# Patient Record
Sex: FEMALE | Race: ASIAN | Employment: FULL TIME | URBAN - METROPOLITAN AREA
[De-identification: names, ages, dates, MRNs, and addresses within clinical notes are randomized per-mention and may not be internally consistent; named-entity substitution may affect disease eponyms.]

---

## 2017-01-03 DIAGNOSIS — Q34.8 PCD (PRIMARY CILIARY DYSKINESIA): ICD-10-CM

## 2017-01-03 DIAGNOSIS — Q89.3 KARTAGENER'S SYNDROME: Primary | Chronic | ICD-10-CM

## 2017-01-05 DIAGNOSIS — Q89.3 KARTAGENER'S SYNDROME: Primary | Chronic | ICD-10-CM

## 2017-01-05 DIAGNOSIS — Q34.8 PCD (PRIMARY CILIARY DYSKINESIA): ICD-10-CM

## 2017-01-10 ENCOUNTER — TELEPHONE (OUTPATIENT)
Dept: PULMONOLOGY | Facility: CLINIC | Age: 42
End: 2017-01-10

## 2017-01-10 NOTE — TELEPHONE ENCOUNTER
Prior Authorization Retail Medication Request  Medication/Dose: Key: [id number]  Cayston 75 mg tid, 28 days on, 28 days off.  Diagnosis and ICD code: bronchiectasis  J47.9  New/Renewal/Insurance Change PA: renewal  Previously Tried and Failed Therapies: check chart    Insurance ID (if provided): not provided  Insurance Phone (if provided): not provided    Any additional info from fax request:     If you received a fax notification from an outside Pharmacy:  Pharmacy Name:[facility]  Pharmacy #:[phone]  Pharmacy Fax:[phone]

## 2017-01-18 NOTE — TELEPHONE ENCOUNTER
[facility] Prior Authorization Team   Phone: [phone]  Fax: [phone]    PA Initiation    Medication: Cayston 75 mg tid, 28 days on, 28 days off.  Insurance Company: [employer] - Phone [phone] Fax [phone]  Pharmacy Filling the Rx: [facility] - [place] [id number]  Filling Pharmacy Phone: [phone]  Filling Pharmacy Fax: [phone]  Start Date: 1/18/2017

## 2017-01-19 NOTE — TELEPHONE ENCOUNTER
Writer received the below [facility] question set via fax. Manually faxed completed [facility] to [facility] marked urgent. Included CF culture report from 04/19/16

## 2017-01-20 NOTE — TELEPHONE ENCOUNTER
PRIOR AUTHORIZATION DENIED    Medication: Cayston 75 mg tid, 28 days on, 28 days off - Denied    Denial Date: 1/19/2017    Denial Rational: Patient does not have diagnosis of Cystic Fibrosis.    Appeal Information: Appeals can be faxed to [employer] at [phone]. Please let us know if you would like to appeal this denial. If appealing, please provide a letter of medical necessity.

## 2017-02-16 ENCOUNTER — OFFICE VISIT (OUTPATIENT)
Dept: PULMONOLOGY | Facility: CLINIC | Age: 42
End: 2017-02-16
Attending: INTERNAL MEDICINE
Payer: COMMERCIAL

## 2017-02-16 VITALS
WEIGHT: 140 LBS | OXYGEN SATURATION: 96 % | HEART RATE: 88 BPM | RESPIRATION RATE: 16 BRPM | BODY MASS INDEX: 23.9 KG/M2 | TEMPERATURE: 98.6 F | DIASTOLIC BLOOD PRESSURE: 79 MMHG | SYSTOLIC BLOOD PRESSURE: 117 MMHG | HEIGHT: 64 IN

## 2017-02-16 DIAGNOSIS — Q34.8 PCD (PRIMARY CILIARY DYSKINESIA): Primary | ICD-10-CM

## 2017-02-16 DIAGNOSIS — Q89.3 KARTAGENER'S SYNDROME: ICD-10-CM

## 2017-02-16 DIAGNOSIS — Q89.3 KARTAGENER SYNDROME: ICD-10-CM

## 2017-02-16 LAB
EXPTIME-PRE: 10.27 SEC
FEF2575-%PRED-PRE: 21 %
FEF2575-PRE: 0.62 L/SEC
FEF2575-PRED: 2.96 L/SEC
FEFMAX-%PRED-PRE: 69 %
FEFMAX-PRE: 4.78 L/SEC
FEFMAX-PRED: 6.9 L/SEC
FEV1-%PRED-PRE: 54 %
FEV1-PRE: 1.49 L
FEV1FEV6-PRE: 62 %
FEV1FEV6-PRED: 84 %
FEV1FVC-PRE: 59 %
FEV1FVC-PRED: 83 %
FIFMAX-PRE: 4.45 L/SEC
FVC-%PRED-PRE: 76 %
FVC-PRE: 2.54 L
FVC-PRED: 3.33 L
GRAM STN SPEC: ABNORMAL
Lab: ABNORMAL
MICRO REPORT STATUS: ABNORMAL
SPECIMEN SOURCE: ABNORMAL

## 2017-02-16 PROCEDURE — 87077 CULTURE AEROBIC IDENTIFY: CPT | Performed by: INTERNAL MEDICINE

## 2017-02-16 PROCEDURE — 87186 SC STD MICRODIL/AGAR DIL: CPT | Performed by: INTERNAL MEDICINE

## 2017-02-16 PROCEDURE — 87070 CULTURE OTHR SPECIMN AEROBIC: CPT | Performed by: INTERNAL MEDICINE

## 2017-02-16 PROCEDURE — 99212 OFFICE O/P EST SF 10 MIN: CPT | Mod: 25,ZF

## 2017-02-16 PROCEDURE — 87205 SMEAR GRAM STAIN: CPT | Performed by: INTERNAL MEDICINE

## 2017-02-16 PROCEDURE — 94375 RESPIRATORY FLOW VOLUME LOOP: CPT | Mod: ZF | Performed by: INTERNAL MEDICINE

## 2017-02-16 RX ORDER — ALBUTEROL SULFATE 90 UG/1
2 AEROSOL, METERED RESPIRATORY (INHALATION) EVERY 6 HOURS PRN
Qty: 1 INHALER | Refills: 12 | Status: SHIPPED | OUTPATIENT
Start: 2017-02-16 | End: 2019-09-11

## 2017-02-16 RX ORDER — BUDESONIDE AND FORMOTEROL FUMARATE DIHYDRATE 160; 4.5 UG/1; UG/1
2 AEROSOL RESPIRATORY (INHALATION) 2 TIMES DAILY
Qty: 1 INHALER | Refills: 12 | Status: SHIPPED | OUTPATIENT
Start: 2017-02-16 | End: 2018-08-07

## 2017-02-16 ASSESSMENT — ENCOUNTER SYMPTOMS
DOUBLE VISION: 0
BRUISES/BLEEDS EASILY: 0
INSOMNIA: 0
HOARSE VOICE: 0
SWOLLEN GLANDS: 0
PALPITATIONS: 0
TASTE DISTURBANCE: 0
BOWEL INCONTINENCE: 0
TACHYCARDIA: 0
CLAUDICATION: 0
HEARTBURN: 0
NECK PAIN: 0
TROUBLE SWALLOWING: 0
POOR WOUND HEALING: 0
ARTHRALGIAS: 0
DIFFICULTY URINATING: 0
LOSS OF CONSCIOUSNESS: 0
HALLUCINATIONS: 0
FATIGUE: 0
FLANK PAIN: 0
NERVOUS/ANXIOUS: 0
SPUTUM PRODUCTION: 1
NUMBNESS: 0
RECTAL PAIN: 0
HOT FLASHES: 0
HEADACHES: 0
EXERCISE INTOLERANCE: 0
EYE IRRITATION: 0
DYSURIA: 0
DIZZINESS: 0
WEIGHT GAIN: 0
DIARRHEA: 0
CONSTIPATION: 0
SYNCOPE: 0
BREAST MASS: 0
HYPOTENSION: 0
RESPIRATORY PAIN: 0
COUGH DISTURBING SLEEP: 0
HYPERTENSION: 0
SLEEP DISTURBANCES DUE TO BREATHING: 0
COUGH: 1
FEVER: 0
JOINT SWELLING: 0
MEMORY LOSS: 0
ORTHOPNEA: 0
NAIL CHANGES: 0
JAUNDICE: 0
ABDOMINAL PAIN: 0
MYALGIAS: 0
MUSCLE WEAKNESS: 0
NIGHT SWEATS: 0
SHORTNESS OF BREATH: 0
POSTURAL DYSPNEA: 0
CHILLS: 0
TINGLING: 0
STIFFNESS: 0
PANIC: 0
WEIGHT LOSS: 0
MUSCLE CRAMPS: 0
SINUS PAIN: 0
VOMITING: 0
BLOATING: 0
WHEEZING: 1
LEG SWELLING: 0
DYSPNEA ON EXERTION: 1
INCREASED ENERGY: 0
NAUSEA: 0
SEIZURES: 0
SMELL DISTURBANCE: 0
HEMOPTYSIS: 0
DECREASED LIBIDO: 0
ALTERED TEMPERATURE REGULATION: 0
LIGHT-HEADEDNESS: 0
EYE PAIN: 0
DECREASED APPETITE: 0
POLYPHAGIA: 0
SPEECH CHANGE: 0
WEAKNESS: 0
DEPRESSION: 0
SNORES LOUDLY: 0
EYE WATERING: 0
DECREASED CONCENTRATION: 0
TREMORS: 0
BLOOD IN STOOL: 0
SKIN CHANGES: 0
SORE THROAT: 0
LEG PAIN: 0
HEMATURIA: 0
PARALYSIS: 0
EYE REDNESS: 0
POLYDIPSIA: 0
SINUS CONGESTION: 0
NECK MASS: 0
BREAST PAIN: 0
RECTAL BLEEDING: 0
BACK PAIN: 0
DISTURBANCES IN COORDINATION: 0
EXTREMITY NUMBNESS: 0

## 2017-02-16 ASSESSMENT — PAIN SCALES - GENERAL: PAINLEVEL: NO PAIN (0)

## 2017-02-16 NOTE — MR AVS SNAPSHOT
After Visit Summary   2/16/2017    Armando Dc    MRN: [id number]           Patient Information     Date Of Birth          1975        Visit Information        Provider Department      2/16/2017 2:20 PM Lyn Hernández MD [facility]        Today's Diagnoses     PCD (primary ciliary dyskinesia)    -  1    Kartagener syndrome        Kartagener's syndrome          Care Instructions    Self-Care Plan       MRN: [id number]   Clinic Date: February 16, 2017   Patient: Armando Dc       Pulmonary Function Tests  FEV1: amount of air you can blow out in 1 second  FVC: total amount of air you can take in and blow out    Your Goals:         PFT Latest Ref Rng & Units 2/16/2017   FVC L 2.54   FEV1 L 1.49   FVC% % 76   FEV1% % 54          Airway Clearance: The Most Important Way to Keep Your Lungs Healthy  Vest Settings:    Hill-Rom Frequencies: 8, 9, 10 Pressure 10 Then, Frequencies 18, 19, 20 Pressure 6      RespirTech: Quick Start with Pressure of     Do each frequency for 5 minutes; Deflate vest after each frequency & cough 3 times before beginning the next setting.    Vest and Neb Therapy should be done 2 times/day.        Wt Readings from Last 3 Encounters:   02/16/17 63.5 kg (140 lb)   09/20/16 63.1 kg (139 lb 1.8 oz)   08/08/16 61.7 kg (136 lb)       Body mass index is 24.33 kg/(m^2).       Last A1C Results:   Hemoglobin A1C   Date Value Ref Range Status   09/20/2016 5.8 4.3 - 6.0 % Final         If diabetic, measure A1C every 6 months. Goal: Under 7%         RECOMMENDATIONS: Things look good.  Keep doing what you are doing.  Fasting lipid panel before next visit.    YOUR GOAL:  Stay well.      CF Nurse Line:  Tami Lao: [phone]     Sybil Peng RT: [phone]                    Follow-ups after your visit        Follow-up notes from your care team     Return in about 3 months (around 5/16/2017) for fasting lipid panel before next  visit..      Your next 10 appointments already scheduled     May 16, 2017 10:40 AM CDT   (Arrive by 10:25 AM)   RETURN CYSTIC FIBROSIS VISIT with Lyn Hernández MD   [facility] ([facility])    [address] [id number] [phone]              Future tests that were ordered for you today     Open Future Orders        Priority Expected Expires Ordered    Sputum Culture Aerobic Bacterial Routine  3/18/2017 2/16/2017    Gram stain Routine  4/17/2017 2/16/2017    Lipid panel reflex to direct LDL Routine 5/16/2017 2/16/2018 2/16/2017            Who to contact     If you have questions or need follow up information about today's clinic visit or your schedule please contact [facility] directly at [phone].  Normal or non-critical lab and imaging results will be communicated to you by MyChart, letter or phone within 4 business days after the clinic has received the results. If you do not hear from us within 7 days, please contact the clinic through WebLinchart or phone. If you have a critical or abnormal lab result, we will notify you by phone as soon as possible.  Submit refill requests through Mixbook or call your pharmacy and they will forward the refill request to us. Please allow 3 business days for your refill to be completed.          Additional Information About Your Visit        MyChart Information     Mixbook gives you secure access to your electronic health record. If you see a primary care provider, you can also send messages to your care team and make appointments. If you have questions, please call your primary care clinic.  If you do not have a primary care provider, please call [phone] and they will assist you.        Care EveryWhere ID     This is your Care EveryWhere ID. This could be used by other organizations to access your [facility]  "records  [id number]        Your Vitals Were     Pulse Temperature Respirations Height Pulse Oximetry BMI (Body Mass Index)    88 98.6  F (37  C) (Oral) 16 1.615 m (5' 3.6\") 96% 24.33 kg/m2       Blood Pressure from Last 3 Encounters:   02/16/17 117/79   09/20/16 113/76   08/08/16 128/90    Weight from Last 3 Encounters:   02/16/17 63.5 kg (140 lb)   09/20/16 63.1 kg (139 lb 1.8 oz)   08/08/16 61.7 kg (136 lb)              We Performed the Following     RESPIRATORY FLOW VOLUME LOOP          Today's Medication Changes          These changes are accurate as of: 2/16/17  3:07 PM.  If you have any questions, ask your nurse or doctor.               Stop taking these medicines if you haven't already. Please contact your care team if you have questions.     aztreonam 200 MG/ML   Commonly known as:  AZACTAM   Stopped by:  Lyn Hernández MD           aztreonam lysine 75 MG Solr   Commonly known as:  CAYSTON   Stopped by:  Lyn Hernández MD           tobramycin (PF) 300 MG/5ML neb solution   Commonly known as:  ALEK   Stopped by:  Lyn Hernández MD                Where to get your medicines      These medications were sent to [facility] - [address]  [address] [place] [id number]    Hours:  Mon-Fri 8:30am-5:00pm Toll Free [phone] Phone:  [phone]     albuterol 108 (90 BASE) MCG/ACT Inhaler    budesonide-formoterol 160-4.5 MCG/ACT Inhaler                Primary Care Provider    None Specified       No primary provider on file.        Thank you!     Thank you for choosing [facility]  for your care. Our goal is always to provide you with excellent care. Hearing back from our patients is one way we can continue to improve our services. Please take a few minutes to complete the written survey that you may receive in the mail after your visit with us. Thank you!             Your Updated " "Medication List - Protect others around you: Learn how to safely use, store and throw away your medicines at www.disposemymeds.org.          This list is accurate as of: 2/16/17  3:07 PM.  Always use your most recent med list.                   Brand Name Dispense Instructions for use    ACAPELLA Misc      use twice daily       acetylcysteine 20 % injection    MUCOMYST    120 mL    Inhale 2 mLs into the lungs 2 times daily       * albuterol (2.5 MG/3ML) 0.083% neb solution     180 mL    USE ONE VIAL IN NEBULIZER TWICE DAILY       * albuterol 108 (90 BASE) MCG/ACT Inhaler    PROAIR HFA/PROVENTIL HFA/VENTOLIN HFA    1 Inhaler    Inhale 2 puffs into the lungs every 6 hours as needed for shortness of breath / dyspnea or wheezing       budesonide-formoterol 160-4.5 MCG/ACT Inhaler    SYMBICORT    1 Inhaler    Inhale 2 puffs into the lungs 2 times daily       nebulizer Sindy      Nebulizer compressor - use with ALEK as directed       Syringe Luer Lock 20G X 1-1/2\" 5 ML Misc     60 each    1 each 2 times daily       Water For Injection Sterile Soln     250 mL    Inject 4 mLs into the vein 2 times daily       * Notice:  This list has 2 medication(s) that are the same as other medications prescribed for you. Read the directions carefully, and ask your doctor or other care provider to review them with you.      "

## 2017-02-16 NOTE — LETTER
Date:February 21, 2017      Patient was self referred, no letter generated. Do not send.        [facility] Information

## 2017-02-16 NOTE — PATIENT INSTRUCTIONS
Self-Care Plan       MRN: [id number]   Clinic Date: February 16, 2017   Patient: Armando Dc       Pulmonary Function Tests  FEV1: amount of air you can blow out in 1 second  FVC: total amount of air you can take in and blow out    Your Goals:         PFT Latest Ref Rng & Units 2/16/2017   FVC L 2.54   FEV1 L 1.49   FVC% % 76   FEV1% % 54          Airway Clearance: The Most Important Way to Keep Your Lungs Healthy  Vest Settings:    Hill-Rom Frequencies: 8, 9, 10 Pressure 10 Then, Frequencies 18, 19, 20 Pressure 6      RespirTech: Quick Start with Pressure of     Do each frequency for 5 minutes; Deflate vest after each frequency & cough 3 times before beginning the next setting.    Vest and Neb Therapy should be done 2 times/day.        Wt Readings from Last 3 Encounters:   02/16/17 63.5 kg (140 lb)   09/20/16 63.1 kg (139 lb 1.8 oz)   08/08/16 61.7 kg (136 lb)       Body mass index is 24.33 kg/(m^2).       Last A1C Results:   Hemoglobin A1C   Date Value Ref Range Status   09/20/2016 5.8 4.3 - 6.0 % Final         If diabetic, measure A1C every 6 months. Goal: Under 7%         RECOMMENDATIONS: Things look good.  Keep doing what you are doing.  Fasting lipid panel before next visit.    YOUR GOAL:  Stay well.      CF Nurse Line:  Tami Lao: [phone]     Sybil Peng RT: [phone]

## 2017-02-16 NOTE — LETTER
2/16/2017       RE: Armando Dc  [address]  [place] [address]     Dear Colleague,    Thank you for referring your patient, Armando Dc, to the [facility] at [facility]. Please see a copy of my visit note below.    [facility]  February 16, 2017         Assessment and Plan:   Armando Dc is a 41 year old female with PCD.    1. PCD lung disease with moderately-severe obstruction:  Armando has had a nice improvement in her pulmonary function as well as her pulmonary symptomatology.  She reports that she is no longer having episodes of shortness of breath, and her  also reports that she appears more comfortable.  Her last culture was last year and she did show evidence of Pseudomonas.  She has not been doing her inhalational ALEK and aztreonam, as she has found no significant change with them.  I have removed these from her list.  I have recommended that she continue to do her vest and nebulized therapy as prescribed.   2.  Chronic otitis and sinusitis.  Armando describes stable symptoms.  She has no active concerns at this time.    3.  Gynecology.  Armando and her  are continuing to pursue conception.  They will be going to [place] in May or June for another attempt at vitro fertilization.   4.  Psychosocial.  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  She reports that it is going well, but it is very tiring.  She is holding her own despite the rigorous lifestyle of a resident.     Lyn Hernández MD MPH   of Medicine  Pulmonary, Allergy, Critical Care and Sleep Medicine      Interval History:     Armando does report that she continues to produce sputum that is yellow in color.  Her cough frequency and sputum volume are slightly improved.  She is doing her vest therapy at least one time per day.          Review of Systems:     All questionnaires were  reviewed by me today with the patient.  Review of Systems     Constitutional:  Negative for fever, chills, weight loss, weight gain, fatigue, decreased appetite, night sweats, recent stressors, height gain, height loss, post-operative complications, incisional pain, hallucinations, increased energy, hyperactivity and confused.   HENT:  Negative for ear pain, hearing loss, tinnitus, nosebleeds, trouble swallowing, hoarse voice, mouth sores, sore throat, ear discharge, tooth pain, gum tenderness, taste disturbance, smell disturbance, hearing aid, bleeding gums, dry mouth, sinus pain, sinus congestion and neck mass.    Eyes:  Negative for double vision, pain, redness, eye pain, decreased vision, eye watering, eye bulging, eye dryness, flashing lights, spots, floaters, strabismus, tunnel vision, jaundice and eye irritation.   Respiratory:   Positive for cough, sputum production, wheezing and dyspnea on exertion. Negative for hemoptysis, shortness of breath, sleep disturbances due to breathing, snores loudly, respiratory pain, cough disturbing sleep and postural dyspnea.    Cardiovascular:  Positive for dyspnea on exertion. Negative for chest pain, palpitations, orthopnea, claudication, leg swelling, fingers/toes turn blue, hypertension, hypotension, syncope, history of heart murmur, chest pain on exertion, chest pain at rest, pacemaker, few scattered varicosities, leg pain, sleep disturbances due to breathing, tachycardia, light-headedness, exercise intolerance and edema.   Gastrointestinal:  Negative for heartburn, nausea, vomiting, abdominal pain, diarrhea, constipation, blood in stool, melena, rectal pain, bloating, hemorrhoids, bowel incontinence, jaundice, rectal bleeding, coffee ground emesis and change in stool.   Genitourinary:  Negative for bladder incontinence, dysuria, urgency, hematuria, flank pain, vaginal discharge, difficulty urinating, genital sores, dyspareunia, decreased libido, nocturia, voiding less  frequently, arousal difficulty, abnormal vaginal bleeding, excessive menstruation, menstrual changes, hot flashes, vaginal dryness and postmenopausal bleeding.   Musculoskeletal:  Negative for myalgias, back pain, joint swelling, arthralgias, stiffness, muscle cramps, neck pain, bone pain, muscle weakness and fracture.   Skin:  Negative for nail changes, itching, poor wound healing, rash, hair changes, skin changes, acne, warts, poor wound healing, scarring, flaky skin, Raynaud's phenomenon, sensitivity to sunlight and skin thickening.   Neurological:  Negative for dizziness, tingling, tremors, speech change, seizures, loss of consciousness, weakness, light-headedness, numbness, headaches, disturbances in coordination, extremity numbness, memory loss, difficulty walking and paralysis.   Endo/Heme:  Negative for anemia, swollen glands and bruises/bleeds easily.   Psychiatric/Behavioral:  Negative for depression, hallucinations, memory loss, decreased concentration, mood swings and panic attacks.    Breast:  Negative for breast discharge, breast mass, breast pain and nipple retraction.   Endocrine:  Negative for altered temperature regulation, polyphagia, polydipsia, unwanted hair growth and change in facial hair.            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,      Pseudomonas aeruginosa colonization 2008     Reactive airway disease      Uncomplicated asthma      Reactive airway disease     Past Surgical History   Procedure Laterality Date     Tonsillectomy & adenoidectomy       8 years old     Transvaginal retrieval ovum Bilateral 3/3/2016     Procedure: TRANSVAGINAL RETRIEVAL OVUM;  Surgeon: Sunshine Gilliam MD;  [facility]           Family History:     Family History   Problem Relation Age of Onset     Hyperlipidemia Mother      DIABETES Mother      Hypertension Mother       "Hyperlipidemia Father      Hypertension Father             Social History:     Social History     Social History     Marital status:      Spouse name: N/A     Number of children: N/A     Years of education: N/A     Occupational History     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 Devices (ACAPELLA) MIS     Respiratory Therapy Supplies (NEBULIZER) SUZANNA     acetylcysteine (MUCOMYST) 20 % nebulizer solution     Syringe/Needle, Disp, (SYRINGE LUER LOCK) 20G X 1-1/2\" 5 ML MISC     Water For Injection Sterile SOLN     No current facility-administered medications for this visit.             Physical Exam:   /79  Pulse 88  Temp 98.6  F (37  C) (Oral)  Resp 16  Ht 1.615 m (5' 3.6\")  Wt 63.5 kg (140 lb)  SpO2 96%  BMI 24.33 kg/m2    Constitutional:   Awake, alert and in no apparent distress     Eyes:   nonicteric     ENT:   oral mucosa moist without lesions, normal tm bilaterally, bilateral mucosal edema      Neck:   Supple without supraclavicular or cervical lymphadenopathy     Lungs:   fair air flow.  No crackles. scattered  rhonchi.  rare wheezes.     Cardiovascular:   Normal S1 and S2.  RRR.  No murmur, gallop or rub.     Abdomen:   NABS, soft, nontender, nondistended.      Musculoskeletal:   No edema, no digital clubbing present     Neurologic:   Alert and conversant.     Skin:   Warm, dry.  No rash on limited exam.             Data:   All laboratory " and imaging data reviewed.    Cystic Fibrosis Culture  Specimen Description   Date Value Ref Range Status   02/16/2017 Sputum  Final   02/16/2017 Sputum  Final   04/19/2016 Swab  Final    Culture Micro   Date Value Ref Range Status   02/16/2017 Heavy growth Normal otto to date  Final   04/19/2016 (A)  Final    Normal otto  Light growth Pseudomonas aeruginosa, mucoid strain     10/28/2015 (A)  Final    Normal otto  Light growth Pseudomonas aeruginosa Organism failed to thrive for susceptibility   testing  Light growth Aspergillus niger          Recent Results (from the past 168 hour(s))   General PFT Lab (Please always keep checked)    Collection Time: 02/16/17  1:47 PM   Result Value Ref Range    FVC-Pred 3.33 L    FVC-Pre 2.54 L    FVC-%Pred-Pre 76 %    FEV1-Pre 1.49 L    FEV1-%Pred-Pre 54 %    FEV1FVC-Pred 83 %    FEV1FVC-Pre 59 %    FEFMax-Pred 6.90 L/sec    FEFMax-Pre 4.78 L/sec    FEFMax-%Pred-Pre 69 %    FEF2575-Pred 2.96 L/sec    FEF2575-Pre 0.62 L/sec    DYL1140-%Pred-Pre 21 %    ExpTime-Pre 10.27 sec    FIFMax-Pre 4.45 L/sec    FEV1FEV6-Pred 84 %    FEV1FEV6-Pre 62 %   Sputum Culture Aerobic Bacterial    Collection Time: 02/16/17  3:00 PM   Result Value Ref Range    Specimen Description Sputum     Culture Micro Heavy growth Normal otto to date     Micro Report Status Pending    Gram stain    Collection Time: 02/16/17  3:00 PM   Result Value Ref Range    Specimen Description Sputum     Special Requests Screen     Gram Stain (A)      <10 Squamous epithelial cells/low power field  >25 PMNs/low power field  Moderate Gram negative rods  Rare Gram positive cocci      Micro Report Status FINAL 02/16/2017        PFT: Moderately-severe obstructive lung disease.  When compared to 9/20/2016, the FEV1 has incresaed.  The decrease in FVC may represent air trapping v. restrictive physiology.  Lung volumes would be necessary to determine.      Again, thank you for allowing me to participate in the care of your  patient.      Sincerely,    Lyn Hernández MD

## 2017-02-16 NOTE — PROGRESS NOTES
[facility]  February 16, 2017         Assessment and Plan:   Armando Dc is a 41 year old female with PCD.    1. PCD lung disease with moderately-severe obstruction:  Armando has had a nice improvement in her pulmonary function as well as her pulmonary symptomatology.  She reports that she is no longer having episodes of shortness of breath, and her  also reports that she appears more comfortable.  Her last culture was last year and she did show evidence of Pseudomonas.  She has not been doing her inhalational ALEK and aztreonam, as she has found no significant change with them.  I have removed these from her list.  I have recommended that she continue to do her vest and nebulized therapy as prescribed.   2.  Chronic otitis and sinusitis.  Armando describes stable symptoms.  She has no active concerns at this time.    3.  Gynecology.  Armando and her  are continuing to pursue conception.  They will be going to [place] in May or June for another attempt at vitro fertilization.   4.  Psychosocial.  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  She reports that it is going well, but it is very tiring.  She is holding her own despite the rigorous lifestyle of a resident.     Lyn Hernández MD MPH   of Medicine  Pulmonary, Allergy, Critical Care and Sleep Medicine      Interval History:     Armando does report that she continues to produce sputum that is yellow in color.  Her cough frequency and sputum volume are slightly improved.  She is doing her vest therapy at least one time per day.          Review of Systems:     All questionnaires were reviewed by me today with the patient.  Review of Systems     Constitutional:  Negative for fever, chills, weight loss, weight gain, fatigue, decreased appetite, night sweats, recent stressors, height gain, height loss, post-operative complications, incisional pain, hallucinations, increased energy,  hyperactivity and confused.   HENT:  Negative for ear pain, hearing loss, tinnitus, nosebleeds, trouble swallowing, hoarse voice, mouth sores, sore throat, ear discharge, tooth pain, gum tenderness, taste disturbance, smell disturbance, hearing aid, bleeding gums, dry mouth, sinus pain, sinus congestion and neck mass.    Eyes:  Negative for double vision, pain, redness, eye pain, decreased vision, eye watering, eye bulging, eye dryness, flashing lights, spots, floaters, strabismus, tunnel vision, jaundice and eye irritation.   Respiratory:   Positive for cough, sputum production, wheezing and dyspnea on exertion. Negative for hemoptysis, shortness of breath, sleep disturbances due to breathing, snores loudly, respiratory pain, cough disturbing sleep and postural dyspnea.    Cardiovascular:  Positive for dyspnea on exertion. Negative for chest pain, palpitations, orthopnea, claudication, leg swelling, fingers/toes turn blue, hypertension, hypotension, syncope, history of heart murmur, chest pain on exertion, chest pain at rest, pacemaker, few scattered varicosities, leg pain, sleep disturbances due to breathing, tachycardia, light-headedness, exercise intolerance and edema.   Gastrointestinal:  Negative for heartburn, nausea, vomiting, abdominal pain, diarrhea, constipation, blood in stool, melena, rectal pain, bloating, hemorrhoids, bowel incontinence, jaundice, rectal bleeding, coffee ground emesis and change in stool.   Genitourinary:  Negative for bladder incontinence, dysuria, urgency, hematuria, flank pain, vaginal discharge, difficulty urinating, genital sores, dyspareunia, decreased libido, nocturia, voiding less frequently, arousal difficulty, abnormal vaginal bleeding, excessive menstruation, menstrual changes, hot flashes, vaginal dryness and postmenopausal bleeding.   Musculoskeletal:  Negative for myalgias, back pain, joint swelling, arthralgias, stiffness, muscle cramps, neck pain, bone pain, muscle  weakness and fracture.   Skin:  Negative for nail changes, itching, poor wound healing, rash, hair changes, skin changes, acne, warts, poor wound healing, scarring, flaky skin, Raynaud's phenomenon, sensitivity to sunlight and skin thickening.   Neurological:  Negative for dizziness, tingling, tremors, speech change, seizures, loss of consciousness, weakness, light-headedness, numbness, headaches, disturbances in coordination, extremity numbness, memory loss, difficulty walking and paralysis.   Endo/Heme:  Negative for anemia, swollen glands and bruises/bleeds easily.   Psychiatric/Behavioral:  Negative for depression, hallucinations, memory loss, decreased concentration, mood swings and panic attacks.    Breast:  Negative for breast discharge, breast mass, breast pain and nipple retraction.   Endocrine:  Negative for altered temperature regulation, polyphagia, polydipsia, unwanted hair growth and change in facial hair.            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,      Pseudomonas aeruginosa colonization 2008     Reactive airway disease      Uncomplicated asthma      Reactive airway disease     Past Surgical History   Procedure Laterality Date     Tonsillectomy & adenoidectomy       8 years old     Transvaginal retrieval ovum Bilateral 3/3/2016     Procedure: TRANSVAGINAL RETRIEVAL OVUM;  Surgeon: Sunshine Gilliam MD;  Location: [facility]           Family History:     Family History   Problem Relation Age of Onset     Hyperlipidemia Mother      DIABETES Mother      Hypertension Mother      Hyperlipidemia Father      Hypertension Father             Social History:     Social History     Social History     Marital status:      Spouse name: N/A     Number of children: N/A     Years of education: N/A     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 Devices (ACAPELLA) MISC     Respiratory Therapy Supplies (NEBULIZER) SUZANNA     acetylcysteine (MUCOMYST) 20 % nebulizer solution     Syringe/Needle, Disp, (SYRINGE LUER LOCK) 20G X 1-1/2\" 5 ML MISC     Water For Injection Sterile SOLN     No current facility-administered medications for this visit.             Physical Exam:   /79  Pulse 88  Temp 98.6  F (37  C) (Oral)  Resp 16  Ht 1.615 m (5' 3.6\")  Wt 63.5 kg (140 lb)  SpO2 96%  BMI 24.33 kg/m2    Constitutional:   Awake, alert and in no apparent distress     Eyes:   nonicteric     ENT:   oral mucosa moist without lesions, normal tm bilaterally, bilateral mucosal edema      Neck:   Supple without supraclavicular or cervical lymphadenopathy     Lungs:   fair air flow.  No crackles. scattered  rhonchi.  rare wheezes.     Cardiovascular:   Normal S1 and S2.  RRR.  No murmur, gallop or rub.     Abdomen:   NABS, soft, nontender, nondistended.      Musculoskeletal:   No edema, no digital clubbing present     Neurologic:   Alert and conversant.     Skin:   Warm, dry.  No rash on limited exam.             Data:   All laboratory and imaging data reviewed.    Cystic Fibrosis Culture  Specimen Description   Date Value Ref Range Status   02/16/2017 Sputum  Final   02/16/2017 Sputum  Final   04/19/2016 Swab  Final    Culture Micro   Date Value Ref Range Status   02/16/2017 Heavy growth Normal otto to date  Final   04/19/2016 (A)  " Final    Normal otto  Light growth Pseudomonas aeruginosa, mucoid strain     10/28/2015 (A)  Final    Normal otto  Light growth Pseudomonas aeruginosa Organism failed to thrive for susceptibility   testing  Light growth Aspergillus niger          Recent Results (from the past 168 hour(s))   General PFT Lab (Please always keep checked)    Collection Time: 02/16/17  1:47 PM   Result Value Ref Range    FVC-Pred 3.33 L    FVC-Pre 2.54 L    FVC-%Pred-Pre 76 %    FEV1-Pre 1.49 L    FEV1-%Pred-Pre 54 %    FEV1FVC-Pred 83 %    FEV1FVC-Pre 59 %    FEFMax-Pred 6.90 L/sec    FEFMax-Pre 4.78 L/sec    FEFMax-%Pred-Pre 69 %    FEF2575-Pred 2.96 L/sec    FEF2575-Pre 0.62 L/sec    DUT5729-%Pred-Pre 21 %    ExpTime-Pre 10.27 sec    FIFMax-Pre 4.45 L/sec    FEV1FEV6-Pred 84 %    FEV1FEV6-Pre 62 %   Sputum Culture Aerobic Bacterial    Collection Time: 02/16/17  3:00 PM   Result Value Ref Range    Specimen Description Sputum     Culture Micro Heavy growth Normal otto to date     Micro Report Status Pending    Gram stain    Collection Time: 02/16/17  3:00 PM   Result Value Ref Range    Specimen Description Sputum     Special Requests Screen     Gram Stain (A)      <10 Squamous epithelial cells/low power field  >25 PMNs/low power field  Moderate Gram negative rods  Rare Gram positive cocci      Micro Report Status FINAL 02/16/2017        PFT: Moderately-severe obstructive lung disease.  When compared to 9/20/2016, the FEV1 has incresaed.  The decrease in FVC may represent air trapping v. restrictive physiology.  Lung volumes would be necessary to determine.

## 2017-02-16 NOTE — NURSING NOTE
Chief Complaint   Patient presents with     Breathing Problem     Patient is being seen for follow up of PCD     Giorgio Ramsey CMA at 2:20 PM on 2/16/2017

## 2017-02-20 LAB
BACTERIA SPEC CULT: ABNORMAL
MICRO REPORT STATUS: ABNORMAL
MICROORGANISM SPEC CULT: ABNORMAL
MICROORGANISM SPEC CULT: ABNORMAL
SPECIMEN SOURCE: ABNORMAL

## 2017-05-16 ENCOUNTER — OFFICE VISIT (OUTPATIENT)
Dept: PULMONOLOGY | Facility: CLINIC | Age: 42
End: 2017-05-16
Attending: INTERNAL MEDICINE
Payer: COMMERCIAL

## 2017-05-16 VITALS
BODY MASS INDEX: 23.9 KG/M2 | HEART RATE: 95 BPM | HEIGHT: 64 IN | WEIGHT: 140 LBS | OXYGEN SATURATION: 95 % | SYSTOLIC BLOOD PRESSURE: 118 MMHG | RESPIRATION RATE: 18 BRPM | DIASTOLIC BLOOD PRESSURE: 87 MMHG | TEMPERATURE: 98.7 F

## 2017-05-16 DIAGNOSIS — J47.9 BRONCHIECTASIS WITHOUT COMPLICATION (H): Primary | ICD-10-CM

## 2017-05-16 DIAGNOSIS — Q89.3 KARTAGENER'S SYNDROME: Primary | Chronic | ICD-10-CM

## 2017-05-16 DIAGNOSIS — Q89.3 KARTAGENER'S SYNDROME: ICD-10-CM

## 2017-05-16 DIAGNOSIS — Q34.8 PCD (PRIMARY CILIARY DYSKINESIA): ICD-10-CM

## 2017-05-16 LAB
CHOLEST SERPL-MCNC: 176 MG/DL
HDLC SERPL-MCNC: 62 MG/DL
LDLC SERPL CALC-MCNC: 97 MG/DL
NONHDLC SERPL-MCNC: 114 MG/DL
TRIGL SERPL-MCNC: 85 MG/DL

## 2017-05-16 PROCEDURE — 99212 OFFICE O/P EST SF 10 MIN: CPT | Mod: ZF

## 2017-05-16 PROCEDURE — 94375 RESPIRATORY FLOW VOLUME LOOP: CPT | Mod: ZF | Performed by: INTERNAL MEDICINE

## 2017-05-16 PROCEDURE — 36415 COLL VENOUS BLD VENIPUNCTURE: CPT | Performed by: INTERNAL MEDICINE

## 2017-05-16 PROCEDURE — 80061 LIPID PANEL: CPT | Performed by: INTERNAL MEDICINE

## 2017-05-16 ASSESSMENT — PAIN SCALES - GENERAL: PAINLEVEL: NO PAIN (0)

## 2017-05-16 NOTE — NURSING NOTE
Chief Complaint   Patient presents with     RECHECK     Follow up on Asma and her lung issues     Giorgio Ramsey CMA at 10:45 AM on 5/16/2017

## 2017-05-16 NOTE — MR AVS SNAPSHOT
After Visit Summary   5/16/2017    Armando Dc    MRN: [id number]           Patient Information     Date Of Birth          1975        Visit Information        Provider Department      5/16/2017 10:40 AM Lyn Hernández MD [facility]        Today's Diagnoses     Bronchiectasis without acute exacerbation (H)    -  1    Kartagener's syndrome          Care Instructions    Self-Care Plan       MRN: [id number]   Clinic Date: May 16, 2017   Patient: Armando Dc     Annual Studies:   No results found for: IGG  No results found for: INS  There are no preventive care reminders to display for this patient.      Pulmonary Function Tests  FEV1: amount of air you can blow out in 1 second  FVC: total amount of air you can take in and blow out    Your Goals:         PFT Latest Ref Rng & Units 5/16/2017   FVC L 2.55   FEV1 L 1.51   FVC% % 76   FEV1% % 55          Airway Clearance: The Most Important Way to Keep Your Lungs Healthy  Vest Settings:    Hill-Rom Frequencies: 8, 9, 10 Pressure 10 Then, Frequencies 18, 19, 20 Pressure 6      RespirTech: Quick Start with Pressure of     Do each frequency for 5 minutes; Deflate vest after each frequency & cough 3 times before beginning the next setting.    Vest and Neb Therapy should be done 1 times/day.    Good Nutrition Can Improve Lung Function and Overall Health     Take ALL of your vitamins with food     Take 1/2 of your enzymes before EVERY meal/snack and the other 1/2 mid-meal/snack    Wt Readings from Last 3 Encounters:   05/16/17 63.5 kg (140 lb)   02/16/17 63.5 kg (140 lb)   09/20/16 63.1 kg (139 lb 1.8 oz)       Body mass index is 24.41 kg/(m^2).         National CF Foundation Recommendations for BMI in CF Adults: Women: at least 22 Men: at least 23        Controlling Blood Sugars Helps Prevent Lung Infections & Improves Nutrition  Test blood sugar:     In the morning before eating (goal is )     2 hours after  a meal (goal is less than 150)     When pre-meal glucose is greater than 150 add correction     At bedtime (if less than 100 eat a snack with 15 grams of carbohydrates  Last A1C Results:   Hemoglobin A1C   Date Value Ref Range Status   09/20/2016 5.8 4.3 - 6.0 % Final         If diabetic, measure A1C every 6 months. Goal: Under 7%    Staying Healthy    Research:  If you are interested in learning about research opportunities or have questions, please contact Adriana Vásquez at [phone] or [email or web address].      CF Foundation:  Compass is a personalized resource service to help you with the insurance, financial, legal and other issues you are facing.  It's free, confidential and available to anyone with CF.  Ask your  for more information or contact Compass directly at [facility] ([phone]) or compass@cff.org, or learn more at cff.org/compass.          RECOMMENDATIONS: Great job!  [facility].  Keep up the good work.  Think about yoga.    YOUR GOAL: Enjoy the summer and the beginning of your 3rd year.  I like the idea of the child fellowship.      CF Nurse Line:  Tami Lao: [phone]   Sybil Peng, RT: [phone]     Delphine Angulo: [phone] or Milena Ferreiraicians: [phone]   Sharon Stacy, Diabetes Nurse: [phone]      Akilah Guzman: [phone] or Olga Greer [phone], Social Workers   [email or web address]                 Follow-ups after your visit        Follow-up notes from your care team     Return in about 3 months (around 8/16/2017).      Your next 10 appointments already scheduled     Aug 16, 2017 10:00 AM CDT   CF LOOP with  [facility] Pulmonary Function Testing ([facility])    [address] [place] [address] [phone]            Aug 16, 2017 10:30 AM CDT   (Arrive by 10:15 AM)   RETURN CYSTIC FIBROSIS VISIT with Lyn Hernández MD  "  [facility] ([facility])    [address] [facility] [facility]  [address] [facility] [address] [phone]              Future tests that were ordered for you today     Open Future Orders        Priority Expected Expires Ordered    Sputum Culture Aerobic Bacterial Routine  6/15/2017 5/16/2017    Gram stain Routine  7/15/2017 5/16/2017    RESPIRATORY FLOW VOLUME LOOP Routine  6/15/2017 5/16/2017            Who to contact     If you have questions or need follow up information about today's clinic visit or your schedule please contact [facility] directly at [phone].  Normal or non-critical lab and imaging results will be communicated to you by OFERTALDIAhart, letter or phone within 4 business days after the clinic has received the results. If you do not hear from us within 7 days, please contact the clinic through [employer] or phone. If you have a critical or abnormal lab result, we will notify you by phone as soon as possible.  Submit refill requests through [employer] or call your pharmacy and they will forward the refill request to us. Please allow 3 business days for your refill to be completed.          Additional Information About Your Visit        OFERTALDIAharMemorado Information     [employer] gives you secure access to your electronic health record. If you see a primary care provider, you can also send messages to your care team and make appointments. If you have questions, please call your primary care clinic.  If you do not have a primary care provider, please call [phone] and they will assist you.        Care EveryWhere ID     This is your Care EveryWhere ID. This could be used by other organizations to access your [place] medical records  [id number]        Your Vitals Were     Pulse Temperature Respirations Height Pulse Oximetry BMI (Body Mass Index)    95 98.7  F (37.1  C) (Oral) 18 1.613 m (5' 3.5\") 95% 24.41 kg/m2       Blood " "Pressure from Last 3 Encounters:   05/16/17 118/87   02/16/17 117/79   09/20/16 113/76    Weight from Last 3 Encounters:   05/16/17 63.5 kg (140 lb)   02/16/17 63.5 kg (140 lb)   09/20/16 63.1 kg (139 lb 1.8 oz)              We Performed the Following     RESPIRATORY FLOW VOLUME LOOP        Primary Care Provider    None Specified       No primary provider on file.        Thank you!     Thank you for choosing [facility]  for your care. Our goal is always to provide you with excellent care. Hearing back from our patients is one way we can continue to improve our services. Please take a few minutes to complete the written survey that you may receive in the mail after your visit with us. Thank you!             Your Updated Medication List - Protect others around you: Learn how to safely use, store and throw away your medicines at www.disposemymeds.org.          This list is accurate as of: 5/16/17 11:57 AM.  Always use your most recent med list.                   Brand Name Dispense Instructions for use    ACAPELLA Misc      use twice daily       acetylcysteine 20 % nebulizer solution    MUCOMYST    120 mL    Inhale 2 mLs into the lungs 2 times daily       * albuterol (2.5 MG/3ML) 0.083% neb solution     180 mL    USE ONE VIAL IN NEBULIZER TWICE DAILY       * albuterol 108 (90 BASE) MCG/ACT Inhaler    PROAIR HFA/PROVENTIL HFA/VENTOLIN HFA    1 Inhaler    Inhale 2 puffs into the lungs every 6 hours as needed for shortness of breath / dyspnea or wheezing       budesonide-formoterol 160-4.5 MCG/ACT Inhaler    SYMBICORT    1 Inhaler    Inhale 2 puffs into the lungs 2 times daily       nebulizer Sindy      Nebulizer compressor - use with ALEK as directed       Syringe Luer Lock 20G X 1-1/2\" 5 ML Misc     60 each    1 each 2 times daily       Water For Injection Sterile Soln     250 mL    Inject 4 mLs into the vein 2 times daily       * Notice:  This list has 2 medication(s) that are the same " as other medications prescribed for you. Read the directions carefully, and ask your doctor or other care provider to review them with you.

## 2017-05-16 NOTE — PROGRESS NOTES
[facility]  [facility]  May 16, 2017         Assessment and Plan:   Armando Dc is a 42 year old female with PCD.    1. PCD lung disease with moderately-severe obstruction:  Armando has shown evidence of stability in her pulmonary function as well as her pulmonary symptomatology.  She continues to show evidence of Pseudomonas in her sputum.  We did discuss doing alternating antibiotics, but given her wellness she does not feel that she needs to.  At this time, I would recommend that she continue on her bronchial drainage and nebulized therapies.    2.  Sinusitis.  Currently Armando has no concerning symptoms.  She has a little bit of sneezing and rhinorrhea in the morning, and that resolves without intervention.   3.  Psychosocial.  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  She is interested in pursuing a Child Psychiatry fellowship.  She will be applying for those programs.  She reports overall that things are going well.  She is  and in a stable relationship.   4.  Conception.  Armando and her  have been working with a specialty clinic and pursuing IVF.  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     Lyn Hernández MD MPH   of Medicine  Pulmonary, Allergy, Critical Care and Sleep Medicine      Interval History:     Armando denies that she has any cough outside of about 3 times per day.  Her cough frequency and sputum volume are stable.  Her activity tolerance remains stable.  She is doing one vest per day.          Review of Systems:     CONSTITUTIONAL: no fever, no chills, no change in weight, no change in energy, no change in appetite    INTEGUMENTARY/SKIN: no rash, no obvious new lesions    ENT/MOUTH: no sore throat, no new sinus pain, increased nasal drainage, no hearing loss, no ear ringing     RESPIRATORY: see interval history    CV: no chest pain,  no palpitations    GI: no nausea, no vomiting, no change in stools, no fatty stools, no GERD, no abdominal pain    : no dysuria, no urinary frequency, no incontinence    MUSCULOSKELETAL: no myalgias, no arthralgias    ENDOCRINE: no excessive thirst    NEURO:  No headache, no numbness, no tingling    SLEEP: no issues    PSYCHIATRIC: mood stable          Past Medical and Surgical History:     Past Medical History:   Diagnosis Date     Infertility      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,      Pseudomonas aeruginosa colonization 2008     Reactive airway disease      Uncomplicated asthma     Reactive airway disease     Past Surgical History:   Procedure Laterality Date     TONSILLECTOMY & ADENOIDECTOMY      8 years old     TRANSVAGINAL RETRIEVAL OVUM Bilateral 3/3/2016    Procedure: TRANSVAGINAL RETRIEVAL OVUM;  Surgeon: Sunshine Gilliam MD;  Location: 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 Devices (ACAPELLA) MISC     Respiratory  "Therapy Supplies (NEBULIZER) SUZANNA     acetylcysteine (MUCOMYST) 20 % nebulizer solution     Syringe/Needle, Disp, (SYRINGE LUER LOCK) 20G X 1-1/2\" 5 ML MISC     Water For Injection Sterile SOLN     No current facility-administered medications for this visit.             Physical Exam:   /87  Pulse 95  Temp 98.7  F (37.1  C) (Oral)  Resp 18  Ht 1.613 m (5' 3.5\")  Wt 63.5 kg (140 lb)  SpO2 95%  BMI 24.41 kg/m2    Constitutional:   Awake, alert and in no apparent distress     Eyes:   nonicteric     ENT:   oral mucosa moist without lesions, normal tm bilaterally, bilateral mucosal edema      Neck:   Supple without supraclavicular or cervical lymphadenopathy     Lungs:   fair air flow.  No crackles. No rhonchi. diffsue wheezes.     Cardiovascular:   Normal S1 and S2.  RRR.  No murmur, gallop or rub.     Abdomen:   NABS, soft, nontender, nondistended.      Musculoskeletal:   No edema, no digital clubbing present     Neurologic:   Alert and conversant.     Skin:   Warm, dry.  No rash on limited exam.             Data:   All laboratory and imaging data reviewed.    Cystic Fibrosis Culture  Specimen Description   Date Value Ref Range Status   02/16/2017 Sputum  Final   02/16/2017 Sputum  Final   04/19/2016 Swab  Final    Culture Micro   Date Value Ref Range Status   02/16/2017 (A)  Final    Heavy growth Normal otto  Heavy growth Pseudomonas aeruginosa, mucoid strain  Heavy growth Strain 2 Pseudomonas aeruginosa, mucoid strain     04/19/2016 (A)  Final    Normal otto  Light growth Pseudomonas aeruginosa, mucoid strain     10/28/2015 (A)  Final    Normal otto  Light growth Pseudomonas aeruginosa Organism failed to thrive for susceptibility   testing  Light growth Aspergillus niger          Recent Results (from the past 168 hour(s))   General PFT Lab (Please always keep checked)    Collection Time: 05/16/17 10:26 AM   Result Value Ref Range    FVC-Pred 3.32 L    FVC-Pre 2.55 L    FVC-%Pred-Pre 76 %    FEV1-Pre " 1.51 L    FEV1-%Pred-Pre 55 %    FEV1FVC-Pred 83 %    FEV1FVC-Pre 59 %    FEFMax-Pred 6.90 L/sec    FEFMax-Pre 4.65 L/sec    FEFMax-%Pred-Pre 67 %    FEF2575-Pred 2.95 L/sec    FEF2575-Pre 0.64 L/sec    JYK2241-%Pred-Pre 21 %    ExpTime-Pre 10.00 sec    FIFMax-Pre 3.42 L/sec    FEV1FEV6-Pred 84 %    FEV1FEV6-Pre 60 %   Lipid panel reflex to direct LDL    Collection Time: 05/16/17 11:56 AM   Result Value Ref Range    Cholesterol 176 <200 mg/dL    Triglycerides 85 <150 mg/dL    HDL Cholesterol 62 >49 mg/dL    LDL Cholesterol Calculated 97 <100 mg/dL    Non HDL Cholesterol 114 <130 mg/dL       PFT: Moderately-severe obstructive lung disease.  When compared to 2/16/17, the FEV1 and FVC have little change.  The decrease in FVC may represent air trapping v. restrictive physiology.  Lung volumes would be necessary to determine.

## 2017-05-16 NOTE — LETTER
Date:May 17, 2017      Patient was self referred, no letter generated. Do not send.        [facility]

## 2017-05-16 NOTE — LETTER
5/16/2017       RE: Armando Dc  [address]  [place] [address]     Dear Colleague,    Thank you for referring your patient, Armando cD, to the [facility] at [facility]. Please see a copy of my visit note below.    [facility] for [facility]  May 16, 2017         Assessment and Plan:   Armando Dc is a 42 year old female with PCD.    1. PCD lung disease with moderately-severe obstruction:  Armando has shown evidence of stability in her pulmonary function as well as her pulmonary symptomatology.  She continues to show evidence of Pseudomonas in her sputum.  We did discuss doing alternating antibiotics, but given her wellness she does not feel that she needs to.  At this time, I would recommend that she continue on her bronchial drainage and nebulized therapies.    2.  Sinusitis.  Currently Armando has no concerning symptoms.  She has a little bit of sneezing and rhinorrhea in the morning, and that resolves without intervention.   3.  Psychosocial.  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  She is interested in pursuing a Child Psychiatry fellowship.  She will be applying for those programs.  She reports overall that things are going well.  She is  and in a stable relationship.   4.  Conception.  Armando and her  have been working with a specialty clinic and pursuing IVF.  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     Lyn Hernández MD MPH   of Medicine  Pulmonary, Allergy, Critical Care and Sleep Medicine      Interval History:     Armando denies that she has any cough outside of about 3 times per day.  Her cough frequency and sputum volume are stable.  Her activity tolerance remains stable.  She is doing one vest per day.          Review of Systems:     CONSTITUTIONAL: no  fever, no chills, no change in weight, no change in energy, no change in appetite    INTEGUMENTARY/SKIN: no rash, no obvious new lesions    ENT/MOUTH: no sore throat, no new sinus pain, increased nasal drainage, no hearing loss, no ear ringing     RESPIRATORY: see interval history    CV: no chest pain, no palpitations    GI: no nausea, no vomiting, no change in stools, no fatty stools, no GERD, no abdominal pain    : no dysuria, no urinary frequency, no incontinence    MUSCULOSKELETAL: no myalgias, no arthralgias    ENDOCRINE: no excessive thirst    NEURO:  No headache, no numbness, no tingling    SLEEP: no issues    PSYCHIATRIC: mood stable          Past Medical and Surgical History:     Past Medical History:   Diagnosis Date     Infertility      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,      Pseudomonas aeruginosa colonization 2008     Reactive airway disease      Uncomplicated asthma     Reactive airway disease     Past Surgical History:   Procedure Laterality Date     TONSILLECTOMY & ADENOIDECTOMY      8 years old     TRANSVAGINAL RETRIEVAL OVUM Bilateral 3/3/2016    Procedure: TRANSVAGINAL RETRIEVAL OVUM;  Surgeon: Sunshine Gilliam MD;  Location: [facility]           Family History:     Family History   Problem Relation Age of Onset     Hyperlipidemia Mother      DIABETES Mother      Hypertension Mother      Hyperlipidemia Father      Hypertension Father             Social History:     Social History     Social History     Marital status:      Spouse name: N/A     Number of children: N/A     Years of education: N/A     Occupational History     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 Devices (ACAPELLA) MISC     Respiratory Therapy Supplies (NEBULIZER) SUZANNA     acetylcysteine (MUCOMYST) 20 % nebulizer solution     Syringe/Needle, Disp, (SYRINGE LUER LOCK) 20G X 1-1/2\" 5 ML MISC     Water For Injection Sterile SOLN     No current facility-administered medications for this visit.             Physical Exam:   /87  Pulse 95  Temp 98.7  F (37.1  C) (Oral)  Resp 18  Ht 1.613 m (5' 3.5\")  Wt 63.5 kg (140 lb)  SpO2 95%  BMI 24.41 kg/m2    Constitutional:   Awake, alert and in no apparent distress     Eyes:   nonicteric     ENT:   oral mucosa moist without lesions, normal tm bilaterally, bilateral mucosal edema      Neck:   Supple without supraclavicular or cervical lymphadenopathy     Lungs:   fair air flow.  No crackles. No rhonchi. diffsue wheezes.     Cardiovascular:   Normal S1 and S2.  RRR.  No murmur, gallop or rub.     Abdomen:   NABS, soft, nontender, nondistended.      Musculoskeletal:   No edema, no digital clubbing present     Neurologic:   Alert and conversant.     Skin:   Warm, dry.  No rash on limited exam.             Data:   All laboratory and imaging data reviewed.    Cystic Fibrosis Culture  Specimen Description   Date Value Ref Range Status   02/16/2017 Sputum  Final   02/16/2017 Sputum  Final   04/19/2016 Swab  Final    Culture Micro   Date Value Ref Range Status   02/16/2017 (A)  Final    Heavy growth Normal otto  Heavy growth Pseudomonas aeruginosa, mucoid strain  Heavy growth Strain 2 Pseudomonas aeruginosa, mucoid strain     04/19/2016 (A)  Final    Normal otto  Light growth Pseudomonas aeruginosa, mucoid strain     10/28/2015 (A)  Final    Normal otto  Light growth Pseudomonas aeruginosa Organism failed to " thrive for susceptibility   testing  Light growth Aspergillus niger          Recent Results (from the past 168 hour(s))   General PFT Lab (Please always keep checked)    Collection Time: 05/16/17 10:26 AM   Result Value Ref Range    FVC-Pred 3.32 L    FVC-Pre 2.55 L    FVC-%Pred-Pre 76 %    FEV1-Pre 1.51 L    FEV1-%Pred-Pre 55 %    FEV1FVC-Pred 83 %    FEV1FVC-Pre 59 %    FEFMax-Pred 6.90 L/sec    FEFMax-Pre 4.65 L/sec    FEFMax-%Pred-Pre 67 %    FEF2575-Pred 2.95 L/sec    FEF2575-Pre 0.64 L/sec    [id number]-%Pred-Pre 21 %    ExpTime-Pre 10.00 sec    FIFMax-Pre 3.42 L/sec    FEV1FEV6-Pred 84 %    FEV1FEV6-Pre 60 %   Lipid panel reflex to direct LDL    Collection Time: 05/16/17 11:56 AM   Result Value Ref Range    Cholesterol 176 <200 mg/dL    Triglycerides 85 <150 mg/dL    HDL Cholesterol 62 >49 mg/dL    LDL Cholesterol Calculated 97 <100 mg/dL    Non HDL Cholesterol 114 <130 mg/dL       PFT: Moderately-severe obstructive lung disease.  When compared to 2/16/17, the FEV1 and FVC have little change.  The decrease in FVC may represent air trapping v. restrictive physiology.  Lung volumes would be necessary to determine.      Again, thank you for allowing me to participate in the care of your patient.      Sincerely,    Lyn Hernández MD

## 2017-05-16 NOTE — PATIENT INSTRUCTIONS
Self-Care Plan       MRN: [id number]   Clinic Date: May 16, 2017   Patient: Armando Dc     Annual Studies:   No results found for: IGG  No results found for: INS  There are no preventive care reminders to display for this patient.      Pulmonary Function Tests  FEV1: amount of air you can blow out in 1 second  FVC: total amount of air you can take in and blow out    Your Goals:         PFT Latest Ref Rng & Units 5/16/2017   FVC L 2.55   FEV1 L 1.51   FVC% % 76   FEV1% % 55          Airway Clearance: The Most Important Way to Keep Your Lungs Healthy  Vest Settings:    Hill-Rom Frequencies: 8, 9, 10 Pressure 10 Then, Frequencies 18, 19, 20 Pressure 6      RespirTech: Quick Start with Pressure of     Do each frequency for 5 minutes; Deflate vest after each frequency & cough 3 times before beginning the next setting.    Vest and Neb Therapy should be done 1 times/day.    Good Nutrition Can Improve Lung Function and Overall Health     Take ALL of your vitamins with food     Take 1/2 of your enzymes before EVERY meal/snack and the other 1/2 mid-meal/snack    Wt Readings from Last 3 Encounters:   05/16/17 63.5 kg (140 lb)   02/16/17 63.5 kg (140 lb)   09/20/16 63.1 kg (139 lb 1.8 oz)       Body mass index is 24.41 kg/(m^2).         National CF Foundation Recommendations for BMI in CF Adults: Women: at least 22 Men: at least 23        Controlling Blood Sugars Helps Prevent Lung Infections & Improves Nutrition  Test blood sugar:     In the morning before eating (goal is )     2 hours after a meal (goal is less than 150)     When pre-meal glucose is greater than 150 add correction     At bedtime (if less than 100 eat a snack with 15 grams of carbohydrates  Last A1C Results:   Hemoglobin A1C   Date Value Ref Range Status   09/20/2016 5.8 4.3 - 6.0 % Final         If diabetic, measure A1C every 6 months. Goal: Under 7%    Staying Healthy    Research:  If you are interested in learning about research opportunities or  have questions, please contact Adriana Vásquez at [phone] or [email or web address].      CF Foundation:  Compass is a personalized resource service to help you with the insurance, financial, legal and other issues you are facing.  It's free, confidential and available to anyone with CF.  Ask your  for more information or contact Compass directly at 002-COMPASS (820-7510) or compass@cff.org, or learn more at cff.org/compass.          RECOMMENDATIONS: Great job!  [facility].  Keep up the good work.  Think about yoga.    YOUR GOAL: Enjoy the summer and the beginning of your 3rd year.  I like the idea of the child fellowship.      CF Nurse Line:  Tami Lao: [phone]   Sybil Peng, RT: [phone]     Delphine Angulo: [phone] or Faye Maciel, Dieticians: [phone]   Sharon Stacy, Diabetes Nurse: [phone]      Akilah Guzman: [phone] or Olga Greer [phone], Social Workers   [email or web address]

## 2017-05-24 DIAGNOSIS — Z22.39 PSEUDOMONAS AERUGINOSA COLONIZATION: Chronic | ICD-10-CM

## 2017-05-24 DIAGNOSIS — Q89.3 SITUS INVERSUS: Chronic | ICD-10-CM

## 2017-05-24 DIAGNOSIS — Q89.3 KARTAGENER'S SYNDROME: Chronic | ICD-10-CM

## 2017-05-24 RX ORDER — ACETYLCYSTEINE 200 MG/ML
2 SOLUTION ORAL; RESPIRATORY (INHALATION) 2 TIMES DAILY
Qty: 120 ML | Refills: 5 | Status: SHIPPED | OUTPATIENT
Start: 2017-05-24 | End: 2018-06-28

## 2017-05-24 NOTE — TELEPHONE ENCOUNTER
Medication Appeal Initiation    We have initiated an appeal for the requested medication:  Medication: Cayston 75 mg - DENIED, APPEALING   Appeal Start Date:  5/24/2017  Insurance Company: [employer] - Phone [phone] Fax [phone]  Comments:   VENUS drafted and appeal submitted via fax to [employer] [phone]

## 2017-05-24 NOTE — TELEPHONE ENCOUNTER
Drug Name: acetylcysteine 20% soln  Last Fill Date: 2/6/17  Quantity: 120    Diana Pascual   [facility]  [phone]

## 2017-05-25 NOTE — TELEPHONE ENCOUNTER
MEDICATION APPEAL APPROVED    Medication: aztreonam (Cayston) 75mg   Authorization Effective Date: 5/24/2017  Authorization Expiration Date: 5/24/2019  Approved Dose/Quantity:  Reference #: [id number]   Insurance Company: [employer] - Phone [phone] Fax [phone]  Expected CoPay:       CoPay Card Available:      Foundation Assistance Needed:    Which Pharmacy is filling the prescription (Not needed for infusion/clinic administered): [facility] [id number]    ** Appeal approved and pharmacy notified, thank you!

## 2017-06-14 LAB
EXPTIME-PRE: 10 SEC
FEF2575-%PRED-PRE: 21 %
FEF2575-PRE: 0.64 L/SEC
FEF2575-PRED: 2.95 L/SEC
FEFMAX-%PRED-PRE: 67 %
FEFMAX-PRE: 4.65 L/SEC
FEFMAX-PRED: 6.9 L/SEC
FEV1-%PRED-PRE: 55 %
FEV1-PRE: 1.51 L
FEV1FEV6-PRE: 60 %
FEV1FEV6-PRED: 84 %
FEV1FVC-PRE: 59 %
FEV1FVC-PRED: 83 %
FIFMAX-PRE: 3.42 L/SEC
FVC-%PRED-PRE: 76 %
FVC-PRE: 2.55 L
FVC-PRED: 3.32 L

## 2017-08-29 ENCOUNTER — OFFICE VISIT (OUTPATIENT)
Dept: PULMONOLOGY | Facility: CLINIC | Age: 42
End: 2017-08-29
Attending: INTERNAL MEDICINE
Payer: COMMERCIAL

## 2017-08-29 VITALS
DIASTOLIC BLOOD PRESSURE: 81 MMHG | OXYGEN SATURATION: 96 % | WEIGHT: 138.67 LBS | TEMPERATURE: 98.1 F | RESPIRATION RATE: 16 BRPM | HEART RATE: 79 BPM | BODY MASS INDEX: 23.67 KG/M2 | SYSTOLIC BLOOD PRESSURE: 131 MMHG | HEIGHT: 64 IN

## 2017-08-29 DIAGNOSIS — Q89.3 KARTAGENER'S SYNDROME: ICD-10-CM

## 2017-08-29 DIAGNOSIS — Q89.3 KARTAGENER'S SYNDROME: Primary | Chronic | ICD-10-CM

## 2017-08-29 DIAGNOSIS — Q34.8 PCD (PRIMARY CILIARY DYSKINESIA): ICD-10-CM

## 2017-08-29 DIAGNOSIS — Q34.8 PCD (PRIMARY CILIARY DYSKINESIA): Primary | ICD-10-CM

## 2017-08-29 LAB
EXPTIME-PRE: 9.86 SEC
FEF2575-%PRED-PRE: 19 %
FEF2575-PRE: 0.58 L/SEC
FEF2575-PRED: 2.93 L/SEC
FEFMAX-%PRED-PRE: 71 %
FEFMAX-PRE: 4.9 L/SEC
FEFMAX-PRED: 6.89 L/SEC
FEV1-%PRED-PRE: 55 %
FEV1-PRE: 1.53 L
FEV1FEV6-PRE: 59 %
FEV1FEV6-PRED: 84 %
FEV1FVC-PRE: 55 %
FEV1FVC-PRED: 83 %
FIFMAX-PRE: 4.28 L/SEC
FVC-%PRED-PRE: 84 %
FVC-PRE: 2.79 L
FVC-PRED: 3.32 L

## 2017-08-29 PROCEDURE — 87070 CULTURE OTHR SPECIMN AEROBIC: CPT | Performed by: INTERNAL MEDICINE

## 2017-08-29 PROCEDURE — 99212 OFFICE O/P EST SF 10 MIN: CPT | Mod: ZF

## 2017-08-29 ASSESSMENT — PAIN SCALES - GENERAL: PAINLEVEL: NO PAIN (0)

## 2017-08-29 NOTE — PATIENT INSTRUCTIONS
Self-Care Plan       MRN: [id number]   Clinic Date: August 29, 2017   Patient: Armando Dc     Annual Studies:   No results found for: IGG  No results found for: INS  There are no preventive care reminders to display for this patient.      Pulmonary Function Tests  FEV1: amount of air you can blow out in 1 second  FVC: total amount of air you can take in and blow out    Your Goals:         PFT Latest Ref Rng & Units 8/29/2017   FVC L 2.79   FEV1 L 1.53   FVC% % 84   FEV1% % 55          Airway Clearance: The Most Important Way to Keep Your Lungs Healthy  Vest Settings:    Hill-Rom Frequencies: 8, 9, 10 Pressure 10 Then, Frequencies 18, 19, 20 Pressure 6      RespirTech: Quick Start with Pressure of     Do each frequency for 5 minutes; Deflate vest after each frequency & cough 3 times before beginning the next setting.    Vest and Neb Therapy should be done 1-2 times/day.    Good Nutrition Can Improve Lung Function and Overall Health     Take ALL of your vitamins with food     Take 1/2 of your enzymes before EVERY meal/snack and the other 1/2 mid-meal/snack    Wt Readings from Last 3 Encounters:   08/29/17 62.9 kg (138 lb 10.7 oz)   05/16/17 63.5 kg (140 lb)   02/16/17 63.5 kg (140 lb)       Body mass index is 24.18 kg/(m^2).         National CF Foundation Recommendations for BMI in CF Adults: Women: at least 22 Men: at least 23        Controlling Blood Sugars Helps Prevent Lung Infections & Improves Nutrition  Test blood sugar:     In the morning before eating (goal is )     2 hours after a meal (goal is less than 150)     When pre-meal glucose is greater than 150 add correction     At bedtime (if less than 100 eat a snack with 15 grams of carbohydrates  Last A1C Results:   Hemoglobin A1C   Date Value Ref Range Status   09/20/2016 5.8 4.3 - 6.0 % Final         If diabetic, measure A1C every 6 months. Goal: Under 7%    Staying Healthy    Research:  If you are interested in learning about research  opportunities or have questions, please contact Adriana Vásquez at [phone] or [email or web address].      CF Foundation:  Compass is a personalized resource service to help you with the insurance, financial, legal and other issues you are facing.  It's free, confidential and available to anyone with CF.  Ask your  for more information or contact Compass directly at 975-COMPASS ([phone]) or compass@cff.org, or learn more at cff.org/compass.          RECOMMENDATIONS: Will provide letter.  Change to pantoprazole from prilosec.  It is a category B.  Great job!  Good luck with the interview.    YOUR GOAL:  To get a fellowship and enjoy Melvina.      CF Nurse Line:  Tami Lao: [phone]   Sybil Peng, RT: [phone]     Deplhine Angulo: [phone] or Faye Maciel, Dieticians: [phone]   Sharon Stacy, Diabetes Nurse: [phone]      Akilah Guzman: [phone] or Olga Greer [phone], Social Workers   [email or web address]

## 2017-08-29 NOTE — NURSING NOTE
Chief Complaint   Patient presents with     Cystic Fibrosis     Follow up on Asma and her PCD     Giorgio Ramsey CMA at 10:24 AM on 8/29/2017

## 2017-08-29 NOTE — PROGRESS NOTES
[facility]  [facility]  August 29, 2017         Assessment and Plan:   Armando Dc is a 42 year old female with PCD.    1. PCD lung disease with moderately-severe obstruction:  Armando has shown evidence of excellent stability in her pulmonary function.  Symptomatically she does feel quite good.  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  She has not had a culture performed in a long time, so we will obtain a throat culture today.  Armando should continue on her present bronchial drainage and nebulized therapy.    2.  Pre-conception counseling.  Armando is now going to [facility] to pursue IVF.  She and her  tried in the [place] without success.  I do feel that Armando has tolerated previous medications in order to pursue IVF well in the past.  She has also shown excellent stability in her pulmonary function, and I anticipate that will continue as long as she remains diligent with her self-cares.    3.  Sinusitis.  Armando has chronic symptoms that are not worrisome to her.  She does not feel that she needs specific management for this.    4.  Prediabetes.  Armando reports that she has now been told that she is prediabetic and has an A1c of 5.8.  She reports removing sugars from her diet and only eating brown rice instead of white rice.    5.  Gastroesophageal reflux.  Armando is taking p.r.n. Prilosec.  I have recommended that she change this to pantoprazole, as it is a category B drug.    6.  Psychosocial:  Armando continues in her residency.  She will be interviewing for a fellowship in child psychology.  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     Lyn Hernández MD MPH   of Medicine  Pulmonary, Allergy, Critical Care and Sleep Medicine      Interval History:     Armando does continue to produce sputum that  is yellow-green in color.  Her cough frequency and sputum volume are stable to improved.  She denies any difficulty with activity tolerance.  She is vesting every other day.  She continues to have chronic wheezing.          Review of Systems:     CONSTITUTIONAL: no fever, no chills, no change in weight, no change in energy, no change in appetite    INTEGUMENTARY/SKIN: no rash, no obvious new lesions    ENT/MOUTH: no sore throat, no new sinus pain, no new nasal drainage, no hearing loss, no ear ringing     RESPIRATORY: see interval history    CV: no chest pain, no palpitations, no peripheral edema    GI: no nausea, no vomiting, no change in stools, no GERD, no abdominal pain    : no dysuria, no urinary frequency    MUSCULOSKELETAL: no myalgias, no arthralgias    ENDOCRINE: no excessive thirst    NEURO:  No headache, no numbness, no tingling    SLEEP: interrupted by call schedule    PSYCHIATRIC: mood stable          Past Medical and Surgical History:     Past Medical History:   Diagnosis Date     Infertility      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,      Pseudomonas aeruginosa colonization 2008     Reactive airway disease      Uncomplicated asthma     Reactive airway disease     Past Surgical History:   Procedure Laterality Date     TONSILLECTOMY & ADENOIDECTOMY      8 years old     TRANSVAGINAL RETRIEVAL OVUM Bilateral 3/3/2016    Procedure: TRANSVAGINAL RETRIEVAL OVUM;  Surgeon: Sunshine Gilliam MD;  Location: [facility]           Family History:     Family History   Problem Relation Age of Onset     Hyperlipidemia Mother      DIABETES Mother      Hypertension Mother      Hyperlipidemia Father      Hypertension Father             Social History:     Social History     Social History     Marital status:      Spouse name: N/A     Number of children: N/A     Years of education: N/A     Occupational History     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     Social History Main Topics      "Smoking status: Never Smoker     Smokeless tobacco: Never Used     Alcohol use No     Drug use: No     Sexual activity: Yes     Partners: Male     Birth control/ protection: None     Other Topics Concern     Not on file     Social History Narrative            Medications:     Current Outpatient Prescriptions   Medication     acetylcysteine (MUCOMYST) 20 % nebulizer solution     albuterol (PROAIR HFA/PROVENTIL HFA/VENTOLIN HFA) 108 (90 BASE) MCG/ACT Inhaler     budesonide-formoterol (SYMBICORT) 160-4.5 MCG/ACT Inhaler     albuterol (2.5 MG/3ML) 0.083% nebulizer solution     Misc. Devices (ACAPELLA) MISC     Respiratory Therapy Supplies (NEBULIZER) SUZANNA     Syringe/Needle, Disp, (SYRINGE LUER LOCK) 20G X 1-1/2\" 5 ML MISC     Water For Injection Sterile SOLN     No current facility-administered medications for this visit.             Physical Exam:   /81  Pulse 79  Temp 98.1  F (36.7  C) (Oral)  Resp 16  Ht 1.613 m (5' 3.5\")  Wt 62.9 kg (138 lb 10.7 oz)  SpO2 96%  BMI 24.18 kg/m2    Constitutional:   Awake, alert and in no apparent distress     Eyes:   nonicteric     ENT:   oral mucosa moist without lesions, normal tm bilaterally, bilateral mucosal edema      Neck:   Supple without supraclavicular or cervical lymphadenopathy     Lungs:   fair air flow.  No crackles. biapical rhonchi.  diffuse wheezes.     Cardiovascular:   Normal S1 and S2.  RRR.  No murmur, gallop or rub.     Abdomen:   NABS, soft, nontender, nondistended.       Musculoskeletal:   No edema, digital clubbing present     Neurologic:   Alert and conversant.     Skin:   Warm, dry.  No rash on limited exam.             Data:   All laboratory and imaging data reviewed.    Cystic Fibrosis Culture  Specimen Description   Date Value Ref Range Status   08/29/2017 Throat  Final   02/16/2017 Sputum  Final   02/16/2017 Sputum  Final    Culture Micro   Date Value Ref Range Status   08/29/2017 PENDING  Preliminary   02/16/2017 (A)  Final    Heavy growth " Normal otto  Heavy growth Pseudomonas aeruginosa, mucoid strain  Heavy growth Strain 2 Pseudomonas aeruginosa, mucoid strain     04/19/2016 (A)  Final    Normal otto  Light growth Pseudomonas aeruginosa, mucoid strain          Recent Results (from the past 168 hour(s))   General PFT Lab (Please always keep checked)    Collection Time: 08/29/17 10:01 AM   Result Value Ref Range    FVC-Pred 3.32 L    FVC-Pre 2.79 L    FVC-%Pred-Pre 84 %    FEV1-Pre 1.53 L    FEV1-%Pred-Pre 55 %    FEV1FVC-Pred 83 %    FEV1FVC-Pre 55 %    FEFMax-Pred 6.89 L/sec    FEFMax-Pre 4.90 L/sec    FEFMax-%Pred-Pre 71 %    FEF2575-Pred 2.93 L/sec    FEF2575-Pre 0.58 L/sec    [id number]-%Pred-Pre 19 %    ExpTime-Pre 9.86 sec    FIFMax-Pre 4.28 L/sec    FEV1FEV6-Pred 84 %    FEV1FEV6-Pre 59 %   Throat Culture Aerobic Bacterial    Collection Time: 08/29/17 11:35 AM   Result Value Ref Range    Specimen Description Throat     Special Requests Specimen collected in eSwab transport (white cap)     Culture Micro PENDING        PFT: Moderately-severe obstructive lung disease.  When compared to 5/16/2017, the FEV1 and FVC have increased.

## 2017-08-29 NOTE — LETTER
Date:August 31, 2017      Patient was self referred, no letter generated. Do not send.        [facility] Health Information

## 2017-08-29 NOTE — LETTER
8/29/2017       RE: Armando Dc  [address]  [place] [address]     Dear Colleague,    Thank you for referring your patient, Armando cD, to the [facility] at [facility]. Please see a copy of my visit note below.    [facility]  August 29, 2017         Assessment and Plan:   Armando Dc is a 42 year old female with PCD.    1. PCD lung disease with moderately-severe obstruction:  Armando has shown evidence of excellent stability in her pulmonary function.  Symptomatically she does feel quite good.  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  She has not had a culture performed in a long time, so we will obtain a throat culture today.  Armando should continue on her present bronchial drainage and nebulized therapy.    2.  Pre-conception counseling.  Armando is now going to [facility] to pursue IVF.  She and her  t[place] in the [place].  I do feel that Armando has tolerated previous medications in order to pursue IVF well in the past.  She has also shown excellent stability in her pulmonary function, and I anticipate that will continue as long as she remains diligent with her self-cares.    3.  Sinusitis.  Armando has chronic symptoms that are not worrisome to her.  She does not feel that she needs specific management for this.    4.  Prediabetes.  Armando reports that she has now been told that she is prediabetic and has an A1c of 5.8.  She reports removing sugars from her diet and only eating brown rice instead of white rice.    5.  Gastroesophageal reflux.  Armando is taking p.r.n. Prilosec.  I have recommended that she change this to pantoprazole, as it is a category B drug.    6.  Psychosocial:  Armando continues in her residency.  She will be  interviewing for a fellowship in child psychology.  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     Lyn Hernández MD MPH   of Medicine  Pulmonary, Allergy, Critical Care and Sleep Medicine      Interval History:     Armando does continue to produce sputum that is yellow-green in color.  Her cough frequency and sputum volume are stable to improved.  She denies any difficulty with activity tolerance.  She is vesting every other day.  She continues to have chronic wheezing.          Review of Systems:     CONSTITUTIONAL: no fever, no chills, no change in weight, no change in energy, no change in appetite    INTEGUMENTARY/SKIN: no rash, no obvious new lesions    ENT/MOUTH: no sore throat, no new sinus pain, no new nasal drainage, no hearing loss, no ear ringing     RESPIRATORY: see interval history    CV: no chest pain, no palpitations, no peripheral edema    GI: no nausea, no vomiting, no change in stools, no GERD, no abdominal pain    : no dysuria, no urinary frequency    MUSCULOSKELETAL: no myalgias, no arthralgias    ENDOCRINE: no excessive thirst    NEURO:  No headache, no numbness, no tingling    SLEEP: interrupted by call schedule    PSYCHIATRIC: mood stable          Past Medical and Surgical History:     Past Medical History:   Diagnosis Date     Infertility      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,      Pseudomonas aeruginosa colonization 2008     Reactive airway disease      Uncomplicated asthma     Reactive airway disease     Past Surgical History:   Procedure Laterality Date     TONSILLECTOMY & ADENOIDECTOMY      8 years old     TRANSVAGINAL RETRIEVAL OVUM Bilateral 3/3/2016    Procedure: TRANSVAGINAL RETRIEVAL OVUM;  Surgeon: Sunshine Gilliam MD;  Location: [facility]           Family History:     Family History   Problem Relation Age of Onset     Hyperlipidemia Mother      DIABETES Mother      Hypertension Mother       "Hyperlipidemia Father      Hypertension Father             Social History:     Social History     Social History     Marital status:      Spouse name: N/A     Number of children: N/A     Years of education: N/A     Occupational History     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     Social History Main Topics     Smoking status: Never Smoker     Smokeless tobacco: Never Used     Alcohol use No     Drug use: No     Sexual activity: Yes     Partners: Male     Birth control/ protection: None     Other Topics Concern     Not on file     Social History Narrative            Medications:     Current Outpatient Prescriptions   Medication     acetylcysteine (MUCOMYST) 20 % nebulizer solution     albuterol (PROAIR HFA/PROVENTIL HFA/VENTOLIN HFA) 108 (90 BASE) MCG/ACT Inhaler     budesonide-formoterol (SYMBICORT) 160-4.5 MCG/ACT Inhaler     albuterol (2.5 MG/3ML) 0.083% nebulizer solution     Misc. Devices (ACAPELLA) MISC     Respiratory Therapy Supplies (NEBULIZER) SUZANNA     Syringe/Needle, Disp, (SYRINGE LUER LOCK) 20G X 1-1/2\" 5 ML MISC     Water For Injection Sterile SOLN     No current facility-administered medications for this visit.             Physical Exam:   /81  Pulse 79  Temp 98.1  F (36.7  C) (Oral)  Resp 16  Ht 1.613 m (5' 3.5\")  Wt 62.9 kg (138 lb 10.7 oz)  SpO2 96%  BMI 24.18 kg/m2    Constitutional:   Awake, alert and in no apparent distress     Eyes:   nonicteric     ENT:   oral mucosa moist without lesions, normal tm bilaterally, bilateral mucosal edema      Neck:   Supple without supraclavicular or cervical lymphadenopathy     Lungs:   fair air flow.  No crackles. biapical rhonchi.  diffuse wheezes.     Cardiovascular:   Normal S1 and S2.  RRR.  No murmur, gallop or rub.     Abdomen:   NABS, soft, nontender, nondistended.       Musculoskeletal:   No edema, digital clubbing present     Neurologic:   Alert and conversant.     Skin:   Warm, dry.  No rash on limited exam.             Data: "   All laboratory and imaging data reviewed.    Cystic Fibrosis Culture  Specimen Description   Date Value Ref Range Status   08/29/2017 Throat  Final   02/16/2017 Sputum  Final   02/16/2017 Sputum  Final    Culture Micro   Date Value Ref Range Status   08/29/2017 PENDING  Preliminary   02/16/2017 (A)  Final    Heavy growth Normal otto  Heavy growth Pseudomonas aeruginosa, mucoid strain  Heavy growth Strain 2 Pseudomonas aeruginosa, mucoid strain     04/19/2016 (A)  Final    Normal otto  Light growth Pseudomonas aeruginosa, mucoid strain          Recent Results (from the past 168 hour(s))   General PFT Lab (Please always keep checked)    Collection Time: 08/29/17 10:01 AM   Result Value Ref Range    FVC-Pred 3.32 L    FVC-Pre 2.79 L    FVC-%Pred-Pre 84 %    FEV1-Pre 1.53 L    FEV1-%Pred-Pre 55 %    FEV1FVC-Pred 83 %    FEV1FVC-Pre 55 %    FEFMax-Pred 6.89 L/sec    FEFMax-Pre 4.90 L/sec    FEFMax-%Pred-Pre 71 %    FEF2575-Pred 2.93 L/sec    FEF2575-Pre 0.58 L/sec    [id number]-%Pred-Pre 19 %    ExpTime-Pre 9.86 sec    FIFMax-Pre 4.28 L/sec    FEV1FEV6-Pred 84 %    FEV1FEV6-Pre 59 %   Throat Culture Aerobic Bacterial    Collection Time: 08/29/17 11:35 AM   Result Value Ref Range    Specimen Description Throat     Special Requests Specimen collected in eSwab transport (white cap)     Culture Micro PENDING        PFT: Moderately-severe obstructive lung disease.  When compared to 5/16/2017, the FEV1 and FVC have increased.        Again, thank you for allowing me to participate in the care of your patient.      Sincerely,    Lyn Hernández MD

## 2017-08-31 LAB
BACTERIA SPEC CULT: NORMAL
Lab: NORMAL
SPECIMEN SOURCE: NORMAL

## 2017-09-01 ENCOUNTER — OFFICE VISIT (OUTPATIENT)
Dept: INTERNAL MEDICINE | Facility: CLINIC | Age: 42
End: 2017-09-01

## 2017-09-01 VITALS
DIASTOLIC BLOOD PRESSURE: 76 MMHG | SYSTOLIC BLOOD PRESSURE: 114 MMHG | HEART RATE: 77 BPM | OXYGEN SATURATION: 93 % | WEIGHT: 137.9 LBS | BODY MASS INDEX: 24.04 KG/M2 | RESPIRATION RATE: 20 BRPM

## 2017-09-01 DIAGNOSIS — E55.9 VITAMIN D DEFICIENCY: ICD-10-CM

## 2017-09-01 DIAGNOSIS — Z00.01 ENCOUNTER FOR ROUTINE ADULT MEDICAL EXAM WITH ABNORMAL FINDINGS: Primary | ICD-10-CM

## 2017-09-01 DIAGNOSIS — Q89.3 KARTAGENER'S SYNDROME: Chronic | ICD-10-CM

## 2017-09-01 DIAGNOSIS — S46.811A STRAIN OF TRAPEZIUS MUSCLE, RIGHT, INITIAL ENCOUNTER: ICD-10-CM

## 2017-09-01 LAB
ALBUMIN SERPL-MCNC: 3.7 G/DL (ref 3.4–5)
ALP SERPL-CCNC: 83 U/L (ref 40–150)
ALT SERPL W P-5'-P-CCNC: 20 U/L (ref 0–50)
ANION GAP SERPL CALCULATED.3IONS-SCNC: 6 MMOL/L (ref 3–14)
AST SERPL W P-5'-P-CCNC: 11 U/L (ref 0–45)
BILIRUB SERPL-MCNC: 0.6 MG/DL (ref 0.2–1.3)
BUN SERPL-MCNC: 14 MG/DL (ref 7–30)
CALCIUM SERPL-MCNC: 8.7 MG/DL (ref 8.5–10.1)
CHLORIDE SERPL-SCNC: 105 MMOL/L (ref 94–109)
CO2 SERPL-SCNC: 26 MMOL/L (ref 20–32)
CREAT SERPL-MCNC: 0.81 MG/DL (ref 0.52–1.04)
GFR SERPL CREATININE-BSD FRML MDRD: 78 ML/MIN/1.7M2
GLUCOSE SERPL-MCNC: 122 MG/DL (ref 70–99)
HBA1C MFR BLD: 5.8 % (ref 4.3–6)
POTASSIUM SERPL-SCNC: 3.3 MMOL/L (ref 3.4–5.3)
PROT SERPL-MCNC: 8 G/DL (ref 6.8–8.8)
SODIUM SERPL-SCNC: 138 MMOL/L (ref 133–144)
TSH SERPL DL<=0.005 MIU/L-ACNC: 1.61 MU/L (ref 0.4–4)

## 2017-09-01 ASSESSMENT — PAIN SCALES - GENERAL: PAINLEVEL: MODERATE PAIN (4)

## 2017-09-01 NOTE — PROGRESS NOTES
SUBJECTIVE:  Armando Dc is a 42 year old female who presents for routine health maintenance exam.    LMP: No LMP recorded.    HPI: Hx of ciliary dyskinesia, followed by Dr. Hernández.  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  Her Grandmother recently passed away, most of her family lives either in [place] or [place].  She is here for a few concerns.    1. Establish primary care  2. Neck pain- occurred initially several years ago, was seen in  few weeks ago, given Flexeril, helped a little.  On right side, initially shooting pain; initially had numbness and tingling in right hand but now resolved; no prior issues with neck pain or hx of trauma.  Had similar episode two years ago.   Pain with lifting heavy object.   3. Pre- diabetic- HgA1C of 5.8, with strong family history.    Hx of low Vitamin D, currently taking 2000 IU daily, had taken 50,000 weekly for treatment.      The patient's medical,family and social histories have been reviewed and updated as needed today.    Contraception: none    REVIEW OF SYSTEMS:  CONSTITUTIONAL:NEGATIVE for fever, chills  and has gained about 6-7 lbs pver the past 3 years  INTEGUMENTARY/SKIN: hx of milia, treated with retinoic acid, saw Derm at [facility]  EYES: NEGATIVE for vision changes or irritation  ENT/MOUTH: hx of recurrent sinus disease, winter and spring. No hx of allergies. She does sinus irrigation as needed. Not treated with antibiotics  RESP:cilliary dyskinesia; does vest treatment once daily to twice daily; albuterol, acetyl cysteine; has symbicort to use prn.  Has been hospitalized once in [place] 2014.  Treated with antibiotics.  PFT's every 3 months.  Recent PFT's FEV1/FVC = 1.53/ 3.32  BREAST: NEGATIVE for masses, tenderness or discharge; aunt dx'ed with breast ca recently; due for mammogram   CV: hx of situs inversus  GI: occasional heartburn, was taking Prilosec, may be switching to Protonix   :hx of endometriosis, planning on doing IVF at [facility]     Any history of  irregular bleeding? No  MUSCULOSKELETAL:right neck pain as above  NEURO: NEGATIVE for weakness, dizziness or paresthesias  ENDOCRINE: family hx of diabetes mellitus ; feels cold all the time  HEME/ALLERGY/IMMUNE: NEGATIVE for bleeding problems  PSYCHIATRIC: NEGATIVE for changes in mood or affect    EXAM: /76 (BP Location: Right arm, Patient Position: Chair, Cuff Size: Adult Regular)  Pulse 77  Resp 20  Wt 62.6 kg (137 lb 14.4 oz)  SpO2 93%  BMI 24.04 kg/m2  BMI = Body mass index is 24.04 kg/(m^2).   GENERAL APPEARANCE: healthy, alert and no distress   EYES: Eyes grossly normal to inspection, PERRL and conjunctivae and sclerae normal  HENT: ear canals and TM's normal and nose and mouth without ulcers or lesions  NECK: no adenopathy, no asymmetry, masses, or scars and thyroid normal to palpation; right mid trapezius region with tender trigger point to palpation  RESP: lungs clear to auscultation - no rales, rhonchi or wheezes  BREAST: deferred  CV: regular rates and rhythm, normal S1 S2, no S3 or S4 and no murmur, click or rub audible right chest  LYMPHATICS: normal ant/post cervical and supraclavicular nodes  ABDOMEN: soft, nontender, without hepatosplenomegaly or masses  : deferred  MS: FROM on exam of UE, LE, neck, and back, No arthritis, or synovitis on exam.  EXT: peripheral pulses are brisk and symmetric in upper and lower extremities and clubbing present  SKIN: no suspicious lesions or rashes  NEURO: Normal strength and tone, mentation intact and speech normal  PSYCH: mentation appears normal and affect normal/bright         ASSESSMENT/IMPRESSION:  42 year old health maintenance exam    Cardiac Risks: none  Patient's Goal  LDL = <130    Patient is screened for GC and Chlamydia:  No      (Z00.01) Encounter for routine adult medical exam with abnormal findings  (primary encounter diagnosis)  Comment: pt with impaired glucose tolerance in the past x 1 but normal since.  Will check today and yearly.   Given family hx, pt would benefit from metformin if evidence of impaired glucose tolerance.   Plan: Hemoglobin A1c, Comprehensive metabolic panel,         Mammogram, routine screening, TSH with free T4         reflex        Will order mammogram    (Q89.3) Kartagener's syndrome  Comment: followed by Pulmonary, discussed pneumococcal vaccine which they have discussed and are deferring  Plan: follow    (E55.9) Vitamin D deficiency  Comment: will recheck today  Plan: Vitamin D deficiency screening        Continue Vit D 1000 IU daily or 2000 IU qOD    (S46.207J) Strain of trapezius muscle, right, initial encounter  Comment: pt getting PT at [facility], continue  Plan: recommended massages to trigger point and daily ROM of neck     Immunizations are up to date.  Will discuss pneumococcal vaccine with Dr. Hernández.     I have discussed with the patient the risks, benefits, and treatment options of prescribed medications or other treatment modalities.  I have instructed the patient to call or schedule a follow-up appointment for any problems or failure to improve. I have also discussed the importance of a balanced diet and regular exercise, osteoporosis prevention and accident prevention.

## 2017-09-01 NOTE — PATIENT INSTRUCTIONS
[facility] Medication Refill Request Information:  * Please contact your pharmacy regarding ANY request for medication refills.  ** [facility] Prescription Fax = [phone]  * Please allow 3 business days for routine medication refills.  * Please allow 5 business days for controlled substance medication refills.     [facility] Test Result notification information:  *You will be notified with in 7-10 days of your appointment day regarding the results of your test.  If you are on MyChart you will be notified as soon as the provider has reviewed the results and signed off on them.    [facility] [phone]

## 2017-09-01 NOTE — NURSING NOTE
Chief Complaint   Patient presents with     [facility] care/provider-physical   Brandi Ronquillo LPN 6:57 AM on 9/1/2017

## 2017-09-01 NOTE — MR AVS SNAPSHOT
After Visit Summary   9/1/2017    Armando Dc    MRN: [id number]           Patient Information     Date Of Birth          1975        Visit Information        Provider Department      9/1/2017 7:00 AM Blake Sifuentes MD [facility]        Today's Diagnoses     Encounter for routine adult medical exam with abnormal findings    -  1    Kartagener's syndrome        Vitamin D deficiency          Care Instructions    Primary Care Center Medication Refill Request Information:  * Please contact your pharmacy regarding ANY request for medication refills.  ** [facility] Prescription Fax = [phone]  * Please allow 3 business days for routine medication refills.  * Please allow 5 business days for controlled substance medication refills.     Primary Care Center Test Result notification information:  *You will be notified with in 7-10 days of your appointment day regarding the results of your test.  If you are on MyChart you will be notified as soon as the provider has reviewed the results and signed off on them.    Primary Care Center [phone]           Follow-ups after your visit        Your next 10 appointments already scheduled     Sep 01, 2017  8:00 AM CDT   LAB with [facility] ([facility])    [address] [place] [id number] [phone]           Patient must bring picture ID. Patient should be prepared to give a urine specimen  Please do not eat 10-12 hours before your appointment if you are coming in fasting for labs on lipids, cholesterol, or glucose (sugar). Pregnant women should follow their Care Team instructions. Water with medications is okay. Do not drink coffee or other fluids. If you have concerns about taking  your medications, please ask at office or if scheduling via Mayvenn, send a message by clicking on Secure Messaging, Message Your Care Team.            Sep 01, 2017  3:30 PM CDT   (Arrive by 3:15 PM)   MA  "SCREENING DIGITAL BILATERAL with [id number]   [facility] ([facility] [place])    [address]  2nd Floor  [facility] [address] [phone]           Do not use any powder, lotion or deodorant under your arms or on your breast. If you do, we will ask you to remove it before your exam.  Wear comfortable, two-piece clothing.  If you have any allergies, tell your care team.  Bring any previous mammograms from other facilities or have them mailed to the breast center. Three-dimensional (3D) mammograms are available at [place] locations in [facility], and [place]. [facility] locations include [place] and [facility] in [place]. Benefits of 3D mammograms include: - Improved rate of cancer detection - Decreases your chance of having to go back for more tests, which means fewer: - \"False-positive\" results (This means that there is an abnormal area but it isn't cancer.) - Invasive testing procedures, such as a biopsy or surgery - Can provide clearer images of the breast if you have dense breast tissue. 3D mammography is an optional exam that anyone can have with a 2D mammogram. It doesn't replace or take the place of a 2D mammogram. 2D mammograms remain an effective screening test for all women.  Not all insurance companies cover the cost of a 3D mammogram. Check with your insurance.            Sep 07, 2017 11:45 AM CDT   Office Visit with Zayra Roberts MD   [facility] ([facility])    [address] [place] [address] [phone]           Bring a current list of meds and any records pertaining to this visit. For Physicals, please bring immunization records and any forms needing to be filled out. Please arrive 10 minutes early to complete paperwork.            Dec 05, 2017 10:00 AM CST   CF LOOP with AN PFL [facility] " ([facility] [place])    [address]  3rd [facility] [address] [phone]            Dec 05, 2017 10:40 AM CST   (Arrive by 10:25 AM)   RETURN CYSTIC FIBROSIS VISIT with Lyn Hernández MD   [facility] ([facility])    [address]  3rd [facility] [id number] [phone]              Future tests that were ordered for you today     Open Future Orders        Priority Expected Expires Ordered    Mammogram, routine screening Routine  9/1/2018 9/1/2017            Who to contact     Please call your clinic at [phone] to:    Ask questions about your health    Make or cancel appointments    Discuss your medicines    Learn about your test results    Speak to your doctor   If you have compliments or concerns about an experience at your clinic, or if you wish to file a complaint, please contact [facility] Patient Relations at [phone] or email us at [email or web address]         Additional Information About Your Visit        [employer] Information     3Jamt gives you secure access to your electronic health record. If you see a primary care provider, you can also send messages to your care team and make appointments. If you have questions, please call your primary care clinic.  If you do not have a primary care provider, please call [phone] and they will assist you.      Glassdoor is an electronic gateway that provides easy, online access to your medical records. With Glassdoor, you can request a clinic appointment, read your test results, renew a prescription or communicate with your care team.     To access your existing account, please contact your [facility] or call [phone] for assistance.        Care EveryWhere ID     This is your Care EveryWhere ID. This could be used by other organizations to access your [place]  medical records  [id number]        Your Vitals Were     Pulse Respirations Pulse Oximetry BMI (Body Mass Index)          77 20 93% 24.04 kg/m2         Blood Pressure from Last 3 Encounters:   09/01/17 114/76   08/29/17 131/81   05/16/17 118/87    Weight from Last 3 Encounters:   09/01/17 62.6 kg (137 lb 14.4 oz)   08/29/17 62.9 kg (138 lb 10.7 oz)   05/16/17 63.5 kg (140 lb)              We Performed the Following     Comprehensive metabolic panel     Hemoglobin A1c     TSH with free T4 reflex     Vitamin D deficiency screening        Primary Care Provider    None Specified       No primary provider on file.        Equal Access to Services     MILLY LY : Ana Godinez, sujatha angel, evan weiss, tee mead . So [facility] [phone].    ATENCIÓN: Si habla español, tiene a guevara disposición servicios gratuitos de asistencia lingüística. Llame al [phone].    We comply with applicable federal civil rights laws and [place] laws. We do not discriminate on the basis of race, color, national origin, age, disability sex, sexual orientation or gender identity.            Thank you!     Thank you for choosing [facility]  for your care. Our goal is always to provide you with excellent care. Hearing back from our patients is one way we can continue to improve our services. Please take a few minutes to complete the written survey that you may receive in the mail after your visit with us. Thank you!             Your Updated Medication List - Protect others around you: Learn how to safely use, store and throw away your medicines at www.disposemymeds.org.          This list is accurate as of: 9/1/17  7:59 AM.  Always use your most recent med list.                   Brand Name Dispense Instructions for use Diagnosis    ACAPELLA Misc      use twice daily        acetylcysteine 20 % nebulizer solution    MUCOMYST    120 mL    Inhale 2 mLs into the  "lungs 2 times daily    Kartagener's syndrome, Situs inversus, Pseudomonas aeruginosa colonization       * albuterol (2.5 MG/3ML) 0.083% neb solution     180 mL    USE ONE VIAL IN NEBULIZER TWICE DAILY    Bronchiectasis without acute exacerbation (H)       * albuterol 108 (90 BASE) MCG/ACT Inhaler    PROAIR HFA/PROVENTIL HFA/VENTOLIN HFA    1 Inhaler    Inhale 2 puffs into the lungs every 6 hours as needed for shortness of breath / dyspnea or wheezing    Kartagener syndrome       budesonide-formoterol 160-4.5 MCG/ACT Inhaler    SYMBICORT    1 Inhaler    Inhale 2 puffs into the lungs 2 times daily    Kartagener syndrome, Kartagener's syndrome       nebulizer Sindy      Nebulizer compressor - use with ALEK as directed        Syringe Luer Lock 20G X 1-1/2\" 5 ML Misc     60 each    1 each 2 times daily    Kartagener syndrome, Reactive airway disease       Water For Injection Sterile Soln     250 mL    Inject 4 mLs into the vein 2 times daily    Kartagener syndrome, Reactive airway disease       * Notice:  This list has 2 medication(s) that are the same as other medications prescribed for you. Read the directions carefully, and ask your doctor or other care provider to review them with you.      "

## 2017-09-05 LAB — DEPRECATED CALCIDIOL+CALCIFEROL SERPL-MC: 29 UG/L (ref 20–75)

## 2017-09-10 ENCOUNTER — MYC MEDICAL ADVICE (OUTPATIENT)
Dept: OBGYN | Facility: CLINIC | Age: 42
End: 2017-09-10

## 2017-09-10 ENCOUNTER — MYC MEDICAL ADVICE (OUTPATIENT)
Dept: INTERNAL MEDICINE | Facility: CLINIC | Age: 42
End: 2017-09-10

## 2017-09-11 NOTE — TELEPHONE ENCOUNTER
I was not sure what she was even on my schedule for?   She is seeing GONZÁLEZ for pregnancy and is AM pt.    If she is not seeing GONZÁLEZ she really needs to be at her age and with history of endometriosis.  I will not be able to help her much with letrozole alone.  :(    So if it's for something else, then sure I can find a spot.  But if it's about her getting pregnant, I am not helpful     Sorry about the death in her family though.  :(    Zayra Roberts MD

## 2017-09-18 NOTE — TELEPHONE ENCOUNTER
Could probably see her on call Wednesday if no spots in AM schedule.  (she normally sees AM so would be fine to see her for this)    Thanks  Zayra Roberts MD

## 2017-09-18 NOTE — TELEPHONE ENCOUNTER
Dr Brantley - can you see her on your call day Thurs? If not, I'll double book her on Dr MCKAY. Thanks.   Amrita Webb, RN-BSN

## 2017-09-20 ENCOUNTER — OFFICE VISIT (OUTPATIENT)
Dept: OBGYN | Facility: CLINIC | Age: 42
End: 2017-09-20
Payer: COMMERCIAL

## 2017-09-20 VITALS
WEIGHT: 137 LBS | OXYGEN SATURATION: 96 % | SYSTOLIC BLOOD PRESSURE: 124 MMHG | BODY MASS INDEX: 23.89 KG/M2 | DIASTOLIC BLOOD PRESSURE: 79 MMHG | TEMPERATURE: 98.8 F | HEART RATE: 85 BPM

## 2017-09-20 DIAGNOSIS — N92.0 EXCESSIVE OR FREQUENT MENSTRUATION: Primary | ICD-10-CM

## 2017-09-20 PROCEDURE — 99214 OFFICE O/P EST MOD 30 MIN: CPT | Performed by: OBSTETRICS & GYNECOLOGY

## 2017-09-20 NOTE — PROGRESS NOTES
CC:  Discuss bleeding  HPI:  Armando Dc is a 42 year old female No LMP recorded.  Has been bleeding since 9/5/17 but very light.  Sometimes pink or bloody, but also comes and goes.  Using only a panty liner but last time this happened had a polyp so wants to make sure that gets checked out.    Has done total of 4 cycles of IVF and plans to do one more in [place] probably.  They have discussed donor egg but she is not ready to go there yet.  Wants to know my opinion.    Last u/s she had done was 11/30/16 showing left ovarian cyst measuring 6.5 x 4.3x 5.3 cm thought to be endometrioma.  Has endometriosis by history and prior ?endometromias seen on ultrasound, but has not had laparoscopy.  She is not a great surgery candidate with her history.    Saw +OPK yesterday and day prior, but not sure if she really ovulated.  States [facility] only 0.8 per [facility] where she has been going.    Allergies: Review of patient's allergies indicates no known allergies.    The patient's past medical history, social history and family history is reviewed at our visit and updated in EPIC.    EXAM:  Blood pressure 124/79, pulse 85, temperature 98.8  F (37.1  C), temperature source Oral, weight 137 lb (62.1 kg), SpO2 96 %, not currently breastfeeding.  BMI= Body mass index is 23.89 kg/(m^2).  No LMP recorded.  General - pleasant female in no acute distress.  Neurological - alert and oriented X 3  Psychiatric - normal mood and affect    No other physical examination was performed today as we spent over 50% of today's 25 minute visit in face-to-face discussion and counseling about endometriosis and affect on fertility, left ovarian cyst .    ASSESSMENT/PLAN:  (N92.0) Excessive or frequent menstruation  (primary encounter diagnosis)  Comment: large left ovarian cyst on u/s 11/2016  Plan: Progesterone, US Pelvic Complete with         Transvaginal        Will check progesterone level about 7 days after +OPK to confirm that she ovulated.  Also check u/s to  check endometrium.  Discussed if looks like polyp we would need to take care of that.  If no polyp seen and >4 mm, should have endometrial biopsy done then.  Answered questions.  Discussed ovarian cyst and endometriomas.  Reviewed if it's still large she is at risk of torsion and not sure how her response would be to IVF meds with that ovary.  May need laparoscopy with ovarian cystectomy.  Will discuss more after we know ultrasound results.  Also discussed donor egg at her age and the advantages.       Zayra Roberts MD

## 2017-09-20 NOTE — MR AVS SNAPSHOT
After Visit Summary   9/20/2017    Armando Dc    MRN: [id number]           Patient Information     Date Of Birth          1975        Visit Information        Provider Department      9/20/2017 1:00 PM Zayra Roberts MD [facility]        Today's Diagnoses     Excessive or frequent menstruation    -  1      Care Instructions    Check progesterone level next week--Monday or Tuesday lab only appoinment    Ultrasound to check endometrium for ?polyp AND check left ovary for size of endometrioma    Call [phone] to schedule ultrasound for uterus.  Listen to prompts for the surgery and ultrasound .  [place] [phone]          Follow-ups after your visit        Your next 10 appointments already scheduled     Dec 05, 2017 10:00 AM CST   CF LOOP with  PFL [facility] ([facility])    [address] [place] [id number] [phone]            Dec 05, 2017 10:40 AM CST   (Arrive by 10:25 AM)   RETURN CYSTIC FIBROSIS VISIT with Lyn Hernández MD   [facility] ([facility])    [address] [place] [id number] [phone]              Future tests that were ordered for you today     Open Future Orders        Priority Expected Expires Ordered    Progesterone Routine 10/20/2017 12/19/2017 9/20/2017    US Pelvic Complete with Transvaginal Routine 12/19/2017 3/19/2018 9/20/2017            Who to contact     If you have questions or need follow up information about today's clinic visit or your schedule please contact [facility] directly at [phone].  Normal or non-critical lab and imaging results will be communicated to you by MyChart, letter or phone within 4 business days after the clinic has received the results. If you do not hear from us within 7 days, please contact the clinic  through [employer] or phone. If you have a critical or abnormal lab result, we will notify you by phone as soon as possible.  Submit refill requests through [employer] or call your pharmacy and they will forward the refill request to us. Please allow 3 business days for your refill to be completed.          Additional Information About Your Visit        [employer] Information     [employer] gives you secure access to your electronic health record. If you see a primary care provider, you can also send messages to your care team and make appointments. If you have questions, please call your primary care clinic.  If you do not have a primary care provider, please call [phone] and they will assist you.        Care EveryWhere ID     This is your Care EveryWhere ID. This could be used by other organizations to access your [place] medical records  [id number]        Your Vitals Were     Pulse Temperature Pulse Oximetry BMI (Body Mass Index)          85 98.8  F (37.1  C) (Oral) 96% 23.89 kg/m2         Blood Pressure from Last 3 Encounters:   09/20/17 124/79   09/01/17 114/76   08/29/17 131/81    Weight from Last 3 Encounters:   09/20/17 137 lb (62.1 kg)   09/01/17 137 lb 14.4 oz (62.6 kg)   08/29/17 138 lb 10.7 oz (62.9 kg)               Primary Care Provider    None Specified       No primary provider on file.        Equal Access to Services     MILLY LY : Hadii bernardo ku hadasho Soomaali, waaxda luqadaha, qaybta kaalmada adegildada, tee magana. So [facility] [phone].    ATENCIÓN: Si habla español, tiene a guevara disposición servicios gratuitos de asistencia lingüística. Llame al [phone].    We comply with applicable federal civil rights laws and [place] laws. We do not discriminate on the basis of race, color, national origin, age, disability sex, sexual orientation or gender identity.            Thank you!     Thank you for choosing [facility]  for your care. Our goal is always  "to provide you with excellent care. Hearing back from our patients is one way we can continue to improve our services. Please take a few minutes to complete the written survey that you may receive in the mail after your visit with us. Thank you!             Your Updated Medication List - Protect others around you: Learn how to safely use, store and throw away your medicines at www.disposemymeds.org.          This list is accurate as of: 9/20/17  1:14 PM.  Always use your most recent med list.                   Brand Name Dispense Instructions for use Diagnosis    ACAPELLA Misc      use twice daily        acetylcysteine 20 % nebulizer solution    MUCOMYST    120 mL    Inhale 2 mLs into the lungs 2 times daily    Kartagener's syndrome, Situs inversus, Pseudomonas aeruginosa colonization       * albuterol (2.5 MG/3ML) 0.083% neb solution     180 mL    USE ONE VIAL IN NEBULIZER TWICE DAILY    Bronchiectasis without acute exacerbation (H)       * albuterol 108 (90 BASE) MCG/ACT Inhaler    PROAIR HFA/PROVENTIL HFA/VENTOLIN HFA    1 Inhaler    Inhale 2 puffs into the lungs every 6 hours as needed for shortness of breath / dyspnea or wheezing    Kartagener syndrome       budesonide-formoterol 160-4.5 MCG/ACT Inhaler    SYMBICORT    1 Inhaler    Inhale 2 puffs into the lungs 2 times daily    Kartagener syndrome, Kartagener's syndrome       nebulizer Sindy      Nebulizer compressor - use with ALEK as directed        Syringe Luer Lock 20G X 1-1/2\" 5 ML Misc     60 each    1 each 2 times daily    Kartagener syndrome, Reactive airway disease       Water For Injection Sterile Soln     250 mL    Inject 4 mLs into the vein 2 times daily    Kartagener syndrome, Reactive airway disease       * Notice:  This list has 2 medication(s) that are the same as other medications prescribed for you. Read the directions carefully, and ask your doctor or other care provider to review them with you.      "

## 2017-09-20 NOTE — PATIENT INSTRUCTIONS
Check progesterone level next week--Monday or Tuesday lab only appoinment    Ultrasound to check endometrium for ?polyp AND check left ovary for size of endometrioma    Call [phone] to schedule ultrasound for uterus.  Listen to prompts for the surgery and ultrasound .  [place] [phone]

## 2017-09-25 ENCOUNTER — RADIANT APPOINTMENT (OUTPATIENT)
Dept: ULTRASOUND IMAGING | Facility: CLINIC | Age: 42
End: 2017-09-25
Attending: OBSTETRICS & GYNECOLOGY
Payer: COMMERCIAL

## 2017-09-25 DIAGNOSIS — N92.0 EXCESSIVE OR FREQUENT MENSTRUATION: ICD-10-CM

## 2017-09-25 LAB — PROGEST SERPL-MCNC: 9.9 NG/ML

## 2017-09-25 PROCEDURE — 76830 TRANSVAGINAL US NON-OB: CPT | Performed by: OBSTETRICS & GYNECOLOGY

## 2017-09-25 PROCEDURE — 36415 COLL VENOUS BLD VENIPUNCTURE: CPT | Performed by: OBSTETRICS & GYNECOLOGY

## 2017-09-25 PROCEDURE — 84144 ASSAY OF PROGESTERONE: CPT | Performed by: OBSTETRICS & GYNECOLOGY

## 2017-10-03 ENCOUNTER — ALLIED HEALTH/NURSE VISIT (OUTPATIENT)
Dept: SURGERY | Facility: CLINIC | Age: 42
End: 2017-10-03

## 2017-10-03 ENCOUNTER — OFFICE VISIT (OUTPATIENT)
Dept: SURGERY | Facility: CLINIC | Age: 42
End: 2017-10-03

## 2017-10-03 ENCOUNTER — ANESTHESIA EVENT (OUTPATIENT)
Dept: SURGERY | Facility: CLINIC | Age: 42
End: 2017-10-03

## 2017-10-03 VITALS
TEMPERATURE: 98.1 F | SYSTOLIC BLOOD PRESSURE: 142 MMHG | HEIGHT: 64 IN | BODY MASS INDEX: 24.12 KG/M2 | DIASTOLIC BLOOD PRESSURE: 84 MMHG | WEIGHT: 141.3 LBS | HEART RATE: 71 BPM | RESPIRATION RATE: 18 BRPM | OXYGEN SATURATION: 91 %

## 2017-10-03 DIAGNOSIS — Z01.818 PREOPERATIVE GENERAL PHYSICAL EXAMINATION: Primary | ICD-10-CM

## 2017-10-03 DIAGNOSIS — Z22.39 PSEUDOMONAS AERUGINOSA COLONIZATION: Chronic | ICD-10-CM

## 2017-10-03 DIAGNOSIS — Q89.3 KARTAGENER'S SYNDROME: Chronic | ICD-10-CM

## 2017-10-03 NOTE — PATIENT INSTRUCTIONS
Preparing for Your Surgery      Name:  Armando Dc   MRN:  [id number]   :  1975   Today's Date:  10/3/2017     Arriving for surgery:  Surgery date:                  Your surgery date is not scheduled.  Once it is scheduled we will call you with the Date, time. Location and eating and drinking instructions.  If you do not hear from us please call [phone]  Surgery time:    Arrival time:    Please come to:         What can I eat or drink?  -  You may have solid food or milk products until 8 hours prior to your surgery.  -  You may have water, apple juice or 7up/Sprite until 2 hours prior to your surgery.    Which medicines can I take?    -  Stop the prenatal vitamin one week before surgery    -  Please take these medications the day of surgery:  Nebulizer treatments and inhalers normally taken in the morning      How do I prepare myself?  -  Take two showers: one the night before surgery; and one the morning of surgery.         Use Scrubcare or Hibiclens to wash from neck down.  You may use your own shampoo and conditioner. No other hair products.   -  Do NOT use lotion, powder, deodorant, or antiperspirant the day of your surgery.  -  Do NOT wear any makeup, fingernail polish or jewelry.  -  Begin using Incentive Spirometer 1 week prior to surgery.  Use 4 times per day, up to 5-10 breaths each time.  Bring Incentive Spirometer to hospital.  -Do not bring your own medications to the hospital, except for inhalers and eye drops.  -  Bring your ID and insurance card.    Questions or Concerns:  If you have questions or concerns, please call the  Preoperative Assessment Center, Monday-Friday 7AM-7PM:  [phone]    AFTER YOUR SURGERY  Breathing exercises   Breathing exercises help you recover faster. Take deep breaths and let the air out slowly. This will:     Help you wake up after surgery.    Help prevent complications like pneumonia.  Preventing complications will help you go home sooner.   We may give you a  breathing device (incentive spirometer) to encourage you to breathe deeply.   Nausea and vomiting   You may feel sick to your stomach after surgery; if so, let your nurse know.    Pain control:  After surgery, you may have pain. Our goal is to help you manage your pain. Pain medicine will help you feel comfortable enough to do activities that will help you heal.  These activities may include breathing exercises, walking and physical therapy.   To help your health care team treat your pain we will ask: 1) If you have pain  2) where it is located 3) describe your pain in your words  Methods of pain control include medications given by mouth, vein or by nerve block for some surgeries.  We may give you a pain control pump that will:  1) Deliver the medicine through a tube placed in your vein  2) Control the amount of medicine you receive  3) Allow you to push a button to deliver a dose of pain medicine  Sequential Compression Device (SCD) or Pneumo Boots:  You may need to wear SCD S on your legs or feet. These are wraps connected to a machine that pumps in air and releases it. The repeated pumping helps prevent blood clots from forming.

## 2017-10-03 NOTE — ADDENDUM NOTE
Addendum  created 10/03/17 0931 by Cecilia Maya MD    Actions taken from a BestPractice Advisory, Sign clinical note

## 2017-10-03 NOTE — H&P
Pre-Operative H & P     CC:  Preoperative exam to assess for increased cardiopulmonary risk while undergoing surgery and anesthesia.    Date of Encounter: 10/3/2017  Primary Care Physician:  No primary care provider on file.    LLOYD Dc is a 42 year old female who presents for pre-operative H & P in preparation for possible laparoscopic ovarian cystectomy, with Dr. Huang, location, date and time to be determined. 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  She has had 4 cycles of intravenous fertilization. This process involved pelvic US, 11/30/16, which shows left ovarian cyst 6.5 x 4.3x 5.3 cm thought to be endometrioma.  She also has vaginal bleeding since 9/5/17, light with no documented anemia (HGB 13.9 may be elevated due to hypoxia) and no blood transfusion.    History is obtained from the patient.     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    Steroid use in the last year: no    Personal or FH with difficulty with Anesthesia:  no    Prior to Admission Medications  Current Outpatient Prescriptions   Medication Sig Dispense Refill     Prenatal MV-Min-Fe Fum-FA-DHA (PRENATAL 1 PO) Take 1 tablet by mouth every evening       acetylcysteine (MUCOMYST)  "20 % nebulizer solution Inhale 2 mLs into the lungs 2 times daily 120 mL 5     albuterol (PROAIR HFA/PROVENTIL HFA/VENTOLIN HFA) 108 (90 BASE) MCG/ACT Inhaler Inhale 2 puffs into the lungs every 6 hours as needed for shortness of breath / dyspnea or wheezing 1 Inhaler 12     budesonide-formoterol (SYMBICORT) 160-4.5 MCG/ACT Inhaler Inhale 2 puffs into the lungs 2 times daily 1 Inhaler 12     albuterol (2.5 MG/3ML) 0.083% nebulizer solution USE ONE VIAL IN NEBULIZER TWICE DAILY (Patient taking differently: USE ONE VIAL IN NEBULIZER DAILY) 180 mL 11     Misc. 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  Immigrated in 2006 from [place] ([facility]). No children.  Parents in [place], 2 younger sisters and a brother, she is eldest.        Family History  Family History   Problem Relation Age of Onset     Hyperlipidemia Mother      DIABETES Mother      Hypertension Mother      Hyperlipidemia Father      Hypertension Father      DIABETES Maternal Grandmother      DIABETES Maternal Grandfather                ROS/MED HX    ENT/Pulmonary:     (+)allergic rhinitis, asthma Treatment: Nebulizer daily and Inhaler prn,  , . Other " "pulmonary disease primary ciliary dyskinesia (vest treatments).    Neurologic:  - neg neurologic ROS     Cardiovascular:  - neg cardiovascular ROS   (+) ----. : . . . :. . Previous cardiac testing Echodate:results:date: results: date: results: date: results:          METS/Exercise Tolerance:  >4 METS   Hematologic:  - neg hematologic  ROS       Musculoskeletal:  - neg musculoskeletal ROS       GI/Hepatic:  - neg GI/hepatic ROS       Renal/Genitourinary:  - ROS Renal section negative       Endo:  - neg endo ROS       Psychiatric:  - neg psychiatric ROS       Infectious Disease:   (+) Other Infectious Disease pseudomonas aeriginos colonozation      Malignancy:      - no malignancy   Other:    (+) no H/O Chronic Pain,no other significant disability        The complete review of systems is negative other than noted in the HPI or here.   Temp: 98.1  F (36.7  C) Temp src: Oral BP: 142/84 Pulse: 71   Resp: 18 SpO2: 91 %         141 lbs 4.8 oz  5' 3.5\"   Body mass index is 24.64 kg/(m^2).       Physical Exam  Constitutional: Awake, alert, cooperative, no apparent distress, and appears stated age.  Eyes: Pupils equal, round and reactive to light, extra ocular muscles intact, sclera clear, conjunctiva normal.  HENT: Normocephalic, oral pharynx with dry moist mucus membranes, small areas lips have dry crusted white areas. No goiter appreciated.   Respiratory: late inspiratory crackles especially at bases and left lateral chest. Intermittent wheezes. Moving air. Pulsox 91%  Cardiovascular: Regular rate and rhythm, normal S1 and S2, and no murmur noted.  Carotids +2, no bruits. No LE edema. Palpable pulses to radial  arteries.   GI: Normal bowel sounds, soft, non-distended, non-tender, no masses palpated, no hepatosplenomegaly.    Lymph/Hematologic: No cervical lymphadenopathy and no supraclavicular lymphadenopathy.  Genitourinary:  deferred  Skin: Warm and dry.  No rashes at anticipated surgical site.   Musculoskeletal: Full " ROM of neck. There is no redness, warmth, or swelling of the joints.   Neurologic: Awake, alert, oriented to name, place and time. Cranial nerves II-XII are grossly intact. Gait is normal.   Neuropsychiatric: Calm, cooperative. Normal affect.     Labs: (personally reviewed)  CBC RESULTS:   Recent Labs   Lab Test  11/14/16   1153   WBC  5.6   RBC  4.74   HGB  13.9   HCT  41.3   MCV  87   MCH  29.3   MCHC  33.7   RDW  13.9   PLT  262     Last Basic Metabolic Panel:  Lab Results   Component Value Date     09/01/2017      Lab Results   Component Value Date    POTASSIUM 3.3 09/01/2017     Lab Results   Component Value Date    CHLORIDE 105 09/01/2017     Lab Results   Component Value Date    JOHN 8.7 09/01/2017     Lab Results   Component Value Date    CO2 26 09/01/2017     Lab Results   Component Value Date    BUN 14 09/01/2017     Lab Results   Component Value Date    CR 0.81 09/01/2017     Lab Results   Component Value Date     09/01/2017       EKG: Not indicated.   Cardiac echo: Done in [place] - will request    ASSESSMENT and PLAN  Armando Dc is a 42 year old female scheduled to undergo possible laparoscopic ovarian cystectomy, with Dr. Huang, location, date and time to be determined.     Pre-operative considerations include:  1.) Pulmonary: Reactive airway ds. Kartagener's syndrome. 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 Last exacerbation requiring hospitalization with associated hypoxia was 2014 in [place]. She has been stable since on vest therapy, Mucomyst nebs, symbicort and albuterol inhalers. Exam today reveals normal RR and effort with scattered wheezes and inspiratory crackles, especially at bases and lt lateral chest. Pulsox 91%. FEV1 55%; FVC 84%.   -See PAC note for evaluation re: discussion anesthesia risks  -MILAGRO risk = 0/8 = low risk  -Use nebulizer as well as inhalers DOS and continue prescribed vest therapy  2.) Cardiac:  Exercise tolerance is somewhat limited due to respiratory sx. Likely has ~ 4 METS -ca walk 4 blocks, SOB with > 1 flight stars. Never smoker.   -RCRI = 0.9%    -No further cardiac evaluation indicated  3.) Heme/Endo: Endometriosis and infertility, has had 4 cycles of intravenous fertilization. US11/30/16 shows left ovarian cyst 6.5 x 4.3x 5.3 cm thought to be endometrioma.    Vaginal bleeding since 9/5/17, light with no documented anemia (HGB 13.9 may be elevated due to hypoxia) and no blood transfusion.   -VTE risk is not elevated (0.26%)   4.) GI: PONV 2 (2 or > antiemetic prophylaxis recommended).    Patient was discussed with Dr Maya.    JAYSHREE Mckenzie  Preoperative Assessment Center  [facility]  [facility]  Phone: [phone]  Fax: [phone]

## 2017-10-03 NOTE — ANESTHESIA PREPROCEDURE EVALUATION
Anesthesia Evaluation     . Pt has had prior anesthetic. Type: General, MAC and Regional           ROS/MED HX    ENT/Pulmonary:     (+)allergic rhinitis, asthma Treatment: Nebulizer daily and Inhaler prn,  , . Other pulmonary disease primary ciliary dyskinesia (vest treatments).    Neurologic:  - neg neurologic ROS     Cardiovascular:  - neg cardiovascular ROS   (+) ----. : . . . :. . Previous cardiac testing Echodate:results:date: results: date: results: date: results:          METS/Exercise Tolerance:  >4 METS   Hematologic:  - neg hematologic  ROS       Musculoskeletal:  - neg musculoskeletal ROS       GI/Hepatic:  - neg GI/hepatic ROS       Renal/Genitourinary:  - ROS Renal section negative       Endo:  - neg endo ROS       Psychiatric:  - neg psychiatric ROS       Infectious Disease:   (+) Other Infectious Disease pseudomonas aeriginos colonozation      Malignancy:      - no malignancy   Other:    (+) no H/O Chronic Pain,no other significant disability                    Physical Exam  Normal systems: dental    Airway   Mallampati: III  TM distance: <3 FB  Neck ROM: full    Dental     Cardiovascular   Rhythm and rate: regular and normal      Pulmonary (+) wheezes and rales       Other findings: LABS:  CBC RESULTS: Lab Test        11/14/16                       1153          WBC          5.6           RBC          4.74          HGB          13.9          HCT          41.3          MCV          87            MCH          29.3          MCHC         33.7          RDW          13.9          PLT          262           Last Basic Metabolic Panel:  Lab Results      Component                Value               Date                      NA                       138                 09/01/2017             Lab Results      Component                Value               Date                      POTASSIUM                3.3                 09/01/2017            Lab Results      Component                Value                Date                      CHLORIDE                 105                 09/01/2017            Lab Results      Component                Value               Date                      JOHN                      8.7                 09/01/2017            Lab Results      Component                Value               Date                      CO2                      26                  09/01/2017            Lab Results      Component                Value               Date                      BUN                      14                  09/01/2017            Lab Results      Component                Value               Date                      CR                       0.81                09/01/2017            Lab Results      Component                Value               Date                      GLC                      122                 09/01/2017                     PAC Discussion and Assessment    ASA Classification:   Case is suitable for:   Anesthetic techniques and relevant risks discussed:   Invasive monitoring and risk discussed:   Types:   Possibility and Risk of blood transfusion discussed:   NPO instructions given:   Additional anesthetic preparation and risks discussed:   Needs early admission to pre-op area:   Other:     PAC Resident/NP Anesthesia Assessment:  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 Huang, location, date and time to be determined. PAC referral for risk assessment and optimization for anesthesia with comorbid conditions of primary ciliary dyskinesia (Kartagener's syndrome), reactive airway ds and she is referred to discuss anesthesia options given her respiratory status.  Pre-operative considerations include:  1.) Pulmonary: Reactive airway ds. Kartagener's syndrome. 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 Last exacerbation requiring hospitalization with  associated hypoxia was 2014 in [place]. She has been stable since on vest therapy, Mucomyst nebs, symbicort and albuterol inhalers. Exam today reveals normal RR and effort with scattered wheezes and inspiratory crackles, especially at bases and lt lateral chest. Pulsox 91%. FEV1 55%; FVC 84%. Also with h/o endometriosis and infertility, has had 4 cycles of intravenous fertilization. US11/30/16 shows left ovarian cyst 6.5 x 4.3x 5.3 cm thought to be endometrioma.    -MILAGRO risk = 0/8 = low risk  2.) Cardiac: Exercise tolerance is somewhat limited due to respiratory sx. Likely has ~ 4 METS -ca walk 4 blocks, SOB with > 1 flight stars. Never smoker.   -RCRI = 0.9%    -No further cardiac evaluation indicated  3.) Heme: Vaginal bleeding since 9/5/17, light with no documented anemia (HGB 13.9 may be elevated due to hypoxia) and no blood transfusion.   -VTE risk is not elevated (0.26%)   4.) GI: PONV 2 (2 or > antiemetic prophylaxis recommended).  Pt evaluated by Dr. Maya. See her recommendations below.    Reviewed and Signed by PAC Mid-Level Provider/Resident  Mid-Level Provider/Resident: Azeb Thacker PA-C  Date: 10/3/17  Time: 8:32 AM    Attending Anesthesiologist Anesthesia Assessment:  42 year old for possible lap cystectomy of ovary in the setting of Kartagener's syndrome. Chart reviewed, patient seen and evaluated; agree with above assessment. Her primary ciliary dyskinesis behaves a bit like CF, and is manageed much the same way with vest treatment, mucomyst, symbicort and albuterol. She is followed closely here by pulmonary who will set the preop meds (if any). No cardiac disease known.     Patient is appropriate for the planned procedure without further workup or medical management change. The final anesthesia plan will be determined by the physician anesthesiologist caring for the patient on the day of surgery.      Reviewed and Signed by PAC Anesthesiologist  Anesthesiologist: heather  Date:  10/3/2017  Time:   Pass/Fail: Pass  Disposition:     PAC Pharmacist Assessment:        Pharmacist:   Date:   Time:                           .

## 2017-10-03 NOTE — MR AVS SNAPSHOT
After Visit Summary   10/3/2017    Armando Dc    MRN: [id number]           Patient Information     Date Of Birth          1975        Visit Information        Provider Department      10/3/2017 8:30 AM Rn, [facility] Preoperative Assessment Center        Care Instructions    Preparing for Your Surgery      Name:  Armando Dc   MRN:  [id number]   :  1975   Today's Date:  10/3/2017     Arriving for surgery:  Surgery date:                  Your surgery date is not scheduled.  Once it is scheduled we will call you with the Date, time. Location and eating and drinking instructions.  If you do not hear from us please call [phone]  Surgery time:    Arrival time:    Please come to:         What can I eat or drink?  -  You may have solid food or milk products until 8 hours prior to your surgery.  -  You may have water, apple juice or 7up/Sprite until 2 hours prior to your surgery.    Which medicines can I take?    -  Stop the prenatal vitamin one week before surgery    -  Please take these medications the day of surgery:  Nebulizer treatments and inhalers normally taken in the morning      How do I prepare myself?  -  Take two showers: one the night before surgery; and one the morning of surgery.         Use Scrubcare or Hibiclens to wash from neck down.  You may use your own shampoo and conditioner. No other hair products.   -  Do NOT use lotion, powder, deodorant, or antiperspirant the day of your surgery.  -  Do NOT wear any makeup, fingernail polish or jewelry.  -  Begin using Incentive Spirometer 1 week prior to surgery.  Use 4 times per day, up to 5-10 breaths each time.  Bring Incentive Spirometer to hospital.  -Do not bring your own medications to the hospital, except for inhalers and eye drops.  -  Bring your ID and insurance card.    Questions or Concerns:  If you have questions or concerns, please call the  Preoperative Assessment Center, Monday-Friday 7AM-7PM:   [phone]    AFTER YOUR SURGERY  Breathing exercises   Breathing exercises help you recover faster. Take deep breaths and let the air out slowly. This will:     Help you wake up after surgery.    Help prevent complications like pneumonia.  Preventing complications will help you go home sooner.   We may give you a breathing device (incentive spirometer) to encourage you to breathe deeply.   Nausea and vomiting   You may feel sick to your stomach after surgery; if so, let your nurse know.    Pain control:  After surgery, you may have pain. Our goal is to help you manage your pain. Pain medicine will help you feel comfortable enough to do activities that will help you heal.  These activities may include breathing exercises, walking and physical therapy.   To help your health care team treat your pain we will ask: 1) If you have pain  2) where it is located 3) describe your pain in your words  Methods of pain control include medications given by mouth, vein or by nerve block for some surgeries.  We may give you a pain control pump that will:  1) Deliver the medicine through a tube placed in your vein  2) Control the amount of medicine you receive  3) Allow you to push a button to deliver a dose of pain medicine  Sequential Compression Device (SCD) or Pneumo Boots:  You may need to wear SCD S on your legs or feet. These are wraps connected to a machine that pumps in air and releases it. The repeated pumping helps prevent blood clots from forming.                 Follow-ups after your visit        Your next 10 appointments already scheduled     Oct 03, 2017  8:30 AM CDT   (Arrive by 8:15 AM)   PAC RN ASSESSMENT with Dov Pac Rn   [facility] Preoperative Assessment Center ([facility])    [address] [id number] [phone]            Oct 03, 2017  9:00 AM CDT   (Arrive by 8:45 AM)   PAC Anesthesia Consult with Dov Pac Anesthesiologist   [facility] Preoperative  Assessment Center ([facility])    [address] [place] [address]  4th [facility] [address] [phone]            Oct 03, 2017  9:15 AM CDT   LAB with  LAB   [facility] ([facility])    [address] [place] [address]  1st [facility] [id number] [phone]           Patient must bring picture ID. Patient should be prepared to give a urine specimen  Please do not eat 10-12 hours before your appointment if you are coming in fasting for labs on lipids, cholesterol, or glucose (sugar). Pregnant women should follow their Care Team instructions. Water with medications is okay. Do not drink coffee or other fluids. If you have concerns about taking  your medications, please ask at office or if scheduling via [employer], send a message by clicking on Secure Messaging, Message Your Care Team.            Dec 05, 2017 10:00 AM CST   CF LOOP with  PFL CF   [facility] Pulmonary Function Testing ([facility])    [address] [place] [id number] [phone]            Dec 05, 2017 10:40 AM CST   (Arrive by 10:25 AM)   RETURN CYSTIC FIBROSIS VISIT with Lyn Hernández MD   [facility] ([facility])    [address] [place] [id number] [phone]              Who to contact     Please call your clinic at [phone] to:    Ask questions about your health    Make or cancel appointments    Discuss your medicines    Learn about your test results    Speak to your doctor   If you have compliments or concerns about an experience at your clinic, or if you wish to file a complaint, please contact [facility] Patient Relations at [phone] or email us at [email or web address]         Additional Information About Your Visit        [employer] Information     [employer] gives you secure access to your  electronic health record. If you see a primary care provider, you can also send messages to your care team and make appointments. If you have questions, please call your primary care clinic.  If you do not have a primary care provider, please call [phone] and they will assist you.      [employer] is an electronic gateway that provides easy, online access to your medical records. With [employer], you can request a clinic appointment, read your test results, renew a prescription or communicate with your care team.     To access your existing account, please contact your [facility] or call [phone] for assistance.        Care EveryWhere ID     This is your Care EveryWhere ID. This could be used by other organizations to access your [place] medical records  [id number]        Your Vitals Were     Last Period                   10/01/2017            Blood Pressure from Last 3 Encounters:   10/03/17 142/84   09/20/17 124/79   09/01/17 114/76    Weight from Last 3 Encounters:   10/03/17 64.1 kg (141 lb 4.8 oz)   09/20/17 62.1 kg (137 lb)   09/01/17 62.6 kg (137 lb 14.4 oz)              Today, you had the following     No orders found for display         Today's Medication Changes          These changes are accurate as of: 10/3/17  8:17 AM.  If you have any questions, ask your nurse or doctor.               These medicines have changed or have updated prescriptions.        Dose/Directions    * albuterol (2.5 MG/3ML) 0.083% neb solution   This may have changed:  See the new instructions.   Used for:  Bronchiectasis without acute exacerbation (H)   Changed by:  Lyn Hernández MD        USE ONE VIAL IN NEBULIZER TWICE DAILY   Quantity:  180 mL   Refills:  11       * albuterol 108 (90 BASE) MCG/ACT Inhaler   Commonly known as:  PROAIR HFA/PROVENTIL HFA/VENTOLIN HFA   This may have changed:  Another medication with the same name was changed. Make sure you understand how and when to  take each.   Used for:  Kartagener syndrome   Changed by:  Lyn Hernández MD        Dose:  2 puff   Inhale 2 puffs into the lungs every 6 hours as needed for shortness of breath / dyspnea or wheezing   Quantity:  1 Inhaler   Refills:  12       * Notice:  This list has 2 medication(s) that are the same as other medications prescribed for you. Read the directions carefully, and ask your doctor or other care provider to review them with you.             Primary Care Provider    None Specified       No primary provider on file.        Equal Access to Services     [facility]: Hadii aad ku hadasho Soomaali, waaxda luqadaha, qaybta kaalmada adeegyada, tee mead . So [facility] [phone].    ATENCIÓN: Si habla español, tiene a guevara disposición servicios gratuitos de asistencia lingüística. Llame al [phone].    We comply with applicable federal civil rights laws and [place] laws. We do not discriminate on the basis of race, color, national origin, age, disability, sex, sexual orientation, or gender identity.            Thank you!     Thank you for choosing [facility] PREOPERATIVE ASSESSMENT CENTER  for your care. Our goal is always to provide you with excellent care. Hearing back from our patients is one way we can continue to improve our services. Please take a few minutes to complete the written survey that you may receive in the mail after your visit with us. Thank you!             Your Updated Medication List - Protect others around you: Learn how to safely use, store and throw away your medicines at www.disposemymeds.org.          This list is accurate as of: 10/3/17  8:17 AM.  Always use your most recent med list.                   Brand Name Dispense Instructions for use Diagnosis    ACAPELLA Misc      use twice daily        acetylcysteine 20 % nebulizer solution    MUCOMYST    120 mL    Inhale 2 mLs into the lungs 2 times daily    Kartagener's syndrome, Situs inversus,  "Pseudomonas aeruginosa colonization       * albuterol (2.5 MG/3ML) 0.083% neb solution     180 mL    USE ONE VIAL IN NEBULIZER TWICE DAILY    Bronchiectasis without acute exacerbation (H)       * albuterol 108 (90 BASE) MCG/ACT Inhaler    PROAIR HFA/PROVENTIL HFA/VENTOLIN HFA    1 Inhaler    Inhale 2 puffs into the lungs every 6 hours as needed for shortness of breath / dyspnea or wheezing    Kartagener syndrome       budesonide-formoterol 160-4.5 MCG/ACT Inhaler    SYMBICORT    1 Inhaler    Inhale 2 puffs into the lungs 2 times daily    Kartagener syndrome, Kartagener's syndrome       nebulizer Sindy      Nebulizer compressor - use with ALEK as directed        PRENATAL 1 PO      Take 1 tablet by mouth every evening        Syringe Luer Lock 20G X 1-1/2\" 5 ML Misc     60 each    1 each 2 times daily    Kartagener syndrome, Reactive airway disease       Water For Injection Sterile Soln     250 mL    Inject 4 mLs into the vein 2 times daily    Kartagener syndrome, Reactive airway disease       * Notice:  This list has 2 medication(s) that are the same as other medications prescribed for you. Read the directions carefully, and ask your doctor or other care provider to review them with you.      "

## 2017-10-06 ENCOUNTER — TELEPHONE (OUTPATIENT)
Dept: PULMONOLOGY | Facility: CLINIC | Age: 42
End: 2017-10-06

## 2017-10-06 NOTE — TELEPHONE ENCOUNTER
"Respiratory Therapy Telephone Note:    Writer was asked by RNCC to contact patient regarding upcoming laparoscopic abdominal surgery.    Patient encouraged to:  1. To prepare for surgery, perform 3-4 treatments daily for 2-3 days prior to surgery.  2. Utilize approved pain medication after surgery to help with discomfort - preferably dosed 30-60 minutes before Vest treatments.  3. Use a thin towel, thin blanket, or sweatshirt to \"splint\" areas of discomfort immediately after surgery.  4. You are at increased risk of pulmonary complications after an abdominal surgery. It is ok to use lower Vest settings immediately post op, but imperative to return to [place] Protocol settings as soon after surgery as able.   5. For any additional needs regarding this, don't hesitate to call or in basket. We are happy to help troubleshoot as needed.   "

## 2017-10-13 RX ORDER — CEFAZOLIN SODIUM 2 G/100ML
2 INJECTION, SOLUTION INTRAVENOUS
Status: CANCELLED | OUTPATIENT
Start: 2017-11-13

## 2017-10-13 RX ORDER — CEFAZOLIN SODIUM 1 G/3ML
1 INJECTION, POWDER, FOR SOLUTION INTRAMUSCULAR; INTRAVENOUS SEE ADMIN INSTRUCTIONS
Status: CANCELLED | OUTPATIENT
Start: 2017-11-13

## 2017-11-01 ENCOUNTER — TELEPHONE (OUTPATIENT)
Dept: PULMONOLOGY | Facility: CLINIC | Age: 42
End: 2017-11-01

## 2017-11-01 DIAGNOSIS — J47.9 BRONCHIECTASIS WITHOUT ACUTE EXACERBATION (H): Primary | ICD-10-CM

## 2017-11-03 ENCOUNTER — OFFICE VISIT (OUTPATIENT)
Dept: INTERNAL MEDICINE | Facility: CLINIC | Age: 42
End: 2017-11-03

## 2017-11-03 VITALS
OXYGEN SATURATION: 94 % | BODY MASS INDEX: 24.39 KG/M2 | WEIGHT: 139.9 LBS | HEART RATE: 77 BPM | RESPIRATION RATE: 20 BRPM | SYSTOLIC BLOOD PRESSURE: 132 MMHG | DIASTOLIC BLOOD PRESSURE: 83 MMHG

## 2017-11-03 DIAGNOSIS — R06.02 SOB (SHORTNESS OF BREATH): Primary | ICD-10-CM

## 2017-11-03 DIAGNOSIS — R06.02 SOB (SHORTNESS OF BREATH): ICD-10-CM

## 2017-11-03 LAB
ALBUMIN SERPL-MCNC: 3.7 G/DL (ref 3.4–5)
ALP SERPL-CCNC: 99 U/L (ref 40–150)
ALT SERPL W P-5'-P-CCNC: 18 U/L (ref 0–50)
ANION GAP SERPL CALCULATED.3IONS-SCNC: 7 MMOL/L (ref 3–14)
AST SERPL W P-5'-P-CCNC: 10 U/L (ref 0–45)
BASOPHILS # BLD AUTO: 0 10E9/L (ref 0–0.2)
BASOPHILS NFR BLD AUTO: 0.3 %
BILIRUB SERPL-MCNC: 0.4 MG/DL (ref 0.2–1.3)
BUN SERPL-MCNC: 18 MG/DL (ref 7–30)
CALCIUM SERPL-MCNC: 8.5 MG/DL (ref 8.5–10.1)
CHLORIDE SERPL-SCNC: 106 MMOL/L (ref 94–109)
CO2 SERPL-SCNC: 26 MMOL/L (ref 20–32)
CREAT SERPL-MCNC: 0.68 MG/DL (ref 0.52–1.04)
DIFFERENTIAL METHOD BLD: NORMAL
EOSINOPHIL # BLD AUTO: 0.2 10E9/L (ref 0–0.7)
EOSINOPHIL NFR BLD AUTO: 3.5 %
ERYTHROCYTE [DISTWIDTH] IN BLOOD BY AUTOMATED COUNT: 12.7 % (ref 10–15)
GFR SERPL CREATININE-BSD FRML MDRD: >90 ML/MIN/1.7M2
GLUCOSE SERPL-MCNC: 113 MG/DL (ref 70–99)
HCG SERPL QL: NEGATIVE
HCT VFR BLD AUTO: 40.1 % (ref 35–47)
HGB BLD-MCNC: 13.1 G/DL (ref 11.7–15.7)
IMM GRANULOCYTES # BLD: 0 10E9/L (ref 0–0.4)
IMM GRANULOCYTES NFR BLD: 0.3 %
LYMPHOCYTES # BLD AUTO: 2 10E9/L (ref 0.8–5.3)
LYMPHOCYTES NFR BLD AUTO: 31.4 %
MCH RBC QN AUTO: 29.1 PG (ref 26.5–33)
MCHC RBC AUTO-ENTMCNC: 32.7 G/DL (ref 31.5–36.5)
MCV RBC AUTO: 89 FL (ref 78–100)
MONOCYTES # BLD AUTO: 0.4 10E9/L (ref 0–1.3)
MONOCYTES NFR BLD AUTO: 7 %
NEUTROPHILS # BLD AUTO: 3.6 10E9/L (ref 1.6–8.3)
NEUTROPHILS NFR BLD AUTO: 57.5 %
NRBC # BLD AUTO: 0 10*3/UL
NRBC BLD AUTO-RTO: 0 /100
PLATELET # BLD AUTO: 271 10E9/L (ref 150–450)
POTASSIUM SERPL-SCNC: 4.1 MMOL/L (ref 3.4–5.3)
PROT SERPL-MCNC: 8 G/DL (ref 6.8–8.8)
RBC # BLD AUTO: 4.5 10E12/L (ref 3.8–5.2)
SODIUM SERPL-SCNC: 138 MMOL/L (ref 133–144)
WBC # BLD AUTO: 6.3 10E9/L (ref 4–11)

## 2017-11-03 ASSESSMENT — PAIN SCALES - GENERAL: PAINLEVEL: NO PAIN (0)

## 2017-11-03 NOTE — PROGRESS NOTES
HPI:    Pt. Comes in for preop for laparoscopic ovarian cystectomy with Dr. Roberts. She was seen by Dr. Roberts 9/20/17. She was also seen in Preop clinic 10/3/17. She has h/o  Primary ciliary dyskinesia lung disease and is followed by Dr. Hernández pulmonary and seen 8/29/17. She remains on nebs and inhalers. She denies any rest/exertional CP. Her breathing is currently stable. No new SOB or cough. No URI sxs. No F/C/NS. She does not smoke, no abuse EtOH. No bleeding or anesthesia complaints. She got influenza vaccination this year. Td within the last 10 years. No other HEENT, cardiopulmonary, abdominal, , GYN, neurological complaints.    Past Medical History:   Diagnosis Date     Endometriosis     left ovary     Infertility      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,      Pseudomonas aeruginosa colonization 2008     Reactive airway disease      Uncomplicated asthma     Reactive airway disease     Past Surgical History:   Procedure Laterality Date     TONSILLECTOMY & ADENOIDECTOMY      8 years old     TRANSVAGINAL RETRIEVAL OVUM Bilateral 3/3/2016    IVF Procedure: TRANSVAGINAL RETRIEVAL OVUM;  Surgeon: Sunshine Gilliam MD;  [facility]     uterine polyp  2013     PE:    Vitals noted gen nad cooperative alert, HEENT, oropharynx clear no exudate, neck supple nl rom, lungs with coarse BS's, good air movement, RRR, S1, S2, No MRG, abdomen, nd, nt, grossly normal neurological exam    A/P:    Probably low/moderate risk for planned procedure. She is currently clinically stable from pulmonary standpoint. She will continue to use Nebulizers/inhalers. She is aware of viral illness triggers. Labs ordered today    Total time spent 25 minutes.  More than 50% of the time spent with Ms. Dc on counseling / coordinating her care

## 2017-11-03 NOTE — MR AVS SNAPSHOT
After Visit Summary   11/3/2017    Armando Dc    MRN: [id number]           Patient Information     Date Of Birth          1975        Visit Information        Provider Department      11/3/2017 7:05 AM Rigoberto Smith MD [facility] Care Clinic        Today's Diagnoses     SOB (shortness of breath)    -  1      Care Instructions    Primary Care Center: [phone]     Primary Care Center Medication Refill Request Information:  * Please contact your pharmacy regarding ANY request for medication refills.  ** [facility] Prescription Fax = [phone]  * Please allow 3 business days for routine medication refills.  * Please allow 5 business days for controlled substance medication refills.     Primary Care Center Test Result notification information:  *You will be notified with in 7-10 days of your appointment day regarding the results of your test.  If you are on MyChart you will be notified as soon as the provider has reviewed the results and signed off on them.          Follow-ups after your visit        Your next 10 appointments already scheduled     Nov 03, 2017  7:45 AM CDT   LAB with  LAB   [facility] Lab ([facility])    [address] [place] [id number] [phone]           Please do not eat 10-12 hours before your appointment if you are coming in fasting for labs on lipids, cholesterol, or glucose (sugar). This does not apply to pregnant women. Water, hot tea and black coffee (with nothing added) are okay. Do not drink other fluids, diet soda or chew gum.            Nov 13, 2017   Procedure with Zayra Roberts MD   [facility] [place], Same Day Surgery (--)    [facility] [id number] [phone]            Dec 05, 2017 10:00 AM CST   CF LOOP with  PFL CF   [facility] Pulmonary Function Testing ([facility])    [address] [place] [address] [phone]             Dec 05, 2017 10:40 AM CST   (Arrive by 10:25 AM)   RETURN CYSTIC FIBROSIS VISIT with Lyn Hernández MD   [facility] ([facility])    [address] [address] [phone]              Future tests that were ordered for you today     Open Future Orders        Priority Expected Expires Ordered    CBC with platelets differential Routine 11/3/2017 11/17/2017 11/3/2017    Comprehensive metabolic panel Routine 11/3/2017 11/3/2018 11/3/2017            Who to contact     Please call your clinic at [phone] to:    Ask questions about your health    Make or cancel appointments    Discuss your medicines    Learn about your test results    Speak to your doctor   If you have compliments or concerns about an experience at your clinic, or if you wish to file a complaint, please contact [facility] Patient Relations at [phone] or email us at [email or web address]         Additional Information About Your Visit        around[employer]wayhar[employer] Information     [employer] gives you secure access to your electronic health record. If you see a primary care provider, you can also send messages to your care team and make appointments. If you have questions, please call your primary care clinic.  If you do not have a primary care provider, please call [phone] and they will assist you.      [employer] is an electronic gateway that provides easy, online access to your medical records. With [employer], you can request a clinic appointment, read your test results, renew a prescription or communicate with your care team.     To access your existing account, please contact your [facility] or call [phone] for assistance.        Care EveryWhere ID     This is your Care EveryWhere ID. This could be used by other organizations to access your [place] medical records  [id number]         Your Vitals Were     Pulse Respirations Pulse Oximetry BMI (Body Mass Index)          77 20 94% 24.39 kg/m2         Blood Pressure from Last 3 Encounters:   11/03/17 132/83   10/03/17 142/84   09/20/17 124/79    Weight from Last 3 Encounters:   11/03/17 63.5 kg (139 lb 14.4 oz)   10/03/17 64.1 kg (141 lb 4.8 oz)   09/20/17 62.1 kg (137 lb)              We Performed the Following     HCG qualitative          Today's Medication Changes          These changes are accurate as of: 11/3/17  7:26 AM.  If you have any questions, ask your nurse or doctor.               These medicines have changed or have updated prescriptions.        Dose/Directions    * albuterol (2.5 MG/3ML) 0.083% neb solution   This may have changed:  See the new instructions.   Used for:  Bronchiectasis without acute exacerbation (H)   Changed by:  Lyn Hernández MD        USE ONE VIAL IN NEBULIZER TWICE DAILY   Quantity:  180 mL   Refills:  11       * albuterol 108 (90 BASE) MCG/ACT Inhaler   Commonly known as:  PROAIR HFA/PROVENTIL HFA/VENTOLIN HFA   This may have changed:  Another medication with the same name was changed. Make sure you understand how and when to take each.   Used for:  Kartagener syndrome   Changed by:  Lyn Hernández MD        Dose:  2 puff   Inhale 2 puffs into the lungs every 6 hours as needed for shortness of breath / dyspnea or wheezing   Quantity:  1 Inhaler   Refills:  12       * Notice:  This list has 2 medication(s) that are the same as other medications prescribed for you. Read the directions carefully, and ask your doctor or other care provider to review them with you.             Primary Care Provider Office Phone # Fax #    Blake Sifuentes [phone] [address] [address]        Equal Access to Services     [facility]: Ana byrnes Soelissa, waaxda luqadaha, qaybta kaalmaananya weiss, tee magana. So  [facility] [place] [phone].    ATENCIÓN: Si edith ocntreras, tiene a guevara disposición servicios gratuitos de asistencia lingüística. Srikanth keys [phone].    We comply with applicable federal civil rights laws and [place] laws. We do not discriminate on the basis of race, color, national origin, age, disability, sex, sexual orientation, or gender identity.            Thank you!     Thank you for choosing [facility] PRIMARY CARE CLINIC  for your care. Our goal is always to provide you with excellent care. Hearing back from our patients is one way we can continue to improve our services. Please take a few minutes to complete the written survey that you may receive in the mail after your visit with us. Thank you!             Your Updated Medication List - Protect others around you: Learn how to safely use, store and throw away your medicines at www.disposemymeds.org.          This list is accurate as of: 11/3/17  7:26 AM.  Always use your most recent med list.                   Brand Name Dispense Instructions for use Diagnosis    ACAPELLA Misc      use twice daily        acetylcysteine 20 % nebulizer solution    MUCOMYST    120 mL    Inhale 2 mLs into the lungs 2 times daily    Kartagener's syndrome, Situs inversus, Pseudomonas aeruginosa colonization       * albuterol (2.5 MG/3ML) 0.083% neb solution     180 mL    USE ONE VIAL IN NEBULIZER TWICE DAILY    Bronchiectasis without acute exacerbation (H)       * albuterol 108 (90 BASE) MCG/ACT Inhaler    PROAIR HFA/PROVENTIL HFA/VENTOLIN HFA    1 Inhaler    Inhale 2 puffs into the lungs every 6 hours as needed for shortness of breath / dyspnea or wheezing    Kartagener syndrome       budesonide-formoterol 160-4.5 MCG/ACT Inhaler    SYMBICORT    1 Inhaler    Inhale 2 puffs into the lungs 2 times daily    Kartagener syndrome, Kartagener's syndrome       fluticasone-salmeterol 100-50 MCG/DOSE diskus inhaler    ADVAIR    3 Inhaler    Inhale 1 puff into the lungs 2 times daily     "Bronchiectasis without acute exacerbation (H)       nebulizer Sindy      Nebulizer compressor - use with ALEK as directed        PRENATAL 1 PO      Take 1 tablet by mouth every evening        Syringe Luer Lock 20G X 1-1/2\" 5 ML Misc     60 each    1 each 2 times daily    Kartagener syndrome, Reactive airway disease       Water For Injection Sterile Soln     250 mL    Inject 4 mLs into the vein 2 times daily    Kartagener syndrome, Reactive airway disease       * Notice:  This list has 2 medication(s) that are the same as other medications prescribed for you. Read the directions carefully, and ask your doctor or other care provider to review them with you.      "

## 2017-11-03 NOTE — NURSING NOTE
Chief Complaint   Patient presents with     Pre-Op Exam     pre op laparoscopic cystectomy miguel Ronquillo LPN 7:14 AM on 11/3/2017

## 2017-11-03 NOTE — PATIENT INSTRUCTIONS
[facility]: [phone]     [facility] Medication Refill Request Information:  * Please contact your pharmacy regarding ANY request for medication refills.  ** [facility] Prescription Fax = [phone]  * Please allow 3 business days for routine medication refills.  * Please allow 5 business days for controlled substance medication refills.     [facility] Test Result notification information:  *You will be notified with in 7-10 days of your appointment day regarding the results of your test.  If you are on MyChart you will be notified as soon as the provider has reviewed the results and signed off on them.

## 2017-11-09 ENCOUNTER — NURSE TRIAGE (OUTPATIENT)
Dept: NURSING | Facility: CLINIC | Age: 42
End: 2017-11-09

## 2017-11-09 ENCOUNTER — TELEPHONE (OUTPATIENT)
Dept: FAMILY MEDICINE | Facility: CLINIC | Age: 42
End: 2017-11-09

## 2017-11-09 NOTE — TELEPHONE ENCOUNTER
Cycle day 11 started yesterday. Acute pain in pelvis, lead to vomiting yesterday. Used Ibuprofen and rested. After 2-3 hours the pain was better. Scheduled for surgery next week. This is unusual pain. Wonders if she should take a day off from work. She's also on call tonight. May leave detailed message if she doesn't answer. She starts her shift at 8 a.m. So I told her if she doesn't think she has the stamina to make it through the day, she should call in sick. Please call her to discuss further..    Siobhan Pierce RN-[facility] Nurse Advisors

## 2017-11-09 NOTE — TELEPHONE ENCOUNTER
Cycle day 11 started yesterday. Acute pain in pelvis, lead to vomiting. Used Ibuprofen and rested. After 2-3 hours the pain was better. Scheduled for surgery next week. This is unusual pain. Wonders if she should take a day off. May leave detailed message.  Epic encounter sent high priority.  Siobhan Pierce RN-[facility]

## 2017-11-09 NOTE — TELEPHONE ENCOUNTER
Patient aware - will rest today/tonight and call back if she decides she wants to proceed with US.  Catherine Santos

## 2017-11-09 NOTE — TELEPHONE ENCOUNTER
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  Let me know if wants to get u/s done.    Thanks  Zayra Roberts MD

## 2017-11-09 NOTE — TELEPHONE ENCOUNTER
Patient stated that pain started yesterday - severe pelvic pain. Rating 9-10/10. Initially started on the left pelvic side and then moved more to the middle. Vomited. Still feeling nauseated. Is having discomfort, but not as painful today as it was yesterday. No vaginal bleeding, abnormal discharge, UTI symptoms, fever, aches or chills. Normal BMs - does not seem to be GI related per patient. Is taking Tylenol/Ibuprofen and heat therapy. Discussed that based on where she is at in cycle, may have been an ovarian cyst that ruptured. Reassuring that the pain is getting better. Worked last night - off today, but needs to work again tonight. Is wondering if she should be off of work tonight to rest? Other suggestions or recommendations?  Catherine Santos

## 2017-11-10 ENCOUNTER — MYC MEDICAL ADVICE (OUTPATIENT)
Dept: OBGYN | Facility: CLINIC | Age: 42
End: 2017-11-10

## 2017-11-12 ENCOUNTER — ANESTHESIA EVENT (OUTPATIENT)
Dept: SURGERY | Facility: CLINIC | Age: 42
End: 2017-11-12
Payer: COMMERCIAL

## 2017-11-13 ENCOUNTER — HOSPITAL ENCOUNTER (OUTPATIENT)
Facility: CLINIC | Age: 42
Discharge: HOME | End: 2017-11-13
Attending: OBSTETRICS & GYNECOLOGY | Admitting: OBSTETRICS & GYNECOLOGY
Payer: COMMERCIAL

## 2017-11-13 ENCOUNTER — ANESTHESIA (OUTPATIENT)
Dept: SURGERY | Facility: CLINIC | Age: 42
End: 2017-11-13
Payer: COMMERCIAL

## 2017-11-13 VITALS
BODY MASS INDEX: 23.52 KG/M2 | RESPIRATION RATE: 18 BRPM | DIASTOLIC BLOOD PRESSURE: 62 MMHG | TEMPERATURE: 98.2 F | HEIGHT: 64 IN | SYSTOLIC BLOOD PRESSURE: 104 MMHG | WEIGHT: 137.79 LBS | OXYGEN SATURATION: 94 %

## 2017-11-13 DIAGNOSIS — Z98.890 S/P LAPAROSCOPY: Primary | ICD-10-CM

## 2017-11-13 LAB
ABO + RH BLD: NORMAL
ABO + RH BLD: NORMAL
BLD GP AB SCN SERPL QL: NORMAL
BLOOD BANK CMNT PATIENT-IMP: NORMAL
GLUCOSE SERPL-MCNC: 115 MG/DL (ref 70–99)
HCG UR QL: NEGATIVE
SPECIMEN EXP DATE BLD: NORMAL

## 2017-11-13 PROCEDURE — 25000128 H RX IP 250 OP 636: Performed by: NURSE ANESTHETIST, CERTIFIED REGISTERED

## 2017-11-13 PROCEDURE — S0020 INJECTION, BUPIVICAINE HYDRO: HCPCS | Performed by: STUDENT IN AN ORGANIZED HEALTH CARE EDUCATION/TRAINING PROGRAM

## 2017-11-13 PROCEDURE — 86850 RBC ANTIBODY SCREEN: CPT | Performed by: OBSTETRICS & GYNECOLOGY

## 2017-11-13 PROCEDURE — 25000132 ZZH RX MED GY IP 250 OP 250 PS 637: Performed by: OBSTETRICS & GYNECOLOGY

## 2017-11-13 PROCEDURE — 94640 AIRWAY INHALATION TREATMENT: CPT

## 2017-11-13 PROCEDURE — 37000009 ZZH ANESTHESIA TECHNICAL FEE, EACH ADDTL 15 MIN: Performed by: OBSTETRICS & GYNECOLOGY

## 2017-11-13 PROCEDURE — 86900 BLOOD TYPING SEROLOGIC ABO: CPT | Performed by: OBSTETRICS & GYNECOLOGY

## 2017-11-13 PROCEDURE — 25000132 ZZH RX MED GY IP 250 OP 250 PS 637: Performed by: ANESTHESIOLOGY

## 2017-11-13 PROCEDURE — 40000275 ZZH STATISTIC RCP TIME EA 10 MIN

## 2017-11-13 PROCEDURE — 82947 ASSAY GLUCOSE BLOOD QUANT: CPT | Performed by: ANESTHESIOLOGY

## 2017-11-13 PROCEDURE — 94669 MECHANICAL CHEST WALL OSCILL: CPT

## 2017-11-13 PROCEDURE — 88305 TISSUE EXAM BY PATHOLOGIST: CPT | Mod: 26 | Performed by: OBSTETRICS & GYNECOLOGY

## 2017-11-13 PROCEDURE — 37000008 ZZH ANESTHESIA TECHNICAL FEE, 1ST 30 MIN: Performed by: OBSTETRICS & GYNECOLOGY

## 2017-11-13 PROCEDURE — 25000132 ZZH RX MED GY IP 250 OP 250 PS 637: Performed by: NURSE ANESTHETIST, CERTIFIED REGISTERED

## 2017-11-13 PROCEDURE — 25000128 H RX IP 250 OP 636: Performed by: ANESTHESIOLOGY

## 2017-11-13 PROCEDURE — C9290 INJ, BUPIVACAINE LIPOSOME: HCPCS | Performed by: STUDENT IN AN ORGANIZED HEALTH CARE EDUCATION/TRAINING PROGRAM

## 2017-11-13 PROCEDURE — 25000125 ZZHC RX 250: Performed by: STUDENT IN AN ORGANIZED HEALTH CARE EDUCATION/TRAINING PROGRAM

## 2017-11-13 PROCEDURE — 25000125 ZZHC RX 250: Performed by: ANESTHESIOLOGY

## 2017-11-13 PROCEDURE — C9399 UNCLASSIFIED DRUGS OR BIOLOG: HCPCS | Performed by: NURSE ANESTHETIST, CERTIFIED REGISTERED

## 2017-11-13 PROCEDURE — 71000014 ZZH RECOVERY PHASE 1 LEVEL 2 FIRST HR: Performed by: OBSTETRICS & GYNECOLOGY

## 2017-11-13 PROCEDURE — 25000125 ZZHC RX 250: Performed by: NURSE ANESTHETIST, CERTIFIED REGISTERED

## 2017-11-13 PROCEDURE — 71000027 ZZH RECOVERY PHASE 2 EACH 15 MINS: Performed by: OBSTETRICS & GYNECOLOGY

## 2017-11-13 PROCEDURE — 36000057 ZZH SURGERY LEVEL 3 1ST 30 MIN - UMMC: Performed by: OBSTETRICS & GYNECOLOGY

## 2017-11-13 PROCEDURE — 25000566 ZZH SEVOFLURANE, EA 15 MIN: Performed by: OBSTETRICS & GYNECOLOGY

## 2017-11-13 PROCEDURE — 27210794 ZZH OR GENERAL SUPPLY STERILE: Performed by: OBSTETRICS & GYNECOLOGY

## 2017-11-13 PROCEDURE — 71000015 ZZH RECOVERY PHASE 1 LEVEL 2 EA ADDTL HR: Performed by: OBSTETRICS & GYNECOLOGY

## 2017-11-13 PROCEDURE — 58662 LAPAROSCOPY EXCISE LESIONS: CPT | Mod: GC | Performed by: OBSTETRICS & GYNECOLOGY

## 2017-11-13 PROCEDURE — 40000170 ZZH STATISTIC PRE-PROCEDURE ASSESSMENT II: Performed by: OBSTETRICS & GYNECOLOGY

## 2017-11-13 PROCEDURE — 36000059 ZZH SURGERY LEVEL 3 EA 15 ADDTL MIN UMMC: Performed by: OBSTETRICS & GYNECOLOGY

## 2017-11-13 PROCEDURE — 86901 BLOOD TYPING SEROLOGIC RH(D): CPT | Performed by: OBSTETRICS & GYNECOLOGY

## 2017-11-13 PROCEDURE — 88305 TISSUE EXAM BY PATHOLOGIST: CPT | Performed by: OBSTETRICS & GYNECOLOGY

## 2017-11-13 PROCEDURE — 81025 URINE PREGNANCY TEST: CPT | Performed by: OBSTETRICS & GYNECOLOGY

## 2017-11-13 PROCEDURE — 36415 COLL VENOUS BLD VENIPUNCTURE: CPT | Performed by: OBSTETRICS & GYNECOLOGY

## 2017-11-13 PROCEDURE — 25000128 H RX IP 250 OP 636: Performed by: STUDENT IN AN ORGANIZED HEALTH CARE EDUCATION/TRAINING PROGRAM

## 2017-11-13 PROCEDURE — 58558 HYSTEROSCOPY BIOPSY: CPT | Mod: GC | Performed by: OBSTETRICS & GYNECOLOGY

## 2017-11-13 RX ORDER — SODIUM CHLORIDE, SODIUM LACTATE, POTASSIUM CHLORIDE, CALCIUM CHLORIDE 600; 310; 30; 20 MG/100ML; MG/100ML; MG/100ML; MG/100ML
INJECTION, SOLUTION INTRAVENOUS CONTINUOUS PRN
Status: DISCONTINUED | OUTPATIENT
Start: 2017-11-13 | End: 2017-11-13

## 2017-11-13 RX ORDER — MEPERIDINE HYDROCHLORIDE 25 MG/ML
12.5 INJECTION INTRAMUSCULAR; INTRAVENOUS; SUBCUTANEOUS
Status: DISCONTINUED | OUTPATIENT
Start: 2017-11-13 | End: 2017-11-13 | Stop reason: HOSPADM

## 2017-11-13 RX ORDER — PROPOFOL 10 MG/ML
INJECTION, EMULSION INTRAVENOUS CONTINUOUS PRN
Status: DISCONTINUED | OUTPATIENT
Start: 2017-11-13 | End: 2017-11-13

## 2017-11-13 RX ORDER — ONDANSETRON 2 MG/ML
INJECTION INTRAMUSCULAR; INTRAVENOUS PRN
Status: DISCONTINUED | OUTPATIENT
Start: 2017-11-13 | End: 2017-11-13

## 2017-11-13 RX ORDER — NALOXONE HYDROCHLORIDE 0.4 MG/ML
.1-.4 INJECTION, SOLUTION INTRAMUSCULAR; INTRAVENOUS; SUBCUTANEOUS
Status: DISCONTINUED | OUTPATIENT
Start: 2017-11-13 | End: 2017-11-13 | Stop reason: HOSPADM

## 2017-11-13 RX ORDER — HYDROCODONE BITARTRATE AND ACETAMINOPHEN 5; 325 MG/1; MG/1
1-2 TABLET ORAL
Status: DISCONTINUED | OUTPATIENT
Start: 2017-11-13 | End: 2017-11-13 | Stop reason: HOSPADM

## 2017-11-13 RX ORDER — LIDOCAINE HYDROCHLORIDE 20 MG/ML
INJECTION, SOLUTION INFILTRATION; PERINEURAL PRN
Status: DISCONTINUED | OUTPATIENT
Start: 2017-11-13 | End: 2017-11-13

## 2017-11-13 RX ORDER — FENTANYL CITRATE 50 UG/ML
25-50 INJECTION, SOLUTION INTRAMUSCULAR; INTRAVENOUS
Status: DISCONTINUED | OUTPATIENT
Start: 2017-11-13 | End: 2017-11-13 | Stop reason: HOSPADM

## 2017-11-13 RX ORDER — SENNOSIDES A AND B 8.6 MG/1
1 TABLET, FILM COATED ORAL DAILY
Qty: 50 TABLET | Refills: 0 | Status: SHIPPED | OUTPATIENT
Start: 2017-11-13 | End: 2017-12-05

## 2017-11-13 RX ORDER — ONDANSETRON 4 MG/1
4 TABLET, ORALLY DISINTEGRATING ORAL
Status: DISCONTINUED | OUTPATIENT
Start: 2017-11-13 | End: 2017-11-13 | Stop reason: HOSPADM

## 2017-11-13 RX ORDER — KETOROLAC TROMETHAMINE 30 MG/ML
INJECTION, SOLUTION INTRAMUSCULAR; INTRAVENOUS PRN
Status: DISCONTINUED | OUTPATIENT
Start: 2017-11-13 | End: 2017-11-13

## 2017-11-13 RX ORDER — ONDANSETRON 2 MG/ML
4 INJECTION INTRAMUSCULAR; INTRAVENOUS EVERY 30 MIN PRN
Status: DISCONTINUED | OUTPATIENT
Start: 2017-11-13 | End: 2017-11-13 | Stop reason: HOSPADM

## 2017-11-13 RX ORDER — SODIUM CHLORIDE, SODIUM LACTATE, POTASSIUM CHLORIDE, CALCIUM CHLORIDE 600; 310; 30; 20 MG/100ML; MG/100ML; MG/100ML; MG/100ML
INJECTION, SOLUTION INTRAVENOUS CONTINUOUS
Status: DISCONTINUED | OUTPATIENT
Start: 2017-11-13 | End: 2017-11-13 | Stop reason: HOSPADM

## 2017-11-13 RX ORDER — FLUMAZENIL 0.1 MG/ML
0.2 INJECTION, SOLUTION INTRAVENOUS
Status: DISCONTINUED | OUTPATIENT
Start: 2017-11-13 | End: 2017-11-13 | Stop reason: HOSPADM

## 2017-11-13 RX ORDER — PROPOFOL 10 MG/ML
INJECTION, EMULSION INTRAVENOUS PRN
Status: DISCONTINUED | OUTPATIENT
Start: 2017-11-13 | End: 2017-11-13

## 2017-11-13 RX ORDER — ACETYLCYSTEINE 200 MG/ML
2 SOLUTION ORAL; RESPIRATORY (INHALATION) ONCE
Status: COMPLETED | OUTPATIENT
Start: 2017-11-13 | End: 2017-11-13

## 2017-11-13 RX ORDER — LIDOCAINE 40 MG/G
CREAM TOPICAL
Status: DISCONTINUED | OUTPATIENT
Start: 2017-11-13 | End: 2017-11-13 | Stop reason: HOSPADM

## 2017-11-13 RX ORDER — ACETAMINOPHEN 325 MG/1
975 TABLET ORAL ONCE
Status: COMPLETED | OUTPATIENT
Start: 2017-11-13 | End: 2017-11-13

## 2017-11-13 RX ORDER — ONDANSETRON 4 MG/1
4 TABLET, ORALLY DISINTEGRATING ORAL EVERY 30 MIN PRN
Status: DISCONTINUED | OUTPATIENT
Start: 2017-11-13 | End: 2017-11-13 | Stop reason: HOSPADM

## 2017-11-13 RX ORDER — DEXAMETHASONE SODIUM PHOSPHATE 4 MG/ML
INJECTION, SOLUTION INTRA-ARTICULAR; INTRALESIONAL; INTRAMUSCULAR; INTRAVENOUS; SOFT TISSUE PRN
Status: DISCONTINUED | OUTPATIENT
Start: 2017-11-13 | End: 2017-11-13

## 2017-11-13 RX ORDER — GABAPENTIN 100 MG/1
300 CAPSULE ORAL ONCE
Status: COMPLETED | OUTPATIENT
Start: 2017-11-13 | End: 2017-11-13

## 2017-11-13 RX ORDER — HYDROCODONE BITARTRATE AND ACETAMINOPHEN 5; 325 MG/1; MG/1
1-2 TABLET ORAL EVERY 4 HOURS PRN
Qty: 30 TABLET | Refills: 0 | Status: SHIPPED | OUTPATIENT
Start: 2017-11-13 | End: 2017-12-05

## 2017-11-13 RX ORDER — FENTANYL CITRATE 50 UG/ML
INJECTION, SOLUTION INTRAMUSCULAR; INTRAVENOUS PRN
Status: DISCONTINUED | OUTPATIENT
Start: 2017-11-13 | End: 2017-11-13

## 2017-11-13 RX ORDER — ALBUTEROL SULFATE 0.83 MG/ML
2.5 SOLUTION RESPIRATORY (INHALATION) EVERY 4 HOURS PRN
Status: DISCONTINUED | OUTPATIENT
Start: 2017-11-13 | End: 2017-11-13 | Stop reason: HOSPADM

## 2017-11-13 RX ORDER — HYDROMORPHONE HYDROCHLORIDE 1 MG/ML
.3-.5 INJECTION, SOLUTION INTRAMUSCULAR; INTRAVENOUS; SUBCUTANEOUS EVERY 10 MIN PRN
Status: DISCONTINUED | OUTPATIENT
Start: 2017-11-13 | End: 2017-11-13 | Stop reason: HOSPADM

## 2017-11-13 RX ORDER — PHENAZOPYRIDINE HYDROCHLORIDE 200 MG/1
200 TABLET, FILM COATED ORAL ONCE
Status: COMPLETED | OUTPATIENT
Start: 2017-11-13 | End: 2017-11-13

## 2017-11-13 RX ORDER — ALBUTEROL SULFATE 90 UG/1
AEROSOL, METERED RESPIRATORY (INHALATION) PRN
Status: DISCONTINUED | OUTPATIENT
Start: 2017-11-13 | End: 2017-11-13

## 2017-11-13 RX ORDER — BUPIVACAINE HYDROCHLORIDE 2.5 MG/ML
INJECTION, SOLUTION EPIDURAL; INFILTRATION; INTRACAUDAL PRN
Status: DISCONTINUED | OUTPATIENT
Start: 2017-11-13 | End: 2017-11-13

## 2017-11-13 RX ADMIN — ALBUTEROL SULFATE 2.5 MG: 2.5 SOLUTION RESPIRATORY (INHALATION) at 09:19

## 2017-11-13 RX ADMIN — GABAPENTIN 300 MG: 300 CAPSULE ORAL at 06:56

## 2017-11-13 RX ADMIN — DEXAMETHASONE SODIUM PHOSPHATE 4 MG: 4 INJECTION, SOLUTION INTRAMUSCULAR; INTRAVENOUS at 08:07

## 2017-11-13 RX ADMIN — PHENAZOPYRIDINE HYDROCHLORIDE 200 MG: 200 TABLET, COATED ORAL at 06:30

## 2017-11-13 RX ADMIN — LIDOCAINE HYDROCHLORIDE 80 MG: 20 INJECTION, SOLUTION INFILTRATION; PERINEURAL at 07:17

## 2017-11-13 RX ADMIN — BUPIVACAINE HYDROCHLORIDE 20 ML: 2.5 INJECTION, SOLUTION EPIDURAL; INFILTRATION; INTRACAUDAL at 10:05

## 2017-11-13 RX ADMIN — KETOROLAC TROMETHAMINE 30 MG: 30 INJECTION, SOLUTION INTRAMUSCULAR at 08:30

## 2017-11-13 RX ADMIN — HYDROMORPHONE HYDROCHLORIDE 0.5 MG: 1 INJECTION, SOLUTION INTRAMUSCULAR; INTRAVENOUS; SUBCUTANEOUS at 08:13

## 2017-11-13 RX ADMIN — FENTANYL CITRATE 25 MCG: 50 INJECTION INTRAMUSCULAR; INTRAVENOUS at 09:41

## 2017-11-13 RX ADMIN — SUGAMMADEX 200 MG: 100 INJECTION, SOLUTION INTRAVENOUS at 08:33

## 2017-11-13 RX ADMIN — ACETYLCYSTEINE 2 ML: 200 SOLUTION ORAL; RESPIRATORY (INHALATION) at 10:09

## 2017-11-13 RX ADMIN — ONDANSETRON 4 MG: 2 INJECTION INTRAMUSCULAR; INTRAVENOUS at 07:11

## 2017-11-13 RX ADMIN — SODIUM CHLORIDE, POTASSIUM CHLORIDE, SODIUM LACTATE AND CALCIUM CHLORIDE: 600; 310; 30; 20 INJECTION, SOLUTION INTRAVENOUS at 07:00

## 2017-11-13 RX ADMIN — Medication 15 MG: at 08:07

## 2017-11-13 RX ADMIN — FENTANYL CITRATE 25 MCG: 50 INJECTION INTRAMUSCULAR; INTRAVENOUS at 09:46

## 2017-11-13 RX ADMIN — Medication 30 MG: at 07:18

## 2017-11-13 RX ADMIN — PROPOFOL 20 MG: 10 INJECTION, EMULSION INTRAVENOUS at 08:07

## 2017-11-13 RX ADMIN — FENTANYL CITRATE 50 MCG: 50 INJECTION, SOLUTION INTRAMUSCULAR; INTRAVENOUS at 07:17

## 2017-11-13 RX ADMIN — PROPOFOL 20 MCG/KG/MIN: 10 INJECTION, EMULSION INTRAVENOUS at 07:25

## 2017-11-13 RX ADMIN — MIDAZOLAM HYDROCHLORIDE 2 MG: 1 INJECTION, SOLUTION INTRAMUSCULAR; INTRAVENOUS at 07:11

## 2017-11-13 RX ADMIN — ALBUTEROL SULFATE 5 PUFF: 90 AEROSOL, METERED RESPIRATORY (INHALATION) at 08:44

## 2017-11-13 RX ADMIN — FENTANYL CITRATE 50 MCG: 50 INJECTION, SOLUTION INTRAMUSCULAR; INTRAVENOUS at 07:44

## 2017-11-13 RX ADMIN — ALBUTEROL SULFATE 5 PUFF: 90 AEROSOL, METERED RESPIRATORY (INHALATION) at 08:40

## 2017-11-13 RX ADMIN — ALBUTEROL SULFATE 5 PUFF: 90 AEROSOL, METERED RESPIRATORY (INHALATION) at 08:29

## 2017-11-13 RX ADMIN — PROPOFOL 150 MG: 10 INJECTION, EMULSION INTRAVENOUS at 07:18

## 2017-11-13 RX ADMIN — BUPIVACAINE 20 ML: 13.3 INJECTION, SUSPENSION, LIPOSOMAL INFILTRATION at 10:05

## 2017-11-13 RX ADMIN — ACETAMINOPHEN 975 MG: 325 TABLET, FILM COATED ORAL at 06:56

## 2017-11-13 RX ADMIN — ALBUTEROL SULFATE 5 PUFF: 90 AEROSOL, METERED RESPIRATORY (INHALATION) at 08:35

## 2017-11-13 RX ADMIN — HYDROMORPHONE HYDROCHLORIDE 0.5 MG: 1 INJECTION, SOLUTION INTRAMUSCULAR; INTRAVENOUS; SUBCUTANEOUS at 09:51

## 2017-11-13 NOTE — DISCHARGE INSTRUCTIONS
Same-Day Surgery   Adult Discharge Orders & Instructions     For 24 hours after surgery:  1. Get plenty of rest.  A responsible adult must stay with you for at least 24 hours after you leave the hospital.   2. Pain medication can slow your reflexes. Do not drive or use heavy equipment.  If you have weakness or tingling, don't drive or use heavy equipment until this feeling goes away.  3. Mixing alcohol and pain medication can cause dizziness and slow your breathing. It can even be fatal. Do not drink alcohol while taking pain medication.  4. Avoid strenuous or risky activities.  Ask for help when climbing stairs.   5. You may feel lightheaded.  If so, sit for a few minutes before standing.  Have someone help you get up.   6. If you have nausea (feel sick to your stomach), drink only clear liquids such as apple juice, ginger ale, broth or 7-Up.  Rest may also help.  Be sure to drink enough fluids.  Move to a regular diet as you feel able. Take pain medications with a small amount of solid food, such as toast or crackers, to avoid nausea.   7. A slight fever is normal. Call the doctor if your fever is over 100 F (37.7 C) (taken under the tongue) or lasts longer than 24 hours.  8. You may have a dry mouth, muscle aches, trouble sleeping or a sore throat.  These symptoms should go away after 24 hours.  9. Do not make important or legal decisions.   Pain Management:      1. Take pain medication (if prescribed) for pain as directed by your physician.        2. WARNING: If the pain medication you have been prescribed contains Tylenol  (acetaminophen), DO NOT take additional doses of Tylenol (acetaminophen).     Call your doctor for any of the followin.  Signs of infection (fever, growing tenderness at the surgery site, severe pain, a large amount of drainage or bleeding, foul-smelling drainage, redness, swelling).    2.  It has been over 8 to 10 hours since surgery and you are still not able to urinate (pee).    3.   "Headache for over 24 hours.    4.  Numbness, tingling or weakness the day after surgery (if you had spinal anesthesia).  To contact a doctor, call _____________________________________ or:      [phone] and ask for the Resident On Call for:          __________________________________________ (answered 24 hours a day)      Emergency Department:  [facility]: [phone]  [place]: [phone]               Rev. 10/2014   Discharge Instructions:   Following a Laparoscopy    Comfort:    The amount of discomfort you can expect is very unpredictable.     If you have pain that cannot be controlled with non aspirin medication or with the prescription medication you may have received, you should notify your physician.     You May Experience:    Abdominal tenderness; abdominal cramps (like menstrual cramps).    Low back ache or discomfort radiating to your shoulders, chest, back or neck. This is a result of the gas used to inflate your abdomen during surgery. This gas is absorbed in 24 to 36 hours. The \"knee chest\" position will help relieve this discomfort.    Sore throat for a day or two resulting from the anesthesia tube used during surgery. You may use throat lozenges to help relieve this discomfort.    Black and blue marks on your abdomen.    Drainage:    You may expect a small amount of drainage from the incision on your abdomen and you may change the bandage when necessary.    You may also have a small amount of vaginal drainage for 3 to 4 days; this is normal and no cause for concern. If excessive bleeding occurs, notify your physician.    Do not douche, and use a pad rather than tampons. Do not resume intercourse for at least one week or until bleeding has ceased.    Home Activity:    The day of surgery spend a quiet day at home.    Increase activity as tolerated.    You may bathe or shower, do not soak in bath tub or scrub incisions.    You have no restrictions on " your diet. Following surgery, drink plenty of fluids and eat a light meal.    The anesthesia may produce some nausea. If you feel nauseated, stay in bed, keep your head down and try drinking fluids such as Seven-Up, tea or soup.    Notify Physician at Once IF:    You have a fever over 100 degrees. A low grade fever (under 100 degrees) is usual after surgery.    You have severe pain.    You have a large amount of bleeding or drainage.    Rev. 4/2014

## 2017-11-13 NOTE — IP AVS SNAPSHOT
[facility] [address] [address]    Phone:  [phone]                                       After Visit Summary   11/13/2017    Armando Dc    MRN: [id number]           After Visit Summary Signature Page     I have received my discharge instructions, and my questions have been answered. I have discussed any challenges I see with this plan with the nurse or doctor.    ..........................................................................................................................................  Patient/Patient Representative Signature      ..........................................................................................................................................  Patient Representative Print Name and Relationship to Patient    ..................................................               ................................................  Date                                            Time    ..........................................................................................................................................  Reviewed by Signature/Title    ...................................................              ..............................................  Date                                                            Time

## 2017-11-13 NOTE — OR NURSING
Pt voided to the restroom, after brief walking able to keep O2 sats above 92%. D/c instructions given along with home meds, pt has no further questions.

## 2017-11-13 NOTE — OR NURSING
Pt's O2 sats drop to low 90s on room air, still requires some blow by O2 to keep sats above 92%. Pre-op sats were 93% on room air. Both nebulizer treatments are done, pt feels like she has secretions but unable to cough them up at this time..Incentive spirometry was offered. Awake and alert, LS clear, pt states that pain is minimal. Family at bedside.

## 2017-11-13 NOTE — ANESTHESIA PREPROCEDURE EVALUATION
Anesthesia Evaluation     .             ROS/MED HX    ENT/Pulmonary:     (+), . Other pulmonary disease Kartagener syndrome, usese vest therapy and nebs daily .    Neurologic:  - neg neurologic ROS     Cardiovascular: Comment: Situs inversus, dextrocardia.     (+) ----. : . . . :. . Previous cardiac testing       METS/Exercise Tolerance:  4 - Raking leaves, gardening   Hematologic:         Musculoskeletal:         GI/Hepatic: Comment: Nausea.     (+) GERD Asymptomatic on medication,       Renal/Genitourinary:  - ROS Renal section negative       Endo: Comment: Decreased tolerance to glucose.         Psychiatric:  - neg psychiatric ROS       Infectious Disease:         Malignancy:         Other:                     Physical Exam  Normal systems: cardiovascular and dental    Airway   Mallampati: II  TM distance: >3 FB  Neck ROM: full    Dental     Cardiovascular   Rhythm and rate: regular and normal      Pulmonary (+) rhonchi                       Anesthesia Plan      History & Physical Review  History and physical reviewed and following examination; no interval change.    ASA Status:  3 .        Plan for General and ETT with Intravenous induction. Maintenance will be Balanced.    PONV prophylaxis:  Dexamethasone or Solumedrol and Ondansetron (or other 5HT-3)  Patient did her neb and vest therapy this AM. Plan to use nebs and vest post-op.  Zofran pre-op. Low dose of Propoful intra-op for PONV prevention.       Postoperative Care  Postoperative pain management:  Multi-modal analgesia.      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     uterine polyp  2013       FAMILY HISTORY:   Family History   Problem Relation Age of Onset     Hyperlipidemia Mother      DIABETES Mother      Hypertension Mother      Hyperlipidemia Father      Hypertension Father      DIABETES Maternal Grandmother      DIABETES Maternal Grandfather            Allergies: No Known Allergies    Meds:   Prescriptions Prior to Admission   Medication Sig Dispense Refill Last Dose     IBUPROFEN PO Take 600 mg by mouth   11/9/2017     acetylcysteine (MUCOMYST) 20 % nebulizer solution Inhale 2 mLs into the lungs 2 times daily 120 mL 5 11/13/2017 at 0415     albuterol (2.5 MG/3ML) 0.083% nebulizer solution USE ONE VIAL IN NEBULIZER TWICE DAILY (Patient taking differently: USE ONE VIAL IN NEBULIZER DAILY) 180 mL 11 11/13/2017 at 0415     Prenatal MV-Min-Fe Fum-FA-DHA (PRENATAL 1 PO) Take 1 tablet by mouth every evening   11/3/2017     albuterol (PROAIR HFA/PROVENTIL HFA/VENTOLIN HFA) 108 (90 BASE) MCG/ACT Inhaler Inhale 2 puffs into the lungs every 6 hours as needed for shortness of breath / dyspnea or wheezing 1 Inhaler 12 More than a month at Unknown time     budesonide-formoterol (SYMBICORT) 160-4.5 MCG/ACT Inhaler Inhale 2 puffs into the lungs 2 times daily 1 Inhaler 12 More than a month at Unknown time     Misc. Devices (ACAPELLA) MISC use twice daily   Taking     Respiratory Therapy Supplies (NEBULIZER) SUZANNA Nebulizer compressor - use with ALEK as directed   Taking     Syringe/Needle, Disp, (SYRINGE LUER LOCK) 20G X 1-1/2\" 5 ML MISC 1 each 2 times daily 60 each 5 Taking     Water For Injection Sterile SOLN Inject 4 mLs into the vein 2 times daily 250 mL 5 Taking       No " current outpatient prescriptions on file.       Physical Exam:  VS: T 97.6, P Data Unavailable, /89, R 16, SpO2 93%       BMP:  Lab Results   Component Value Date     11/03/2017      Lab Results   Component Value Date    POTASSIUM 4.1 11/03/2017     Lab Results   Component Value Date    CHLORIDE 106 11/03/2017     Lab Results   Component Value Date    JOHN 8.5 11/03/2017     Lab Results   Component Value Date    CO2 26 11/03/2017     Lab Results   Component Value Date    BUN 18 11/03/2017     Lab Results   Component Value Date    CR 0.68 11/03/2017     Lab Results   Component Value Date     11/03/2017        CBC:  Lab Results   Component Value Date    WBC 6.3 11/03/2017     Lab Results   Component Value Date    HGB 13.1 11/03/2017     Lab Results   Component Value Date    HCT 40.1 11/03/2017     Lab Results   Component Value Date     11/03/2017        Coags/Type and Screen  Lab Results   Component Value Date    INR 0.98 03/03/2016     Ara Askew M.D.    11/13/2017  6:46 AM

## 2017-11-13 NOTE — ADDENDUM NOTE
Addendum  created 11/13/17 1233 by Liliam Crenshaw APRN CRNA    Anesthesia Event edited, Anesthesia Staff edited, Procedure Event Log accessed

## 2017-11-13 NOTE — IP AVS SNAPSHOT
MRN:[id number]                      After Visit Summary   11/13/2017    Armando Dc    MRN: [id number]           Thank you!     Thank you for choosing [facility] for your care. Our goal is always to provide you with excellent care. Hearing back from our patients is one way we can continue to improve our services. Please take a few minutes to complete the written survey that you may receive in the mail after you visit with us. Thank you!        Patient Information     Date Of Birth          1975        About your hospital stay     You were admitted on:  November 13, 2017 You last received care in the:   PACU    You were discharged on:  November 13, 2017       Who to Call     For medical emergencies, please call 911.  For non-urgent questions about your medical care, please call your primary care provider or clinic, [phone]  For questions related to your surgery, please call your surgery clinic        Attending Provider     Provider Specialty    Zayra Roberts MD OB/Gyn       Primary Care Provider Office Phone # Fax #    Blake ABELARDO Sifuentes [phone]      After Care Instructions     Discharge Instructions       Resume pre procedure diet            Discharge Instructions       Pelvic Rest. No tampons, douching or intercourse for  2  weeks.            Discharge Instructions       You may take norco and ibuprofen for pain.   Call your OB clinic or return to the ED if you have any of the following:    - Temperature greater than 100.4F  - Pain not controlled by pain medications  - any symptoms or preeclampsia, including: Severe headache, visual changes, chest pain, shortness of breath, pain in the upper right abdomen, or sudden increase in swelling  - Uncontrolled nausea/vomiting  - Foul-smelling vaginal discharge  - Vaginal bleeding soaking 1 pad per hour for 2 hours in a row            No alcohol       NO ALCOHOL for 24 hours post procedure            No driving or  operating machinery       No driving or operating machinery until day after procedure                  Your next 10 appointments already scheduled     Dec 05, 2017 10:00 AM CST   CF LOOP with UC PFL CF   [facility] Pulmonary Function Testing ([facility])    [address] [place] [id number] [phone]            Dec 05, 2017 10:40 AM CST   (Arrive by 10:25 AM)   Return Bronchiectas Non CF with Lyn Hernández MD   [facility] ([facility])    [address] [place] [id number] [phone]              Further instructions from your care team       Same-Day Surgery   Adult Discharge Orders & Instructions     For 24 hours after surgery:  1. Get plenty of rest.  A responsible adult must stay with you for at least 24 hours after you leave the hospital.   2. Pain medication can slow your reflexes. Do not drive or use heavy equipment.  If you have weakness or tingling, don't drive or use heavy equipment until this feeling goes away.  3. Mixing alcohol and pain medication can cause dizziness and slow your breathing. It can even be fatal. Do not drink alcohol while taking pain medication.  4. Avoid strenuous or risky activities.  Ask for help when climbing stairs.   5. You may feel lightheaded.  If so, sit for a few minutes before standing.  Have someone help you get up.   6. If you have nausea (feel sick to your stomach), drink only clear liquids such as apple juice, ginger ale, broth or 7-Up.  Rest may also help.  Be sure to drink enough fluids.  Move to a regular diet as you feel able. Take pain medications with a small amount of solid food, such as toast or crackers, to avoid nausea.   7. A slight fever is normal. Call the doctor if your fever is over 100 F (37.7 C) (taken under the tongue) or lasts longer than 24 hours.  8. You may have a dry mouth, muscle aches, trouble  "sleeping or a sore throat.  These symptoms should go away after 24 hours.  9. Do not make important or legal decisions.   Pain Management:      1. Take pain medication (if prescribed) for pain as directed by your physician.        2. WARNING: If the pain medication you have been prescribed contains Tylenol  (acetaminophen), DO NOT take additional doses of Tylenol (acetaminophen).     Call your doctor for any of the followin.  Signs of infection (fever, growing tenderness at the surgery site, severe pain, a large amount of drainage or bleeding, foul-smelling drainage, redness, swelling).    2.  It has been over 8 to 10 hours since surgery and you are still not able to urinate (pee).    3.  Headache for over 24 hours.    4.  Numbness, tingling or weakness the day after surgery (if you had spinal anesthesia).  To contact a doctor, call _____________________________________ or:      [phone] and ask for the Resident On Call for:          __________________________________________ (answered 24 hours a day)      Emergency Department:  [facility]: [phone]  [facility]: [phone]               Rev. 10/2014   Discharge Instructions:   Following a Laparoscopy    Comfort:    The amount of discomfort you can expect is very unpredictable.     If you have pain that cannot be controlled with non aspirin medication or with the prescription medication you may have received, you should notify your physician.     You May Experience:    Abdominal tenderness; abdominal cramps (like menstrual cramps).    Low back ache or discomfort radiating to your shoulders, chest, back or neck. This is a result of the gas used to inflate your abdomen during surgery. This gas is absorbed in 24 to 36 hours. The \"knee chest\" position will help relieve this discomfort.    Sore throat for a day or two resulting from the anesthesia tube used during surgery. You may use throat lozenges to help relieve " "this discomfort.    Black and blue marks on your abdomen.    Drainage:    You may expect a small amount of drainage from the incision on your abdomen and you may change the bandage when necessary.    You may also have a small amount of vaginal drainage for 3 to 4 days; this is normal and no cause for concern. If excessive bleeding occurs, notify your physician.    Do not douche, and use a pad rather than tampons. Do not resume intercourse for at least one week or until bleeding has ceased.    Home Activity:    The day of surgery spend a quiet day at home.    Increase activity as tolerated.    You may bathe or shower, do not soak in bath tub or scrub incisions.    You have no restrictions on your diet. Following surgery, drink plenty of fluids and eat a light meal.    The anesthesia may produce some nausea. If you feel nauseated, stay in bed, keep your head down and try drinking fluids such as Seven-Up, tea or soup.    Notify Physician at Once IF:    You have a fever over 100 degrees. A low grade fever (under 100 degrees) is usual after surgery.    You have severe pain.    You have a large amount of bleeding or drainage.    Rev. 4/2014    Additional Information     If you use hormonal birth control (such as the pill, patch, ring or implants): You'll need a second form of birth control for 7 days (condoms, a diaphragm or contraceptive foam). While in the hospital, you received a medicine called Bridion. Your normal birth control will not work as well for a week after taking this medicine.          Pending Results     Date and Time Order Name Status Description    11/13/2017 0828 Surgical pathology exam In process             Admission Information     Date & Time Provider Department Dept. Phone    11/13/2017 Zayra Roberts MD  PACU [phone]      Your Vitals Were     Blood Pressure Temperature Respirations Height Weight Pulse Oximetry    119/78 98.2  F (36.8  C) (Axillary) 18 1.626 m (5' 4\") 62.5 kg (137 " lb 12.6 oz) 94%    BMI (Body Mass Index)                   23.65 kg/m2           [facility] Information     [facility] gives you secure access to your electronic health record. If you see a primary care provider, you can also send messages to your care team and make appointments. If you have questions, please call your primary care clinic.  If you do not have a primary care provider, please call [phone] and they will assist you.        Care EveryWhere ID     This is your Care EveryWhere ID. This could be used by other organizations to access your [facility] medical records  [id number]        Equal Access to Services     [facility] : Ana Godinez, sujatha angel, evan weiss, tee mead . [facility] [phone].    ATENCIÓN: Si habla español, tiene a guevara disposición servicios gratuitos de asistencia lingüística. [facility] [phone].    We comply with applicable federal civil rights laws and [place] laws. We do not discriminate on the basis of race, color, national origin, age, disability, sex, sexual orientation, or gender identity.               Review of your medicines      START taking        Dose / Directions    HYDROcodone-acetaminophen 5-325 MG per tablet   Commonly known as:  NORCO   Used for:  S/P laparoscopy        Dose:  1-2 tablet   Take 1-2 tablets by mouth every 4 hours as needed for other (Moderate to Severe Pain)   Quantity:  30 tablet   Refills:  0       senna 8.6 MG tablet   Commonly known as:  SENOKOT   Used for:  S/P laparoscopy        Dose:  1 tablet   Take 1 tablet by mouth daily   Quantity:  50 tablet   Refills:  0         CONTINUE these medicines which may have CHANGED, or have new prescriptions. If we are uncertain of the size of tablets/capsules you have at home, strength may be listed as something that might have changed.        Dose / Directions    * albuterol (2.5 MG/3ML) 0.083% neb solution   This may have changed:  See the  "new instructions.   Used for:  Bronchiectasis without acute exacerbation (H)        USE ONE VIAL IN NEBULIZER TWICE DAILY   Quantity:  180 mL   Refills:  11       * albuterol 108 (90 BASE) MCG/ACT Inhaler   Commonly known as:  PROAIR HFA/PROVENTIL HFA/VENTOLIN HFA   This may have changed:  Another medication with the same name was changed. Make sure you understand how and when to take each.   Used for:  Kartagener syndrome        Dose:  2 puff   Inhale 2 puffs into the lungs every 6 hours as needed for shortness of breath / dyspnea or wheezing   Quantity:  1 Inhaler   Refills:  12       * Notice:  This list has 2 medication(s) that are the same as other medications prescribed for you. Read the directions carefully, and ask your doctor or other care provider to review them with you.      CONTINUE these medicines which have NOT CHANGED        Dose / Directions    ACAPELLA Misc        use twice daily   Refills:  0       acetylcysteine 20 % nebulizer solution   Commonly known as:  MUCOMYST   Used for:  Kartagener's syndrome, Situs inversus, Pseudomonas aeruginosa colonization        Dose:  2 mL   Inhale 2 mLs into the lungs 2 times daily   Quantity:  120 mL   Refills:  5       budesonide-formoterol 160-4.5 MCG/ACT Inhaler   Commonly known as:  SYMBICORT   Used for:  Kartagener syndrome, Kartagener's syndrome        Dose:  2 puff   Inhale 2 puffs into the lungs 2 times daily   Quantity:  1 Inhaler   Refills:  12       IBUPROFEN PO        Dose:  600 mg   Take 600 mg by mouth   Refills:  0       nebulizer Sindy        Nebulizer compressor - use with ALEK as directed   Refills:  0       PRENATAL 1 PO        Dose:  1 tablet   Take 1 tablet by mouth every evening   Refills:  0       Syringe Luer Lock 20G X 1-1/2\" 5 ML Misc   Used for:  Kartagener syndrome, Reactive airway disease        Dose:  1 each   1 each 2 times daily   Quantity:  60 each   Refills:  5       Water For Injection Sterile Soln   Used for:  Kartagener " syndrome, Reactive airway disease        Dose:  4 mL   Inject 4 mLs into the vein 2 times daily   Quantity:  250 mL   Refills:  5            Where to get your medicines      These medications were sent to [facility] [place], [place] - [address]  [address] [facility] [address], [facility] [address]     Phone:  [phone]     senna 8.6 MG tablet         Some of these will need a paper prescription and others can be bought over the counter. Ask your nurse if you have questions.     Bring a paper prescription for each of these medications     HYDROcodone-acetaminophen 5-325 MG per tablet                Protect others around you: Learn how to safely use, store and throw away your medicines at www.disposemymeds.org.             Medication List: This is a list of all your medications and when to take them. Check marks below indicate your daily home schedule. Keep this list as a reference.      Medications           Morning Afternoon Evening Bedtime As Needed    ACAPELLA Misc   use twice daily                                acetylcysteine 20 % nebulizer solution   Commonly known as:  MUCOMYST   Inhale 2 mLs into the lungs 2 times daily   Last time this was given:  2 mLs on 11/13/2017 10:09 AM                                * albuterol (2.5 MG/3ML) 0.083% neb solution   USE ONE VIAL IN NEBULIZER TWICE DAILY   Last time this was given:  2.5 mg on 11/13/2017  9:19 AM                                * albuterol 108 (90 BASE) MCG/ACT Inhaler   Commonly known as:  PROAIR HFA/PROVENTIL HFA/VENTOLIN HFA   Inhale 2 puffs into the lungs every 6 hours as needed for shortness of breath / dyspnea or wheezing   Last time this was given:  5 puffs on 11/13/2017  8:44 AM                                budesonide-formoterol 160-4.5 MCG/ACT Inhaler   Commonly known as:  SYMBICORT   Inhale 2 puffs into the lungs 2 times daily                                HYDROcodone-acetaminophen 5-325 MG per tablet   Commonly known as:   "NORCO   Take 1-2 tablets by mouth every 4 hours as needed for other (Moderate to Severe Pain)                                IBUPROFEN PO   Take 600 mg by mouth                                nebulizer Sindy   Nebulizer compressor - use with ALEK as directed                                PRENATAL 1 PO   Take 1 tablet by mouth every evening                                senna 8.6 MG tablet   Commonly known as:  SENOKOT   Take 1 tablet by mouth daily                                Syringe Luer Lock 20G X 1-1/2\" 5 ML Misc   1 each 2 times daily                                Water For Injection Sterile Soln   Inject 4 mLs into the vein 2 times daily                                * Notice:  This list has 2 medication(s) that are the same as other medications prescribed for you. Read the directions carefully, and ask your doctor or other care provider to review them with you.      "

## 2017-11-13 NOTE — BRIEF OP NOTE
Brief Operative Note   Name: Armando Dc  MRN: [id number]  : 1975  Date of Surgery: 2017    Pre-operative Diagnosis:   -left ovarian cysts  -desire for fertility with plan for IVF cycles  -ultrasound findings consistent with endometrial polyp  -endometriosis  -situs inversus totalis  -primary ciliary dyskinesia  Post-operative Diagnosis:   -same as above s/p below listed procedure  -diffuse endometriosis  -left endometriomas  Procedure(s):   Left ovarian cystostomy, drainage of endometrioma.  Hysteroscopy, polypectomy with myosure     Surgeon: Zayra Roberts MD   Assistants: Selena Collins MD    Anesthesia: general  EBL: 10 mL   Urine Output: 200 mL clear urine   Fluids: 1200 mL crystalloid    Specimens: endometrial curetting  Complications: None apparent.  Findings: EUA: 6 week sized midline mobile uterus, no right adnexal masses palpated, left adnexal fullness palpated.  Normal appearing external genitalia, normal appearing vaginal mucosa, normal appearing cervix.  On laparoscopy: entire abdominal cavity with diffuse endometriosis implants speckling the bowels and peritoneal lining.  Normal appearing right fallopian tube with dense adhesions between the right ovary and right tube and right pelvic sidewall.  Normal appearing right ovary.  Thin filmy adhesions between left ovarian endometrioma and cul de sac as well as posterior uterus.  Left ovary with two large endometriomas drained of thick dark brown fluid.  Normal appearing left fallopian tube.  Normal appearing liver, gallbladder, and stomach with position consistent with situs inversus totalis. Appendix in left lower quadrant located retrocecally.  On hysteroscopy: intrauterine cavity with diffuse polypoid tissues.  Following hysteroscopy empty appearing uterine cavity.     Selena Collins MD  2017, 8:35 AM

## 2017-11-13 NOTE — ANESTHESIA CARE TRANSFER NOTE
Patient: Armando Dc    Procedure(s):  Left  Laparoscopy Ovarian Cystotomy, Hysteroscopy And Polpectomy With Myosure  - Wound Class: II-Clean Contaminated   - Wound Class: II-Clean Contaminated    Diagnosis: Left Endometriomas and Endometrial Polyp   Diagnosis Additional Information: No value filed.    Anesthesia Type:   General, ETT     Note:  Airway :Face Mask  Patient transferred to:PACU  Comments: Report to RN, Dr Mccain present, will do TAP block.  RT present for Neb/vest Rx   /71  &@ SR  RR + 14, clear, SaO2 100 %  Humidified O2 by FM  Temp 36.6 C HOB upHandoff Report: Identifed the Patient, Identified the Reponsible Provider, Reviewed the pertinent medical history, Discussed the surgical course, Reviewed Intra-OP anesthesia mangement and issues during anesthesia, Set expectations for post-procedure period and Allowed opportunity for questions and acknowledgement of understanding      Vitals: (Last set prior to Anesthesia Care Transfer)    CRNA VITALS  11/13/2017 0816 - 11/13/2017 0904      11/13/2017             NIBP: 120/71    Ht Rate: 70                Electronically Signed By: DESHAWN Smith CRNA  November 13, 2017  9:04 AM

## 2017-11-13 NOTE — ANESTHESIA POSTPROCEDURE EVALUATION
Patient: Armando Dc    Procedure(s):  Left  Laparoscopy Ovarian Cystotomy, Hysteroscopy And Polpectomy With Myosure  - Wound Class: II-Clean Contaminated   - Wound Class: II-Clean Contaminated    Diagnosis:Left Endometriomas and Endometrial Polyp   Diagnosis Additional Information: No value filed.    Anesthesia Type:  General, ETT    Note:  Anesthesia Post Evaluation    Patient location during evaluation: PACU  Patient participation: Able to fully participate in evaluation  Level of consciousness: awake and alert  Pain management: adequate  Airway patency: patent  Cardiovascular status: acceptable  Respiratory status: acceptable  Hydration status: acceptable  PONV: none     Anesthetic complications: None          Last vitals:  Vitals:    11/13/17 1000 11/13/17 1015 11/13/17 1030   BP: 118/81 101/71 119/78   Resp: 14 9 18   Temp: 36.7  C (98.1  F)  36.8  C (98.2  F)   SpO2: 100% 100% 94%         Electronically Signed By: Ara Askew MD  November 13, 2017  10:41 AM

## 2017-11-13 NOTE — OR NURSING
RT at bedside to do vest treatment, Albuterol inhaler given at the same time, waiting for Acetylcysteine inhaler from pharmacy.

## 2017-11-13 NOTE — ANESTHESIA PROCEDURE NOTES
Peripheral Nerve Block Procedure Note    Staff:     Anesthesiologist:  JUWAN MANDEL  Location: PACU  Procedure Start/Stop TImes:      11/13/2017 10:00 AM     11/13/2017 10:08 AM    patient identified, IV checked, site marked, risks and benefits discussed, informed consent, monitors and equipment checked, pre-op evaluation, at physician/surgeon's request and post-op pain management      Correct Patient: Yes      Correct Position: Yes      Correct Site: Yes      Correct Procedure: Yes      Correct Laterality:  Yes    Site Marked:  Yes  Procedure details:     Procedure:  TAP    ASA:  2    Diagnosis:  Lap gyn surgery    Laterality:  Bilateral    Position:  Supine    Sterile Prep: chloraprep, mask and sterile gloves      Local skin infiltration:  None    Needle:  Short bevel    Needle gauge:  21    Needle length (mm):  110    Ultrasound: Yes      Ultrasound used to identify targeted nerve, plexus, or vascular structure and placed a needle adjacent to it      Permanent Image entered into patiient's record      Abnormal pain on injection: No      Blood Aspirated: No      Paresthesias:  No    Bleeding at site: No      Bolus via:  Needle    Infusion Method:  Single Shot    Complications:  None  Assessment/Narrative:     Injection made incrementally with aspirations every (mL):  5     Bilateral TAP blocks (10ml exparel + 10ml 0.25% bupivacaine into each TAP site)

## 2017-11-13 NOTE — OP NOTE
Operative Report  Name: Armando Dc  MRN: [id number]  : 1975  Date of Surgery: 2017    Pre-operative Diagnosis:   -left ovarian cysts  -desire for fertility with plan for IVF cycles  -ultrasound findings consistent with endometrial polyp  -endometriosis  -situs inversus totalis  -primary ciliary dyskinesia  Post-operative Diagnosis:   -same as above s/p below listed procedure  -diffuse endometriosis  -left endometriomas  Procedure(s):   Left ovarian cystostomy, drainage of endometrioma.  Hysteroscopy, polypectomy with myosure      Surgeon: Zayra Roberts MD   Assistants: Selena Collins MD     Anesthesia: general  EBL: 10 mL   Urine Output: 200 mL clear urine   Fluids: 1200 mL crystalloid     Specimens: endometrial curettings  Complications: None apparent.  Findings: EUA: 6 week sized midline mobile uterus, no right adnexal masses palpated, left adnexal fullness palpated.  Normal appearing external genitalia, normal appearing vaginal mucosa, normal appearing cervix.  On laparoscopy: entire abdominal cavity with diffuse endometriosis implants speckling the bowels and peritoneal lining and abdominal wall.  Normal appearing right fallopian tube with dense adhesions between the right ovary and right tube and right pelvic sidewall.  Normal appearing right ovary.  Thin filmy adhesions between left ovarian endometrioma and cul de sac as well as posterior uterus.  Left ovary with two large endometriomas drained of thick dark brown fluid.  Normal appearing left fallopian tube with endometriosis implants.  Normal appearing liver, gallbladder, and stomach with position consistent with situs inversus totalis. Appendix in left lower quadrant located retrocecally.  On hysteroscopy: intrauterine cavity with diffuse polypoid tissues and one polyp.  Following hysteroscopy empty appearing uterine cavity.     Indications: Armando Dc is a 42 year old  who presents with low abdominal pain and infertility.  The  patient has undergone four cycles of IVF unsuccessfully.  She plans another cycle with egg retrieval and desired removal of left ovarian cysts prior to stimulation cycle.  Ultrasound was performed and showed two complex left ovarian cysts that are consistent with endometriomas as well as a thickened endometrial stripe and likely endometrial polyp.  Laparoscopic left ovarian cystectomy and hysteroscopy with polypectomy were recommended.  All risks, benefits and alternatives were discussed and written informed consent was obtained.     Procedure: The patient was taken to the operating room where she was placed in the dorsal lithotomy position with feet in yellow fin stirrups. General endotracheal anesthesia was administered. An exam under anesthesia was performed. The patient was then prepped and draped in the usual sterile fashion. A speculum was inserted into the vagina, a single toothed tenaculum placed on the cervix at 12 o'clock, and a uterine manipulator inserted into the cervical os. The speculum was removed. Attention was then turned to the abdomen and an 11-blade scalpel was used to make a 5 mm vertical incision at the inferior aspect of the umbilicus and a [place] used to expand the incision and bluntly dissect through the subcutaneous tissue to the fascia.  A 5 mm port was placed as CO2 gas was infusing under direct visualization.  Pneumoperitoneum was achieved with good tympany of the abdomen.  Attention was turned to the RLQ of the abdomen where a site 4 cm medial to the anterior superior iliac crest was noted to be free of major blood vessels and a 5 mm horizontal skin incision made. A 5 mm port was placed under visualization within the abdomen. A 5 mm port was placed in the LLQ of the abdomen with similar technique under visualization. Inspection of the pelvis with blunt probe showed above listed findings  A scissors was used to incise the first endometrioma and was drained with the suction .   This was repeated with the second endometrioma resulting in completely decompressed ovarian cysts.  During compression, the filmy left ovarian to posterior uterine adhesions were taken down bluntly. Surgical sites were observed to be hemostatic.   The pneumoperitoneum was expelled. The ports were removed. The skin incisions were closed using dermabond.     Attention was then turned to the hysteroscopy.  The speculum was reinserted into the vagina, the uterine manipulator removed. The endocervical canal was serially dilated to 5.5 mm using Hegar dilators.  The hysteroscope was inserted without difficulty with the above findings noted.  Polypoid tissue was removed with the myosure lite device.  The tissue was sent to pathology. All instruments were then removed.  The tenaculum was removed from the cervix and the puncture sites were hemostatic.  The patient was repositioned to the supine position.  The patient tolerated the procedure well and was taken to the recovery room in stable condition.  Dr. Roberts was scrubbed and present for the entire procedure.    The patient was extubated in the operating room and transferred to the PACU in stable condition.    Selena Collins MD  OBGYN PGY3    Zayra Roberts MD

## 2017-11-14 ENCOUNTER — TELEPHONE (OUTPATIENT)
Dept: OBGYN | Facility: CLINIC | Age: 42
End: 2017-11-14

## 2017-11-14 NOTE — TELEPHONE ENCOUNTER
Patient called requesting a call back from you regarding her surgery tomorrow. She is aware you are seeing patients today and might not call until tomorrow when you are on-call. Pt stated something about speaking with her . She can be reached at [phone]. Thanks.   Amrita Webb, RN-BSN

## 2017-11-14 NOTE — TELEPHONE ENCOUNTER
surgical findings discussed at length over the phone and questions answered. Planning on going to [facility] end of January and considering donor egg for IVF.    No pain noted after surgery.  No postop appt needed.    Zayra Roberts MD

## 2017-11-20 LAB — COPATH REPORT: NORMAL

## 2017-12-05 ENCOUNTER — OFFICE VISIT (OUTPATIENT)
Dept: PULMONOLOGY | Facility: CLINIC | Age: 42
End: 2017-12-05
Attending: INTERNAL MEDICINE
Payer: COMMERCIAL

## 2017-12-05 VITALS
OXYGEN SATURATION: 94 % | RESPIRATION RATE: 16 BRPM | HEART RATE: 78 BPM | DIASTOLIC BLOOD PRESSURE: 85 MMHG | WEIGHT: 138 LBS | BODY MASS INDEX: 23.69 KG/M2 | SYSTOLIC BLOOD PRESSURE: 123 MMHG

## 2017-12-05 DIAGNOSIS — Q34.8 PCD (PRIMARY CILIARY DYSKINESIA): ICD-10-CM

## 2017-12-05 DIAGNOSIS — Q89.3 KARTAGENER'S SYNDROME: Primary | Chronic | ICD-10-CM

## 2017-12-05 DIAGNOSIS — Q34.8 PCD (PRIMARY CILIARY DYSKINESIA): Primary | ICD-10-CM

## 2017-12-05 LAB
BACTERIA SPEC CULT: NORMAL
EXPTIME-PRE: 10.19 SEC
FEF2575-%PRED-PRE: 22 %
FEF2575-PRE: 0.65 L/SEC
FEF2575-PRED: 2.92 L/SEC
FEFMAX-%PRED-PRE: 62 %
FEFMAX-PRE: 4.3 L/SEC
FEFMAX-PRED: 6.89 L/SEC
FEV1-%PRED-PRE: 54 %
FEV1-PRE: 1.5 L
FEV1FEV6-PRE: 63 %
FEV1FEV6-PRED: 84 %
FEV1FVC-PRE: 60 %
FEV1FVC-PRED: 83 %
FIFMAX-PRE: 3.16 L/SEC
FVC-%PRED-PRE: 76 %
FVC-PRE: 2.52 L
FVC-PRED: 3.31 L
Lab: NORMAL
SPECIMEN SOURCE: NORMAL

## 2017-12-05 PROCEDURE — 99212 OFFICE O/P EST SF 10 MIN: CPT | Mod: ZF

## 2017-12-05 PROCEDURE — 87070 CULTURE OTHR SPECIMN AEROBIC: CPT | Performed by: INTERNAL MEDICINE

## 2017-12-05 ASSESSMENT — PAIN SCALES - GENERAL: PAINLEVEL: NO PAIN (0)

## 2017-12-05 NOTE — MR AVS SNAPSHOT
After Visit Summary   12/5/2017    Armando Dc    MRN: [id number]           Patient Information     Date Of Birth          1975        Visit Information        Provider Department      12/5/2017 10:40 AM Lyn Hernández MD [facility]        Today's Diagnoses     PCD (primary ciliary dyskinesia)    -  1      Care Instructions    Self-Care Plan       MRN: [id number]   Clinic Date: December 5, 2017   Patient: Armando Dc     Annual Studies:   No results found for: IGG  No results found for: INS  There are no preventive care reminders to display for this patient.      Pulmonary Function Tests  FEV1: amount of air you can blow out in 1 second  FVC: total amount of air you can take in and blow out    Your Goals:         PFT Latest Ref Rng & Units 12/5/2017   FVC L 2.52   FEV1 L 1.50   FVC% % 76   FEV1% % 54          Airway Clearance: The Most Important Way to Keep Your Lungs Healthy  Vest Settings:    Hill-[place] Frequencies: 8, 9, 10 Pressure 10 Then, Frequencies 18, 19, 20 Pressure 6      RespirTech: Quick Start with Pressure of     Do each frequency for 5 minutes; Deflate vest after each frequency & cough 3 times before beginning the next setting.    Vest and Neb Therapy should be done 2 times/day.    Good Nutrition Can Improve Lung Function and Overall Health     Take ALL of your vitamins with food     Take 1/2 of your enzymes before EVERY meal/snack and the other 1/2 mid-meal/snack    Wt Readings from Last 3 Encounters:   12/05/17 62.6 kg (138 lb)   11/13/17 62.5 kg (137 lb 12.6 oz)   11/03/17 63.5 kg (139 lb 14.4 oz)       Body mass index is 23.69 kg/(m^2).         National CF Foundation Recommendations for BMI in CF Adults: Women: at least 22 Men: at least 23        Controlling Blood Sugars Helps Prevent Lung Infections & Improves Nutrition  Test blood sugar:     In the morning before eating (goal is )     2 hours after a meal (goal is less than  150)     When pre-meal glucose is greater than 150 add correction     At bedtime (if less than 100 eat a snack with 15 grams of carbohydrates  Last A1C Results:   Hemoglobin A1C   Date Value Ref Range Status   09/01/2017 5.8 4.3 - 6.0 % Final         If diabetic, measure A1C every 6 months. Goal: Under 7%    Staying Healthy    Research:  If you are interested in learning about research opportunities or have questions, please contact Adriana Vásquez at [phone] or [email or web address].      CF Foundation:  Compass is a personalized resource service to help you with the insurance, financial, legal and other issues you are facing.  It's free, confidential and available to anyone with CF.  Ask your  for more information or contact Compass directly at 651-COMPASS ([phone]) or compass@cff.org, or learn more at cff.org/compass.          RECOMMENDATIONS: ENT referral.  Lifestyle changes--diet and exercise.  Do not stress about this.  Give yourself a chance to figure it out over time.  Great job with vest.  Try to get back two.  Congratulations on your fellowship.    YOUR GOAL:  Take care of yourself.      CF Nurse Line:  Tami Lao: [phone]   Sybil Peng, RT: [phone]     Delphine Angulo: [phone] or Milena Ferreiraicians: [phone]   Sharon Stacy, Diabetes Nurse: [phone]      Akilah Guzman: [phone] or Olga Greer [phone], Social Workers   [email or web address]                   Follow-ups after your visit        Additional Services     ENT referral       Specify Dr. Burnett.  PCD with otitis and sinusitis.                  Follow-up notes from your care team     Return in about 2 months (around 2/5/2018), or ENT referral.      Your next 10 appointments already scheduled     Jan 24, 2018  3:00 PM CST   (Arrive by 2:45 PM)   New Patient Visit with Arpita Burnett MD   [facility] ([facility])    [address] [place] [address]  [address] [facility] [address]   [phone]            Feb 20, 2018  9:30 AM CST   CF LOOP with [facility] PFL CF   [facility] Pulmonary Function Testing ([facility])    [address] [id number] [phone]            Feb 20, 2018 10:00 AM CST   (Arrive by 9:45 AM)   Return Bronchiectas Non CF with Lyn Hernández MD   [facility] ([facility])    [address] [place] [id number] [phone]              Who to contact     If you have questions or need follow up information about today's clinic visit or your schedule please contact [facility] directly at [phone].  Normal or non-critical lab and imaging results will be communicated to you by MyChart, letter or phone within 4 business days after the clinic has received the results. If you do not hear from us within 7 days, please contact the clinic through [employer] or phone. If you have a critical or abnormal lab result, we will notify you by phone as soon as possible.  Submit refill requests through [employer] or call your pharmacy and they will forward the refill request to us. Please allow 3 business days for your refill to be completed.          Additional Information About Your Visit        MyChart Information     [employer] gives you secure access to your electronic health record. If you see a primary care provider, you can also send messages to your care team and make appointments. If you have questions, please call your primary care clinic.  If you do not have a primary care provider, please call [phone] and they will assist you.        Care EveryWhere ID     This is your Care EveryWhere ID. This could be used by other organizations to access your [facility] medical records  [id number]        Your Vitals Were     Pulse Respirations Pulse Oximetry BMI (Body Mass Index)           78 16 94% 23.69 kg/m2         Blood Pressure from Last 3 Encounters:   12/05/17 123/85   11/13/17 104/62   11/03/17 132/83    Weight from Last 3 Encounters:   12/05/17 62.6 kg (138 lb)   11/13/17 62.5 kg (137 lb 12.6 oz)   11/03/17 63.5 kg (139 lb 14.4 oz)              We Performed the Following     ENT referral     Sputum Culture Aerobic Bacterial     Throat Culture Aerobic Bacterial          Today's Medication Changes          These changes are accurate as of: 12/5/17 11:59 PM.  If you have any questions, ask your nurse or doctor.               These medicines have changed or have updated prescriptions.        Dose/Directions    * albuterol (2.5 MG/3ML) 0.083% neb solution   This may have changed:  See the new instructions.   Used for:  Bronchiectasis without acute exacerbation (H)   Changed by:  Lyn Hernández MD        USE ONE VIAL IN NEBULIZER TWICE DAILY   Quantity:  180 mL   Refills:  11       * albuterol 108 (90 BASE) MCG/ACT Inhaler   Commonly known as:  PROAIR HFA/PROVENTIL HFA/VENTOLIN HFA   This may have changed:  Another medication with the same name was changed. Make sure you understand how and when to take each.   Used for:  Kartagener syndrome   Changed by:  Lyn Hernández MD        Dose:  2 puff   Inhale 2 puffs into the lungs every 6 hours as needed for shortness of breath / dyspnea or wheezing   Quantity:  1 Inhaler   Refills:  12       * Notice:  This list has 2 medication(s) that are the same as other medications prescribed for you. Read the directions carefully, and ask your doctor or other care provider to review them with you.      Stop taking these medicines if you haven't already. Please contact your care team if you have questions.     HYDROcodone-acetaminophen 5-325 MG per tablet   Commonly known as:  NORCO   Stopped by:  Lyn Hernández MD           senna 8.6 MG tablet   Commonly known as:  SENOKOT   Stopped by:  Lyn Hernández MD                     Primary Care Provider Office Phone # Fax #    Blakenilam Sfiuentes -[phone] [address] [address]        Equal Access to Services     MILLY LY : Hadrozina bernardo gabriel khriso Sowaleskaali, waaxda luqadaha, qaybta kaalmada adeparis, tee anaya laToniapepper magana. So [facility] [phone].    ATENCIÓN: Si habla español, tiene a guevara disposición servicios gratuitos de asistencia lingüística. [facility] [phone].    We comply with applicable federal civil rights laws and [place] laws. We do not discriminate on the basis of race, color, national origin, age, disability, sex, sexual orientation, or gender identity.            Thank you!     Thank you for choosing [facility]  for your care. Our goal is always to provide you with excellent care. Hearing back from our patients is one way we can continue to improve our services. Please take a few minutes to complete the written survey that you may receive in the mail after your visit with us. Thank you!             Your Updated Medication List - Protect others around you: Learn how to safely use, store and throw away your medicines at www.disposemymeds.org.          This list is accurate as of: 12/5/17 11:59 PM.  Always use your most recent med list.                   Brand Name Dispense Instructions for use Diagnosis    ACAPELLA Misc      use twice daily        acetylcysteine 20 % nebulizer solution    MUCOMYST    120 mL    Inhale 2 mLs into the lungs 2 times daily    Kartagener's syndrome, Situs inversus, Pseudomonas aeruginosa colonization       * albuterol (2.5 MG/3ML) 0.083% neb solution     180 mL    USE ONE VIAL IN NEBULIZER TWICE DAILY    Bronchiectasis without acute exacerbation (H)       * albuterol 108 (90 BASE) MCG/ACT Inhaler    PROAIR HFA/PROVENTIL HFA/VENTOLIN HFA    1 Inhaler    Inhale 2 puffs into the lungs every 6 hours as needed for shortness of breath /  "dyspnea or wheezing    Kartagener syndrome       budesonide-formoterol 160-4.5 MCG/ACT Inhaler    SYMBICORT    1 Inhaler    Inhale 2 puffs into the lungs 2 times daily    Kartagener syndrome, Kartagener's syndrome       IBUPROFEN PO      Take 600 mg by mouth        nebulizer Sindy      Nebulizer compressor - use with ALEK as directed        PRENATAL 1 PO      Take 1 tablet by mouth every evening        Syringe Luer Lock 20G X 1-1/2\" 5 ML Misc     60 each    1 each 2 times daily    Kartagener syndrome, Reactive airway disease       Water For Injection Sterile Soln     250 mL    Inject 4 mLs into the vein 2 times daily    Kartagener syndrome, Reactive airway disease       * Notice:  This list has 2 medication(s) that are the same as other medications prescribed for you. Read the directions carefully, and ask your doctor or other care provider to review them with you.      "

## 2017-12-05 NOTE — PROGRESS NOTES
[facility]  [facility]  December 5, 2017         Assessment and Plan:   Armando Dc is a 42 year old female with PCD.    1. PCD lung disease with moderately-severe obstruction:  Armando does feel that she is doing relatively well from a pulmonary point of view.  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  She is not able to exercise, because of how busy she is.  Armando has not had a culture in several months.  We will try to obtain a throat culture today.  She should continue to do her vest therapy at least 1 if or 2 times per day.   2.  Sinusitis and otitis:  Armando does complain of some symptomatology that we have not actively addressed.  I have recommended at this time that she be seen by ENT for further evaluation.  It does seem that she has a fair amount of sinus symptoms that she underplays.   3.  Preconception counseling:  Armando and her  are traveling to [facility] in January and she is going to try another round of IVF.  She recently underwent a laparoscopic surgery and was found to have rather extensive endometriosis.  She is working with both a fertility specialist and again physicians in [facility].   4.  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  She is very excited about this opportunity.  She will be starting this in July.  Armando does report that she has some difficulty with sleep because of anxiety related to her busy life.  She does not feel that she needs an actual intervention related to this.     Lyn Hernández MD MPH   of Medicine  Pulmonary, Allergy, Critical Care and Sleep Medicine      Interval History:     Armando does report that her cough frequency and sputum volume are variable from day to day.  She is not short of breath.  She is able to get through her day without any difficulty.  She is performing 1 vest  therapy per day.  She does use Advair as her long-acting bronchodilator and steroid inhaler.          Review of Systems:     CONSTITUTIONAL: no fever, no chills, no change in weight, no change in energy, no change in appetite    INTEGUMENTARY/SKIN: no rash, no obvious new lesions    ENT/MOUTH: no sore throat, no new sinus pain, no new nasal drainage, no hearing loss, no ear ringing     RESPIRATORY: see interval history    CV: no chest pain, no palpitations, no peripheral edema    GI: no nausea, no vomiting, no change in stools, no fatty stools, no GERD, no abdominal pain    : no dysuria, no urinary frequency    MUSCULOSKELETAL: no myalgias, no arthralgias    ENDOCRINE: no excessive thirst    SLEEP: no issues    PSYCHIATRIC: mood stable          Past Medical and Surgical History:     Past Medical History:   Diagnosis Date     Endometriosis     left ovary     Infertility      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,      Pseudomonas aeruginosa colonization 2008     Reactive airway disease      Uncomplicated asthma     Reactive airway disease     Past Surgical History:   Procedure Laterality Date     LAPAROSCOPIC CYSTECTOMY OVARIAN (BENIGN) Left 11/13/2017    Procedure: LAPAROSCOPIC CYSTECTOMY OVARIAN (BENIGN);  Left  Laparoscopy Ovarian Cystotomy, Hysteroscopy And Polpectomy With Myosure ;  Surgeon: Zayra Roberts MD;  [facility]     OPERATIVE HYSTEROSCOPY WITH MORCELLATOR N/A 11/13/2017    Procedure: OPERATIVE HYSTEROSCOPY WITH MORCELLATOR;;  Surgeon: Zayra Roberts MD;  [facility]     TONSILLECTOMY & ADENOIDECTOMY      8 years old     TRANSVAGINAL RETRIEVAL OVUM Bilateral 3/3/2016    IVF Procedure: TRANSVAGINAL RETRIEVAL OVUM;  Surgeon: Sunshine Gilliam MD;  Location: [facility]     uterine polyp  2013           Family History:     Family History   Problem Relation Age of Onset     Hyperlipidemia Mother      DIABETES Mother      Hypertension Mother      Hyperlipidemia  "Father      Hypertension Father      DIABETES Maternal Grandmother      DIABETES Maternal Grandfather             Social History:     Social History     Social History     Marital status:      Spouse name: N/A     Number of children: N/A     Years of education: N/A     Occupational History     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  Immigrated in 2006 from [place] ([facility]). No children.  Parents in [place], 2 younger sisters and a brother, she is eldest.             Medications:     Current Outpatient Prescriptions   Medication     IBUPROFEN PO     Prenatal MV-Min-Fe Fum-FA-DHA (PRENATAL 1 PO)     acetylcysteine (MUCOMYST) 20 % nebulizer solution     albuterol (PROAIR HFA/PROVENTIL HFA/VENTOLIN HFA) 108 (90 BASE) MCG/ACT Inhaler     budesonide-formoterol (SYMBICORT) 160-4.5 MCG/ACT Inhaler     albuterol (2.5 MG/3ML) 0.083% nebulizer solution     Misc. Devices (ACAPELLA) [facility]     Respiratory Therapy Supplies (NEBULIZER) SUZANNA     Syringe/Needle, Disp, (SYRINGE LUER LOCK) 20G X 1-1/2\" 5 ML MISC     Water For Injection Sterile SOLN     No current facility-administered medications for this visit.             Physical Exam:   /85 (BP Location: Right arm, Patient Position: Chair, Cuff Size: Adult Regular)  Pulse 78  Resp 16  Wt 62.6 kg (138 lb)  SpO2 94%  BMI 23.69 kg/m2    Constitutional:   Awake, alert and in no apparent distress     Eyes:   nonicteric     ENT:   oral mucosa moist without lesions, normal tm bilaterally, bilateral mucosal edema and erythema     Neck:   Supple without supraclavicular or cervical lymphadenopathy     Lungs:   Good air flow.  No crackles. No rhonchi.  No " wheezes.     Cardiovascular:   Normal S1 and S2.  RRR.  No murmur, gallop or rub.     Abdomen:   NABS, soft, nontender, nondistended.  No HSM.     Musculoskeletal:   No edema, digital clubbing present     Neurologic:   Alert and conversant.     Skin:   Warm, dry.  No rash on limited exam.             Data:   All laboratory and imaging data reviewed.    Cystic Fibrosis Culture  Specimen Description   Date Value Ref Range Status   12/05/2017 Throat  Final   10/03/2017 Unknown  Final   10/03/2017 Sputum  Final   10/03/2017 Unknown  Final    Culture Micro   Date Value Ref Range Status   12/05/2017 Canceled, Test credited  Final   12/05/2017 Incorrectly ordered by PCU/Clinic  Final   12/05/2017   Final    Notification of test cancellation was given to  Giorgio TILLMAN from [facility].12/5/17 at 1350.TV.     12/05/2017 Reordered test as throat culture.  Final        Recent Results (from the past 168 hour(s))   General PFT Lab (Please always keep checked)    Collection Time: 12/05/17 10:25 AM   Result Value Ref Range    FVC-Pred 3.31 L    FVC-Pre 2.52 L    FVC-%Pred-Pre 76 %    FEV1-Pre 1.50 L    FEV1-%Pred-Pre 54 %    FEV1FVC-Pred 83 %    FEV1FVC-Pre 60 %    FEFMax-Pred 6.89 L/sec    FEFMax-Pre 4.30 L/sec    FEFMax-%Pred-Pre 62 %    FEF2575-Pred 2.92 L/sec    FEF2575-Pre 0.65 L/sec    CVJ6066-%Pred-Pre 22 %    ExpTime-Pre 10.19 sec    FIFMax-Pre 3.16 L/sec    FEV1FEV6-Pred 84 %    FEV1FEV6-Pre 63 %   Sputum Culture Aerobic Bacterial    Collection Time: 12/05/17 11:10 AM   Result Value Ref Range    Specimen Description Throat     Special Requests Specimen collected in eSwab transport (white cap)     Culture Micro Canceled, Test credited     Culture Micro Incorrectly ordered by PCU/Clinic     Culture Micro       Notification of test cancellation was given to  Giorgio TILLMAN from [facility].12/5/17 at 1350.TV.      Culture Micro Reordered test as throat culture.        PFT: Moderately-severe obstructive lung  disease.  When compared to 8/29/2017, the FEV1 and FVC have decresaed.  The decrease in FVC may represent air trapping v. restrictive physiology.  Lung volumes would be necessary to determine.

## 2017-12-05 NOTE — LETTER
12/5/2017       RE: Armando Dc  [address]  [place] [address]     Dear Colleague,    Thank you for referring your patient, Armando Dc, to the [facility] FOR [facility] at [facility]. Please see a copy of my visit note below.    [facility]  December 5, 2017         Assessment and Plan:   Armando Dc is a 42 year old female with PCD.    1. PCD lung disease with moderately-severe obstruction:  Armando does feel that she is doing relatively well from a pulmonary point of view.  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  She is not able to exercise, because of how busy she is.  Armando has not had a culture in several months.  We will try to obtain a throat culture today.  She should continue to do her vest therapy at least 1 if or 2 times per day.   2.  Sinusitis and otitis:  Armando does complain of some symptomatology that we have not actively addressed.  I have recommended at this time that she be seen by ENT for further evaluation.  It does seem that she has a fair amount of sinus symptoms that she underplays.   3.  Preconception counseling:  Armando and her  are traveling to [place] in January and she is going to try another round of IVF.  She recently underwent a laparoscopic surgery and was found to have rather extensive endometriosis.  She is working with both a fertility specialist and again physicians in [facility].   4.  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  She is very excited about this opportunity.  She will be starting this in July.  Armando does report that she has some difficulty with sleep because of anxiety related to her busy life.  She does not feel that she needs an actual intervention related to this.     Lyn Heránndez MD MPH  Assistant  Professor of Medicine  Pulmonary, Allergy, Critical Care and Sleep Medicine      Interval History:     Armando does report that her cough frequency and sputum volume are variable from day to day.  She is not short of breath.  She is able to get through her day without any difficulty.  She is performing 1 vest therapy per day.  She does use Advair as her long-acting bronchodilator and steroid inhaler.          Review of Systems:     CONSTITUTIONAL: no fever, no chills, no change in weight, no change in energy, no change in appetite    INTEGUMENTARY/SKIN: no rash, no obvious new lesions    ENT/MOUTH: no sore throat, no new sinus pain, no new nasal drainage, no hearing loss, no ear ringing     RESPIRATORY: see interval history    CV: no chest pain, no palpitations, no peripheral edema    GI: no nausea, no vomiting, no change in stools, no fatty stools, no GERD, no abdominal pain    : no dysuria, no urinary frequency    MUSCULOSKELETAL: no myalgias, no arthralgias    ENDOCRINE: no excessive thirst    SLEEP: no issues    PSYCHIATRIC: mood stable          Past Medical and Surgical History:     Past Medical History:   Diagnosis Date     Endometriosis     left ovary     Infertility      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,      Pseudomonas aeruginosa colonization 2008     Reactive airway disease      Uncomplicated asthma     Reactive airway disease     Past Surgical History:   Procedure Laterality Date     LAPAROSCOPIC CYSTECTOMY OVARIAN (BENIGN) Left 11/13/2017    Procedure: LAPAROSCOPIC CYSTECTOMY OVARIAN (BENIGN);  Left  Laparoscopy Ovarian Cystotomy, Hysteroscopy And Polpectomy With Myosure ;  Surgeon: Zayra Roberts MD;  [facility]     OPERATIVE HYSTEROSCOPY WITH MORCELLATOR N/A 11/13/2017    Procedure: OPERATIVE HYSTEROSCOPY WITH MORCELLATOR;;  Surgeon: Zayra Roberts MD;  [facility]     TONSILLECTOMY & ADENOIDECTOMY      8 years old     TRANSVAGINAL RETRIEVAL OVUM Bilateral  "3/3/2016    IVF Procedure: TRANSVAGINAL RETRIEVAL OVUM;  Surgeon: Sunshine Gilliam MD;  Location: [facility]     uterine polyp  2013           Family History:     Family History   Problem Relation Age of Onset     Hyperlipidemia Mother      DIABETES Mother      Hypertension Mother      Hyperlipidemia Father      Hypertension Father      DIABETES Maternal Grandmother      DIABETES Maternal Grandfather             Social History:     Social History     Social History     Marital status:      Spouse name: N/A     Number of children: N/A     Years of education: N/A     Occupational History     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  Immigrated in 2006 from [place] ([facility]). No children.  Parents in [place], 2 younger sisters and a brother, she is eldest.             Medications:     Current Outpatient Prescriptions   Medication     IBUPROFEN PO     Prenatal MV-Min-Fe Fum-FA-DHA (PRENATAL 1 PO)     acetylcysteine (MUCOMYST) 20 % nebulizer solution     albuterol (PROAIR HFA/PROVENTIL HFA/VENTOLIN HFA) 108 (90 BASE) MCG/ACT Inhaler     budesonide-formoterol (SYMBICORT) 160-4.5 MCG/ACT Inhaler     albuterol (2.5 MG/3ML) 0.083% nebulizer solution     Misc. Devices (ACAPELLA) [facility] [place]     Respiratory Therapy Supplies (NEBULIZER) SU[employer]ANNA     Syringe/Needle, Disp, (SYRINGE LUER LOCK) 20G X 1-1/2\" 5 ML MISC     Water For Injection Sterile SOLN     No current facility-administered medications for this visit.             Physical Exam:   /85 (BP Location: Right arm, Patient Position: Chair, Cuff Size: Adult Regular)  Pulse 78  Resp 16  Wt 62.6 kg (138 lb)  SpO2 94%  BMI 23.69 " kg/m2    Constitutional:   Awake, alert and in no apparent distress     Eyes:   nonicteric     ENT:   oral mucosa moist without lesions, normal tm bilaterally, bilateral mucosal edema and erythema     Neck:   Supple without supraclavicular or cervical lymphadenopathy     Lungs:   Good air flow.  No crackles. No rhonchi.  No wheezes.     Cardiovascular:   Normal S1 and S2.  RRR.  No murmur, gallop or rub.     Abdomen:   NABS, soft, nontender, nondistended.  No HSM.     Musculoskeletal:   No edema, digital clubbing present     Neurologic:   Alert and conversant.     Skin:   Warm, dry.  No rash on limited exam.             Data:   All laboratory and imaging data reviewed.    Cystic Fibrosis Culture  Specimen Description   Date Value Ref Range Status   12/05/2017 Throat  Final   10/03/2017 Unknown  Final   10/03/2017 Sputum  Final   10/03/2017 Unknown  Final    Culture Micro   Date Value Ref Range Status   12/05/2017 Canceled, Test credited  Final   12/05/2017 Incorrectly ordered by PCU/Clinic  Final   12/05/2017   Final    Notification of test cancellation was given to  Giorgio TILLMAN from [facility].12/5/17 at 1350.TV.     12/05/2017 Reordered test as throat culture.  Final        Recent Results (from the past 168 hour(s))   General PFT Lab (Please always keep checked)    Collection Time: 12/05/17 10:25 AM   Result Value Ref Range    FVC-Pred 3.31 L    FVC-Pre 2.52 L    FVC-%Pred-Pre 76 %    FEV1-Pre 1.50 L    FEV1-%Pred-Pre 54 %    FEV1FVC-Pred 83 %    FEV1FVC-Pre 60 %    FEFMax-Pred 6.89 L/sec    FEFMax-Pre 4.30 L/sec    FEFMax-%Pred-Pre 62 %    FEF2575-Pred 2.92 L/sec    FEF2575-Pre 0.65 L/sec    LSJ1854-%Pred-Pre 22 %    ExpTime-Pre 10.19 sec    FIFMax-Pre 3.16 L/sec    FEV1FEV6-Pred 84 %    FEV1FEV6-Pre 63 %   Sputum Culture Aerobic Bacterial    Collection Time: 12/05/17 11:10 AM   Result Value Ref Range    Specimen Description Throat     Special Requests Specimen collected in eSwab transport (white cap)      Culture Micro Canceled, Test credited     Culture Micro Incorrectly ordered by PCU/Clinic     Culture Micro       Notification of test cancellation was given to  Giorgio TILLMAN from [facility].12/5/17 at 1350.TV.      Culture Micro Reordered test as throat culture.        PFT: Moderately-severe obstructive lung disease.  When compared to 8/29/2017, the FEV1 and FVC have decresaed.  The decrease in FVC may represent air trapping v. restrictive physiology.  Lung volumes would be necessary to determine.      Again, thank you for allowing me to participate in the care of your patient.      Sincerely,    Lyn Hernández MD

## 2017-12-05 NOTE — PATIENT INSTRUCTIONS
Self-Care Plan       MRN: [id number]   Clinic Date: December 5, 2017   Patient: Armando Dc     Annual Studies:   No results found for: IGG  No results found for: INS  There are no preventive care reminders to display for this patient.      Pulmonary Function Tests  FEV1: amount of air you can blow out in 1 second  FVC: total amount of air you can take in and blow out    Your Goals:         PFT Latest Ref Rng & Units 12/5/2017   FVC L 2.52   FEV1 L 1.50   FVC% % 76   FEV1% % 54          Airway Clearance: The Most Important Way to Keep Your Lungs Healthy  Vest Settings:    Hill-Rom Frequencies: 8, 9, 10 Pressure 10 Then, Frequencies 18, 19, 20 Pressure 6      RespirTech: Quick Start with Pressure of     Do each frequency for 5 minutes; Deflate vest after each frequency & cough 3 times before beginning the next setting.    Vest and Neb Therapy should be done 2 times/day.    Good Nutrition Can Improve Lung Function and Overall Health     Take ALL of your vitamins with food     Take 1/2 of your enzymes before EVERY meal/snack and the other 1/2 mid-meal/snack    Wt Readings from Last 3 Encounters:   12/05/17 62.6 kg (138 lb)   11/13/17 62.5 kg (137 lb 12.6 oz)   11/03/17 63.5 kg (139 lb 14.4 oz)       Body mass index is 23.69 kg/(m^2).         National CF Foundation Recommendations for BMI in CF Adults: Women: at least 22 Men: at least 23        Controlling Blood Sugars Helps Prevent Lung Infections & Improves Nutrition  Test blood sugar:     In the morning before eating (goal is )     2 hours after a meal (goal is less than 150)     When pre-meal glucose is greater than 150 add correction     At bedtime (if less than 100 eat a snack with 15 grams of carbohydrates  Last A1C Results:   Hemoglobin A1C   Date Value Ref Range Status   09/01/2017 5.8 4.3 - 6.0 % Final         If diabetic, measure A1C every 6 months. Goal: Under 7%    Staying Healthy    Research:  If you are interested in learning about research  opportunities or have questions, please contact Adriana Vásquez at [phone] or [email or web address].      CF Foundation:  Compass is a personalized resource service to help you with the insurance, financial, legal and other issues you are facing.  It's free, confidential and available to anyone with CF.  Ask your  for more information or contact Compass directly at 131-COMPASS (028-1855) or compass@cff.org, or learn more at cff.org/compass.          RECOMMENDATIONS: ENT referral.  Lifestyle changes--diet and exercise.  Do not stress about this.  Give yourself a chance to figure it out over time.  Great job with vest.  Try to get back two.  Congratulations on your fellowship.    YOUR GOAL:  Take care of yourself.      CF Nurse Line:  Tami Lao: [phone]   Sybil Peng, RT: [phone]     Delphine Angulo: [phone] or Faye Maciel, Dieticians: [phone]   Sharon Stacy, Diabetes Nurse: [phone]      Akilah Guzman: [phone] or Olga Greer [phone], Social Workers   [email or web address]

## 2017-12-05 NOTE — NURSING NOTE
Chief Complaint   Patient presents with     Bronchiectasis     Patient is being seen for PCD follow up      Lola Espinoza CMA at 11:01 AM on 12/5/2017

## 2017-12-07 LAB
BACTERIA SPEC CULT: NORMAL
SPECIMEN SOURCE: NORMAL

## 2017-12-29 DIAGNOSIS — J47.9 BRONCHIECTASIS WITHOUT ACUTE EXACERBATION (H): ICD-10-CM

## 2017-12-29 RX ORDER — ALBUTEROL SULFATE 0.83 MG/ML
SOLUTION RESPIRATORY (INHALATION)
Qty: 180 ML | Refills: 11 | Status: CANCELLED | OUTPATIENT
Start: 2017-12-29

## 2017-12-30 RX ORDER — ALBUTEROL SULFATE 0.83 MG/ML
1 SOLUTION RESPIRATORY (INHALATION) 2 TIMES DAILY
Qty: 180 ML | Refills: 11 | OUTPATIENT
Start: 2017-12-30

## 2018-01-15 NOTE — TELEPHONE ENCOUNTER
APPT INFO    Date /Time: 1/24/18 at 3PM   Reason for Appt: PCD with otitis and sinusitis   Ref Provider/Clinic: Lyn Hernández   Are there internal records? Yes/No?  IF YES, list clinic names: [facility]   Are there outside records? Yes/No? No   Patient Contact (Y/N) & Call Details: No   Action: Chart reviewed

## 2018-01-22 ENCOUNTER — RADIANT APPOINTMENT (OUTPATIENT)
Dept: ULTRASOUND IMAGING | Facility: CLINIC | Age: 43
End: 2018-01-22
Attending: OBSTETRICS & GYNECOLOGY
Payer: COMMERCIAL

## 2018-01-22 DIAGNOSIS — Z31.41 FERTILITY TESTING: ICD-10-CM

## 2018-01-22 LAB
ESTRADIOL SERPL-MCNC: 12 PG/ML
LH SERPL-ACNC: 1.5 IU/L
PROGEST SERPL-MCNC: <0.2 NG/ML

## 2018-01-22 PROCEDURE — 83002 ASSAY OF GONADOTROPIN (LH): CPT | Performed by: OBSTETRICS & GYNECOLOGY

## 2018-01-22 PROCEDURE — 82670 ASSAY OF TOTAL ESTRADIOL: CPT | Performed by: OBSTETRICS & GYNECOLOGY

## 2018-01-22 PROCEDURE — 36415 COLL VENOUS BLD VENIPUNCTURE: CPT | Performed by: OBSTETRICS & GYNECOLOGY

## 2018-01-22 PROCEDURE — 84144 ASSAY OF PROGESTERONE: CPT | Performed by: OBSTETRICS & GYNECOLOGY

## 2018-01-22 PROCEDURE — 76830 TRANSVAGINAL US NON-OB: CPT | Performed by: OBSTETRICS & GYNECOLOGY

## 2018-01-24 ENCOUNTER — PRE VISIT (OUTPATIENT)
Dept: OTOLARYNGOLOGY | Facility: CLINIC | Age: 43
End: 2018-01-24

## 2018-01-24 ENCOUNTER — MYC MEDICAL ADVICE (OUTPATIENT)
Dept: OBGYN | Facility: CLINIC | Age: 43
End: 2018-01-24

## 2018-01-24 ENCOUNTER — OFFICE VISIT (OUTPATIENT)
Dept: OTOLARYNGOLOGY | Facility: CLINIC | Age: 43
End: 2018-01-24
Payer: COMMERCIAL

## 2018-01-24 VITALS — BODY MASS INDEX: 24.1 KG/M2 | WEIGHT: 136 LBS | HEIGHT: 63 IN

## 2018-01-24 DIAGNOSIS — J31.0 OTHER CHRONIC RHINITIS: Primary | ICD-10-CM

## 2018-01-24 DIAGNOSIS — Q89.3 SITUS INVERSUS: Chronic | ICD-10-CM

## 2018-01-24 ASSESSMENT — PAIN SCALES - GENERAL: PAINLEVEL: NO PAIN (0)

## 2018-01-24 NOTE — MR AVS SNAPSHOT
After Visit Summary   1/24/2018    Armando Dc    MRN: [id number]           Patient Information     Date Of Birth          1975        Visit Information        Provider Department      1/24/2018 3:00 PM Arpita Burnett MD [facility] Nose [facility] Thro[facility]        Today's Diagnoses     Other chronic rhinitis    -  1    Situs inversus           Follow-ups after your visit        Your next 10 appointments already scheduled     Feb 20, 2018  9:30 AM CST   CF LOOP with UC PFL CF   [facility] Pulmonary Function Testing ([facility])    [address]  3rd Floor  [facility] [address] [phone]            Feb 20, 2018 10:00 AM CST   (Arrive by 9:45 AM)   Return Bronchiectas Non CF with Lyn Hernández MD   [facility] ([facility])    [address] [place] [address]  [address] [place] [address] [phone]              Who to contact     Please call your clinic at [phone] to:    Ask questions about your health    Make or cancel appointments    Discuss your medicines    Learn about your test results    Speak to your doctor   If you have compliments or concerns about an experience at your clinic, or if you wish to file a complaint, please contact [facility] Patient Relations at [phone] or email us at [email or web address]         Additional Information About Your Visit        MyChart Information     [employer] gives you secure access to your electronic health record. If you see a primary care provider, you can also send messages to your care team and make appointments. If you have questions, please call your primary care clinic.  If you do not have a primary care provider, please call [phone] and they will assist you.      [employer] is an electronic gateway that provides easy, online access to your medical records. With [employer], you can request a clinic appointment,  "read your test results, renew a prescription or communicate with your care team.     To access your existing account, please contact your [facility] or call [phone] for assistance.        Care EveryWhere ID     This is your Care EveryWhere ID. This could be used by other organizations to access your [place] medical records  [id number]        Your Vitals Were     Height BMI (Body Mass Index)                1.61 m (5' 3.39\") 23.8 kg/m2           Blood Pressure from Last 3 Encounters:   12/05/17 123/85   11/13/17 104/62   11/03/17 132/83    Weight from Last 3 Encounters:   01/24/18 61.7 kg (136 lb)   12/05/17 62.6 kg (138 lb)   11/13/17 62.5 kg (137 lb 12.6 oz)              Today, you had the following     No orders found for display         Today's Medication Changes          These changes are accurate as of 1/24/18 11:59 PM.  If you have any questions, ask your nurse or doctor.               These medicines have changed or have updated prescriptions.        Dose/Directions    * albuterol (2.5 MG/3ML) 0.083% neb solution   This may have changed:  See the new instructions.   Used for:  Bronchiectasis without acute exacerbation (H)        USE ONE VIAL IN NEBULIZER TWICE DAILY   Quantity:  180 mL   Refills:  11       * albuterol 108 (90 BASE) MCG/ACT Inhaler   Commonly known as:  PROAIR HFA/PROVENTIL HFA/VENTOLIN HFA   This may have changed:  Another medication with the same name was changed. Make sure you understand how and when to take each.   Used for:  Kartagener syndrome        Dose:  2 puff   Inhale 2 puffs into the lungs every 6 hours as needed for shortness of breath / dyspnea or wheezing   Quantity:  1 Inhaler   Refills:  12       * Notice:  This list has 2 medication(s) that are the same as other medications prescribed for you. Read the directions carefully, and ask your doctor or other care provider to review them with you.             Primary Care Provider Office Phone # " Fax #    Blake ZHOU MD Bear [phone]       [facility] [id number]  [facility] [address]        Equal Access to Services     MILLY LY : Ana bernardo gabriel fitz Godinez, wagrisda luqadaha, qaybta kaalmada isela, tee anaya laToniapepper magana. So [facility] [phone].    ATENCIÓN: Si habla español, tiene a guevara disposición servicios gratuitos de asistencia lingüística. Llame al [phone].    We comply with applicable federal civil rights laws and [place] laws. We do not discriminate on the basis of race, color, national origin, age, disability, sex, sexual orientation, or gender identity.            Thank you!     Thank you for choosing [facility] EAR NOSE AND THROAT  for your care. Our goal is always to provide you with excellent care. Hearing back from our patients is one way we can continue to improve our services. Please take a few minutes to complete the written survey that you may receive in the mail after your visit with us. Thank you!             Your Updated Medication List - Protect others around you: Learn how to safely use, store and throw away your medicines at www.disposemymeds.org.          This list is accurate as of 1/24/18 11:59 PM.  Always use your most recent med list.                   Brand Name Dispense Instructions for use Diagnosis    ACAPELLA Misc      use twice daily        acetylcysteine 20 % nebulizer solution    MUCOMYST    120 mL    Inhale 2 mLs into the lungs 2 times daily    Kartagener's syndrome, Situs inversus, Pseudomonas aeruginosa colonization       * albuterol (2.5 MG/3ML) 0.083% neb solution     180 mL    USE ONE VIAL IN NEBULIZER TWICE DAILY    Bronchiectasis without acute exacerbation (H)       * albuterol 108 (90 BASE) MCG/ACT Inhaler    PROAIR HFA/PROVENTIL HFA/VENTOLIN HFA    1 Inhaler    Inhale 2 puffs into the lungs every 6 hours as needed for shortness of breath / dyspnea or wheezing    Kartagener syndrome       budesonide-formoterol  "160-4.5 MCG/ACT Inhaler    SYMBICORT    1 Inhaler    Inhale 2 puffs into the lungs 2 times daily    Kartagener syndrome, Kartagener's syndrome       IBUPROFEN PO      Take 600 mg by mouth        nebulizer Sindy      Nebulizer compressor - use with ALEK as directed        PRENATAL 1 PO      Take 1 tablet by mouth every evening        Syringe Luer Lock 20G X 1-1/2\" 5 ML Misc     60 each    1 each 2 times daily    Kartagener syndrome, Reactive airway disease       Water For Injection Sterile Soln     250 mL    Inject 4 mLs into the vein 2 times daily    Kartagener syndrome, Reactive airway disease       * Notice:  This list has 2 medication(s) that are the same as other medications prescribed for you. Read the directions carefully, and ask your doctor or other care provider to review them with you.      "

## 2018-01-24 NOTE — LETTER
2018       RE: Armando Dc  [address]  [place] [address]     Dear Colleague,    Thank you for referring your patient, Armando Dc, to the [facility] EAR NOSE AND THROAT at [facility]. Please see a copy of my visit note below.    Otolaryngology Adult Consultation    Patient: Armando Dc   : 1975     Patient presents with:  Consult:   Chief Complaint   Patient presents with     Consult     PCD with otitis and sinusits         Referring Provider: Lyn Hernández   Consulting Physician:  Arpita Burnett MD       Assessment/Plan: Armando has relatively few sinus complaints, but as Dr. Hernández points out she may be minimizing.  Currently I have no objective reason to scan her or recommend more aggressive therapy.  If she does elicit increased sinus pain, drainage or congestion, I would endorse ordering a CT scan and followup with me for further evaluation.  She know to contact my office if she has any concerning symptoms.     HPI: Armando Dc is a 42 year old female seen today in the Otolaryngology Clinic in consultation from Dr. Hernández for a history of primary ciliary dyskinesia.  Symptoms include congestion and drainage at times, but not 'severe'. No significant facial pain or headaches. Duration of symptoms has been years, but no previous surgery has been done.  She does irrigate, with some benefit. Overall she has done well from a pulmonary standpoint too.        Current Outpatient Prescriptions on File Prior to Visit:  IBUPROFEN PO Take 600 mg by mouth   Prenatal MV-Min-Fe Fum-FA-DHA (PRENATAL 1 PO) Take 1 tablet by mouth every evening   acetylcysteine (MUCOMYST) 20 % nebulizer solution Inhale 2 mLs into the lungs 2 times daily   albuterol (PROAIR HFA/PROVENTIL HFA/VENTOLIN HFA) 108 (90 BASE) MCG/ACT Inhaler Inhale 2 puffs into the lungs every 6 hours as needed for shortness of breath / dyspnea or wheezing   budesonide-formoterol (SYMBICORT)  "160-4.5 MCG/ACT Inhaler Inhale 2 puffs into the lungs 2 times daily   albuterol (2.5 MG/3ML) 0.083% nebulizer solution USE ONE VIAL IN NEBULIZER TWICE DAILY (Patient taking differently: USE ONE VIAL IN NEBULIZER DAILY)   Misc. Devices (ACAPELLA) MISC use twice daily   Respiratory Therapy Supplies (NEBULIZER) SUZANNA Nebulizer compressor - use with ALEK as directed   Syringe/Needle, Disp, (SYRINGE LUER LOCK) 20G X 1-1/2\" 5 ML MISC 1 each 2 times daily   Water For Injection Sterile SOLN Inject 4 mLs into the vein 2 times daily     No current facility-administered medications on file prior to visit.      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    Physical Exam:    General Assessment   The patient is alert, oriented and in no acute distress.   Head/Face/Scalp  Grossly normal.   Ears  Normal canals, auricles and tympanic membranes.   Nose  External nose is straight, skin is normal. Intranasal exam (anterior rhinoscopy) reveals normal moist mucosa, turbinate tissue without edema, erythema or crusting.  Septum mainly in the midline.    Mouth  Oral cavity shows healthy mucosa with out ulceration, masses or other lesions involving the tongue, palate, buccal mucosa, floor of mouth or gingiva.  Dentition in good repair.  Oropharynx with normal tonsils, posterior wall and base of tongue.  Neck  No significant adenopathy, thyroid or salivary gland abnormality.     Arpita Burnett MD      "

## 2018-01-24 NOTE — NURSING NOTE
"Chief Complaint   Patient presents with     Consult     PCD with otitis and sinusits      Height 1.61 m (5' 3.39\"), weight 61.7 kg (136 lb), not currently breastfeeding.    Abhishek Christensen LPN    "

## 2018-02-02 NOTE — PROGRESS NOTES
Otolaryngology Adult Consultation    Patient: Armando Dc   : 1975     Patient presents with:  Consult:   Chief Complaint   Patient presents with     Consult     PCD with otitis and sinusits         Referring Provider: Lyn Hernández   Consulting Physician:  Arpita Burnett MD       Assessment/Plan: Armando has relatively few sinus complaints, but as Dr. Hernández points out she may be minimizing.  Currently I have no objective reason to scan her or recommend more aggressive therapy.  If she does elicit increased sinus pain, drainage or congestion, I would endorse ordering a CT scan and followup with me for further evaluation.  She know to contact my office if she has any concerning symptoms.     HPI: Armando Dc is a 42 year old female seen today in the Otolaryngology Clinic in consultation from Dr. Hernández for a history of primary ciliary dyskinesia.  Symptoms include congestion and drainage at times, but not 'severe'. No significant facial pain or headaches. Duration of symptoms has been years, but no previous surgery has been done.  She does irrigate, with some benefit. Overall she has done well from a pulmonary standpoint too.        Current Outpatient Prescriptions on File Prior to Visit:  IBUPROFEN PO Take 600 mg by mouth   Prenatal MV-Min-Fe Fum-FA-DHA (PRENATAL 1 PO) Take 1 tablet by mouth every evening   acetylcysteine (MUCOMYST) 20 % nebulizer solution Inhale 2 mLs into the lungs 2 times daily   albuterol (PROAIR HFA/PROVENTIL HFA/VENTOLIN HFA) 108 (90 BASE) MCG/ACT Inhaler Inhale 2 puffs into the lungs every 6 hours as needed for shortness of breath / dyspnea or wheezing   budesonide-formoterol (SYMBICORT) 160-4.5 MCG/ACT Inhaler Inhale 2 puffs into the lungs 2 times daily   albuterol (2.5 MG/3ML) 0.083% nebulizer solution USE ONE VIAL IN NEBULIZER TWICE DAILY (Patient taking differently: USE ONE VIAL IN NEBULIZER DAILY)   Misc. Devices (ACAPELLA) MISC use twice daily   Respiratory Therapy  "Supplies (NEBULIZER) SUZANNA Nebulizer compressor - use with ALEK as directed   Syringe/Needle, Disp, (SYRINGE LUER LOCK) 20G X 1-1/2\" 5 ML MISC 1 each 2 times daily   Water For Injection Sterile SOLN Inject 4 mLs into the vein 2 times daily     No current facility-administered medications on file prior to visit.      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    Physical Exam:    General Assessment   The patient is alert, oriented and in no acute distress.   Head/Face/Scalp  Grossly normal.   Ears  Normal canals, auricles and tympanic membranes.   Nose  External nose is straight, skin is normal. Intranasal exam (anterior rhinoscopy) reveals normal moist mucosa, turbinate tissue without edema, erythema or crusting.  Septum mainly in the midline.    Mouth  Oral cavity shows healthy mucosa with out ulceration, masses or other lesions involving the tongue, palate, buccal mucosa, floor of mouth or gingiva.  Dentition in good repair.  Oropharynx with normal tonsils, posterior wall and base of tongue.  Neck  No significant adenopathy, thyroid or salivary gland abnormality.         "

## 2018-02-15 ENCOUNTER — ALLIED HEALTH/NURSE VISIT (OUTPATIENT)
Dept: PHARMACY | Facility: CLINIC | Age: 43
End: 2018-02-15
Payer: COMMERCIAL

## 2018-02-15 NOTE — MR AVS SNAPSHOT
After Visit Summary   2/15/2018    Armando Dc    MRN: [id number]           Patient Information     Date Of Birth          1975        Visit Information        Provider Department      2/15/2018 3:30 PM Jennifer Oliver RPH [facility]         Follow-ups after your visit        Your next 10 appointments already scheduled     Feb 20, 2018  9:30 AM CST   CF LOOP with UC PFL CF   [facility] Pulmonary Function Testing ([facility])    [address]  3rd Floor  [facility] [id number] [phone]            Feb 20, 2018 10:00 AM CST   (Arrive by 9:45 AM)   Return Bronchiectas Non CF with Lyn Hernández MD   [facility] ([facility])    [address] [place] [address]  [address] [place] [id number] [phone]              Who to contact     If you have questions or need follow up information about today's clinic visit or your schedule please contact [facility] directly at [phone].  Normal or non-critical lab and imaging results will be communicated to you by [employer], letter or phone within 4 business days after the clinic has received the results. If you do not hear from us within 7 days, please contact the clinic through [employer] or phone. If you have a critical or abnormal lab result, we will notify you by phone as soon as possible.  Submit refill requests through [employer] or call your pharmacy and they will forward the refill request to us. Please allow 3 business days for your refill to be completed.          Additional Information About Your Visit        MyChart Information     [employer] gives you secure access to your electronic health record. If you see a primary care provider, you can also send messages to your care team and make appointments. If you have questions, please call your primary care  clinic.  If you do not have a primary care provider, please call [phone] and they will assist you.        Care EveryWhere ID     This is your Care EveryWhere ID. This could be used by other organizations to access your [place] medical records  [id number]         Blood Pressure from Last 3 Encounters:   12/05/17 123/85   11/13/17 104/62   11/03/17 132/83    Weight from Last 3 Encounters:   01/24/18 136 lb (61.7 kg)   12/05/17 138 lb (62.6 kg)   11/13/17 137 lb 12.6 oz (62.5 kg)              Today, you had the following     No orders found for display         Today's Medication Changes          These changes are accurate as of 2/15/18  3:31 PM.  If you have any questions, ask your nurse or doctor.               These medicines have changed or have updated prescriptions.        Dose/Directions    * albuterol (2.5 MG/3ML) 0.083% neb solution   This may have changed:  See the new instructions.   Used for:  Bronchiectasis without acute exacerbation (H)        USE ONE VIAL IN NEBULIZER TWICE DAILY   Quantity:  180 mL   Refills:  11       * albuterol 108 (90 BASE) MCG/ACT Inhaler   Commonly known as:  PROAIR HFA/PROVENTIL HFA/VENTOLIN HFA   This may have changed:  Another medication with the same name was changed. Make sure you understand how and when to take each.   Used for:  Kartagener syndrome        Dose:  2 puff   Inhale 2 puffs into the lungs every 6 hours as needed for shortness of breath / dyspnea or wheezing   Quantity:  1 Inhaler   Refills:  12       * Notice:  This list has 2 medication(s) that are the same as other medications prescribed for you. Read the directions carefully, and ask your doctor or other care provider to review them with you.             Primary Care Provider Office Phone # Fax #    Blake Sifuentes -[phone] [address] [address]        Equal Access to Services     MILLY LY AH: sujatha Fatima qaybta  tee miguelmegan mead ah. Allison [facility] [phone].    ATENCIÓN: Si edith contreras, tiene a guevara disposición servicios gratuitos de asistencia lingüística. Srikanth al [phone].    We comply with applicable federal civil rights laws and [place] laws. We do not discriminate on the basis of race, color, national origin, age, disability, sex, sexual orientation, or gender identity.            Thank you!     Thank you for choosing [facility]  for your care. Our goal is always to provide you with excellent care. Hearing back from our patients is one way we can continue to improve our services. Please take a few minutes to complete the written survey that you may receive in the mail after your visit with us. Thank you!             Your Updated Medication List - Protect others around you: Learn how to safely use, store and throw away your medicines at www.disposemymeds.org.          This list is accurate as of 2/15/18  3:31 PM.  Always use your most recent med list.                   Brand Name Dispense Instructions for use Diagnosis    ACAPELLA Misc      use twice daily        acetylcysteine 20 % nebulizer solution    MUCOMYST    120 mL    Inhale 2 mLs into the lungs 2 times daily    Kartagener's syndrome, Situs inversus, Pseudomonas aeruginosa colonization       * albuterol (2.5 MG/3ML) 0.083% neb solution     180 mL    USE ONE VIAL IN NEBULIZER TWICE DAILY    Bronchiectasis without acute exacerbation (H)       * albuterol 108 (90 BASE) MCG/ACT Inhaler    PROAIR HFA/PROVENTIL HFA/VENTOLIN HFA    1 Inhaler    Inhale 2 puffs into the lungs every 6 hours as needed for shortness of breath / dyspnea or wheezing    Kartagener syndrome       budesonide-formoterol 160-4.5 MCG/ACT Inhaler    SYMBICORT    1 Inhaler    Inhale 2 puffs into the lungs 2 times daily    Kartagener syndrome, Kartagener's syndrome       IBUPROFEN PO      Take 600 mg by  "mouth        nebulizer Sindy      Nebulizer compressor - use with ALEK as directed        PRENATAL 1 PO      Take 1 tablet by mouth every evening        Syringe Luer Lock 20G X 1-1/2\" 5 ML Misc     60 each    1 each 2 times daily    Kartagener syndrome, Reactive airway disease       Water For Injection Sterile Soln     250 mL    Inject 4 mLs into the vein 2 times daily    Kartagener syndrome, Reactive airway disease       * Notice:  This list has 2 medication(s) that are the same as other medications prescribed for you. Read the directions carefully, and ask your doctor or other care provider to review them with you.      "

## 2018-02-21 DIAGNOSIS — N92.6 IRREGULAR MENSES: ICD-10-CM

## 2018-02-21 LAB — B-HCG SERPL-ACNC: <1 IU/L (ref 0–5)

## 2018-02-21 PROCEDURE — 84702 CHORIONIC GONADOTROPIN TEST: CPT | Performed by: OBSTETRICS & GYNECOLOGY

## 2018-02-21 PROCEDURE — 36415 COLL VENOUS BLD VENIPUNCTURE: CPT | Performed by: OBSTETRICS & GYNECOLOGY

## 2018-03-02 ENCOUNTER — OFFICE VISIT (OUTPATIENT)
Dept: OBGYN | Facility: CLINIC | Age: 43
End: 2018-03-02
Payer: COMMERCIAL

## 2018-03-02 VITALS
WEIGHT: 136.5 LBS | HEART RATE: 83 BPM | TEMPERATURE: 98 F | BODY MASS INDEX: 24.19 KG/M2 | OXYGEN SATURATION: 95 % | DIASTOLIC BLOOD PRESSURE: 81 MMHG | HEIGHT: 63 IN | SYSTOLIC BLOOD PRESSURE: 127 MMHG

## 2018-03-02 DIAGNOSIS — N80.9 ENDOMETRIOSIS: ICD-10-CM

## 2018-03-02 PROCEDURE — 99213 OFFICE O/P EST LOW 20 MIN: CPT | Performed by: OBSTETRICS & GYNECOLOGY

## 2018-03-02 NOTE — MR AVS SNAPSHOT
After Visit Summary   3/2/2018    Armando Dc    MRN: [id number]           Patient Information     Date Of Birth          1975        Visit Information        Provider Department      3/2/2018 1:30 PM Zayra Roberts MD [facility]        Today's Diagnoses     Endometriosis           Follow-ups after your visit        Your next 10 appointments already scheduled     Mar 07, 2018 10:15 AM CST   New Patient Visit with Nemo Weldon MD   [facility] ([facility])    [facility] [address]  [address]  [facility] [address] [phone]              Who to contact     If you have questions or need follow up information about today's clinic visit or your schedule please contact [facility] directly at [phone].  Normal or non-critical lab and imaging results will be communicated to you by MyChart, letter or phone within 4 business days after the clinic has received the results. If you do not hear from us within 7 days, please contact the clinic through MyChart or phone. If you have a critical or abnormal lab result, we will notify you by phone as soon as possible.  Submit refill requests through [employer] or call your pharmacy and they will forward the refill request to us. Please allow 3 business days for your refill to be completed.          Additional Information About Your Visit        MyChart Information     [employer] gives you secure access to your electronic health record. If you see a primary care provider, you can also send messages to your care team and make appointments. If you have questions, please call your primary care clinic.  If you do not have a primary care provider, please call [phone] and they will assist you.        Care EveryWhere ID     This is your Care EveryWhere ID. This could be used by other organizations to access your [facility] medical records  [id number]       "  Your Vitals Were     Pulse Temperature Height Last Period Pulse Oximetry Breastfeeding?    83 98  F (36.7  C) (Oral) 5' 3.39\" (1.61 m) 02/21/2018 95% No    BMI (Body Mass Index)                   23.89 kg/m2            Blood Pressure from Last 3 Encounters:   03/02/18 127/81   12/05/17 123/85   11/13/17 104/62    Weight from Last 3 Encounters:   03/02/18 136 lb 8 oz (61.9 kg)   01/24/18 136 lb (61.7 kg)   12/05/17 138 lb (62.6 kg)              Today, you had the following     No orders found for display         Today's Medication Changes          These changes are accurate as of 3/2/18 11:59 PM.  If you have any questions, ask your nurse or doctor.               These medicines have changed or have updated prescriptions.        Dose/Directions    * albuterol (2.5 MG/3ML) 0.083% neb solution   This may have changed:  See the new instructions.   Used for:  Bronchiectasis without acute exacerbation (H)        USE ONE VIAL IN NEBULIZER TWICE DAILY   Quantity:  180 mL   Refills:  11       * albuterol 108 (90 BASE) MCG/ACT Inhaler   Commonly known as:  PROAIR HFA/PROVENTIL HFA/VENTOLIN HFA   This may have changed:  Another medication with the same name was changed. Make sure you understand how and when to take each.   Used for:  Kartagener syndrome        Dose:  2 puff   Inhale 2 puffs into the lungs every 6 hours as needed for shortness of breath / dyspnea or wheezing   Quantity:  1 Inhaler   Refills:  12       * Notice:  This list has 2 medication(s) that are the same as other medications prescribed for you. Read the directions carefully, and ask your doctor or other care provider to review them with you.             Primary Care Provider Office Phone # Fax #    Blake Sifuentes [phone]       [facility] [address] [address]        Equal Access to Services     MILLY LY AH: Ana Godinez, sujatha angel, tee herring " lasoniya magana. So [facility] [phone].    ATENCIÓN: Si habla diane, tiene a guevara disposición servicios gratuitos de asistencia lingüística. Srikanth keys [phone].    We comply with applicable federal civil rights laws and [place] laws. We do not discriminate on the basis of race, color, national origin, age, disability, sex, sexual orientation, or gender identity.            Thank you!     Thank you for choosing [facility]  for your care. Our goal is always to provide you with excellent care. Hearing back from our patients is one way we can continue to improve our services. Please take a few minutes to complete the written survey that you may receive in the mail after your visit with us. Thank you!             Your Updated Medication List - Protect others around you: Learn how to safely use, store and throw away your medicines at www.disposemymeds.org.          This list is accurate as of 3/2/18 11:59 PM.  Always use your most recent med list.                   Brand Name Dispense Instructions for use Diagnosis    ACAPELLA Misc      use twice daily        acetylcysteine 20 % nebulizer solution    MUCOMYST    120 mL    Inhale 2 mLs into the lungs 2 times daily    Kartagener's syndrome, Situs inversus, Pseudomonas aeruginosa colonization       * albuterol (2.5 MG/3ML) 0.083% neb solution     180 mL    USE ONE VIAL IN NEBULIZER TWICE DAILY    Bronchiectasis without acute exacerbation (H)       * albuterol 108 (90 BASE) MCG/ACT Inhaler    PROAIR HFA/PROVENTIL HFA/VENTOLIN HFA    1 Inhaler    Inhale 2 puffs into the lungs every 6 hours as needed for shortness of breath / dyspnea or wheezing    Kartagener syndrome       budesonide-formoterol 160-4.5 MCG/ACT Inhaler    SYMBICORT    1 Inhaler    Inhale 2 puffs into the lungs 2 times daily    Kartagener syndrome, Kartagener's syndrome       IBUPROFEN PO      Take 600 mg by mouth        nebulizer Sindy      Nebulizer compressor - use with ALEK as directed         "PRENATAL 1 PO      Take 1 tablet by mouth every evening        Syringe Luer Lock 20G X 1-1/2\" 5 ML Misc     60 each    1 each 2 times daily    Kartagener syndrome, Reactive airway disease       Water For Injection Sterile Soln     250 mL    Inject 4 mLs into the vein 2 times daily    Kartagener syndrome, Reactive airway disease       * Notice:  This list has 2 medication(s) that are the same as other medications prescribed for you. Read the directions carefully, and ask your doctor or other care provider to review them with you.      "

## 2018-03-02 NOTE — PROGRESS NOTES
"Chief Complaint   Patient presents with     Consult       Initial /81 (BP Location: Left arm, Patient Position: Sitting, Cuff Size: Adult Regular)  Pulse 83  Temp 98  F (36.7  C) (Oral)  Ht 5' 3.39\" (1.61 m)  Wt 136 lb 8 oz (61.9 kg)  LMP 2018  SpO2 95%  Breastfeeding? No  BMI 23.89 kg/m2 Estimated body mass index is 23.89 kg/(m^2) as calculated from the following:    Height as of this encounter: 5' 3.39\" (1.61 m).    Weight as of this encounter: 136 lb 8 oz (61.9 kg).  BP completed using cuff size: regular        The following HM Due: NONE      The following patient reported/Care Every where data was sent to:  P ABSTRACT QUALITY INITIATIVES [14393]  n/a      n/a and patient has appointment for today              "

## 2018-03-03 NOTE — PROGRESS NOTES
"CC:  Next steps for pregnancy  HPI:  Armando Dc is a 42 year old female Patient's last menstrual period was 02/21/2018.   Here to discuss if she should do something different to help pregnancy chances. Had IVF cycle in [place] with donor egg but didn't work even with good blastocyst. Wants to know if should try again or not? Is endometriosis inside the uterus and causing problems? Anything she can do to help?    Also wanted to discuss long term and if ovaries would need to come out with all the endometriosis and endometriomas that keep forming.       Allergies: Review of patient's allergies indicates no known allergies.    The patient's past medical history, social history and family history is reviewed at our visit and updated in EPIC.    EXAM:  Blood pressure 127/81, pulse 83, temperature 98  F (36.7  C), temperature source Oral, height 5' 3.39\" (1.61 m), weight 136 lb 8 oz (61.9 kg), last menstrual period 02/21/2018, SpO2 95 %, not currently breastfeeding.  BMI= Body mass index is 23.89 kg/(m^2).  Patient's last menstrual period was 02/21/2018.  General - pleasant female in no acute distress.  Neurological - alert and oriented X 3  Psychiatric - normal mood and affect    No other physical examination was performed today as we spent over 50% of today's 15 minute visit in face-to-face discussion and counseling about trying to get pregnant and best options, effects of endometrosis.    ASSESSMENT/PLAN:  (N80.9) Endometriosis  Comment: severe  Plan: discussed even with donor egg not >50% pregnancy rate with IVF, but yes, usually people try up to 3 times before giving up. Also could do adoption. Not sure what the endometriosis is doing to fertility rate but at least no hydrosalpinx so that's good. Answered questions. Would continue donor egg if does IVF. Reviewed prior surgical findings and pictures.    Zayra Roberts MD    "

## 2018-03-06 ASSESSMENT — ENCOUNTER SYMPTOMS
SNORES LOUDLY: 0
POSTURAL DYSPNEA: 0
DYSPNEA ON EXERTION: 0
WHEEZING: 0
SPUTUM PRODUCTION: 1
HEMOPTYSIS: 0
COUGH: 1
SHORTNESS OF BREATH: 0
COUGH DISTURBING SLEEP: 0

## 2018-03-06 ASSESSMENT — ANXIETY QUESTIONNAIRES
6. BECOMING EASILY ANNOYED OR IRRITABLE: NOT AT ALL
1. FEELING NERVOUS, ANXIOUS, OR ON EDGE: NOT AT ALL
2. NOT BEING ABLE TO STOP OR CONTROL WORRYING: NOT AT ALL
GAD7 TOTAL SCORE: 0
7. FEELING AFRAID AS IF SOMETHING AWFUL MIGHT HAPPEN: NOT AT ALL
7. FEELING AFRAID AS IF SOMETHING AWFUL MIGHT HAPPEN: NOT AT ALL
3. WORRYING TOO MUCH ABOUT DIFFERENT THINGS: NOT AT ALL
GAD7 TOTAL SCORE: 0
4. TROUBLE RELAXING: NOT AT ALL
5. BEING SO RESTLESS THAT IT IS HARD TO SIT STILL: NOT AT ALL

## 2018-03-07 ENCOUNTER — OFFICE VISIT (OUTPATIENT)
Dept: OBGYN | Facility: CLINIC | Age: 43
End: 2018-03-07
Attending: OBSTETRICS & GYNECOLOGY
Payer: COMMERCIAL

## 2018-03-07 VITALS
WEIGHT: 137 LBS | SYSTOLIC BLOOD PRESSURE: 106 MMHG | BODY MASS INDEX: 24.27 KG/M2 | HEIGHT: 63 IN | HEART RATE: 77 BPM | DIASTOLIC BLOOD PRESSURE: 79 MMHG

## 2018-03-07 DIAGNOSIS — N97.9 FEMALE INFERTILITY: Primary | ICD-10-CM

## 2018-03-07 PROCEDURE — G0463 HOSPITAL OUTPT CLINIC VISIT: HCPCS | Mod: ZF

## 2018-03-07 ASSESSMENT — ANXIETY QUESTIONNAIRES: GAD7 TOTAL SCORE: 0

## 2018-03-07 NOTE — NURSING NOTE
Chief Complaint   Patient presents with     Second Opinion     Endometriosis        See PRIMO Keys 3/7/2018

## 2018-03-07 NOTE — MR AVS SNAPSHOT
"              After Visit Summary   3/7/2018    Armando Dc    MRN: [id number]           Patient Information     Date Of Birth          1975        Visit Information        Provider Department      3/7/2018 10:15 AM Nemo Weldon MD [facility]        Today's Diagnoses     Female infertility    -  1       Follow-ups after your visit        Follow-up notes from your care team     Return if symptoms worsen or fail to improve.      Who to contact     Please call your clinic at [phone] to:    Ask questions about your health    Make or cancel appointments    Discuss your medicines    Learn about your test results    Speak to your doctor            Additional Information About Your Visit        [employer] Information     [facility] gives you secure access to your electronic health record. If you see a primary care provider, you can also send messages to your care team and make appointments. If you have questions, please call your primary care clinic.  If you do not have a primary care provider, please call [phone] and they will assist you.      [facility] is an electronic gateway that provides easy, online access to your medical records. With [facility], you can request a clinic appointment, read your test results, renew a prescription or communicate with your care team.     To access your existing account, please contact your [facility] or call [phone] for assistance.        Care EveryWhere ID     This is your Care EveryWhere ID. This could be used by other organizations to access your [facility] medical records  [id number]        Your Vitals Were     Pulse Height Last Period Breastfeeding? BMI (Body Mass Index)       77 1.6 m (5' 3\") 02/21/2018 No 24.27 kg/m2        Blood Pressure from Last 3 Encounters:   03/07/18 106/79   03/02/18 127/81   12/05/17 123/85    Weight from Last 3 Encounters:   03/07/18 62.1 kg (137 lb)   03/02/18 61.9 kg (136 lb 8 oz) "   01/24/18 61.7 kg (136 lb)              Today, you had the following     No orders found for display         Today's Medication Changes          These changes are accurate as of 3/7/18 11:59 PM.  If you have any questions, ask your nurse or doctor.               These medicines have changed or have updated prescriptions.        Dose/Directions    * albuterol (2.5 MG/3ML) 0.083% neb solution   This may have changed:  See the new instructions.   Used for:  Bronchiectasis without acute exacerbation (H)        USE ONE VIAL IN NEBULIZER TWICE DAILY   Quantity:  180 mL   Refills:  11       * albuterol 108 (90 BASE) MCG/ACT Inhaler   Commonly known as:  PROAIR HFA/PROVENTIL HFA/VENTOLIN HFA   This may have changed:  Another medication with the same name was changed. Make sure you understand how and when to take each.   Used for:  Kartagener syndrome        Dose:  2 puff   Inhale 2 puffs into the lungs every 6 hours as needed for shortness of breath / dyspnea or wheezing   Quantity:  1 Inhaler   Refills:  12       * Notice:  This list has 2 medication(s) that are the same as other medications prescribed for you. Read the directions carefully, and ask your doctor or other care provider to review them with you.             Primary Care Provider Office Phone # Fax #    Blake Sifuentes [phone] [address] [address]        Equal Access to Services     MLILY LY AH: Ana pinedoo Soelissa, waaxda luqadaha, qaybta kaalmada isela, tee magana. So [facility] [phone].    ATENCIÓN: Si habla español, tiene a guevara disposición servicios gratuitos de asistencia lingüística. Llame al [phone].    We comply with applicable federal civil rights laws and [place] laws. We do not discriminate on the basis of race, color, national origin, age, disability, sex, sexual orientation, or gender identity.            Thank you!     Thank you for choosing  "[facility]  for your care. Our goal is always to provide you with excellent care. Hearing back from our patients is one way we can continue to improve our services. Please take a few minutes to complete the written survey that you may receive in the mail after your visit with us. Thank you!             Your Updated Medication List - Protect others around you: Learn how to safely use, store and throw away your medicines at www.disposemymeds.org.          This list is accurate as of 3/7/18 11:59 PM.  Always use your most recent med list.                   Brand Name Dispense Instructions for use Diagnosis    ACAPELLA Misc      use twice daily        acetylcysteine 20 % nebulizer solution    MUCOMYST    120 mL    Inhale 2 mLs into the lungs 2 times daily    Kartagener's syndrome, Situs inversus, Pseudomonas aeruginosa colonization       * albuterol (2.5 MG/3ML) 0.083% neb solution     180 mL    USE ONE VIAL IN NEBULIZER TWICE DAILY    Bronchiectasis without acute exacerbation (H)       * albuterol 108 (90 BASE) MCG/ACT Inhaler    PROAIR HFA/PROVENTIL HFA/VENTOLIN HFA    1 Inhaler    Inhale 2 puffs into the lungs every 6 hours as needed for shortness of breath / dyspnea or wheezing    Kartagener syndrome       budesonide-formoterol 160-4.5 MCG/ACT Inhaler    SYMBICORT    1 Inhaler    Inhale 2 puffs into the lungs 2 times daily    Kartagener syndrome, Kartagener's syndrome       IBUPROFEN PO      Take 600 mg by mouth        nebulizer Sindy      Nebulizer compressor - use with ALEK as directed        PRENATAL 1 PO      Take 1 tablet by mouth every evening        Syringe Luer Lock 20G X 1-1/2\" 5 ML Misc     60 each    1 each 2 times daily    Kartagener syndrome, Reactive airway disease       Water For Injection Sterile Soln     250 mL    Inject 4 mLs into the vein 2 times daily    Kartagener syndrome, Reactive airway disease       * Notice:  This list has 2 medication(s) that are the same as other " medications prescribed for you. Read the directions carefully, and ask your doctor or other care provider to review them with you.

## 2018-03-07 NOTE — LETTER
3/7/2018       RE: Armando Dc  [address]  [place] [address]     Dear Colleague,    Thank you for referring your patient, Armando Dc, to the [facility] at [facility]. Please see a copy of my visit note below.     Pt here for second opinion re: endometriosis treatment and infertility.    HPI:  Armando is a 41yo Nulligravid female with known longstanding endometriosis and infertility.  She has Kartagener's Syndrome as well.      She has tried IVF with multiple cycles including one with donor egg in [place] most recently (with great egg/blast quality) which all have failed.      A good portion of the patients records are available and reviewed at today's visit.    Past GYN history:  No STD history, severe endometriosis including chocolate cysts and adhesions.   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,      Pseudomonas aeruginosa colonization 2008     Reactive airway disease      Uncomplicated asthma     Reactive airway disease       Past Surgical History:   Procedure Laterality Date     ADENOIDECTOMY       LAPAROSCOPIC CYSTECTOMY OVARIAN (BENIGN) Left 11/13/2017    Procedure: LAPAROSCOPIC CYSTECTOMY OVARIAN (BENIGN);  Left  Laparoscopy Ovarian Cystotomy, Hysteroscopy And Polpectomy With Myosure ;  Surgeon: Zayra Roberts MD;  Location: [facility] OR     OPERATIVE HYSTEROSCOPY WITH MORCELLATOR N/A 11/13/2017    Procedure: OPERATIVE HYSTEROSCOPY WITH MORCELLATOR;;  Surgeon: Zayra Roberts MD;  Location: [facility] OR     TONSILLECTOMY       TONSILLECTOMY & ADENOIDECTOMY      8 years old     TRANSVAGINAL RETRIEVAL OVUM Bilateral 3/3/2016    IVF Procedure: TRANSVAGINAL RETRIEVAL OVUM;  Surgeon: Sunshine Gilliam MD;  Location:  [facility]     uterine polyp   "2013       Family History   Problem Relation Age of Onset     Hyperlipidemia Mother      DIABETES Mother      Hypertension Mother      Hyperlipidemia Father      Hypertension Father      DIABETES Maternal Grandmother      DIABETES Maternal Grandfather        Allergies: Review of patient's allergies indicates no known allergies.    Current Outpatient Prescriptions   Medication Sig Dispense Refill     IBUPROFEN PO Take 600 mg by mouth       Prenatal MV-Min-Fe Fum-FA-DHA (PRENATAL 1 PO) Take 1 tablet by mouth every evening       acetylcysteine (MUCOMYST) 20 % nebulizer solution Inhale 2 mLs into the lungs 2 times daily 120 mL 5     albuterol (PROAIR HFA/PROVENTIL HFA/VENTOLIN HFA) 108 (90 BASE) MCG/ACT Inhaler Inhale 2 puffs into the lungs every 6 hours as needed for shortness of breath / dyspnea or wheezing 1 Inhaler 12     budesonide-formoterol (SYMBICORT) 160-4.5 MCG/ACT Inhaler Inhale 2 puffs into the lungs 2 times daily 1 Inhaler 12     albuterol (2.5 MG/3ML) 0.083% nebulizer solution USE ONE VIAL IN NEBULIZER TWICE DAILY (Patient taking differently: USE ONE VIAL IN NEBULIZER DAILY) 180 mL 11     Misc. Devices (ACAPELLA) MISC use twice daily       Respiratory Therapy Supplies (NEBULIZER) SUZANNA Nebulizer compressor - use with ALEK as directed       Syringe/Needle, Disp, (SYRINGE LUER LOCK) 20G X 1-1/2\" 5 ML MISC 1 each 2 times daily 60 each 5     Water For Injection Sterile SOLN Inject 4 mLs into the vein 2 times daily 250 mL 5       ROS:  C: NEGATIVE for fever, chills, change in weight  I: NEGATIVE for worrisome rashes, moles or lesions  E: NEGATIVE for vision changes or irritation  E/M: NEGATIVE for ear, mouth and throat problems  R: NEGATIVE for significant cough or SOB  CV: NEGATIVE for chest pain, palpitations or peripheral edema  GI: NEGATIVE for nausea, abdominal pain, heartburn, or change in bowel habits  : NEGATIVE for frequency, dysuria, hematuria, vaginal discharge  M: NEGATIVE for significant " "arthralgias or myalgia  N: NEGATIVE for weakness, dizziness or paresthesias  E: NEGATIVE for temperature intolerance, skin/hair changes  P: NEGATIVE for changes in mood or affect    EXAM:  Blood pressure 106/79, pulse 77, height 1.6 m (5' 3\"), weight 62.1 kg (137 lb), last menstrual period 02/21/2018, not currently breastfeeding.   BMI= Body mass index is 24.27 kg/(m^2).  General - pleasant female in no acute distress..  Lungs - non labored breathing  Musculoskeletal - no gross deformities.  Neurological - normal strength, sensation, and mental status.      ASSESSMENT/PLAN:  Infertilty with Kartageners syndrome, AMA, and severe endometriosis and endometrioma.      Pt has questions re: any other treatment ideas.      She is wonder if BTL would improve efficacy of IVF cycle.  Her last HSG was in 2016 but in late 2017 there were normal tube seen at laparoscopy w/o dilation.  I discussed this with Dr. Kuo at request of the patient.  She did not recommend BTL given normal appearing tubes.      Again I reiterated what Dr Roberts had mentioned re: IVF cycle success at baseline.  Given only one round w/ donor egg, it would be reasonable to consider another round.  Consideration of alternate family options: ie-adoption as well.     Will f/u with pt via [email or web address] to further advise.       Nemo Weldon MD, FACOG  [facility]  OB/GYN    3/9/2018  3:40 PM    TT spent 30 min, >50% counseling and coordinating care    "

## 2018-03-09 NOTE — PROGRESS NOTES
Pt here for second opinion re: endometriosis treatment and infertility.    HPI:  Armando is a 43yo Nulligravid female with known longstanding endometriosis and infertility.  She has Kartagener's Syndrome as well.      She has tried IVF with multiple cycles including one with donor egg in [place] most recently (with great egg/blast quality) which all have failed.      A good portion of the patients records are available and reviewed at today's visit.    Past GYN history:  No STD history, severe endometriosis including chocolate cysts and adhesions.   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,      Pseudomonas aeruginosa colonization 2008     Reactive airway disease      Uncomplicated asthma     Reactive airway disease       Past Surgical History:   Procedure Laterality Date     ADENOIDECTOMY       LAPAROSCOPIC CYSTECTOMY OVARIAN (BENIGN) Left 11/13/2017    Procedure: LAPAROSCOPIC CYSTECTOMY OVARIAN (BENIGN);  Left  Laparoscopy Ovarian Cystotomy, Hysteroscopy And Polpectomy With Myosure ;  Surgeon: Zayra Roberts MD;  Location: [facility]     OPERATIVE HYSTEROSCOPY WITH MORCELLATOR N/A 11/13/2017    Procedure: OPERATIVE HYSTEROSCOPY WITH MORCELLATOR;;  Surgeon: Zayra Roberts MD;  Location: [facility] OR     TONSILLECTOMY       TONSILLECTOMY & ADENOIDECTOMY      8 years old     TRANSVAGINAL RETRIEVAL OVUM Bilateral 3/3/2016    IVF Procedure: TRANSVAGINAL RETRIEVAL OVUM;  Surgeon: Sunshine Gilliam MD;  Location: [facility]     uterine polyp  2013       Family History   Problem Relation Age of Onset     Hyperlipidemia Mother      DIABETES Mother      Hypertension Mother      Hyperlipidemia Father      Hypertension Father      DIABETES Maternal Grandmother      DIABETES Maternal Grandfather        Allergies: Review of patient's  "allergies indicates no known allergies.    Current Outpatient Prescriptions   Medication Sig Dispense Refill     IBUPROFEN PO Take 600 mg by mouth       Prenatal MV-Min-Fe Fum-FA-DHA (PRENATAL 1 PO) Take 1 tablet by mouth every evening       acetylcysteine (MUCOMYST) 20 % nebulizer solution Inhale 2 mLs into the lungs 2 times daily 120 mL 5     albuterol (PROAIR HFA/PROVENTIL HFA/VENTOLIN HFA) 108 (90 BASE) MCG/ACT Inhaler Inhale 2 puffs into the lungs every 6 hours as needed for shortness of breath / dyspnea or wheezing 1 Inhaler 12     budesonide-formoterol (SYMBICORT) 160-4.5 MCG/ACT Inhaler Inhale 2 puffs into the lungs 2 times daily 1 Inhaler 12     albuterol (2.5 MG/3ML) 0.083% nebulizer solution USE ONE VIAL IN NEBULIZER TWICE DAILY (Patient taking differently: USE ONE VIAL IN NEBULIZER DAILY) 180 mL 11     Misc. Devices (ACAPELLA) MISC use twice daily       Respiratory Therapy Supplies (NEBULIZER) SUZANNA Nebulizer compressor - use with ALEK as directed       Syringe/Needle, Disp, (SYRINGE LUER LOCK) 20G X 1-1/2\" 5 ML MISC 1 each 2 times daily 60 each 5     Water For Injection Sterile SOLN Inject 4 mLs into the vein 2 times daily 250 mL 5       ROS:  C: NEGATIVE for fever, chills, change in weight  I: NEGATIVE for worrisome rashes, moles or lesions  E: NEGATIVE for vision changes or irritation  E/M: NEGATIVE for ear, mouth and throat problems  R: NEGATIVE for significant cough or SOB  CV: NEGATIVE for chest pain, palpitations or peripheral edema  GI: NEGATIVE for nausea, abdominal pain, heartburn, or change in bowel habits  : NEGATIVE for frequency, dysuria, hematuria, vaginal discharge  M: NEGATIVE for significant arthralgias or myalgia  N: NEGATIVE for weakness, dizziness or paresthesias  E: NEGATIVE for temperature intolerance, skin/hair changes  P: NEGATIVE for changes in mood or affect    EXAM:  Blood pressure 106/79, pulse 77, height 1.6 m (5' 3\"), weight 62.1 kg (137 lb), last menstrual period " 02/21/2018, not currently breastfeeding.   BMI= Body mass index is 24.27 kg/(m^2).  General - pleasant female in no acute distress..  Lungs - non labored breathing  Musculoskeletal - no gross deformities.  Neurological - normal strength, sensation, and mental status.      ASSESSMENT/PLAN:  Infertilty with Kartageners syndrome, AMA, and severe endometriosis and endometrioma.      Pt has questions re: any other treatment ideas.      She is wonder if BTL would improve efficacy of IVF cycle.  Her last HSG was in 2016 but in late 2017 there were normal tube seen at laparoscopy w/o dilation.  I discussed this with Dr. Kuo at request of the patient.  She did not recommend BTL given normal appearing tubes.      Again I reiterated what Dr Roberts had mentioned re: IVF cycle success at baseline.  Given only one round w/ donor egg, it would be reasonable to consider another round.  Consideration of alternate family options: ie-adoption as well.     Will f/u with pt via [employer] to further advise.       Nemo Weldon MD, FACOG  [facility] Staff  OB/GYN    3/9/2018  3:40 PM    TT spent 30 min, >50% counseling and coordinating care        Answers for HPI/ROS submitted by the patient on 3/6/2018   RAFAEL 7 TOTAL SCORE: 0  PHQ-2 Score: 0  General Symptoms: No  Skin Symptoms: No  HENT Symptoms: No  EYE SYMPTOMS: No  HEART SYMPTOMS: No  LUNG SYMPTOMS: Yes  INTESTINAL SYMPTOMS: No  URINARY SYMPTOMS: No  GYNECOLOGIC SYMPTOMS: No  BREAST SYMPTOMS: No  SKELETAL SYMPTOMS: No  BLOOD SYMPTOMS: No  NERVOUS SYSTEM SYMPTOMS: No  MENTAL HEALTH SYMPTOMS: No  Cough: Yes  Sputum or phlegm: Yes  Coughing up blood: No  Difficulty breating or shortness of breath: No  Snoring: No  Wheezing: No  Difficulty breathing on exertion: No  Nighttime Cough: No  Difficulty breathing when lying flat: No

## 2018-06-06 ENCOUNTER — MEDICAL CORRESPONDENCE (OUTPATIENT)
Dept: HEALTH INFORMATION MANAGEMENT | Facility: CLINIC | Age: 43
End: 2018-06-06

## 2018-06-28 DIAGNOSIS — Z22.39 PSEUDOMONAS AERUGINOSA COLONIZATION: Chronic | ICD-10-CM

## 2018-06-28 DIAGNOSIS — Q89.3 SITUS INVERSUS: Chronic | ICD-10-CM

## 2018-06-28 DIAGNOSIS — Q89.3 KARTAGENER'S SYNDROME: Chronic | ICD-10-CM

## 2018-06-28 RX ORDER — ACETYLCYSTEINE 200 MG/ML
2 SOLUTION ORAL; RESPIRATORY (INHALATION) 2 TIMES DAILY
Qty: 120 ML | Refills: 5 | Status: SHIPPED | OUTPATIENT
Start: 2018-06-28

## 2018-06-28 RX ORDER — ACETYLCYSTEINE 200 MG/ML
2 SOLUTION ORAL; RESPIRATORY (INHALATION) 2 TIMES DAILY
Qty: 120 ML | Refills: 5 | Status: SHIPPED | OUTPATIENT
Start: 2018-06-28 | End: 2018-06-28

## 2018-06-29 ENCOUNTER — OFFICE VISIT (OUTPATIENT)
Dept: OBGYN | Facility: CLINIC | Age: 43
End: 2018-06-29
Payer: COMMERCIAL

## 2018-06-29 VITALS
WEIGHT: 134 LBS | SYSTOLIC BLOOD PRESSURE: 118 MMHG | HEIGHT: 63 IN | OXYGEN SATURATION: 96 % | BODY MASS INDEX: 23.74 KG/M2 | HEART RATE: 75 BPM | DIASTOLIC BLOOD PRESSURE: 83 MMHG

## 2018-06-29 DIAGNOSIS — Z00.00 ROUTINE GENERAL MEDICAL EXAMINATION AT A HEALTH CARE FACILITY: Primary | ICD-10-CM

## 2018-06-29 DIAGNOSIS — N80.9 ENDOMETRIOSIS: Primary | ICD-10-CM

## 2018-06-29 LAB
ERYTHROCYTE [DISTWIDTH] IN BLOOD BY AUTOMATED COUNT: 13.3 % (ref 10–15)
HCT VFR BLD AUTO: 43.8 % (ref 35–47)
HGB BLD-MCNC: 13.7 G/DL (ref 11.7–15.7)
MCH RBC QN AUTO: 29.5 PG (ref 26.5–33)
MCHC RBC AUTO-ENTMCNC: 31.3 G/DL (ref 31.5–36.5)
MCV RBC AUTO: 94 FL (ref 78–100)
PLATELET # BLD AUTO: 282 10E9/L (ref 150–450)
RBC # BLD AUTO: 4.65 10E12/L (ref 3.8–5.2)
WBC # BLD AUTO: 9.1 10E9/L (ref 4–11)

## 2018-06-29 PROCEDURE — 82306 VITAMIN D 25 HYDROXY: CPT

## 2018-06-29 PROCEDURE — 84439 ASSAY OF FREE THYROXINE: CPT

## 2018-06-29 PROCEDURE — 36415 COLL VENOUS BLD VENIPUNCTURE: CPT

## 2018-06-29 PROCEDURE — 87340 HEPATITIS B SURFACE AG IA: CPT

## 2018-06-29 PROCEDURE — 99213 OFFICE O/P EST LOW 20 MIN: CPT | Performed by: OBSTETRICS & GYNECOLOGY

## 2018-06-29 PROCEDURE — 86803 HEPATITIS C AB TEST: CPT

## 2018-06-29 PROCEDURE — 85027 COMPLETE CBC AUTOMATED: CPT

## 2018-06-29 PROCEDURE — 86780 TREPONEMA PALLIDUM: CPT

## 2018-06-29 PROCEDURE — 87389 HIV-1 AG W/HIV-1&-2 AB AG IA: CPT

## 2018-06-29 PROCEDURE — 84443 ASSAY THYROID STIM HORMONE: CPT

## 2018-06-29 RX ORDER — HYDROCODONE BITARTRATE AND ACETAMINOPHEN 5; 325 MG/1; MG/1
1 TABLET ORAL EVERY 4 HOURS PRN
Qty: 10 TABLET | Refills: 0 | Status: SHIPPED | OUTPATIENT
Start: 2018-06-29 | End: 2018-08-07

## 2018-06-29 NOTE — MR AVS SNAPSHOT
After Visit Summary   6/29/2018    Armando Dc    MRN: [id number]           Patient Information     Date Of Birth          1975        Visit Information        Provider Department      6/29/2018 1:30 PM Zayra Roberts MD [facility]        Today's Diagnoses     Endometriosis    -  1       Follow-ups after your visit        Your next 10 appointments already scheduled     Jul 06, 2018  4:20 PM CDT   US PELVIC COMPLETE W TRANSVAGINAL with RDUS1   [facility] ([facility])    [address] [place] [address]  [address]  [facility] [address] [phone]           Please bring a list of your medicines (including vitamins, minerals and over-the-counter drugs). Also, tell your doctor about any allergies you may have. Wear comfortable clothes and leave your valuables at home.  Adults: Drink six 8-ounce glasses of fluid one hour before your exam. Do NOT empty your bladder.  If you need to empty your bladder before your exam, try to release only a little bit of urine. Then, drink another 8oz glass of fluid.  Children: Children who are potty trained should drink at least 4 cups (32 oz) of liquid 45 minutes to one hour prior to the exam. The child s bladder must be full in order to achieve a diagnostic exam. If your child is very uncomfortable or has an urgent need to pee, please notify a technologist; they will try to find out how much longer the wait may be and provide instructions to help relieve the pressure. Occasionally it is medically necessary to insert a urinary catheter to fill the bladder.  Please call the Imaging Department at your exam site with any questions.            Aug 07, 2018  8:00 AM CDT   CF LOOP with  PFL CF   [facility] ([facility])    [address]  3rd Floor  [facility] [id number] [phone]            Aug 07, 2018  8:40 AM CDT   (Arrive by 8:25 AM)   Return Bronchiectas Non  "CF with Lyn Hernández MD   [facility] ([facility])    [address]  [address] [place] [address] [phone]              Future tests that were ordered for you today     Open Future Orders        Priority Expected Expires Ordered    US Pelvic Complete with Transvaginal Routine 9/27/2018 12/26/2018 6/29/2018            Who to contact     If you have questions or need follow up information about today's clinic visit or your schedule please contact [facility] directly at [phone].  Normal or non-critical lab and imaging results will be communicated to you by [employer], letter or phone within 4 business days after the clinic has received the results. If you do not hear from us within 7 days, please contact the clinic through "[employer]"t or phone. If you have a critical or abnormal lab result, we will notify you by phone as soon as possible.  Submit refill requests through [employer] or call your pharmacy and they will forward the refill request to us. Please allow 3 business days for your refill to be completed.          Additional Information About Your Visit        [employer] Information     [employer] gives you secure access to your electronic health record. If you see a primary care provider, you can also send messages to your care team and make appointments. If you have questions, please call your primary care clinic.  If you do not have a primary care provider, please call [phone] and they will assist you.        Care EveryWhere ID     This is your Care EveryWhere ID. This could be used by other organizations to access your [facility] medical records  [id number]        Your Vitals Were     Pulse Height Last Period Pulse Oximetry BMI (Body Mass Index)       75 5' 3\" (1.6 m) 06/13/2018 96% 23.74 kg/m2        Blood Pressure from Last 3 Encounters:   06/29/18 118/83   03/07/18 106/79   03/02/18 127/81    Weight from " Last 3 Encounters:   06/29/18 134 lb (60.8 kg)   03/07/18 137 lb (62.1 kg)   03/02/18 136 lb 8 oz (61.9 kg)                 Today's Medication Changes          These changes are accurate as of 6/29/18  7:03 PM.  If you have any questions, ask your nurse or doctor.               Start taking these medicines.        Dose/Directions    HYDROcodone-acetaminophen 5-325 MG per tablet   Commonly known as:  NORCO   Used for:  Endometriosis   Started by:  Zayra Roberts MD        Dose:  1 tablet   Take 1 tablet by mouth every 4 hours as needed for pain   Quantity:  10 tablet   Refills:  0         These medicines have changed or have updated prescriptions.        Dose/Directions    * albuterol (2.5 MG/3ML) 0.083% neb solution   This may have changed:  See the new instructions.   Used for:  Bronchiectasis without acute exacerbation (H)        USE ONE VIAL IN NEBULIZER TWICE DAILY   Quantity:  180 mL   Refills:  11       * albuterol 108 (90 Base) MCG/ACT Inhaler   Commonly known as:  PROAIR HFA/PROVENTIL HFA/VENTOLIN HFA   This may have changed:  Another medication with the same name was changed. Make sure you understand how and when to take each.   Used for:  Kartagener syndrome        Dose:  2 puff   Inhale 2 puffs into the lungs every 6 hours as needed for shortness of breath / dyspnea or wheezing   Quantity:  1 Inhaler   Refills:  12       * Notice:  This list has 2 medication(s) that are the same as other medications prescribed for you. Read the directions carefully, and ask your doctor or other care provider to review them with you.         Where to get your medicines      Some of these will need a paper prescription and others can be bought over the counter.  Ask your nurse if you have questions.     Bring a paper prescription for each of these medications     HYDROcodone-acetaminophen 5-325 MG per tablet               Information about OPIOIDS     PRESCRIPTION OPIOIDS: WHAT YOU NEED TO KNOW   We gave you an  opioid (narcotic) pain medicine. It is important to manage your pain, but opioids are not always the best choice. You should first try all the other options your care team gave you. Take this medicine for as short a time (and as few doses) as possible.     These medicines have risks:    DO NOT drive when on new or higher doses of pain medicine. These medicines can affect your alertness and reaction times, and you could be arrested for driving under the influence (DUI). If you need to use opioids long-term, talk to your care team about driving.    DO NOT operate heave machinery    DO NOT do any other dangerous activities while taking these medicines.     DO NOT drink any alcohol while taking these medicines.      If the opioid prescribed includes acetaminophen, DO NOT take with any other medicines that contain acetaminophen. Read all labels carefully. Look for the word  acetaminophen  or  Tylenol.  Ask your pharmacist if you have questions or are unsure.    You can get addicted to pain medicines, especially if you have a history of addiction (chemical, alcohol or substance dependence). Talk to your care team about ways to reduce this risk.    Store your pills in a secure place, locked if possible. We will not replace any lost or stolen medicine. If you don t finish your medicine, please throw away (dispose) as directed by your pharmacist. The [place] Pollution Control Agency has more information about safe disposal: https://www.pca.[facility].mn.us/living-green/managing-unwanted-medications.     All opioids tend to cause constipation. Drink plenty of water and eat foods that have a lot of fiber, such as fruits, vegetables, prune juice, apple juice and high-fiber cereal. Take a laxative (Miralax, milk of magnesia, Colace, Senna) if you don t move your bowels at least every other day.          Primary Care Provider Office Phone # Fax #    Blake Sifuentes -[phone] [address]  [address]  [facility] [address]        Equal Access to Services     MILLY LY : Hadii bernardo gabriel fitz Godinez, wagrisda luqadaha, qatamrata kaalmatee palaciosrossyzhen magana. So [facility] [phone].    ATENCIÓN: Si habla español, tiene a guevara disposición servicios gratuitos de asistencia lingüística. Srikanth al [phone].    We comply with applicable federal civil rights laws and [place] laws. We do not discriminate on the basis of race, color, national origin, age, disability, sex, sexual orientation, or gender identity.            Thank you!     Thank you for choosing [facility]  for your care. Our goal is always to provide you with excellent care. Hearing back from our patients is one way we can continue to improve our services. Please take a few minutes to complete the written survey that you may receive in the mail after your visit with us. Thank you!             Your Updated Medication List - Protect others around you: Learn how to safely use, store and throw away your medicines at www.disposemymeds.org.          This list is accurate as of 6/29/18  7:03 PM.  Always use your most recent med list.                   Brand Name Dispense Instructions for use Diagnosis    ACAPELLA Misc      use twice daily        acetylcysteine 20 % nebulizer solution    MUCOMYST    120 mL    Inhale 2 mLs into the lungs 2 times daily    Kartagener's syndrome, Situs inversus, Pseudomonas aeruginosa colonization       * albuterol (2.5 MG/3ML) 0.083% neb solution     180 mL    USE ONE VIAL IN NEBULIZER TWICE DAILY    Bronchiectasis without acute exacerbation (H)       * albuterol 108 (90 Base) MCG/ACT Inhaler    PROAIR HFA/PROVENTIL HFA/VENTOLIN HFA    1 Inhaler    Inhale 2 puffs into the lungs every 6 hours as needed for shortness of breath / dyspnea or wheezing    Kartagener syndrome       budesonide-formoterol 160-4.5 MCG/ACT Inhaler    SYMBICORT    1 Inhaler    Inhale 2 puffs into the lungs 2  "times daily    Kartagener syndrome, Kartagener's syndrome       HYDROcodone-acetaminophen 5-325 MG per tablet    NORCO    10 tablet    Take 1 tablet by mouth every 4 hours as needed for pain    Endometriosis       IBUPROFEN PO      Take 600 mg by mouth        nebulizer Sindy      Nebulizer compressor - use with ALEK as directed        PRENATAL 1 PO      Take 1 tablet by mouth every evening        Syringe Luer Lock 20G X 1-1/2\" 5 ML Misc     60 each    1 each 2 times daily    Kartagener syndrome, Reactive airway disease       Water For Injection Sterile Soln     250 mL    Inject 4 mLs into the vein 2 times daily    Kartagener syndrome, Reactive airway disease       * Notice:  This list has 2 medication(s) that are the same as other medications prescribed for you. Read the directions carefully, and ask your doctor or other care provider to review them with you.      "

## 2018-06-29 NOTE — PROGRESS NOTES
"CC:  Pelvic pain  HPI:  Armando Dc is a 43 year old female Patient's last menstrual period was 06/13/2018.  Here to discuss acute episode of pelvic pain that occurred 6/16 and 6/17 and now is all gone. Had emesis and nausea, was bloated with tender abdomen noted. Not sure ovarian cyst had ruptured and endometriosis or what.  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    Will be starting IVF in [place] soon. Did not have pain issues with drug stim in the past so not worried about that happening now.     Allergies: Review of patient's allergies indicates no known allergies.    The patient's past medical history, social history and family history is reviewed at our visit and updated in EPIC.    EXAM:  Blood pressure 118/83, pulse 75, height 5' 3\" (1.6 m), weight 134 lb (60.8 kg), last menstrual period 06/13/2018, SpO2 96 %, not currently breastfeeding.  BMI= Body mass index is 23.74 kg/(m^2).  Patient's last menstrual period was 06/13/2018.  General - pleasant female in no acute distress.  Neurological - alert and oriented X 3  Psychiatric - normal mood and affect    No other physical examination was performed today as we spent over 50% of today's 20 minute visit in face-to-face discussion and counseling about usual endometriosis symptoms.    ASSESSMENT/PLAN:  (N80.9) Endometriosis  (primary encounter diagnosis)  Comment: acute episode of pain this month  Plan: US Pelvic Complete with Transvaginal,         HYDROcodone-acetaminophen (NORCO) 5-325 MG per         tablet        Check u/s and will let her know results. Discussed this does not sound like typical ovarian cyst pain since was after menses and also not typical of endometriosis. Was given a few pain meds in case needed in the future (she has not taken any prior and reluctant) discussed if happens during work will need to leave and ok for us to do work note PRN.      Zayra Roberts MD    "

## 2018-06-30 LAB
T PALLIDUM AB SER QL: NONREACTIVE
T4 FREE SERPL-MCNC: 0.91 NG/DL (ref 0.76–1.46)
TSH SERPL DL<=0.005 MIU/L-ACNC: 1.59 MU/L (ref 0.4–4)

## 2018-07-02 LAB
DEPRECATED CALCIDIOL+CALCIFEROL SERPL-MC: 22 UG/L (ref 20–75)
HBV SURFACE AG SERPL QL IA: NONREACTIVE
HCV AB SERPL QL IA: NONREACTIVE
HIV 1+2 AB+HIV1 P24 AG SERPL QL IA: NONREACTIVE

## 2018-07-05 ENCOUNTER — MYC MEDICAL ADVICE (OUTPATIENT)
Dept: INTERNAL MEDICINE | Facility: CLINIC | Age: 43
End: 2018-07-05

## 2018-07-06 ENCOUNTER — RADIANT APPOINTMENT (OUTPATIENT)
Dept: ULTRASOUND IMAGING | Facility: CLINIC | Age: 43
End: 2018-07-06
Attending: OBSTETRICS & GYNECOLOGY
Payer: COMMERCIAL

## 2018-07-06 DIAGNOSIS — N80.9 ENDOMETRIOSIS: ICD-10-CM

## 2018-07-06 PROCEDURE — 76830 TRANSVAGINAL US NON-OB: CPT | Performed by: OBSTETRICS & GYNECOLOGY

## 2018-07-10 ENCOUNTER — OFFICE VISIT (OUTPATIENT)
Dept: INTERNAL MEDICINE | Facility: CLINIC | Age: 43
End: 2018-07-10

## 2018-07-10 VITALS
SYSTOLIC BLOOD PRESSURE: 120 MMHG | RESPIRATION RATE: 20 BRPM | HEART RATE: 68 BPM | WEIGHT: 133 LBS | DIASTOLIC BLOOD PRESSURE: 77 MMHG | BODY MASS INDEX: 23.56 KG/M2 | OXYGEN SATURATION: 97 %

## 2018-07-10 DIAGNOSIS — K29.70 GASTRITIS WITHOUT BLEEDING, UNSPECIFIED CHRONICITY, UNSPECIFIED GASTRITIS TYPE: Primary | ICD-10-CM

## 2018-07-10 DIAGNOSIS — R73.09 ELEVATED GLUCOSE: ICD-10-CM

## 2018-07-10 ASSESSMENT — PAIN SCALES - GENERAL: PAINLEVEL: NO PAIN (0)

## 2018-07-10 NOTE — MR AVS SNAPSHOT
After Visit Summary   7/10/2018    Armando Dc    MRN: [id number]           Patient Information     Date Of Birth          1975        Visit Information        Provider Department      7/10/2018 4:45 PM Ara Carrizales, APRN CNP [facility]        Today's Diagnoses     Gastritis without bleeding, unspecified chronicity, unspecified gastritis type    -  1    Elevated glucose          Care Instructions    Primary Care Center: [phone]     Primary Care Center Medication Refill Request Information:  * Please contact your pharmacy regarding ANY request for medication refills.  ** [facility] Prescription Fax = [phone]  * Please allow 3 business days for routine medication refills.  * Please allow 5 business days for controlled substance medication refills.     Primary Care Center Test Result notification information:  *You will be notified with in 7-10 days of your appointment day regarding the results of your test.  If you are on MyChart you will be notified as soon as the provider has reviewed the results and signed off on them.    Please call [phone] to schedule lab appointment.             Follow-ups after your visit        Your next 10 appointments already scheduled     Aug 07, 2018  8:00 AM CDT   CF LOOP with  PFL CF   [facility] Pulmonary Function Testing ([facility])    [address]  3rd Floor  [facility] [address] [phone]            Aug 07, 2018  8:40 AM CDT   (Arrive by 8:25 AM)   Return Bronchiectas Non CF with Lyn Hernández MD   [facility] ([facility])    [address] [address] [phone]              Future tests that were ordered for you today     Open Future Orders        Priority Expected Expires Ordered    Hemoglobin A1c Routine  7/10/2019 7/10/2018    H Pylori antigen stool Routine 7/10/2018 7/10/2019  7/10/2018    Fecal cancer screen FIT Routine 7/10/2018 10/8/2018 7/10/2018            Who to contact     Please call your clinic at [phone] to:    Ask questions about your health    Make or cancel appointments    Discuss your medicines    Learn about your test results    Speak to your doctor            Additional Information About Your Visit        [employer] Information     [employer] gives you secure access to your electronic health record. If you see a primary care provider, you can also send messages to your care team and make appointments. If you have questions, please call your primary care clinic.  If you do not have a primary care provider, please call [phone] and they will assist you.      [employer] is an electronic gateway that provides easy, online access to your medical records. With [employer], you can request a clinic appointment, read your test results, renew a prescription or communicate with your care team.     To access your existing account, please contact your [facility] or call [phone] for assistance.        Care EveryWhere ID     This is your Care EveryWhere ID. This could be used by other organizations to access your [facility] medical records  [id number]        Your Vitals Were     Pulse Respirations Last Period Pulse Oximetry BMI (Body Mass Index)       68 20 06/13/2018 97% 23.56 kg/m2        Blood Pressure from Last 3 Encounters:   07/10/18 120/77   06/29/18 118/83   03/07/18 106/79    Weight from Last 3 Encounters:   07/10/18 60.3 kg (133 lb)   06/29/18 60.8 kg (134 lb)   03/07/18 62.1 kg (137 lb)                 Today's Medication Changes          These changes are accurate as of 7/10/18  5:23 PM.  If you have any questions, ask your nurse or doctor.               These medicines have changed or have updated prescriptions.        Dose/Directions    * albuterol (2.5 MG/3ML) 0.083% neb solution   This may have changed:  See the new instructions.   Used  for:  Bronchiectasis without acute exacerbation (H)        USE ONE VIAL IN NEBULIZER TWICE DAILY   Quantity:  180 mL   Refills:  11       * albuterol 108 (90 Base) MCG/ACT Inhaler   Commonly known as:  PROAIR HFA/PROVENTIL HFA/VENTOLIN HFA   This may have changed:  Another medication with the same name was changed. Make sure you understand how and when to take each.   Used for:  Kartagener syndrome        Dose:  2 puff   Inhale 2 puffs into the lungs every 6 hours as needed for shortness of breath / dyspnea or wheezing   Quantity:  1 Inhaler   Refills:  12       * Notice:  This list has 2 medication(s) that are the same as other medications prescribed for you. Read the directions carefully, and ask your doctor or other care provider to review them with you.             Primary Care Provider Office Phone # Fax #    Blake Sifuentes [phone] [address] [address]        Equal Access to Services     [facility] Hospital[facility] : Hadii aad ku hadasho Soelissa, waaxda luqadaha, qaybta kaalmada adeegyada, tee mead . So [facility] [phone].    ATENCIÓN: Si habla español, tiene a guevara disposición servicios gratuitos de asistencia lingüística. Llame al [phone].    We comply with applicable federal civil rights laws and [place] laws. We do not discriminate on the basis of race, color, national origin, age, disability, sex, sexual orientation, or gender identity.            Thank you!     Thank you for choosing [facility]  for your care. Our goal is always to provide you with excellent care. Hearing back from our patients is one way we can continue to improve our services. Please take a few minutes to complete the written survey that you may receive in the mail after your visit with us. Thank you!             Your Updated Medication List - Protect others around you: Learn how to safely use, store and throw away your medicines at  "www.disposemymeds.org.          This list is accurate as of 7/10/18  5:23 PM.  Always use your most recent med list.                   Brand Name Dispense Instructions for use Diagnosis    ACAPELLA Misc      use twice daily        acetylcysteine 20 % nebulizer solution    MUCOMYST    120 mL    Inhale 2 mLs into the lungs 2 times daily    Kartagener's syndrome, Situs inversus, Pseudomonas aeruginosa colonization       * albuterol (2.5 MG/3ML) 0.083% neb solution     180 mL    USE ONE VIAL IN NEBULIZER TWICE DAILY    Bronchiectasis without acute exacerbation (H)       * albuterol 108 (90 Base) MCG/ACT Inhaler    PROAIR HFA/PROVENTIL HFA/VENTOLIN HFA    1 Inhaler    Inhale 2 puffs into the lungs every 6 hours as needed for shortness of breath / dyspnea or wheezing    Kartagener syndrome       budesonide-formoterol 160-4.5 MCG/ACT Inhaler    SYMBICORT    1 Inhaler    Inhale 2 puffs into the lungs 2 times daily    Kartagener syndrome, Kartagener's syndrome       HYDROcodone-acetaminophen 5-325 MG per tablet    NORCO    10 tablet    Take 1 tablet by mouth every 4 hours as needed for pain    Endometriosis       IBUPROFEN PO      Take 600 mg by mouth        nebulizer Sindy      Nebulizer compressor - use with ALEK as directed        PRENATAL 1 PO      Take 1 tablet by mouth every evening        Syringe Luer Lock 20G X 1-1/2\" 5 ML Misc     60 each    1 each 2 times daily    Kartagener syndrome, Reactive airway disease       Water For Injection Sterile Soln     250 mL    Inject 4 mLs into the vein 2 times daily    Kartagener syndrome, Reactive airway disease       * Notice:  This list has 2 medication(s) that are the same as other medications prescribed for you. Read the directions carefully, and ask your doctor or other care provider to review them with you.      "

## 2018-07-10 NOTE — PROGRESS NOTES
"[facility] [place]   Ara Carrizales, APRN CNP  07/10/2018      Chief Complaint:   Gastrointestinal Problem       History of Present Illness:   Armando Dc is a 43 year old female with a history of endometriosis who presents for evaluation of a GI problem. Armando started experiencing severe abdominal pain about 1 and a half months ago. Around the time the pain started, Armando vomited up clear fluid once. Since then, she continues to experience pain, but has not vomited. She reports nausea after meals and in the morning, feeling \"full\" very quickly after beginning to eat, and epigastric tenderness. She reports that she started taking Omeprazole 20 mg 2 weeks ago, which has improved her symptoms, but has not completely gotten rid of them. She has also stopped drinking coffee and eating spicy foods, which she believes has reduced her stomach pain as well. She denies waking up in the night due to gastrointestinal pain, but mentions that she has woken up due to similar pain in the past, before the onset of her current symptoms. She also denies weight change, pregnancy, or abnormal stools. She visited [facility] in late January 2018, but otherwise has had no recent travel. Because of her trip to [facility], Armando is concerned about the possibility of H. pylori infection. She also reports that her mother has GERD and gastritis.       Other concerns discussed:  Armando has a low vitamin D level. She is wondering how much vitamin D she should take as a supplement.     Armando is due for some routine blood work.     Review of Systems:   Pertinent items are noted in HPI, remainder of complete ROS is negative.      Active Medications:   Current Outpatient Prescriptions:      acetylcysteine (MUCOMYST) 20 % nebulizer solution, Inhale 2 mLs into the lungs 2 times daily, Disp: 120 mL, Rfl: 5     albuterol (2.5 MG/3ML) 0.083% nebulizer solution, USE ONE VIAL IN NEBULIZER TWICE DAILY (Patient taking differently: USE ONE VIAL IN NEBULIZER " "DAILY), Disp: 180 mL, Rfl: 11     albuterol (PROAIR HFA/PROVENTIL HFA/VENTOLIN HFA) 108 (90 BASE) MCG/ACT Inhaler, Inhale 2 puffs into the lungs every 6 hours as needed for shortness of breath / dyspnea or wheezing, Disp: 1 Inhaler, Rfl: 12     budesonide-formoterol (SYMBICORT) 160-4.5 MCG/ACT Inhaler, Inhale 2 puffs into the lungs 2 times daily, Disp: 1 Inhaler, Rfl: 12     HYDROcodone-acetaminophen (NORCO) 5-325 MG per tablet, Take 1 tablet by mouth every 4 hours as needed for pain, Disp: 10 tablet, Rfl: 0     IBUPROFEN PO, Take 600 mg by mouth, Disp: , Rfl:      Misc. Devices (ACAPELLA) MISC, use twice daily, Disp: , Rfl:      Prenatal MV-Min-Fe Fum-FA-DHA (PRENATAL 1 PO), Take 1 tablet by mouth every evening, Disp: , Rfl:      Respiratory Therapy Supplies (NEBULIZER) SUZANNA, Nebulizer compressor - use with ALEK as directed, Disp: , Rfl:      Syringe/Needle, Disp, (SYRINGE LUER LOCK) 20G X 1-1/2\" 5 ML MISC, 1 each 2 times daily, Disp: 60 each, Rfl: 5     Water For Injection Sterile SOLN, Inject 4 mLs into the vein 2 times daily, Disp: 250 mL, Rfl: 5      Allergies:   Review of patient's allergies indicates no known allergies.      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  Endometriosis, left ovary  Infertility  Prediabetes     Past Surgical History:  Laparoscopic cystectomy ovarian, left, 2017  Operative hysteroscopy with morcellator, 2017  Tonsillectomy and adenoidectomy, age 8  Transvaginal retrieval ovum, bilateral, 2016  Uterine polyp, 2013    Family History:   Mother: Hyperlipidemia, diabetes, hypertension, GERD, gastritis  Father: Hyperlipidemia, hypertension  Maternal grandmother: Diabetes  Maternal grandfather: Diabetes      Social History:   Marital Status:   Presents to the " clinic alone  Tobacco Use: Never smoker, never used  Alcohol Use: No  PCP: Blake Sifuentes     Physical Exam:   /77 (BP Location: Right arm, Patient Position: Sitting, Cuff Size: Adult Regular)  Pulse 68  Resp 20  Wt 60.3 kg (133 lb)  LMP 06/13/2018  SpO2 97%  BMI 23.56 kg/m2   Wt Readings from Last 1 Encounters:   07/10/18 60.3 kg (133 lb)     Constitutional: no distress, comfortable, pleasant   Eyes: anicteric  Ears, Nose and Throat: , throat clear.   Cardiovascular: regular rate and rhythm, normal S1 and S2, no murmurs, rubs or gallops.  Respiratory: clear to auscultation, no wheezes or crackles, normal breath sounds   Gastrointestinal: positive bowel sounds,  Slightly tender davion-umbilical area, no hepatosplenomegaly, no masses   Musculoskeletal: full range of motion, no edema   Skin: no concerning lesions, no jaundice, temp normal   Neurological: normal gait, normal speech, no tremor, good historian.  Psychological: appropriate mood, good eye contact, normal affect     Armando was seen today for gastrointestinal problem.    Diagnoses and all orders for this visit:    Gastritis without bleeding, unspecified chronicity, unspecified gastritis type  -     H Pylori antigen stool; Future  -     Fecal cancer screen FIT; Future    If H.pylori negative will continue with Omeprazole for up to 6 weeks total and if no improvement order endoscopy.   If FIT test positive will order endoscopy.     Elevated glucose  -     Hemoglobin A1c; Future    Scribe Disclosure:   KHALIF, Lindsey Vaughan, am serving as a scribe to document services personally performed by DESHAWN Hancock CNP at this visit, based upon the provider's statements to me. All documentation has been reviewed by the aforementioned provider prior to being entered into the official medical record.     Portions of this medical record were completed by a scribe. UPON MY REVIEW AND AUTHENTICATION BY ELECTRONIC SIGNATURE, this confirms (a) I performed  the applicable clinical services, and (b) the record is accurate    Total time spent 25 minutes.  More than 50% of the time spent with Ms. Dc on counseling / coordinating her care    Ara HESS CNP'

## 2018-07-10 NOTE — PATIENT INSTRUCTIONS
Primary Care Center: [phone]     [facility] Medication Refill Request Information:  * Please contact your pharmacy regarding ANY request for medication refills.  ** [facility] Prescription Fax = [phone]  * Please allow 3 business days for routine medication refills.  * Please allow 5 business days for controlled substance medication refills.     [facility] Test Result notification information:  *You will be notified with in 7-10 days of your appointment day regarding the results of your test.  If you are on MyChart you will be notified as soon as the provider has reviewed the results and signed off on them.    Please call [phone] to schedule lab appointment.

## 2018-07-10 NOTE — NURSING NOTE
"Chief Complaint   Patient presents with     Gastrointestinal Problem     gastritis with nausea and heartburn \"for over a month\"   Brandi Ronquillo LPN 5:03 PM on 7/10/2018      "

## 2018-07-14 DIAGNOSIS — K29.70 GASTRITIS WITHOUT BLEEDING, UNSPECIFIED CHRONICITY, UNSPECIFIED GASTRITIS TYPE: ICD-10-CM

## 2018-07-14 LAB — HEMOCCULT STL QL IA: NEGATIVE

## 2018-07-16 LAB
H PYLORI AG STL QL IA: NORMAL
SPECIMEN SOURCE: NORMAL

## 2018-08-07 ENCOUNTER — OFFICE VISIT (OUTPATIENT)
Dept: PULMONOLOGY | Facility: CLINIC | Age: 43
End: 2018-08-07
Attending: INTERNAL MEDICINE
Payer: COMMERCIAL

## 2018-08-07 VITALS
SYSTOLIC BLOOD PRESSURE: 113 MMHG | DIASTOLIC BLOOD PRESSURE: 78 MMHG | WEIGHT: 134 LBS | OXYGEN SATURATION: 97 % | RESPIRATION RATE: 16 BRPM | HEART RATE: 71 BPM | BODY MASS INDEX: 23.74 KG/M2

## 2018-08-07 DIAGNOSIS — J47.9 BRONCHIECTASIS WITHOUT COMPLICATION (H): Primary | ICD-10-CM

## 2018-08-07 DIAGNOSIS — K21.9 GASTROESOPHAGEAL REFLUX DISEASE WITHOUT ESOPHAGITIS: ICD-10-CM

## 2018-08-07 DIAGNOSIS — J47.9 BRONCHIECTASIS (H): Primary | Chronic | ICD-10-CM

## 2018-08-07 LAB
EXPTIME-PRE: 12.29 SEC
FEF2575-%PRED-PRE: 18 %
FEF2575-PRE: 0.53 L/SEC
FEF2575-PRED: 2.9 L/SEC
FEFMAX-%PRED-PRE: 70 %
FEFMAX-PRE: 4.84 L/SEC
FEFMAX-PRED: 6.88 L/SEC
FEV1-%PRED-PRE: 55 %
FEV1-PRE: 1.51 L
FEV1FEV6-PRE: 59 %
FEV1FEV6-PRED: 83 %
FEV1FVC-PRE: 54 %
FEV1FVC-PRED: 82 %
FIFMAX-PRE: 2.58 L/SEC
FVC-%PRED-PRE: 84 %
FVC-PRE: 2.8 L
FVC-PRED: 3.3 L
GRAM STN SPEC: NORMAL
Lab: NORMAL
SPECIMEN SOURCE: NORMAL

## 2018-08-07 PROCEDURE — 87205 SMEAR GRAM STAIN: CPT | Performed by: INTERNAL MEDICINE

## 2018-08-07 PROCEDURE — G0463 HOSPITAL OUTPT CLINIC VISIT: HCPCS | Mod: 25

## 2018-08-07 PROCEDURE — 87186 SC STD MICRODIL/AGAR DIL: CPT | Performed by: INTERNAL MEDICINE

## 2018-08-07 PROCEDURE — 94375 RESPIRATORY FLOW VOLUME LOOP: CPT | Mod: ZF | Performed by: INTERNAL MEDICINE

## 2018-08-07 PROCEDURE — 87070 CULTURE OTHR SPECIMN AEROBIC: CPT | Performed by: INTERNAL MEDICINE

## 2018-08-07 PROCEDURE — 87077 CULTURE AEROBIC IDENTIFY: CPT | Performed by: INTERNAL MEDICINE

## 2018-08-07 RX ORDER — PANTOPRAZOLE SODIUM 40 MG/1
40 TABLET, DELAYED RELEASE ORAL 2 TIMES DAILY
Qty: 60 TABLET | Refills: 3 | Status: SHIPPED | OUTPATIENT
Start: 2018-08-07 | End: 2018-11-07

## 2018-08-07 ASSESSMENT — PAIN SCALES - GENERAL: PAINLEVEL: NO PAIN (0)

## 2018-08-07 NOTE — MR AVS SNAPSHOT
After Visit Summary   8/7/2018    Armando Dc    MRN: [id number]           Patient Information     Date Of Birth          1975        Visit Information        Provider Department      8/7/2018 8:40 AM Lyn Hernández MD [facility]        Today's Diagnoses     Bronchiectasis without complication (H)    -  1    Gastroesophageal reflux disease without esophagitis          Care Instructions    Self-Care Plan       MRN: [id number]   Clinic Date: August 7, 2018   Patient: Armando Dc     RECOMMENDATIONS:   Increase vest to twice daily.  Protonix--one tablet twice daily for 6 weeks, then daily.  Will hold on cayston for now.    YOUR GOAL:  Stay well.  Good luck.    CF Nurse Line:  Tami Lao: [phone]   Sybil Peng RT: [phone]       Pulmonary Function Tests  FEV1: amount of air you can blow out in 1 second  FVC: total amount of air you can take in and blow out    Your Goals:         PFT Latest Ref Rng & Units 8/7/2018   FVC L 2.80   FEV1 L 1.51   FVC% % 84   FEV1% % 55          Airway Clearance: The Most Important Way to Keep Your Lungs Healthy  Vest Settings:    Hill-Rom Frequencies: 8, 9, 10 Pressure 10 Then, Frequencies 18, 19, 20 Pressure 6      RespirTech: Quick Start with Pressure of     Do each frequency for 5 minutes; Deflate vest after each frequency & cough 3 times before beginning the next setting.    Vest and Neb Therapy should be done 1-2 times/day.              Follow-ups after your visit        Follow-up notes from your care team     Return in about 3 months (around 11/7/2018).      Your next 10 appointments already scheduled     Nov 07, 2018  8:00 AM CST   CF LOOP with  [facility] ([facility])    [address]  [address]rd [facility] [address] [phone]            Nov 07, 2018  8:30 AM CST   (Arrive by 8:15 AM)   Return Bronchiectas Non CF  with Lyn Hernández MD   [facility] ([facility])    [address]  [address] [place] [address] [phone]              Future tests that were ordered for you today     Open Future Orders        Priority Expected Expires Ordered    Sputum Culture Aerobic Bacterial Routine  9/6/2018 8/7/2018    Gram stain Routine  10/6/2018 8/7/2018            Who to contact     If you have questions or need follow up information about today's clinic visit or your schedule please contact [facility] directly at [phone].  Normal or non-critical lab and imaging results will be communicated to you by MyChart, letter or phone within 4 business days after the clinic has received the results. If you do not hear from us within 7 days, please contact the clinic through BrandBoardshart or phone. If you have a critical or abnormal lab result, we will notify you by phone as soon as possible.  Submit refill requests through Zazoom or call your pharmacy and they will forward the refill request to us. Please allow 3 business days for your refill to be completed.          Additional Information About Your Visit        BrandBoardshart Information     Zazoom gives you secure access to your electronic health record. If you see a primary care provider, you can also send messages to your care team and make appointments. If you have questions, please call your primary care clinic.  If you do not have a primary care provider, please call [phone] and they will assist you.        Care EveryWhere ID     This is your Care EveryWhere ID. This could be used by other organizations to access your [place] medical records  [id number]        Your Vitals Were     Pulse Respirations Pulse Oximetry BMI (Body Mass Index)          71 16 97% 23.74 kg/m2         Blood Pressure from Last 3 Encounters:   08/07/18 113/78   07/10/18 120/77   06/29/18 118/83     Weight from Last 3 Encounters:   08/07/18 60.8 kg (134 lb)   07/10/18 60.3 kg (133 lb)   06/29/18 60.8 kg (134 lb)              We Performed the Following     RESPIRATORY FLOW VOLUME LOOP          Today's Medication Changes          These changes are accurate as of 8/7/18  9:12 AM.  If you have any questions, ask your nurse or doctor.               Start taking these medicines.        Dose/Directions    pantoprazole 40 MG EC tablet   Commonly known as:  PROTONIX   Used for:  Gastroesophageal reflux disease without esophagitis, Bronchiectasis without complication (H)   Started by:  Lyn Hernández MD        Dose:  40 mg   Take 1 tablet (40 mg) by mouth 2 times daily   Quantity:  60 tablet   Refills:  3         These medicines have changed or have updated prescriptions.        Dose/Directions    * albuterol (2.5 MG/3ML) 0.083% neb solution   This may have changed:  See the new instructions.   Used for:  Bronchiectasis without acute exacerbation (H)        USE ONE VIAL IN NEBULIZER TWICE DAILY   Quantity:  180 mL   Refills:  11       * albuterol 108 (90 Base) MCG/ACT Inhaler   Commonly known as:  PROAIR HFA/PROVENTIL HFA/VENTOLIN HFA   This may have changed:  Another medication with the same name was changed. Make sure you understand how and when to take each.   Used for:  Kartagener syndrome        Dose:  2 puff   Inhale 2 puffs into the lungs every 6 hours as needed for shortness of breath / dyspnea or wheezing   Quantity:  1 Inhaler   Refills:  12       * Notice:  This list has 2 medication(s) that are the same as other medications prescribed for you. Read the directions carefully, and ask your doctor or other care provider to review them with you.      Stop taking these medicines if you haven't already. Please contact your care team if you have questions.     ACAPELLA Misc   Stopped by:  Lyn Hernández MD           budesonide-formoterol 160-4.5 MCG/ACT Inhaler   Commonly known as:  SYMBICORT    Stopped by:  Lyn Hernández MD           HYDROcodone-acetaminophen 5-325 MG per tablet   Commonly known as:  NORCO   Stopped by:  Lyn Hernández MD                Where to get your medicines      These medications were sent to [facility] - [place], [place] - [address]  [address], [place] [address]     Phone:  [phone]     pantoprazole 40 MG EC tablet                Primary Care Provider Office Phone # Fax #    Blake Sifuentes -[phone] [address]  [facility] [address]        Equal Access to Services     [facility] HospitalCHIK[facility] : Hadii aad nery hadasho Soomaali, waaxda luqadaha, qaybta kaalmada adeegyada, tee mead . So [facility] [phone].    ATENCIÓN: Si habla español, tiene a guevara disposición servicios gratuitos de asistencia lingüística. [facility] [phone].    We comply with applicable federal civil rights laws and [place] laws. We do not discriminate on the basis of race, color, national origin, age, disability, sex, sexual orientation, or gender identity.            Thank you!     Thank you for choosing [facility]  for your care. Our goal is always to provide you with excellent care. Hearing back from our patients is one way we can continue to improve our services. Please take a few minutes to complete the written survey that you may receive in the mail after your visit with us. Thank you!             Your Updated Medication List - Protect others around you: Learn how to safely use, store and throw away your medicines at www.disposemymeds.org.          This list is accurate as of 8/7/18  9:12 AM.  Always use your most recent med list.                   Brand Name Dispense Instructions for use Diagnosis    acetylcysteine 20 % nebulizer solution    MUCOMYST    120 mL    Inhale 2 mLs into the lungs 2 times daily    Kartagener's syndrome,  "Situs inversus, Pseudomonas aeruginosa colonization       * albuterol (2.5 MG/3ML) 0.083% neb solution     180 mL    USE ONE VIAL IN NEBULIZER TWICE DAILY    Bronchiectasis without acute exacerbation (H)       * albuterol 108 (90 Base) MCG/ACT Inhaler    PROAIR HFA/PROVENTIL HFA/VENTOLIN HFA    1 Inhaler    Inhale 2 puffs into the lungs every 6 hours as needed for shortness of breath / dyspnea or wheezing    Kartagener syndrome       IBUPROFEN PO      Take 600 mg by mouth        nebulizer Sindy      Nebulizer compressor - use with ALEK as directed        pantoprazole 40 MG EC tablet    PROTONIX    60 tablet    Take 1 tablet (40 mg) by mouth 2 times daily    Gastroesophageal reflux disease without esophagitis, Bronchiectasis without complication (H)       PRENATAL 1 PO      Take 1 tablet by mouth every evening        Syringe Luer Lock 20G X 1-1/2\" 5 ML Misc     60 each    1 each 2 times daily    Kartagener syndrome, Reactive airway disease       Water For Injection Sterile Soln     250 mL    Inject 4 mLs into the vein 2 times daily    Kartagener syndrome, Reactive airway disease       * Notice:  This list has 2 medication(s) that are the same as other medications prescribed for you. Read the directions carefully, and ask your doctor or other care provider to review them with you.      "

## 2018-08-07 NOTE — PROGRESS NOTES
[facility]  [facility]  August 7, 2018         Assessment and Plan:   Armando Dc is a 43 year old female with PCD and bronchiectasis.    1. PCD and bronchiectasis  with moderately-severe obstruction:  Armando has shown evidence of stability in her pulmonary function with evidence of actually improvement in her vital capacity.  Symptomatically, she has been doing quite well.  She has been participating in her vest therapy more consistently because she is now in fellowship and does not take nighttime call or work weekends.  Her last culture did show evidence of normal otto.  At this time, I would recommend:   -- That she continue on her vest therapy.   -- She should consider increasing to 2 vest therapies per day as she is planning in vitro fertilization and pregnancy soon.  We would like to optimize her pulmonary function for that time.     2.  Preconception counseling.  Armando has grown out normal otto, so I do not think it is necessary to be on inhalational antibiotic at this time.  We did again discuss increasing to 2 vest therapies per day.  I did instruct her that if she were to conceive that we would need to see her more consistently.     3.  Sinusitis.  Armando reports that her sinus symptoms are tabled.  She does do saline rinses p.r.n.     4.  Gastroesophageal reflux.  Armando was seen by a primary care physician and started on omeprazole for reflux.  I have changed this to Protonix as it is a category B for pregnancy.  I have instructed her to take 40 mg b.i.d. for 6 weeks followed by 40 mg a day.     5.  Psychosocial.  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  She reports that this is going well.  She expects to do her in vitro fertilization in September.  They plan to implant 2 embryos at that time.     Immunizations: per primary care and OB/Gyne  Colonoscopy: SUKHDEV Hernández MD MPH  Associate Professor of Medicine  Pulmonary, Allergy,  Critical Care and Sleep Medicine      Interval History:     Armando does continue to produce sputum that is light green in color.  She reports it is consistently more thin.  She reports that the cough frequency and sputum volume are stable.  She is rarely congestion.  She is short of breath only on occasion.  She says it happens mostly in the morning and by evening she is feeling improved.  She does continue to perform one vest therapy per day.          Review of Systems:     CONSTITUTIONAL: no fever although did have low grade temp when vest was broken for one week, no chills, no change in weight, no change in energy, no change in appetite    INTEGUMENTARY/SKIN: no rash, no obvious new lesions    ENT/MOUTH: no sore throat, no new sinus pain, no new nasal drainage, increased congestion today     RESPIRATORY: see interval history    CV: no chest pain, no palpitations, no peripheral edema, no orthopnea, no PND    GI: no nausea, no vomiting, no change in stools, no fatty stools, ++ GERD, no abdominal pain    : no dysuria, no urinary frequency    MUSCULOSKELETAL: no myalgias, no arthralgias    ENDOCRINE: no excessive thirst    NEURO:  No headache, no numbness, no tingling    SLEEP: no issues    PSYCHIATRIC: mood stable          Past Medical and Surgical History:     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,      Pseudomonas aeruginosa colonization 2008     Reactive airway disease      Uncomplicated asthma     Reactive airway disease     Past Surgical History:   Procedure Laterality Date     ADENOIDECTOMY       LAPAROSCOPIC CYSTECTOMY OVARIAN (BENIGN) Left 11/13/2017    Procedure: LAPAROSCOPIC CYSTECTOMY OVARIAN (BENIGN);  Left  Laparoscopy Ovarian Cystotomy, Hysteroscopy And Polpectomy With Myosure ;  Surgeon: Zayra Roberts MD;  Location: [facility] OR     OPERATIVE HYSTEROSCOPY WITH  "MORCELLATOR N/A 11/13/2017    Procedure: OPERATIVE HYSTEROSCOPY WITH MORCELLATOR;;  Surgeon: Zayra Roberts MD;  Location: [facility] OR     TONSILLECTOMY       TONSILLECTOMY & ADENOIDECTOMY      8 years old     TRANSVAGINAL RETRIEVAL OVUM Bilateral 3/3/2016    IVF Procedure: TRANSVAGINAL RETRIEVAL OVUM;  Surgeon: Sunshine Gilliam MD;  Location:  [facility]     uterine polyp  2013         Family History:     Family History   Problem Relation Age of Onset     Hyperlipidemia Mother      Diabetes Mother      Hypertension Mother      Hyperlipidemia Father      Hypertension Father      Diabetes Maternal Grandmother      Diabetes Maternal Grandfather           Social History:     Social History     Social History     Marital status:      Spouse name: N/A     Number of children: N/A     Years of education: N/A     Occupational History     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  Immigrated in 2006 from [place] ([place] No children.  Parents in [place], 2 younger sisters and a brother, she is eldest.           Medications:     Current Outpatient Prescriptions   Medication     acetylcysteine (MUCOMYST) 20 % nebulizer solution     albuterol (2.5 MG/3ML) 0.083% nebulizer solution     albuterol (PROAIR HFA/PROVENTIL HFA/VENTOLIN HFA) 108 (90 BASE) MCG/ACT Inhaler     IBUPROFEN PO     pantoprazole (PROTONIX) 40 MG EC tablet     Prenatal MV-Min-Fe Fum-FA-DHA (PRENATAL 1 PO)     Respiratory Therapy Supplies (NEBULIZER) SUZANNA     Syringe/Needle, Disp, (SYRINGE LUER LOCK) 20G X 1-1/2\" 5 ML MISC     Water For Injection Sterile SOLN     No current facility-administered " medications for this visit.           Physical Exam:   /78 (BP Location: Right arm, Patient Position: Chair, Cuff Size: Adult Regular)  Pulse 71  Resp 16  Wt 60.8 kg (134 lb)  SpO2 97%  BMI 23.74 kg/m2    Constitutional:   Awake, alert and in no apparent distress     Eyes:   nonicteric     ENT:   oral mucosa moist without lesions, normal tm bilaterally, bilateral mucosal edema     Neck:   Supple without supraclavicular or cervical lymphadenopathy     Lungs:   fair air flow.  scattered crackles. No rhonchi.  No wheezes.     Cardiovascular:   Normal S1 and S2.  RRR.  No murmur, gallop or rub.     Abdomen:   NABS, soft, nontender, nondistended.       Musculoskeletal:   No edema, digital clubbing present     Neurologic:   Alert and conversant.     Skin:   Warm, dry.  No rash on limited exam.             Data:   All laboratory and imaging data reviewed.    Cystic Fibrosis Culture  Specimen Description   Date Value Ref Range Status   08/07/2018 Sputum  Final   07/14/2018 Feces  Final   12/05/2017 Throat  Final   12/05/2017 Throat  Final    Culture Micro   Date Value Ref Range Status   12/05/2017 Canceled, Test credited  Final   12/05/2017 Incorrectly ordered by PCU/Clinic  Final   12/05/2017   Final    Notification of test cancellation was given to  Giorgio TILLMAN from [facility].12/5/17 at 1350.TV.     12/05/2017 Reordered test as throat culture.  Final   12/05/2017 Heavy growth  Normal otto    Final        Recent Results (from the past 168 hour(s))   General PFT Lab (Please always keep checked)    Collection Time: 08/07/18  8:19 AM   Result Value Ref Range    FVC-Pred 3.30 L    FVC-Pre 2.80 L    FVC-%Pred-Pre 84 %    FEV1-Pre 1.51 L    FEV1-%Pred-Pre 55 %    FEV1FVC-Pred 82 %    FEV1FVC-Pre 54 %    FEFMax-Pred 6.88 L/sec    FEFMax-Pre 4.84 L/sec    FEFMax-%Pred-Pre 70 %    FEF2575-Pred 2.90 L/sec    FEF2575-Pre 0.53 L/sec    EYN3154-%Pred-Pre 18 %    ExpTime-Pre 12.29 sec    FIFMax-Pre 2.58 L/sec     FEV1FEV6-Pred 83 %    FEV1FEV6-Pre 59 %   Gram stain    Collection Time: 08/07/18  9:20 AM   Result Value Ref Range    Specimen Description Sputum     Special Requests Screen     Gram Stain <10 Squamous epithelial cells/low power field     Gram Stain >25 PMNs/low power field     Gram Stain       Moderate  Mixed gram positive and gram negative bacteria present.         PFT: Moderately-severe obstructive lung disease.  When compared to 12/5/2017, the FEV1 is unchanged and the FVC has increased.

## 2018-08-07 NOTE — PATIENT INSTRUCTIONS
Self-Care Plan       MRN: [id number]   Clinic Date: August 7, 2018   Patient: Armando Dc     RECOMMENDATIONS:   Increase vest to twice daily.  Protonix--one tablet twice daily for 6 weeks, then daily.  Will hold on cayston for now.    YOUR GOAL:  Stay well.  Good luck.    CF Nurse Line:  Tami Lao: [phone]   Sybil Peng, RT: [phone]       Pulmonary Function Tests  FEV1: amount of air you can blow out in 1 second  FVC: total amount of air you can take in and blow out    Your Goals:         PFT Latest Ref Rng & Units 8/7/2018   FVC L 2.80   FEV1 L 1.51   FVC% % 84   FEV1% % 55          Airway Clearance: The Most Important Way to Keep Your Lungs Healthy  Vest Settings:    Hill-Rom Frequencies: 8, 9, 10 Pressure 10 Then, Frequencies 18, 19, 20 Pressure 6      RespirTech: Quick Start with Pressure of     Do each frequency for 5 minutes; Deflate vest after each frequency & cough 3 times before beginning the next setting.    Vest and Neb Therapy should be done 1-2 times/day.

## 2018-08-07 NOTE — LETTER
8/7/2018       RE: Armando Dc  [address]  [place] [address]     Dear Colleague,    Thank you for referring your patient, Armando Dc, to the [facility] at [facility]. Please see a copy of my visit note below.    [facility]  August 7, 2018         Assessment and Plan:   Armando Dc is a 43 year old female with PCD and bronchiectasis.    1. PCD and bronchiectasis  with moderately-severe obstruction:  Armando has shown evidence of stability in her pulmonary function with evidence of actually improvement in her vital capacity.  Symptomatically, she has been doing quite well.  She has been participating in her vest therapy more consistently because she is now in fellowship and does not take nighttime call or work weekends.  Her last culture did show evidence of normal otto.  At this time, I would recommend:   -- That she continue on her vest therapy.   -- She should consider increasing to 2 vest therapies per day as she is planning in vitro fertilization and pregnancy soon.  We would like to optimize her pulmonary function for that time.     2.  Preconception counseling.  Armando has grown out normal otto, so I do not think it is necessary to be on inhalational antibiotic at this time.  We did again discuss increasing to 2 vest therapies per day.  I did instruct her that if she were to conceive that we would need to see her more consistently.     3.  Sinusitis.  Armando reports that her sinus symptoms are tabled.  She does do saline rinses p.r.n.     4.  Gastroesophageal reflux.  Armando was seen by a primary care physician and started on omeprazole for reflux.  I have changed this to Protonix as it is a category B for pregnancy.  I have instructed her to take 40 mg b.i.d. for 6 weeks followed by 40 mg a day.     5.  Psychosocial.  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  She reports that this is going well.  She expects to do her in vitro fertilization in September.  They plan to implant 2 embryos at that time.     Immunizations: per primary care and OB/Gyne  Colonoscopy: NA    Lyn Hernández MD MPH  Associate Professor of Medicine  Pulmonary, Allergy, Critical Care and Sleep Medicine      Interval History:     Armando does continue to produce sputum that is light green in color.  She reports it is consistently more thin.  She reports that the cough frequency and sputum volume are stable.  She is rarely congestion.  She is short of breath only on occasion.  She says it happens mostly in the morning and by evening she is feeling improved.  She does continue to perform one vest therapy per day.          Review of Systems:     CONSTITUTIONAL: no fever although did have low grade temp when vest was broken for one week, no chills, no change in weight, no change in energy, no change in appetite    INTEGUMENTARY/SKIN: no rash, no obvious new lesions    ENT/MOUTH: no sore throat, no new sinus pain, no new nasal drainage, increased congestion today     RESPIRATORY: see interval history    CV: no chest pain, no palpitations, no peripheral edema, no orthopnea, no PND    GI: no nausea, no vomiting, no change in stools, no fatty stools, ++ GERD, no abdominal pain    : no dysuria, no urinary frequency    MUSCULOSKELETAL: no myalgias, no arthralgias    ENDOCRINE: no excessive thirst    NEURO:  No headache, no numbness, no tingling    SLEEP: no issues    PSYCHIATRIC: mood stable          Past Medical and Surgical History:     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,      Pseudomonas aeruginosa colonization 2008     Reactive airway disease      Uncomplicated asthma     Reactive airway disease     Past Surgical History:   Procedure Laterality Date      ADENOIDECTOMY       LAPAROSCOPIC CYSTECTOMY OVARIAN (BENIGN) Left 11/13/2017    Procedure: LAPAROSCOPIC CYSTECTOMY OVARIAN (BENIGN);  Left  Laparoscopy Ovarian Cystotomy, Hysteroscopy And Polpectomy With Myosure ;  Surgeon: Zayra Roberts MD;  Location: [facility] OR     OPERATIVE HYSTEROSCOPY WITH MORCELLATOR N/A 11/13/2017    Procedure: OPERATIVE HYSTEROSCOPY WITH MORCELLATOR;;  Surgeon: Zayra Roberts MD;  Location: [facility] OR     TONSILLECTOMY       TONSILLECTOMY & ADENOIDECTOMY      8 years old     TRANSVAGINAL RETRIEVAL OVUM Bilateral 3/3/2016    IVF Procedure: TRANSVAGINAL RETRIEVAL OVUM;  Surgeon: Sunshine Gilliam MD;  Location:  [facility]     uterine polyp  2013         Family History:     Family History   Problem Relation Age of Onset     Hyperlipidemia Mother      Diabetes Mother      Hypertension Mother      Hyperlipidemia Father      Hypertension Father      Diabetes Maternal Grandmother      Diabetes Maternal Grandfather           Social History:     Social History     Social History     Marital status:      Spouse name: N/A     Number of children: N/A     Years of education: N/A     Occupational History     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  Immigrated in 2006 from [place] ([facility]). No children.  Parents in [place], 2 younger sisters and a brother, she is eldest.           Medications:     Current Outpatient Prescriptions   Medication     acetylcysteine (MUCOMYST) 20 % nebulizer solution     albuterol (2.5 MG/3ML) 0.083% nebulizer solution     albuterol (PROAIR HFA/PROVENTIL HFA/VENTOLIN HFA) 108 (90 BASE) MCG/ACT Inhaler     IBUPROFEN  "PO     pantoprazole (PROTONIX) 40 MG EC tablet     Prenatal MV-Min-Fe Fum-FA-DHA (PRENATAL 1 PO)     Respiratory Therapy Supplies (NEBULIZER) SUZANNA     Syringe/Needle, Disp, (SYRINGE LUER LOCK) 20G X 1-1/2\" 5 ML MISC     Water For Injection Sterile SOLN     No current facility-administered medications for this visit.           Physical Exam:   /78 (BP Location: Right arm, Patient Position: Chair, Cuff Size: Adult Regular)  Pulse 71  Resp 16  Wt 60.8 kg (134 lb)  SpO2 97%  BMI 23.74 kg/m2    Constitutional:   Awake, alert and in no apparent distress     Eyes:   nonicteric     ENT:   oral mucosa moist without lesions, normal tm bilaterally, bilateral mucosal edema     Neck:   Supple without supraclavicular or cervical lymphadenopathy     Lungs:   fair air flow.  scattered crackles. No rhonchi.  No wheezes.     Cardiovascular:   Normal S1 and S2.  RRR.  No murmur, gallop or rub.     Abdomen:   NABS, soft, nontender, nondistended.       Musculoskeletal:   No edema, digital clubbing present     Neurologic:   Alert and conversant.     Skin:   Warm, dry.  No rash on limited exam.             Data:   All laboratory and imaging data reviewed.    Cystic Fibrosis Culture  Specimen Description   Date Value Ref Range Status   08/07/2018 Sputum  Final   07/14/2018 Feces  Final   12/05/2017 Throat  Final   12/05/2017 Throat  Final    Culture Micro   Date Value Ref Range Status   12/05/2017 Canceled, Test credited  Final   12/05/2017 Incorrectly ordered by PCU/Clinic  Final   12/05/2017   Final    Notification of test cancellation was given to  Giorgio TILLMAN from [facility].12/5/17 at 1350.TV.     12/05/2017 Reordered test as throat culture.  Final   12/05/2017 Heavy growth  Normal otto    Final        Recent Results (from the past 168 hour(s))   General PFT Lab (Please always keep checked)    Collection Time: 08/07/18  8:19 AM   Result Value Ref Range    FVC-Pred 3.30 L    FVC-Pre 2.80 L    FVC-%Pred-Pre 84 %    " FEV1-Pre 1.51 L    FEV1-%Pred-Pre 55 %    FEV1FVC-Pred 82 %    FEV1FVC-Pre 54 %    FEFMax-Pred 6.88 L/sec    FEFMax-Pre 4.84 L/sec    FEFMax-%Pred-Pre 70 %    FEF2575-Pred 2.90 L/sec    FEF2575-Pre 0.53 L/sec    PNJ0718-%Pred-Pre 18 %    ExpTime-Pre 12.29 sec    FIFMax-Pre 2.58 L/sec    FEV1FEV6-Pred 83 %    FEV1FEV6-Pre 59 %   Gram stain    Collection Time: 08/07/18  9:20 AM   Result Value Ref Range    Specimen Description Sputum     Special Requests Screen     Gram Stain <10 Squamous epithelial cells/low power field     Gram Stain >25 PMNs/low power field     Gram Stain       Moderate  Mixed gram positive and gram negative bacteria present.         PFT: Moderately-severe obstructive lung disease.  When compared to 12/5/2017, the FEV1 is unchanged and the FVC has increased.      Again, thank you for allowing me to participate in the care of your patient.      Sincerely,    Lyn Hernández MD

## 2018-08-13 LAB
BACTERIA SPEC CULT: ABNORMAL
SPECIMEN SOURCE: ABNORMAL

## 2018-08-14 ENCOUNTER — CARE COORDINATION (OUTPATIENT)
Dept: PULMONOLOGY | Facility: CLINIC | Age: 43
End: 2018-08-14

## 2018-08-14 DIAGNOSIS — J47.9 BRONCHIECTASIS WITHOUT ACUTE EXACERBATION (H): Primary | ICD-10-CM

## 2018-08-14 RX ORDER — AZITHROMYCIN 250 MG/1
TABLET, FILM COATED ORAL
Qty: 6 TABLET | Refills: 0 | Status: SHIPPED | OUTPATIENT
Start: 2018-08-14 | End: 2018-10-17

## 2018-08-14 NOTE — PROGRESS NOTES
VM left with pt to call the CF office. Dr. Hernández is recommending that she be treated for Strep pneumoniae in her most recent culture with a z-pack. We need to know which pharmacy to send the medication to also. Pt replied via [employer] with preferred pharmacy.

## 2018-09-25 DIAGNOSIS — Z32.01 PREGNANCY TEST POSITIVE: ICD-10-CM

## 2018-09-25 DIAGNOSIS — R73.09 ELEVATED GLUCOSE: ICD-10-CM

## 2018-09-25 LAB
B-HCG SERPL-ACNC: ABNORMAL IU/L (ref 0–5)
ESTRADIOL SERPL-MCNC: 337 PG/ML
HBA1C MFR BLD: 5.8 % (ref 0–5.6)
PROGEST SERPL-MCNC: 70.6 NG/ML

## 2018-09-25 PROCEDURE — 84702 CHORIONIC GONADOTROPIN TEST: CPT | Performed by: OBSTETRICS & GYNECOLOGY

## 2018-09-25 PROCEDURE — 83036 HEMOGLOBIN GLYCOSYLATED A1C: CPT | Performed by: OBSTETRICS & GYNECOLOGY

## 2018-09-25 PROCEDURE — 82670 ASSAY OF TOTAL ESTRADIOL: CPT | Performed by: OBSTETRICS & GYNECOLOGY

## 2018-09-25 PROCEDURE — 36415 COLL VENOUS BLD VENIPUNCTURE: CPT | Performed by: OBSTETRICS & GYNECOLOGY

## 2018-09-25 PROCEDURE — 84144 ASSAY OF PROGESTERONE: CPT | Performed by: OBSTETRICS & GYNECOLOGY

## 2018-10-01 ENCOUNTER — HOSPITAL ENCOUNTER (OUTPATIENT)
Dept: ULTRASOUND IMAGING | Facility: CLINIC | Age: 43
Discharge: HOME | End: 2018-10-01
Attending: OBSTETRICS & GYNECOLOGY | Admitting: OBSTETRICS & GYNECOLOGY
Payer: COMMERCIAL

## 2018-10-01 DIAGNOSIS — Z32.01 PREGNANCY TEST POSITIVE: ICD-10-CM

## 2018-10-01 PROCEDURE — 76801 OB US < 14 WKS SINGLE FETUS: CPT

## 2018-10-02 ENCOUNTER — TELEPHONE (OUTPATIENT)
Dept: OBGYN | Facility: CLINIC | Age: 43
End: 2018-10-02

## 2018-10-02 NOTE — TELEPHONE ENCOUNTER
Pt is calling to see if she could come to the clinic today to get a progesterone shot.  Says that her  usually does them but he is out of town today.   Discussed with pt that per [facility] policy, we cannot give an injection for a medication that a patient brings from home.  Encouraged her to ask other friends/family who may be able to give her a shot if she is unable to do it herself.  Manuela Cuba RN

## 2018-10-09 ENCOUNTER — TELEPHONE (OUTPATIENT)
Dept: OBGYN | Facility: CLINIC | Age: 43
End: 2018-10-09

## 2018-10-09 DIAGNOSIS — N97.9 FEMALE INFERTILITY: Primary | ICD-10-CM

## 2018-10-09 NOTE — TELEPHONE ENCOUNTER
Ultrasound tech from Radiology called regarding patient's upcoming appointment on 10/11. The order for the ultrasound needs to be changed. There needs to be a reason for the ultrasound since she has already had a dating ultrasound. They will not do the ultrasound without a reason. Please change/edit order with a reason - can't just be viability. If you have questions, you can call US tech at [phone]. Thanks.   Amrita Webb, RN-BSN

## 2018-10-10 NOTE — TELEPHONE ENCOUNTER
This was done.  She is IVF and 43 years old. Needs viability per her IVF clinic    Zayra Roberts MD

## 2018-10-11 ENCOUNTER — HOSPITAL ENCOUNTER (OUTPATIENT)
Dept: ULTRASOUND IMAGING | Facility: CLINIC | Age: 43
Discharge: HOME | End: 2018-10-11
Attending: OBSTETRICS & GYNECOLOGY | Admitting: OBSTETRICS & GYNECOLOGY
Payer: COMMERCIAL

## 2018-10-11 DIAGNOSIS — Z32.01 PREGNANCY EXAMINATION OR TEST, POSITIVE RESULT: ICD-10-CM

## 2018-10-11 PROCEDURE — 76801 OB US < 14 WKS SINGLE FETUS: CPT

## 2018-10-17 ENCOUNTER — PRENATAL OFFICE VISIT (OUTPATIENT)
Dept: NURSING | Facility: CLINIC | Age: 43
End: 2018-10-17
Payer: COMMERCIAL

## 2018-10-17 VITALS
DIASTOLIC BLOOD PRESSURE: 83 MMHG | SYSTOLIC BLOOD PRESSURE: 133 MMHG | WEIGHT: 138 LBS | OXYGEN SATURATION: 96 % | TEMPERATURE: 98 F | HEIGHT: 63 IN | HEART RATE: 98 BPM | BODY MASS INDEX: 24.45 KG/M2

## 2018-10-17 DIAGNOSIS — Z23 NEED FOR TDAP VACCINATION: ICD-10-CM

## 2018-10-17 DIAGNOSIS — R73.03 PRE-DIABETES: ICD-10-CM

## 2018-10-17 DIAGNOSIS — O09.529 AMA (ADVANCED MATERNAL AGE) MULTIGRAVIDA 35+: Primary | ICD-10-CM

## 2018-10-17 DIAGNOSIS — O09.519 AMA (ADVANCED MATERNAL AGE) PRIMIGRAVIDA 35+: ICD-10-CM

## 2018-10-17 LAB
ABO + RH BLD: NORMAL
ABO + RH BLD: NORMAL
ALBUMIN UR-MCNC: NEGATIVE MG/DL
APPEARANCE UR: CLEAR
BILIRUB UR QL STRIP: NEGATIVE
BLD GP AB SCN SERPL QL: NORMAL
BLOOD BANK CMNT PATIENT-IMP: NORMAL
COLOR UR AUTO: YELLOW
ERYTHROCYTE [DISTWIDTH] IN BLOOD BY AUTOMATED COUNT: 13.7 % (ref 10–15)
GLUCOSE UR STRIP-MCNC: NEGATIVE MG/DL
HCT VFR BLD AUTO: 39.5 % (ref 35–47)
HGB BLD-MCNC: 13.6 G/DL (ref 11.7–15.7)
HGB UR QL STRIP: NEGATIVE
KETONES UR STRIP-MCNC: NEGATIVE MG/DL
LEUKOCYTE ESTERASE UR QL STRIP: NEGATIVE
MCH RBC QN AUTO: 30.1 PG (ref 26.5–33)
MCHC RBC AUTO-ENTMCNC: 34.4 G/DL (ref 31.5–36.5)
MCV RBC AUTO: 87 FL (ref 78–100)
NITRATE UR QL: NEGATIVE
PH UR STRIP: 6 PH (ref 5–7)
PLATELET # BLD AUTO: 286 10E9/L (ref 150–450)
RBC # BLD AUTO: 4.52 10E12/L (ref 3.8–5.2)
SOURCE: NORMAL
SP GR UR STRIP: <=1.005 (ref 1–1.03)
SPECIMEN EXP DATE BLD: NORMAL
UROBILINOGEN UR STRIP-ACNC: 0.2 EU/DL (ref 0.2–1)
WBC # BLD AUTO: 11.2 10E9/L (ref 4–11)

## 2018-10-17 PROCEDURE — 82306 VITAMIN D 25 HYDROXY: CPT | Performed by: OBSTETRICS & GYNECOLOGY

## 2018-10-17 PROCEDURE — 87088 URINE BACTERIA CULTURE: CPT | Performed by: OBSTETRICS & GYNECOLOGY

## 2018-10-17 PROCEDURE — 85027 COMPLETE CBC AUTOMATED: CPT | Performed by: OBSTETRICS & GYNECOLOGY

## 2018-10-17 PROCEDURE — 87086 URINE CULTURE/COLONY COUNT: CPT | Performed by: OBSTETRICS & GYNECOLOGY

## 2018-10-17 PROCEDURE — 99207 ZZC NO CHARGE NURSE ONLY: CPT

## 2018-10-17 PROCEDURE — 99000 SPECIMEN HANDLING OFFICE-LAB: CPT | Performed by: OBSTETRICS & GYNECOLOGY

## 2018-10-17 PROCEDURE — 87340 HEPATITIS B SURFACE AG IA: CPT | Performed by: OBSTETRICS & GYNECOLOGY

## 2018-10-17 PROCEDURE — 36415 COLL VENOUS BLD VENIPUNCTURE: CPT | Performed by: OBSTETRICS & GYNECOLOGY

## 2018-10-17 PROCEDURE — 81003 URINALYSIS AUTO W/O SCOPE: CPT | Performed by: OBSTETRICS & GYNECOLOGY

## 2018-10-17 PROCEDURE — 86850 RBC ANTIBODY SCREEN: CPT | Performed by: OBSTETRICS & GYNECOLOGY

## 2018-10-17 PROCEDURE — 87389 HIV-1 AG W/HIV-1&-2 AB AG IA: CPT | Performed by: OBSTETRICS & GYNECOLOGY

## 2018-10-17 PROCEDURE — 86762 RUBELLA ANTIBODY: CPT | Performed by: OBSTETRICS & GYNECOLOGY

## 2018-10-17 PROCEDURE — 86901 BLOOD TYPING SEROLOGIC RH(D): CPT | Performed by: OBSTETRICS & GYNECOLOGY

## 2018-10-17 PROCEDURE — 86780 TREPONEMA PALLIDUM: CPT | Performed by: OBSTETRICS & GYNECOLOGY

## 2018-10-17 PROCEDURE — 86900 BLOOD TYPING SEROLOGIC ABO: CPT | Performed by: OBSTETRICS & GYNECOLOGY

## 2018-10-17 PROCEDURE — 83021 HEMOGLOBIN CHROMOTOGRAPHY: CPT | Mod: 90 | Performed by: OBSTETRICS & GYNECOLOGY

## 2018-10-17 NOTE — MR AVS SNAPSHOT
After Visit Summary   10/17/2018    Armando Dc    MRN: [id number]           Patient Information     Date Of Birth          1975        Visit Information        Provider Department      10/17/2018 8:15 AM RD OB NURSE EDUCATION [facility]        Today's Diagnoses     AMA (advanced maternal age) multigravida 35+    -  1    Pre-diabetes        Need for Tdap vaccination        AMA (advanced maternal age) primigravida 35+           Follow-ups after your visit        Additional Services     MAT FETAL MED CTR REFERRAL-PREGNANCY       Body mass index is 23.74 kg/(m^2).    >> Patient may proceed with recommendations for further testing as directed by the Maternal Fetal Medicine Specialist >>    >> If requesting Fetal Echo: MFM will determine appropriate location for exam due to indication.    >> If requesting Lung Maturity Amnio:  If results indicate fetal lung maturity, induction or C/S is recommended within 36 hours.  Please schedule accordingly.     Dear Patient:   Please be aware that coverage of these services is subject to the terms and limitations of your health insurance plan.  Call member services at your health plan with any benefit or coverage questions.      Please bring the following to your appointment:    >>  Any x-rays, CTs or MRIs which have been performed.  Contact the facility where they were done to arrange for  prior to your scheduled appointment.  Any new CT, MRI or other procedures ordered by your specialist must be performed at a [facility] facility or coordinated by your clinic's referral office.  >>  List of current medications   >>  This referral request   >>  Any documents/labs given to you for this referral                  Your next 10 appointments already scheduled     Oct 24, 2018  8:30 AM CDT   LAB with RD LAB   [facility] ([facility]    [address] [place] [address] [phone]            Please do not eat 10-12 hours before your appointment if you are coming in fasting for labs on lipids, cholesterol, or glucose (sugar). This does not apply to pregnant women. Water, hot tea and black coffee (with nothing added) are okay. Do not drink other fluids, diet soda or chew gum.            Oct 26, 2018  2:30 PM CDT   New Prenatal with Zayra Roberts MD   [facility]    [address] [place] [address]  [address]  [facility] [id number] [phone]            Nov 07, 2018  8:00 AM CST   CF LOOP with  PFL CF   [facility] ([facility])    [address]  3rd Floor  [facility] [address] [phone]            Nov 07, 2018  8:30 AM CST   (Arrive by 8:15 AM)   Return Bronchiectas Non CF with Lyn Hernández MD   [facility] ([facility])    [address]  [address]  [facility] [address] [phone]              Future tests that were ordered for you today     Open Future Orders        Priority Expected Expires Ordered    Glucose tolerance, gest screen, 1 hour Routine  10/17/2019 10/17/2018            Who to contact     If you have questions or need follow up information about today's clinic visit or your schedule please contact [facility] directly at [phone].  Normal or non-critical lab and imaging results will be communicated to you by MyChart, letter or phone within 4 business days after the clinic has received the results. If you do not hear from us within 7 days, please contact the clinic through MyChart or phone. If you have a critical or abnormal lab result, we will notify you by phone as soon as possible.  Submit refill requests through [employer] or call your pharmacy and they will forward the refill request to us. Please allow 3 business days for your refill to be completed.          Additional  "Information About Your Visit        MyChart Information     [employer]portgroup gives you secure access to your electronic health record. If you see a primary care provider, you can also send messages to your care team and make appointments. If you have questions, please call your primary care clinic.  If you do not have a primary care provider, please call [phone] and they will assist you.        Care EveryWhere ID     This is your Care EveryWhere ID. This could be used by other organizations to access your [facility] medical records  [id number]        Your Vitals Were     Pulse Temperature Height Pulse Oximetry BMI (Body Mass Index)       98 98  F (36.7  C) 5' 3\" (1.6 m) 96% 24.45 kg/m2        Blood Pressure from Last 3 Encounters:   10/17/18 133/83   08/07/18 113/78   07/10/18 120/77    Weight from Last 3 Encounters:   10/17/18 138 lb (62.6 kg)   08/07/18 134 lb (60.8 kg)   07/10/18 133 lb (60.3 kg)              We Performed the Following     ABO/Rh type and screen     CBC with platelets     Hepatitis B surface antigen     HGB Eval Reflex to ELP or RBC Solubility     HIV Antigen Antibody Combo     MAT FETAL MED CTR REFERRAL-PREGNANCY     Rubella Antibody IgG Quantitative     Treponema Abs w Reflex to RPR and Titer     UA without Microscopic     Urine Culture Aerobic Bacterial     Vitamin D Deficiency          Today's Medication Changes          These changes are accurate as of 10/17/18  9:32 AM.  If you have any questions, ask your nurse or doctor.               These medicines have changed or have updated prescriptions.        Dose/Directions    * albuterol (2.5 MG/3ML) 0.083% neb solution   This may have changed:  See the new instructions.   Used for:  Bronchiectasis without acute exacerbation (H)        USE ONE VIAL IN NEBULIZER TWICE DAILY   Quantity:  180 mL   Refills:  11       * albuterol 108 (90 Base) MCG/ACT inhaler   Commonly known as:  PROAIR HFA/PROVENTIL HFA/VENTOLIN HFA   This may have changed:  Another " medication with the same name was changed. Make sure you understand how and when to take each.   Used for:  Kartagener syndrome        Dose:  2 puff   Inhale 2 puffs into the lungs every 6 hours as needed for shortness of breath / dyspnea or wheezing   Quantity:  1 Inhaler   Refills:  12       * Notice:  This list has 2 medication(s) that are the same as other medications prescribed for you. Read the directions carefully, and ask your doctor or other care provider to review them with you.      Stop taking these medicines if you haven't already. Please contact your care team if you have questions.     azithromycin 250 MG tablet   Commonly known as:  ZITHROMAX           IBUPROFEN PO                    Primary Care Provider Office Phone # Fax #    Blake ABELARDO Sifuentes [phone]       [facility] [address] [address]        Equal Access to Services     JEREMY [facility] HospitalCHIKI : Ana pinedoo Soelissa, waaxda luqadaha, qaybta kaalmada isela, tee mead . So [facility] [phone].    ATENCIÓN: Si habla español, tiene a guevara disposición servicios gratuitos de asistencia lingüística. Srikanth al [phone].    We comply with applicable federal civil rights laws and [place] laws. We do not discriminate on the basis of race, color, national origin, age, disability, sex, sexual orientation, or gender identity.            Thank you!     Thank you for choosing [facility] [place]  for your care. Our goal is always to provide you with excellent care. Hearing back from our patients is one way we can continue to improve our services. Please take a few minutes to complete the written survey that you may receive in the mail after your visit with us. Thank you!             Your Updated Medication List - Protect others around you: Learn how to safely use, store and throw away your medicines at www.disposemymeds.org.          This list is accurate as of 10/17/18  9:32 AM.   "Always use your most recent med list.                   Brand Name Dispense Instructions for use Diagnosis    acetylcysteine 20 % nebulizer solution    MUCOMYST    120 mL    Inhale 2 mLs into the lungs 2 times daily    Kartagener's syndrome, Situs inversus, Pseudomonas aeruginosa colonization       * albuterol (2.5 MG/3ML) 0.083% neb solution     180 mL    USE ONE VIAL IN NEBULIZER TWICE DAILY    Bronchiectasis without acute exacerbation (H)       * albuterol 108 (90 Base) MCG/ACT inhaler    PROAIR HFA/PROVENTIL HFA/VENTOLIN HFA    1 Inhaler    Inhale 2 puffs into the lungs every 6 hours as needed for shortness of breath / dyspnea or wheezing    Kartagener syndrome       nebulizer Sindy      Nebulizer compressor - use with ALEK as directed        pantoprazole 40 MG EC tablet    PROTONIX    60 tablet    Take 1 tablet (40 mg) by mouth 2 times daily    Gastroesophageal reflux disease without esophagitis, Bronchiectasis without complication (H)       PRENATAL 1 PO      Take 1 tablet by mouth every evening        Syringe Luer Lock 20G X 1-1/2\" 5 ML Misc     60 each    1 each 2 times daily    Kartagener syndrome, Reactive airway disease       Water For Injection Sterile Soln     250 mL    Inject 4 mLs into the vein 2 times daily    Kartagener syndrome, Reactive airway disease       * Notice:  This list has 2 medication(s) that are the same as other medications prescribed for you. Read the directions carefully, and ask your doctor or other care provider to review them with you.      "

## 2018-10-17 NOTE — PROGRESS NOTES
Important Information for Provider:     Patient presents for new ob teaching and labs, first pregnancy, AMA,IVF. Last ultrasound was 10/11/18, viable pregnancy. Ordered first trimester screening, NIPT. Patient is prediabetic, early 1 hour GCT scheduled 10/24/18. Denies any problems at this time. Patient is scheduled with Dr Roberts 10/26/18 for NOB, (CALL DAY),patient is aware to call 1 hour ahead.(only wants BE)    Caffeine intake/servings daily - 0  Calcium intake/servings daily - 3  Exercise 5 times weekly - describe ; walks,precautions given  Sunscreen used - Yes  Seatbelts used - Yes  Guns stored in the home - No  Self Breast Exam - Yes  Pap test up to date -  Yes  Eye exam up to date -  Yes  Dental exam up to date -  Yes  DEXA scan up to date -  No  Flex Sig/Colonoscopy up to date -  No  Mammography up to date -  No  Immunizations reviewed and up to date - Yes  Abuse: Current or Past (Physical, Sexual or Emotional) - No  Do you feel safe in your environment - Yes  Do you cope well with stress - Yes  Do you suffer from insomnia - No            Prenatal OB Questionnaire  Patient supplied answers from flow sheet for:  Prenatal OB Questionnaire.  Past Medical History  Diabetes?: No (prediabetes)  Hypertension : No  Heart disease, mitral valve prolapse or rheumatic fever?: No  An autoimmune disease such as lupus or rheumatoid arthritis?: No  Kidney disease or urinary tract infection?: No  Epilepsy, seizures or spells?: No  Migraine headaches?: No  A stroke or loss of function or sensation?: No  Any other neurological problems?: No  Have you ever been treated for depression?: No  Are you having problems with crying spells or loss of self-esteem?: No  Have you ever required psychiatric care?: No  Have you ever had hepatitis, liver disease or jaundice?: No  Have you been treated for blood clots in your veins, deep vein thromosis, inflammation in the veins, thrombosis, phlebitis, pulmonary embolism or varicosities?:  No  Have you had excessive bleeding after surgery or dental work?: No  Do you bleed more than other women after a cut or scratch?: No  Do you have a history of anemia?: No  Have you ever had thyroid problems or taken thyroid medication?: No   Do you have any endocrine problems?: No  Have you ever been in a major accident or suffered serious trauma?: No  Within the last year, has anyone hit, slapped, kicked or otherwise hurt you?: No  In the last year, has anyone forced you to have sex when you didn't want to?: No    Past Medical History 2   Have you ever received a blood transfusion?: No  Would you refuse a blood transfusion if a doctor judged it to be medically necessary?: No   If you answered Yes, would you rather die than receive a blood transfusion?: No  If you answered Yes, is this for Spiritism reasons?: No  Does anyone in your home smoke?: No  Do you use tobacco products?: No  Do you drink beer, wine or hard liquor?: No  Do you use any of the following: marijuana, speed, cocaine, heroin, hallucinogens or other drugs?: No   Is your blood type Rh negative?: No  Have you ever had abnormal antibodies in your blood?: No  Have you ever had asthma?: (!) Yes  Have you ever had tuberculosis?: No  Do you have any allergies to drugs or over-the-counter medications?: No  Allergies: Dust Mites, Aspartame, Ethanol, Venlafaxine, Hydrochloride, Sertraline: No  Have you had any breast problems?: No  Have you ever ?: No  Have you had any gynecological surgical procedures such as cervical conization, a LEEP procedure, laser treatment, cryosurgery of the cervix or a dilation and curettage, etc?: No  Have you ever had any other surgical procedures?: (!) Yes (removed endometrial cysts)  Have you been hospitalized for a nonsurgical reason excluding normal delivery?: No  Have you ever had any anesthetic complications?: No  Have you ever had an abnormal pap smear?: No    Past Medical History (Continued)  Do you have a  history of abnormalities of the uterus?: No  Did your mother take MIKE or any other hormones when she was pregnant with you?: No  Did it take you more than a year to become pregnant?: (!) Yes  Have you ever been evaluated or treated for infertility?: (!) YES  Is there a history of medical problems in your family, which you feel may be important to this pregnancy?: (!) Yes (DM TYPE 2, HTN)  Do you have any other problems we have not asked about which you feel may be important to this pregnancy?: No    Symptoms since last menstrual period  Do you have any of the following symptoms: abdominal pain, blood in stools or urine, chest pain, shortness of breath, coughing or vomiting up blood, your heart racing or skipping beats, nausea and vomiting, pain on urination or vaginal discharge or bleed: No  Will the patient be 35 years old or older at the time of delivery?: (!) Yes    Has the patient, baby's father or anyone in either family had:  Thalassemia (Italian, Greek, Mediterranean or  background only) and an MCV result less than 80?: No  Neural tube defect such as meningomyelocele, spina bifida or anencephaly?: No  Congenital heart defect?: No  Down's Syndrome?: No  Tr-Sachs disease (Adventist, Cajun, Slovak-Morrison)?: No  Sickle cell disease or trait ()?: No  Hemophilia or other inherited problems of blood?: No  Muscular dystrophy?: No  Cystic fibrosis?: No  Edwards's chorea?: No  Mental retardation/autism?: No  If yes, was the person tested for fragile X?: No  Any other inherited genetic or chromosomal disorder?: (!) Yes (PCD)  Maternal metabolic disorder (e.g Insulin-dependent diabetes, PKU)?: No  A child with birth defects not listed above?: No  Recurrent pregnancy loss or stillbirth?: No   Has the patient had any medications/street drugs/alcohol since her last menstrual period?: No  Does the patient or baby's father have any other genetic risks?: No    Infection History   Do you object to being tested  "for Hepatitis B?: No  Do you object to being tested for HIV?: No   Do you feel that you are at high risk for coming in contact with the AIDS virus?: No  Have you ever been treated for tuberculosis?: No  Have you ever had a positive skin test for tuberculosis?: No  Do you live with someone who has tuberculosis?: No  Have you ever been exposed to tuberculosis?: No  Do you have genital herpes?: No  Does your partner have genital herpes?: No  Have you had a viral illness since your last period?: No  Have you ever had gonorrhea, chlamydia, syphilis, venereal warts, trichomoniasis, pelvic inflammatory disease or any other sexually transmitted disease?: No  Do you know if you are a genital group B streptococcus carrier?: No  Have you had chicken pox/varicella?: No   Have you been vaccinated against chicken Pox?: No  Have you had any other infectious diseases?: No      Allergies as of 10/17/2018:    Allergies as of 10/17/2018     (No Known Allergies)       Current medications are:  Current Outpatient Prescriptions   Medication Sig Dispense Refill     acetylcysteine (MUCOMYST) 20 % nebulizer solution Inhale 2 mLs into the lungs 2 times daily 120 mL 5     albuterol (2.5 MG/3ML) 0.083% nebulizer solution USE ONE VIAL IN NEBULIZER TWICE DAILY (Patient taking differently: USE ONE VIAL IN NEBULIZER DAILY) 180 mL 11     albuterol (PROAIR HFA/PROVENTIL HFA/VENTOLIN HFA) 108 (90 BASE) MCG/ACT Inhaler Inhale 2 puffs into the lungs every 6 hours as needed for shortness of breath / dyspnea or wheezing 1 Inhaler 12     pantoprazole (PROTONIX) 40 MG EC tablet Take 1 tablet (40 mg) by mouth 2 times daily 60 tablet 3     Prenatal MV-Min-Fe Fum-FA-DHA (PRENATAL 1 PO) Take 1 tablet by mouth every evening       Respiratory Therapy Supplies (NEBULIZER) SUZANNA Nebulizer compressor - use with ALEK as directed       Syringe/Needle, Disp, (SYRINGE LUER LOCK) 20G X 1-1/2\" 5 ML MISC 1 each 2 times daily 60 each 5     Water For Injection Sterile SOLN " Inject 4 mLs into the vein 2 times daily 250 mL 5         Early ultrasound screening tool:    Does patient have irregular periods?  No  Did patient use hormonal birth control in the three months prior to positive urine pregnancy test? No  Is the patient breastfeeding?  No  Is the patient 10 weeks or greater at time of education visit?  No

## 2018-10-18 LAB
DEPRECATED CALCIDIOL+CALCIFEROL SERPL-MC: 24 UG/L (ref 20–75)
HBV SURFACE AG SERPL QL IA: NONREACTIVE
HGB A1 MFR BLD: 96.7 % (ref 95–97.9)
HGB A2 MFR BLD: 2.8 % (ref 2–3.5)
HGB C MFR BLD: 0 % (ref 0–0)
HGB E MFR BLD: 0 % (ref 0–0)
HGB F MFR BLD: 0.5 % (ref 0–2.1)
HGB FRACT BLD ELPH-IMP: NORMAL
HGB OTHER MFR BLD: 0 % (ref 0–0)
HGB S BLD QL SOLY: NORMAL
HGB S MFR BLD: 0 % (ref 0–0)
HIV 1+2 AB+HIV1 P24 AG SERPL QL IA: NONREACTIVE
PATH INTERP BLD-IMP: NORMAL
RUBV IGG SERPL IA-ACNC: 41 IU/ML
T PALLIDUM AB SER QL: NONREACTIVE

## 2018-10-19 NOTE — PROGRESS NOTES
Carrillo Roberts,  Congratulations!  Your prenatal labs are normal.  Dr. Roberts is out of the office.    Dr. Alonso

## 2018-10-24 ENCOUNTER — TELEPHONE (OUTPATIENT)
Dept: OBGYN | Facility: CLINIC | Age: 43
End: 2018-10-24

## 2018-10-24 DIAGNOSIS — R73.03 PRE-DIABETES: ICD-10-CM

## 2018-10-24 DIAGNOSIS — O09.519 AMA (ADVANCED MATERNAL AGE) PRIMIGRAVIDA 35+: ICD-10-CM

## 2018-10-24 DIAGNOSIS — R73.09 ABNORMAL GLUCOSE LEVEL: Primary | ICD-10-CM

## 2018-10-24 DIAGNOSIS — O09.529 AMA (ADVANCED MATERNAL AGE) MULTIGRAVIDA 35+: ICD-10-CM

## 2018-10-24 LAB — GLUCOSE 1H P 50 G GLC PO SERPL-MCNC: 185 MG/DL (ref 60–129)

## 2018-10-24 PROCEDURE — 36415 COLL VENOUS BLD VENIPUNCTURE: CPT | Performed by: OBSTETRICS & GYNECOLOGY

## 2018-10-24 PROCEDURE — 82950 GLUCOSE TEST: CPT | Performed by: OBSTETRICS & GYNECOLOGY

## 2018-10-24 NOTE — TELEPHONE ENCOUNTER
Patient did not pass 1 hr gct. Per result note, 3 hr gtt needs to be ordered. There is already an order in patient's chart.   Amrita Webb RN-BSN

## 2018-10-26 ENCOUNTER — PRENATAL OFFICE VISIT (OUTPATIENT)
Dept: OBGYN | Facility: CLINIC | Age: 43
End: 2018-10-26
Payer: COMMERCIAL

## 2018-10-26 VITALS
DIASTOLIC BLOOD PRESSURE: 85 MMHG | BODY MASS INDEX: 25.15 KG/M2 | HEART RATE: 81 BPM | WEIGHT: 142 LBS | SYSTOLIC BLOOD PRESSURE: 134 MMHG | TEMPERATURE: 97.1 F

## 2018-10-26 DIAGNOSIS — Z86.39 HISTORY OF ELEVATED GLUCOSE: ICD-10-CM

## 2018-10-26 DIAGNOSIS — Q89.3 SITUS INVERSUS: Chronic | ICD-10-CM

## 2018-10-26 DIAGNOSIS — J45.40 MODERATE PERSISTENT REACTIVE AIRWAY DISEASE WITHOUT COMPLICATION: Chronic | ICD-10-CM

## 2018-10-26 DIAGNOSIS — Z23 NEED FOR PROPHYLACTIC VACCINATION AND INOCULATION AGAINST INFLUENZA: ICD-10-CM

## 2018-10-26 DIAGNOSIS — O09.511 ELDERLY PRIMIGRAVIDA IN FIRST TRIMESTER: Primary | ICD-10-CM

## 2018-10-26 PROCEDURE — 90471 IMMUNIZATION ADMIN: CPT | Performed by: OBSTETRICS & GYNECOLOGY

## 2018-10-26 PROCEDURE — 90686 IIV4 VACC NO PRSV 0.5 ML IM: CPT | Performed by: OBSTETRICS & GYNECOLOGY

## 2018-10-26 PROCEDURE — 99207 ZZC FIRST OB VISIT: CPT | Performed by: OBSTETRICS & GYNECOLOGY

## 2018-10-26 ASSESSMENT — ANXIETY QUESTIONNAIRES
3. WORRYING TOO MUCH ABOUT DIFFERENT THINGS: SEVERAL DAYS
6. BECOMING EASILY ANNOYED OR IRRITABLE: NOT AT ALL
GAD7 TOTAL SCORE: 6
5. BEING SO RESTLESS THAT IT IS HARD TO SIT STILL: NOT AT ALL
1. FEELING NERVOUS, ANXIOUS, OR ON EDGE: SEVERAL DAYS
7. FEELING AFRAID AS IF SOMETHING AWFUL MIGHT HAPPEN: SEVERAL DAYS
2. NOT BEING ABLE TO STOP OR CONTROL WORRYING: MORE THAN HALF THE DAYS

## 2018-10-26 ASSESSMENT — PATIENT HEALTH QUESTIONNAIRE - PHQ9: 5. POOR APPETITE OR OVEREATING: SEVERAL DAYS

## 2018-10-26 NOTE — PROGRESS NOTES

## 2018-10-26 NOTE — MR AVS SNAPSHOT
After Visit Summary   10/26/2018    Armando Dc    MRN: [id number]           Patient Information     Date Of Birth          1975        Visit Information        Provider Department      10/26/2018 2:30 PM Zayra Roberts MD [facility]        Today's Diagnoses     Elderly primigravida in first trimester    -  1    Need for prophylactic vaccination and inoculation against influenza        Moderate persistent reactive airway disease without complication        Situs inversus        History of elevated glucose           Follow-ups after your visit        Your next 10 appointments already scheduled     Oct 31, 2018  9:15 AM CDT   ESTABLISHED PRENATAL with Zayra Roberts MD   [facility] ([facility])    [address] [place] [address] [place]  [address]  [facility] [address]   [phone]            Nov 07, 2018  7:45 AM CST   ESTABLISHED PRENATAL with Zayra Roberts MD   [facility] ([facility])    [address] [place] [address] [place]  [address]  [facility] [address]   [phone]            Nov 07, 2018  8:00 AM CST   CF LOOP with  PFL CF   [facility] Pulmonary Function Testing ([facility])    [address]  3rd [address]  [facility] [address]   [phone]            Nov 07, 2018  8:30 AM CST   (Arrive by 8:15 AM)   Return Bronchiectas Non CF with Lyn Hernández MD   [facility] ([facility])    [address]  [address]  [facility] [address]   [phone]            Nov 14, 2018  8:00 AM CST   Genetic Counseling with YADY GEN COUNSELOR 1   [facility] Maternal Fetal Medicine [facility] ([facility])    [address]  [place] [address]   [phone]            Nov 14, 2018  8:45 AM CST   MFM NUCHAL TRANSLUCENCY with REBECCAR1   [facility] Maternal Fetal Medicine Ultrasound -  [place] ([facility])    [address]  [facility] [address]   [phone]            Nov 14, 2018  9:15 AM CST   Radiology MD with [facility] ANSLEY DEGROOT   [facility] - [place] ([facility])    [address]  [facility] [address]   [phone]           Please arrive at the time given for your first appointment. This visit is used internally to schedule the physician's time during your ultrasound.              Who to contact     If you have questions or need follow up information about today's clinic visit or your schedule please contact [facility] directly at [phone].  Normal or non-critical lab and imaging results will be communicated to you by [employer], letter or phone within 4 business days after the clinic has received the results. If you do not hear from us within 7 days, please contact the clinic through [employer] or phone. If you have a critical or abnormal lab result, we will notify you by phone as soon as possible.  Submit refill requests through [employer] or call your pharmacy and they will forward the refill request to us. Please allow 3 business days for your refill to be completed.          Additional Information About Your Visit        [employer] Information     [employer] gives you secure access to your electronic health record. If you see a primary care provider, you can also send messages to your care team and make appointments. If you have questions, please call your primary care clinic.  If you do not have a primary care provider, please call [phone] and they will assist you.        Care EveryWhere ID     This is your Care EveryWhere ID. This could be used by other organizations to access your [facility] medical records  [id number]        Your Vitals Were     Pulse Temperature BMI (Body Mass Index)             81 97.1  F (36.2  C) (Oral) 25.15 kg/m2          Blood Pressure from  Last 3 Encounters:   10/26/18 134/85   10/17/18 133/83   08/07/18 113/78    Weight from Last 3 Encounters:   10/26/18 142 lb (64.4 kg)   10/17/18 138 lb (62.6 kg)   08/07/18 134 lb (60.8 kg)              We Performed the Following     FLU VACCINE, SPLIT VIRUS, IM (QUADRIVALENT) [85962]- >3 YRS     Vaccine Administration, Initial [85715]          Today's Medication Changes          These changes are accurate as of 10/26/18 11:59 PM.  If you have any questions, ask your nurse or doctor.               These medicines have changed or have updated prescriptions.        Dose/Directions    * albuterol (2.5 MG/3ML) 0.083% neb solution   This may have changed:  See the new instructions.   Used for:  Bronchiectasis without acute exacerbation (H)        USE ONE VIAL IN NEBULIZER TWICE DAILY   Quantity:  180 mL   Refills:  11       * albuterol 108 (90 Base) MCG/ACT inhaler   Commonly known as:  PROAIR HFA/PROVENTIL HFA/VENTOLIN HFA   This may have changed:  Another medication with the same name was changed. Make sure you understand how and when to take each.   Used for:  Kartagener syndrome        Dose:  2 puff   Inhale 2 puffs into the lungs every 6 hours as needed for shortness of breath / dyspnea or wheezing   Quantity:  1 Inhaler   Refills:  12       * Notice:  This list has 2 medication(s) that are the same as other medications prescribed for you. Read the directions carefully, and ask your doctor or other care provider to review them with you.             Primary Care Provider Office Phone # Fax #    Blake Sifuentes [phone] [address] [address]        Equal Access to Services     [facility] ALIYAH AH: Ana Godinez, sujatha angel, tee herring. So [facility] [phone].    ATENCIÓN: Si habla español, tiene a guevara disposición servicios gratuitos de asistencia lingüística. Llame al [phone].    We comply  "with applicable federal civil rights laws and [place] laws. We do not discriminate on the basis of race, color, national origin, age, disability, sex, sexual orientation, or gender identity.            Thank you!     Thank you for choosing [facility]  for your care. Our goal is always to provide you with excellent care. Hearing back from our patients is one way we can continue to improve our services. Please take a few minutes to complete the written survey that you may receive in the mail after your visit with us. Thank you!             Your Updated Medication List - Protect others around you: Learn how to safely use, store and throw away your medicines at www.disposemymeds.org.          This list is accurate as of 10/26/18 11:59 PM.  Always use your most recent med list.                   Brand Name Dispense Instructions for use Diagnosis    acetylcysteine 20 % nebulizer solution    MUCOMYST    120 mL    Inhale 2 mLs into the lungs 2 times daily    Kartagener's syndrome, Situs inversus, Pseudomonas aeruginosa colonization       * albuterol (2.5 MG/3ML) 0.083% neb solution     180 mL    USE ONE VIAL IN NEBULIZER TWICE DAILY    Bronchiectasis without acute exacerbation (H)       * albuterol 108 (90 Base) MCG/ACT inhaler    PROAIR HFA/PROVENTIL HFA/VENTOLIN HFA    1 Inhaler    Inhale 2 puffs into the lungs every 6 hours as needed for shortness of breath / dyspnea or wheezing    Kartagener syndrome       nebulizer Sindy      Nebulizer compressor - use with ALEK as directed        pantoprazole 40 MG EC tablet    PROTONIX    60 tablet    Take 1 tablet (40 mg) by mouth 2 times daily    Gastroesophageal reflux disease without esophagitis, Bronchiectasis without complication (H)       PRENATAL 1 PO      Take 1 tablet by mouth every evening        Syringe Luer Lock 20G X 1-1/2\" 5 ML Misc     60 each    1 each 2 times daily    Kartagener syndrome, Reactive airway disease       Water For Injection Sterile " Soln     250 mL    Inject 4 mLs into the vein 2 times daily    Kartagener syndrome, Reactive airway disease       * Notice:  This list has 2 medication(s) that are the same as other medications prescribed for you. Read the directions carefully, and ask your doctor or other care provider to review them with you.

## 2018-10-27 ASSESSMENT — ANXIETY QUESTIONNAIRES: GAD7 TOTAL SCORE: 6

## 2018-10-27 NOTE — PROGRESS NOTES
"Dates by blast transfer in [place] IVF with PGS, still on medication. Very anxious about this pregnancy and thinks mood will improve after the first trimester.  Rodriguez 7=6 today.  Labs reviewed and normal, has been taking a baby aspirin daily.  Happy to see fetal heartbeat on BSUS today. Discussed early GCT results and failed, needs GTT ASAP. She is known \"prediabetic\" so likely will need insulin this pregnancy. Discussed growth ultrasounds, fetal ECHO, BPP's and delivery prior to LO. Has first trimester screen scheduled. flu shot today. Providers/residents, weight gain/exercise, delivery discussed.  She does not want residents involved with her care because she is so high risk and first baby.  Weight gain discussed since right now is too much. RTC weekly due to anxiety for doptone check. BE    HPI:  Armando Dc is a 43 year old female No LMP recorded. Patient is pregnant. at 10w1d, Estimated Date of Delivery: May 23, 2019.  She denies vaginal bleeding and abdominal pain.  Anxious about this pregnancy and if it will work out because of all of her risk factors.  Has been in contact with her pulmonologist.   No other c/o.    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,      Pseudomonas aeruginosa colonization 2008     Reactive airway disease      Situs inversus      Uncomplicated asthma     Reactive airway disease       Past Surgical History:   Procedure Laterality Date     ADENOIDECTOMY       LAPAROSCOPIC CYSTECTOMY OVARIAN (BENIGN) Left 11/13/2017    Procedure: LAPAROSCOPIC CYSTECTOMY OVARIAN (BENIGN);  Left  Laparoscopy Ovarian Cystotomy, Hysteroscopy And Polpectomy With Myosure ;  Surgeon: Zayra Roberts MD;  Location: [facility] OR     OPERATIVE HYSTEROSCOPY WITH MORCELLATOR N/A 11/13/2017    Procedure: OPERATIVE HYSTEROSCOPY WITH MORCELLATOR;;  Surgeon: Zayra Roberts MD;  Location: [facility] " OR     TONSILLECTOMY       TONSILLECTOMY & ADENOIDECTOMY      8 years old     TRANSVAGINAL RETRIEVAL OVUM Bilateral 3/3/2016    IVF Procedure: TRANSVAGINAL RETRIEVAL OVUM;  Surgeon: Sunshine Gilliam MD;  Location: [facility]     uterine polyp  2013       Allergies: Review of patient's allergies indicates no known allergies.     EXAM:  Blood pressure 134/85, pulse 81, temperature 97.1  F (36.2  C), temperature source Oral, weight 142 lb (64.4 kg), not currently breastfeeding.   BMI= Body mass index is 25.15 kg/(m^2).  General - pleasant female in no acute distress.  Neck - supple without lymphadenopathy or thyromegaly.  Lungs - clear to auscultation bilaterally.  Heart - regular rate and rhythm without murmur.  Breast - no nodularity, asymmetry or nipple discharge bilaterally.  Abdomen - soft, nontender, nondistended, no hepatosplenomegaly.  Pelvic - EG: normal adult female, BUS: within normal limits, Vagina: well rugated, no discharge, Cervix: no lesions or CMT, closed/long Uterus: gravid, consistant with dates, mobile, Adnexae: no masses or tenderness.  Rectovaginal - deferred.  Musculoskeletal - no gross deformities.  Neurological - normal strength, sensation, and mental status.    Doptones were not attempted  Could not see well on transabdominal ultrasound, so transvaginal ultrasound done to confirm cardiac activity.  Fetal heart rate 160's    ASSESSMENT/PLAN:  (O09.511) Elderly primigravida in first trimester  (primary encounter diagnosis)  Comment: IVF  Plan: Discussed with her age and IVF would plan fetal echo and growth ultrasounds.  She plans to do first trimester screen and AFP was discussed.    (Z23) Need for prophylactic vaccination and inoculation against influenza  Plan: FLU VACCINE, SPLIT VIRUS, IM (QUADRIVALENT)         [61046]- >3 YRS, Vaccine Administration,         Initial [51018]    (J45.40) Moderate persistent reactive airway disease without complication  Comment: Sees pulmonary  Plan: We  will need to be in contact with pulmonary as needed during the pregnancy.  Discussed her medications should be fine as maternal breathing is most important.  Discussed risk of sleep apnea during pregnancy.    (Q89.3) Situs inversus  Plan: Plan level 2 and fetal echo.    (Z86.39) History of elevated glucose  Comment: Failed 1 hour, she has been testing fastings with her 's kit at home and sound elevated  Plan: Plan 3-hour GTT ASAP and likely is diabetic and will need insulin this pregnancy.  Discussed delivery 37-39 weeks on insulin, growth ultrasounds and weekly biophysical profiles.    Weight gain and exercise during pregnancy was discussed at today's visit.  The patient will return to clinic in 4 weeks for continued prenatal care.

## 2018-10-31 ENCOUNTER — PRENATAL OFFICE VISIT (OUTPATIENT)
Dept: OBGYN | Facility: CLINIC | Age: 43
End: 2018-10-31
Payer: COMMERCIAL

## 2018-10-31 VITALS
HEART RATE: 90 BPM | DIASTOLIC BLOOD PRESSURE: 81 MMHG | BODY MASS INDEX: 24.34 KG/M2 | WEIGHT: 137.4 LBS | SYSTOLIC BLOOD PRESSURE: 127 MMHG | HEIGHT: 63 IN | OXYGEN SATURATION: 97 %

## 2018-10-31 DIAGNOSIS — O09.511 ELDERLY PRIMIGRAVIDA IN FIRST TRIMESTER: ICD-10-CM

## 2018-10-31 LAB
GLUCOSE 1H P 100 G GLC PO SERPL-MCNC: 182 MG/DL (ref 60–179)
GLUCOSE 2H P 100 G GLC PO SERPL-MCNC: 116 MG/DL (ref 60–154)
GLUCOSE 3H P 100 G GLC PO SERPL-MCNC: 76 MG/DL (ref 60–139)
GLUCOSE P FAST SERPL-MCNC: 88 MG/DL (ref 60–94)

## 2018-10-31 PROCEDURE — 36415 COLL VENOUS BLD VENIPUNCTURE: CPT | Performed by: OBSTETRICS & GYNECOLOGY

## 2018-10-31 PROCEDURE — 82951 GLUCOSE TOLERANCE TEST (GTT): CPT | Performed by: OBSTETRICS & GYNECOLOGY

## 2018-10-31 PROCEDURE — 99207 ZZC PRENATAL VISIT: CPT | Performed by: OBSTETRICS & GYNECOLOGY

## 2018-10-31 PROCEDURE — 82952 GTT-ADDED SAMPLES: CPT | Performed by: OBSTETRICS & GYNECOLOGY

## 2018-10-31 NOTE — MR AVS SNAPSHOT
After Visit Summary   10/31/2018    Armando Dc    MRN: [id number]           Patient Information     Date Of Birth          1975        Visit Information        Provider Department      10/31/2018 9:15 AM Zayra Roberts MD [facility]        Today's Diagnoses     Elderly primigravida in first trimester           Follow-ups after your visit        Your next 10 appointments already scheduled     Nov 07, 2018  7:45 AM CST   ESTABLISHED PRENATAL with Zayra Roberts MD   [facility] ([facility]    [address] [place] [address]  [address]  [facility] [address]   [phone]            Nov 07, 2018  8:00 AM CST   CF LOOP with  PFL CF   [facility] Pulmonary Function Testing ([facility])    [address]  3rd Floor  [facility] [address]   [phone]            Nov 07, 2018  8:30 AM CST   (Arrive by 8:15 AM)   Return Bronchiectas Non CF with Lyn Hernández MD   [facility] ([facility])    [address]  [address]  [facility] [address]   [phone]            Nov 14, 2018  8:00 AM CST   Genetic Counseling with UR GEN COUNSELOR 1   [facility] Fetal [facility] - [place] ([facility])    [address]  [facility] [address]   [phone]            Nov 14, 2018  8:45 AM CST   MFM NUCHAL TRANSLUCENCY with BARBARA   [facility] Ultrasound - [place] ([facility])    [address]  [facility] [address] [phone]            Nov 14, 2018  9:15 AM CST   Radiology MD with YADY PANCHAL MD   [facility] M[facility] Fetal Medicine - [facility])    [address]  [facility] [address]   [phone]           Please arrive at the time given for your first appointment. This visit is used  "internally to schedule the physician's time during your ultrasound.              Who to contact     If you have questions or need follow up information about today's clinic visit or your schedule please contact [facility] directly at [phone].  Normal or non-critical lab and imaging results will be communicated to you by Netrepidhart, letter or phone within 4 business days after the clinic has received the results. If you do not hear from us within 7 days, please contact the clinic through Netrepidhart or phone. If you have a critical or abnormal lab result, we will notify you by phone as soon as possible.  Submit refill requests through ENEFpro or call your pharmacy and they will forward the refill request to us. Please allow 3 business days for your refill to be completed.          Additional Information About Your Visit        [employer] Information     ENEFpro gives you secure access to your electronic health record. If you see a primary care provider, you can also send messages to your care team and make appointments. If you have questions, please call your primary care clinic.  If you do not have a primary care provider, please call [phone] and they will assist you.        Care EveryWhere ID     This is your Care EveryWhere ID. This could be used by other organizations to access your [place] medical records  [id number]        Your Vitals Were     Pulse Height Pulse Oximetry BMI (Body Mass Index)          90 5' 3\" (1.6 m) 97% 24.34 kg/m2         Blood Pressure from Last 3 Encounters:   10/31/18 127/81   10/26/18 134/85   10/17/18 133/83    Weight from Last 3 Encounters:   10/31/18 137 lb 6.4 oz (62.3 kg)   10/26/18 142 lb (64.4 kg)   10/17/18 138 lb (62.6 kg)              We Performed the Following     Glucose tolerance gest std 100 gm 3 hr          Today's Medication Changes          These changes are accurate as of 10/31/18  9:25 AM.  If you have any questions, ask your nurse or doctor.       "         These medicines have changed or have updated prescriptions.        Dose/Directions    * albuterol (2.5 MG/3ML) 0.083% neb solution   This may have changed:  See the new instructions.   Used for:  Bronchiectasis without acute exacerbation (H)        USE ONE VIAL IN NEBULIZER TWICE DAILY   Quantity:  180 mL   Refills:  11       * albuterol 108 (90 Base) MCG/ACT inhaler   Commonly known as:  PROAIR HFA/PROVENTIL HFA/VENTOLIN HFA   This may have changed:  Another medication with the same name was changed. Make sure you understand how and when to take each.   Used for:  Kartagener syndrome        Dose:  2 puff   Inhale 2 puffs into the lungs every 6 hours as needed for shortness of breath / dyspnea or wheezing   Quantity:  1 Inhaler   Refills:  12       * Notice:  This list has 2 medication(s) that are the same as other medications prescribed for you. Read the directions carefully, and ask your doctor or other care provider to review them with you.             Primary Care Provider Office Phone # Fax #    Blake Sifuentes -[phone]       [facility] [address] [address]        Equal Access to Services     [facility]wardCHIKI : Hadii bernardo byrnes Soelissa, waaxda lumalia, qaybta kaalmaananya weiss, tee mead . So [facility] [phone].    ATENCIÓN: Si habla español, tiene a guevara disposición servicios gratuitos de asistencia lingüística. Ll[facility] [phone].    We comply with applicable federal civil rights laws and [place] laws. We do not discriminate on the basis of race, color, national origin, age, disability, sex, sexual orientation, or gender identity.            Thank you!     Thank you for choosing [facility]  for your care. Our goal is always to provide you with excellent care. Hearing back from our patients is one way we can continue to improve our services. Please take a few minutes to complete the written survey that you may  "receive in the mail after your visit with us. Thank you!             Your Updated Medication List - Protect others around you: Learn how to safely use, store and throw away your medicines at www.disposemymeds.org.          This list is accurate as of 10/31/18  9:25 AM.  Always use your most recent med list.                   Brand Name Dispense Instructions for use Diagnosis    acetylcysteine 20 % nebulizer solution    MUCOMYST    120 mL    Inhale 2 mLs into the lungs 2 times daily    Kartagener's syndrome, Situs inversus, Pseudomonas aeruginosa colonization       * albuterol (2.5 MG/3ML) 0.083% neb solution     180 mL    USE ONE VIAL IN NEBULIZER TWICE DAILY    Bronchiectasis without acute exacerbation (H)       * albuterol 108 (90 Base) MCG/ACT inhaler    PROAIR HFA/PROVENTIL HFA/VENTOLIN HFA    1 Inhaler    Inhale 2 puffs into the lungs every 6 hours as needed for shortness of breath / dyspnea or wheezing    Kartagener syndrome       nebulizer Sindy      Nebulizer compressor - use with ALEK as directed        pantoprazole 40 MG EC tablet    PROTONIX    60 tablet    Take 1 tablet (40 mg) by mouth 2 times daily    Gastroesophageal reflux disease without esophagitis, Bronchiectasis without complication (H)       PRENATAL 1 PO      Take 1 tablet by mouth every evening        Syringe Luer Lock 20G X 1-1/2\" 5 ML Misc     60 each    1 each 2 times daily    Kartagener syndrome, Reactive airway disease       Water For Injection Sterile Soln     250 mL    Inject 4 mLs into the vein 2 times daily    Kartagener syndrome, Reactive airway disease       * Notice:  This list has 2 medication(s) that are the same as other medications prescribed for you. Read the directions carefully, and ask your doctor or other care provider to review them with you.      "

## 2018-10-31 NOTE — PROGRESS NOTES
Doing 3 hour GTT today--sort of nauseated from that. Happy to hear doptones! Seeing pulmonary and doing PFT's. Has appt next week. BE

## 2018-11-06 ENCOUNTER — AMBULATORY - HEALTHEAST (OUTPATIENT)
Dept: MATERNAL FETAL MEDICINE | Facility: HOSPITAL | Age: 43
End: 2018-11-06

## 2018-11-06 DIAGNOSIS — Z34.90 PRENATAL CARE, ANTEPARTUM: ICD-10-CM

## 2018-11-07 ENCOUNTER — OFFICE VISIT (OUTPATIENT)
Dept: PULMONOLOGY | Facility: CLINIC | Age: 43
End: 2018-11-07
Attending: INTERNAL MEDICINE
Payer: COMMERCIAL

## 2018-11-07 ENCOUNTER — AMBULATORY - HEALTHEAST (OUTPATIENT)
Dept: MATERNAL FETAL MEDICINE | Facility: HOSPITAL | Age: 43
End: 2018-11-07

## 2018-11-07 VITALS
SYSTOLIC BLOOD PRESSURE: 126 MMHG | OXYGEN SATURATION: 100 % | DIASTOLIC BLOOD PRESSURE: 81 MMHG | RESPIRATION RATE: 17 BRPM | HEART RATE: 109 BPM | HEIGHT: 64 IN | WEIGHT: 139 LBS | BODY MASS INDEX: 23.73 KG/M2

## 2018-11-07 DIAGNOSIS — Q34.8 PCD (PRIMARY CILIARY DYSKINESIA): Primary | ICD-10-CM

## 2018-11-07 DIAGNOSIS — J47.9 BRONCHIECTASIS WITHOUT COMPLICATION (H): Primary | Chronic | ICD-10-CM

## 2018-11-07 LAB
EXPTIME-PRE: 10.92 SEC
FEF2575-%PRED-PRE: 21 %
FEF2575-PRE: 0.61 L/SEC
FEF2575-PRED: 2.89 L/SEC
FEFMAX-%PRED-PRE: 66 %
FEFMAX-PRE: 4.54 L/SEC
FEFMAX-PRED: 6.87 L/SEC
FEV1-%PRED-PRE: 54 %
FEV1-PRE: 1.47 L
FEV1FEV6-PRE: 61 %
FEV1FEV6-PRED: 83 %
FEV1FVC-PRE: 56 %
FEV1FVC-PRED: 82 %
FIFMAX-PRE: 4.45 L/SEC
FVC-%PRED-PRE: 79 %
FVC-PRE: 2.62 L
FVC-PRED: 3.3 L
GRAM STN SPEC: NORMAL
Lab: NORMAL
SPECIMEN SOURCE: NORMAL

## 2018-11-07 PROCEDURE — 87077 CULTURE AEROBIC IDENTIFY: CPT | Performed by: INTERNAL MEDICINE

## 2018-11-07 PROCEDURE — 87186 SC STD MICRODIL/AGAR DIL: CPT | Performed by: INTERNAL MEDICINE

## 2018-11-07 PROCEDURE — G0463 HOSPITAL OUTPT CLINIC VISIT: HCPCS | Mod: ZF

## 2018-11-07 PROCEDURE — 87070 CULTURE OTHR SPECIMN AEROBIC: CPT | Performed by: INTERNAL MEDICINE

## 2018-11-07 PROCEDURE — 87205 SMEAR GRAM STAIN: CPT | Performed by: INTERNAL MEDICINE

## 2018-11-07 PROCEDURE — 87185 SC STD ENZYME DETCJ PER NZM: CPT | Performed by: INTERNAL MEDICINE

## 2018-11-07 PROCEDURE — 87107 FUNGI IDENTIFICATION MOLD: CPT | Performed by: INTERNAL MEDICINE

## 2018-11-07 ASSESSMENT — PAIN SCALES - GENERAL: PAINLEVEL: NO PAIN (0)

## 2018-11-07 NOTE — MR AVS SNAPSHOT
After Visit Summary   11/7/2018    Armando Dc    MRN: [id number]           Patient Information     Date Of Birth          1975        Visit Information        Provider Department      11/7/2018 8:30 AM Lyn Hernández MD [facility]        Today's Diagnoses     PCD (primary ciliary dyskinesia)    -  1      Care Instructions    Bronchiectasis Self-Care Plan       MRN: [id number]   Clinic Date: November 7, 2018   Patient: Armando Dc     RECOMMENDATIONS:   When doing nebs, inhale through your mouth and exhale through nose.  If you grow out strep again, I will treat.  If your ears bother you, let me know.  Will plan on overnight oximetry sometime in the second trimester.  Will need to follow every 4 to 6 weeks.    YOUR GOAL:  Enjoy your pregnancy.    CF Nurse Line:  Sheila Lao and Bonnie: [phone]   Sybil Peng, RT: [phone]       Annual Studies:   No results found for: IGG  No results found for: INS  There are no preventive care reminders to display for this patient.      Pulmonary Function Tests  FEV1: amount of air you can blow out in 1 second  FVC: total amount of air you can take in and blow out    Your Goals:         PFT Latest Ref Rng & Units 11/7/2018   FVC L 2.62   FEV1 L 1.47   FVC% % 79   FEV1% % 54          Airway Clearance: The Most Important Way to Keep Your Lungs Healthy  Vest Settings:    Hill-Rom Frequencies: 8, 9, 10 Pressure 10 Then, Frequencies 18, 19, 20 Pressure 6      RespirTech: Quick Start with Pressure of     Do each frequency for 5 minutes; Deflate vest after each frequency & cough 3 times before beginning the next setting.    Vest and Neb Therapy should be done 2 times/day.      Wt Readings from Last 3 Encounters:   11/07/18 63 kg (139 lb)   10/31/18 62.3 kg (137 lb 6.4 oz)   10/26/18 64.4 kg (142 lb)       Body mass index is 23.86 kg/(m^2).           Last A1C Results:   Hemoglobin A1C   Date Value Ref Range Status    09/25/2018 5.8 (H) 0 - 5.6 % Final     Comment:     Normal <5.7% Prediabetes 5.7-6.4%  Diabetes 6.5% or higher - adopted from ADA   consensus guidelines.                                Follow-ups after your visit        Follow-up notes from your care team     Return every 4 to 6 weeks .      Your next 10 appointments already scheduled     Jan 22, 2019  8:00 AM CST   CF LOOP with UC PFL CF   [facility] Pulmonary Function Testing ([facility])    [address] [place] [address]   [phone]            Jan 22, 2019  8:40 AM CST   (Arrive by 8:25 AM)   Return Bronchiectas Non CF with Lyn Hernández MD   [facility] ([facility])    [address] [place] [address]   [phone]            Feb 19, 2019  7:30 AM CST   CF LOOP with UC PFL CF   [facility] Pulmonary Function Testing ([facility])    [address] [place] [address]   [phone]            Feb 19, 2019  8:00 AM CST   (Arrive by 7:45 AM)   Return Bronchiectas Non CF with Lyn Hernández MD   [facility] ([facility])    [address] [place] [address]   [phone]            Mar 27, 2019  7:30 AM CDT   CF LOOP with UC PFL [facility] Pulmonary Function Testing ([facility])    [address] [place] [address]   [phone]            Mar 27, 2019  7:50 AM CDT   (Arrive by 7:35 AM)   Return Bronchiectas Non CF with Lyn Hernández MD   [facility] ([facility])    [address] [place] [address]  [address] [place] [address]   [phone]            Apr 17, 2019  7:30 AM CDT   CF LOOP with UC PFL CF   [facility] Pulmonary Function Testing ([facility])    [address]  3rd [facility] [address]   [phone]            Apr 17, 2019  7:50 AM CDT   (Arrive by 7:35 AM)   Return Bronchiectas Non CF with Lyn Hernández MD   [facility] ([facility])    [address]  [address]  [facility] [address]   [phone]            May 08, 2019  7:30 AM CDT   CF LOOP with UC PFL CF   [facility] Pulmonary Function Testing ([facility])    [address]  [address] [facility] [address]   [phone]            May 08, 2019  7:50 AM CDT   (Arrive by 7:35 AM)   Return Bronchiectas Non CF with Lyn Hernández MD   [facility] ([facility])    [address] [place] [address]  [address] [place] [address]   [phone]              Who to contact     If you have questions or need follow up information about today's clinic visit or your schedule please contact [facility] directly at [phone].  Normal or non-critical lab and imaging results will be communicated to you by PM Pediatricshart, letter or phone within 4 business days after the clinic has received the results. If you do not hear from us within 7 days, please contact the clinic through PM Pediatricshart or phone. If you have a critical or abnormal lab result, we will notify you by phone as soon as possible.  Submit refill requests through Face-Me or call your pharmacy and they will forward the refill request to us. Please allow 3 business days for your refill to be completed.          Additional Information About Your Visit        PM [email or web address] Information     Face-Me gives you secure access to your electronic health record. If you see a primary care provider, you can also send messages to your care team and make appointments. If you have questions, please call your primary care clinic.  If you do not have  "a primary care provider, please call [phone] and they will assist you.        Care EveryWhere ID     This is your Care EveryWhere ID. This could be used by other organizations to access your [place] medical records  [id number]        Your Vitals Were     Pulse Respirations Height Pulse Oximetry BMI (Body Mass Index)       109 17 1.626 m (5' 4\") 100% 23.86 kg/m2        Blood Pressure from Last 3 Encounters:   11/07/18 126/81   10/31/18 127/81   10/26/18 134/85    Weight from Last 3 Encounters:   11/07/18 63 kg (139 lb)   10/31/18 62.3 kg (137 lb 6.4 oz)   10/26/18 64.4 kg (142 lb)              Today, you had the following     No orders found for display         Today's Medication Changes          These changes are accurate as of 11/7/18  8:58 AM.  If you have any questions, ask your nurse or doctor.               These medicines have changed or have updated prescriptions.        Dose/Directions    * albuterol (2.5 MG/3ML) 0.083% neb solution   This may have changed:  See the new instructions.   Used for:  Bronchiectasis without acute exacerbation (H)        USE ONE VIAL IN NEBULIZER TWICE DAILY   Quantity:  180 mL   Refills:  11       * albuterol 108 (90 Base) MCG/ACT inhaler   Commonly known as:  PROAIR HFA/PROVENTIL HFA/VENTOLIN HFA   This may have changed:  Another medication with the same name was changed. Make sure you understand how and when to take each.   Used for:  Kartagener syndrome        Dose:  2 puff   Inhale 2 puffs into the lungs every 6 hours as needed for shortness of breath / dyspnea or wheezing   Quantity:  1 Inhaler   Refills:  12       * Notice:  This list has 2 medication(s) that are the same as other medications prescribed for you. Read the directions carefully, and ask your doctor or other care provider to review them with you.      Stop taking these medicines if you haven't already. Please contact your care team if you have questions.     pantoprazole 40 MG EC tablet   Commonly known " as:  PROTONIX   Stopped by:  Lyn Hernández MD                    Primary Care Provider Office Phone # Fax #    Blake Sifuentes -[phone]       [facility] [place] [id number]  [facility] [place] [address]        Equal Access to Services     [facility] HospitalCHIKI : Hadii aad ku hadasho Soomaali, waaxda luqadaha, qaybta kaalmada adeegyada, waxay javanin hayikern marlon luannezhen mead . So [facility] [phone].    ATENCIÓN: Si habla español, tiene a guevara disposición servicios gratuitos de asistencia lingüística. LlCleveland Clinic Medina Hospital [phone].    We comply with applicable federal civil rights laws and [place] laws. We do not discriminate on the basis of race, color, national origin, age, disability, sex, sexual orientation, or gender identity.            Thank you!     Thank you for choosing [facility]  for your care. Our goal is always to provide you with excellent care. Hearing back from our patients is one way we can continue to improve our services. Please take a few minutes to complete the written survey that you may receive in the mail after your visit with us. Thank you!             Your Updated Medication List - Protect others around you: Learn how to safely use, store and throw away your medicines at www.disposemymeds.org.          This list is accurate as of 11/7/18  8:58 AM.  Always use your most recent med list.                   Brand Name Dispense Instructions for use Diagnosis    acetylcysteine 20 % nebulizer solution    MUCOMYST    120 mL    Inhale 2 mLs into the lungs 2 times daily    Kartagener's syndrome, Situs inversus, Pseudomonas aeruginosa colonization       * albuterol (2.5 MG/3ML) 0.083% neb solution     180 mL    USE ONE VIAL IN NEBULIZER TWICE DAILY    Bronchiectasis without acute exacerbation (H)       * albuterol 108 (90 Base) MCG/ACT inhaler    PROAIR HFA/PROVENTIL HFA/VENTOLIN HFA    1 Inhaler    Inhale 2 puffs into the lungs every 6 hours as needed for  "shortness of breath / dyspnea or wheezing    Kartagener syndrome       aspirin 81 MG tablet      Take 81 mg by mouth daily        nebulizer Sindy      Nebulizer compressor - use with ALEK as directed        PRENATAL 1 PO      Take 1 tablet by mouth every evening        PROGESTERONE IM      Inject 10 mLs into the muscle        Syringe Luer Lock 20G X 1-1/2\" 5 ML Misc     60 each    1 each 2 times daily    Kartagener syndrome, Reactive airway disease       Water For Injection Sterile Soln     250 mL    Inject 4 mLs into the vein 2 times daily    Kartagener syndrome, Reactive airway disease       * Notice:  This list has 2 medication(s) that are the same as other medications prescribed for you. Read the directions carefully, and ask your doctor or other care provider to review them with you.      "

## 2018-11-07 NOTE — PATIENT INSTRUCTIONS
Bronchiectasis Self-Care Plan       MRN: [id number]   Clinic Date: November 7, 2018   Patient: Armando Dc     RECOMMENDATIONS:   When doing nebs, inhale through your mouth and exhale through nose.  If you grow out strep again, I will treat.  If your ears bother you, let me know.  Will plan on overnight oximetry sometime in the second trimester.  Will need to follow every 4 to 6 weeks.    YOUR GOAL:  Enjoy your pregnancy.    CF Nurse Line:  Tami Lao: [phone]   Syibl Peng, RT: [phone]       Annual Studies:   No results found for: IGG  No results found for: INS  There are no preventive care reminders to display for this patient.      Pulmonary Function Tests  FEV1: amount of air you can blow out in 1 second  FVC: total amount of air you can take in and blow out    Your Goals:         PFT Latest Ref Rng & Units 11/7/2018   FVC L 2.62   FEV1 L 1.47   FVC% % 79   FEV1% % 54          Airway Clearance: The Most Important Way to Keep Your Lungs Healthy  Vest Settings:    Hill-Rom Frequencies: 8, 9, 10 Pressure 10 Then, Frequencies 18, 19, 20 Pressure 6      RespirTech: Quick Start with Pressure of     Do each frequency for 5 minutes; Deflate vest after each frequency & cough 3 times before beginning the next setting.    Vest and Neb Therapy should be done 2 times/day.      Wt Readings from Last 3 Encounters:   11/07/18 63 kg (139 lb)   10/31/18 62.3 kg (137 lb 6.4 oz)   10/26/18 64.4 kg (142 lb)       Body mass index is 23.86 kg/(m^2).           Last A1C Results:   Hemoglobin A1C   Date Value Ref Range Status   09/25/2018 5.8 (H) 0 - 5.6 % Final     Comment:     Normal <5.7% Prediabetes 5.7-6.4%  Diabetes 6.5% or higher - adopted from ADA   consensus guidelines.

## 2018-11-07 NOTE — PROGRESS NOTES
[facility]  [facility]  November 7, 2018         Assessment and Plan:   Armando Dc is a 43 year old female with PCD with bronchiectasis.    1. Bronchiectasis lung disease with moderately-severe obstruction:  Armando presents today being 12 weeks' pregnant.  She says after she found out she was pregnant she has not done her bronchial drainage therapy with her vest.  She has only been doing the Aerobika.  She says because of the pregnancy that it has been difficult to consider doing this.  With this she does appear that she has had a decline in pulmonary function and does have some crackles on her exam today.  I did ask rAmando to consider reinitiating her vest therapy as I do think that optimizing her pulmonary status is essential for the success of her pregnancy.  She is agreeable to this plan.  She did meet with Timmy Peng, our respiratory therapist, today who fitted a vest that would be appropriate for her to use during her second and third trimesters.  Armando does continue to show evidence of Pseudomonas in her sputum.  I have recommended at this time.   -- Armando reinitiate doing vest therapy 1-2 times per day.     -- She should continue on her present nebulized therapy.  We did review this medications today and do appear to be appropriate for pregnancy.     2.  Otitis.  Armando did have opaque tympanic membranes bilaterally.  The left being a little bit more inflamed.  She has no symptoms consistent with infection at this time, however.  We will follow her for any symptom change.  If she shows evidence of a secondary organism in her sputum today I would consider tretment.     3.  Second trimester pregnancy.  Armando is now beginning her second trimester of her pregnancy.  She has overall felt well.  She is concerned about her oxygenation.  I assured her today that her sats are currently quite good.  We discussed performing an overnight oximetry later in her pregnancy to make  sure that her oxygen also is maintained during her sleep.     4.  Gastroesophageal reflux.  Armando is struggling with gastroesophageal reflux.  We did discuss using either Tums or protonix.      5.  Psychosocial.  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  She is currently at the [facility] and enjoying this very much.  Her  is going to have to return to [place] because of his father's illness.  She is not certain who will be giving her progesterone shots that she needs until 11/18 but feels that she will be able to work it out.     Immunizations: received flu shot    Lyn Hernández MD MPH  Associate Professor of Medicine  Pulmonary, Allergy, Critical Care and Sleep Medicine      Interval History:     Armando reports that she is continuing to produce sputum that is green in color.  She denies any hemoptysis.  Her cough frequency and sputum volume are unchanged.  She is doing an Aerobika and nebulized therapy twice daily.  She is very much enjoying her fellowship.          Review of Systems:     CONSTITUTIONAL: no fever, no chills, no change in weight, decrease in energy, no change in appetite    INTEGUMENTARY/SKIN: no rash, no obvious new lesions    ENT/MOUTH: no sore throat, no new sinus pain, increased nasal drainage, no hearing loss, no ear ringing     RESPIRATORY: see interval history    CV: no chest pain, no palpitations, no peripheral edema, no orthopnea, no PND    GI: no nausea, no vomiting, no change in stools, no fatty stools, ++ GERD, no abdominal pain    : no dysuria, ++ urinary frequency    MUSCULOSKELETAL: no myalgias, no arthralgias    ENDOCRINE: no excessive thirst    NEURO:  No headache, no numbness, no tingling    SLEEP: better toward the end of this tri-mester    PSYCHIATRIC: mood nervous          Past Medical and Surgical History:     Past Medical History:   Diagnosis Date     Chronic sinusitis      Endometriosis     left ovary     Infertility      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,      Pseudomonas aeruginosa colonization 2008     Reactive airway disease      Situs inversus      Uncomplicated asthma     Reactive airway disease     Past Surgical History:   Procedure Laterality Date     ADENOIDECTOMY       LAPAROSCOPIC CYSTECTOMY OVARIAN (BENIGN) Left 11/13/2017    Procedure: LAPAROSCOPIC CYSTECTOMY OVARIAN (BENIGN);  Left  Laparoscopy Ovarian Cystotomy, Hysteroscopy And Polpectomy With Myosure ;  Surgeon: Zayra Roberts MD;  [facility]     OPERATIVE HYSTEROSCOPY WITH MORCELLATOR N/A 11/13/2017    Procedure: OPERATIVE HYSTEROSCOPY WITH MORCELLATOR;;  Surgeon: Zayra Roberts MD;  [facility]     TONSILLECTOMY       TONSILLECTOMY & ADENOIDECTOMY      8 years old     TRANSVAGINAL RETRIEVAL OVUM Bilateral 3/3/2016    IVF Procedure: TRANSVAGINAL RETRIEVAL OVUM;  Surgeon: Sunshine Gilliam MD;  Location: [facility]     uterine polyp  2013           Family History:     Family History   Problem Relation Age of Onset     Hyperlipidemia Mother      Diabetes Mother      Hypertension Mother      Hyperlipidemia Father      Hypertension Father      Diabetes Maternal Grandmother      Diabetes Maternal Grandfather             Social History:     Social History     Social History     Marital status:      Spouse name: N/A     Number of children: 0     Years of education: N/A     Occupational History     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  Immigrated in 2006 from [place] ([facility] No children.  Parents in [place], 2 younger sisters and a brother, she is eldest.             Medications:     Current  "Outpatient Prescriptions   Medication     acetylcysteine (MUCOMYST) 20 % nebulizer solution     albuterol (2.5 MG/3ML) 0.083% nebulizer solution     albuterol (PROAIR HFA/PROVENTIL HFA/VENTOLIN HFA) 108 (90 BASE) MCG/ACT Inhaler     aspirin 81 MG tablet     Prenatal MV-Min-Fe Fum-FA-DHA (PRENATAL 1 PO)     PROGESTERONE IM     Respiratory Therapy Supplies (NEBULIZER) SUZANNA     Syringe/Needle, Disp, (SYRINGE LUER LOCK) 20G X 1-1/2\" 5 ML MISC     Water For Injection Sterile SOLN     No current facility-administered medications for this visit.             Physical Exam:   /81  Pulse 109  Resp 17  Ht 1.626 m (5' 4\")  Wt 63 kg (139 lb)  SpO2 100%  BMI 23.86 kg/m2    Constitutional:   Awake, alert and in no apparent distress     Eyes:   nonicteric     ENT:   oral mucosa moist without lesions, opaque tm bilaterally, erythema left, bilateral mucosa normal     Neck:   Supple without supraclavicular or cervical lymphadenopathy     Lungs:   Fair air flow.  No crackles. No rhonchi.  occasional wheezes.     Cardiovascular:   Normal S1 and S2.  Tachycardic RR.  No murmur, gallop or rub.     Abdomen:   NABS, soft, nontender, nondistended.      Musculoskeletal:   No edema, no digital clubbing present     Neurologic:   Alert and conversant.     Skin:   Warm, dry.  No rash on limited exam.             Data:   All laboratory and imaging data reviewed.    Cystic Fibrosis Culture  Specimen Description   Date Value Ref Range Status   11/07/2018 Sputum  Final   11/07/2018 Sputum  Final   10/17/2018 Midstream Urine  Final    Culture Micro   Date Value Ref Range Status   11/07/2018 Light growth  Normal otto    Preliminary   11/07/2018 Light growth  Streptococcus pneumoniae   (A)  Preliminary   11/07/2018 (A)  Preliminary    Moderate growth  Mucoid strain  Non lactose fermenting gram negative rods     11/07/2018 (A)  Preliminary    Light growth  Filamentous fungus isolated  Referred to mycology for identification     11/07/2018 " Light growth  Haemophilus influenzae   (A)  Preliminary   11/07/2018 Culture in progress  Preliminary        Recent Results (from the past 168 hour(s))   General PFT Lab (Please always keep checked)    Collection Time: 11/07/18  7:55 AM   Result Value Ref Range    FVC-Pred 3.30 L    FVC-Pre 2.62 L    FVC-%Pred-Pre 79 %    FEV1-Pre 1.47 L    FEV1-%Pred-Pre 54 %    FEV1FVC-Pred 82 %    FEV1FVC-Pre 56 %    FEFMax-Pred 6.87 L/sec    FEFMax-Pre 4.54 L/sec    FEFMax-%Pred-Pre 66 %    FEF2575-Pred 2.89 L/sec    FEF2575-Pre 0.61 L/sec    DZS1278-%Pred-Pre 21 %    ExpTime-Pre 10.92 sec    FIFMax-Pre 4.45 L/sec    FEV1FEV6-Pred 83 %    FEV1FEV6-Pre 61 %   Sputum Culture Aerobic Bacterial    Collection Time: 11/07/18  8:00 AM   Result Value Ref Range    Specimen Description Sputum     Culture Micro Light growth  Normal otto       Culture Micro Light growth  Streptococcus pneumoniae   (A)     Culture Micro (A)      Moderate growth  Mucoid strain  Non lactose fermenting gram negative rods      Culture Micro (A)      Light growth  Filamentous fungus isolated  Referred to mycology for identification      Culture Micro Light growth  Haemophilus influenzae   (A)     Culture Micro Culture in progress    Gram stain    Collection Time: 11/07/18  8:00 AM   Result Value Ref Range    Specimen Description Sputum     Special Requests Screen     Gram Stain <10 Squamous epithelial cells/low power field     Gram Stain >25 PMNs/low power field     Gram Stain Few  Mixed gram negative and positive otto          PFT: Moderately-severe obstructive lung disease.  When compared to 8/7/2018, the FEV1 and FVC have little change.  The decrease in FVC may represent air trapping v. restrictive physiology.  Lung volumes would be necessary to determine.

## 2018-11-07 NOTE — LETTER
11/7/2018       RE: Armando Dc  [address]  [place] [address]     Dear Colleague,    Thank you for referring your patient, Armando Dc, to the [facility] at [facility]. Please see a copy of my visit note below.    [facility]  November 7, 2018         Assessment and Plan:   Armando Dc is a 43 year old female with PCD with bronchiectasis.    1. Bronchiectasis lung disease with moderately-severe obstruction:  Armando presents today being 12 weeks' pregnant.  She says after she found out she was pregnant she has not done her bronchial drainage therapy with her vest.  She has only been doing the Aerobika.  She says because of the pregnancy that it has been difficult to consider doing this.  With this she does appear that she has had a decline in pulmonary function and does have some crackles on her exam today.  I did ask Armando to consider reinitiating her vest therapy as I do think that optimizing her pulmonary status is essential for the success of her pregnancy.  She is agreeable to this plan.  She did meet with Timmy Peng, our respiratory therapist, today who fitted a vest that would be appropriate for her to use during her second and third trimesters.  Armando does continue to show evidence of Pseudomonas in her sputum.  I have recommended at this time.   -- Armando reinitiate doing vest therapy 1-2 times per day.     -- She should continue on her present nebulized therapy.  We did review this medications today and do appear to be appropriate for pregnancy.     2.  Otitis.  Armando did have opaque tympanic membranes bilaterally.  The left being a little bit more inflamed.  She has no symptoms consistent with infection at this time, however.  We will follow her for any symptom change.  If she shows evidence of a secondary organism in her sputum today I would consider tretment.      3.  Second trimester pregnancy.  Armando is now beginning her second trimester of her pregnancy.  She has overall felt well.  She is concerned about her oxygenation.  I assured her today that her sats are currently quite good.  We discussed performing an overnight oximetry later in her pregnancy to make sure that her oxygen also is maintained during her sleep.     4.  Gastroesophageal reflux.  Armando is struggling with gastroesophageal reflux.  We did discuss using either Tums or protonix.      5.  Psychosocial.  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  She is currently at the [facility] and enjoying this very much.  Her  is going to have to return to [facility] because of his father's illness.  She is not certain who will be giving her progesterone shots that she needs until 11/18 but feels that she will be able to work it out.     Immunizations: received flu shot    Lyn Hernández MD MPH  Associate Professor of Medicine  Pulmonary, Allergy, Critical Care and Sleep Medicine      Interval History:     Armando reports that she is continuing to produce sputum that is green in color.  She denies any hemoptysis.  Her cough frequency and sputum volume are unchanged.  She is doing an Aerobika and nebulized therapy twice daily.  She is very much enjoying her fellowship.          Review of Systems:     CONSTITUTIONAL: no fever, no chills, no change in weight, decrease in energy, no change in appetite    INTEGUMENTARY/SKIN: no rash, no obvious new lesions    ENT/MOUTH: no sore throat, no new sinus pain, increased nasal drainage, no hearing loss, no ear ringing     RESPIRATORY: see interval history    CV: no chest pain, no palpitations, no peripheral edema, no orthopnea, no PND    GI: no nausea, no vomiting, no change in stools, no fatty stools, ++ GERD, no abdominal pain    : no dysuria, ++ urinary frequency    MUSCULOSKELETAL: no myalgias, no arthralgias    ENDOCRINE: no excessive thirst    NEURO:  No  headache, no numbness, no tingling    SLEEP: better toward the end of this tri-mester    PSYCHIATRIC: mood nervous          Past Medical and Surgical History:     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,      Pseudomonas aeruginosa colonization 2008     Reactive airway disease      Situs inversus      Uncomplicated asthma     Reactive airway disease     Past Surgical History:   Procedure Laterality Date     ADENOIDECTOMY       LAPAROSCOPIC CYSTECTOMY OVARIAN (BENIGN) Left 11/13/2017    Procedure: LAPAROSCOPIC CYSTECTOMY OVARIAN (BENIGN);  Left  Laparoscopy Ovarian Cystotomy, Hysteroscopy And Polpectomy With Myosure ;  Surgeon: Zayra Roberts MD;  [facility]     OPERATIVE HYSTEROSCOPY WITH MORCELLATOR N/A 11/13/2017    Procedure: OPERATIVE HYSTEROSCOPY WITH MORCELLATOR;;  Surgeon: Zayra Roberts MD;  [facility]     TONSILLECTOMY       TONSILLECTOMY & ADENOIDECTOMY      8 years old     TRANSVAGINAL RETRIEVAL OVUM Bilateral 3/3/2016    IVF Procedure: TRANSVAGINAL RETRIEVAL OVUM;  Surgeon: Sunshine Gilliam MD;  Location: [facility]     uterine polyp  2013           Family History:     Family History   Problem Relation Age of Onset     Hyperlipidemia Mother      Diabetes Mother      Hypertension Mother      Hyperlipidemia Father      Hypertension Father      Diabetes Maternal Grandmother      Diabetes Maternal Grandfather             Social History:     Social History     Social History     Marital status:      Spouse name: N/A     Number of children: 0     Years of education: N/A     Occupational History     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  Immigrated in 2006 from [place] ([facility] No children.  Parents in [place], 2 younger sisters and a brother, she is eldest.             Medications:     Current Outpatient Prescriptions   Medication     acetylcysteine (MUCOMYST) 20 % nebulizer solution     albuterol (2.5 MG/3ML) 0.083% nebulizer solution     albuterol (PROAIR HFA/PROVENTIL HFA/VENTOLIN HFA) 108 (90 BASE) MCG/ACT Inhaler     aspirin 81 MG tablet     Prenatal MV-Min-Fe Fum-FA-DHA (PRENATAL 1 PO)     PROGESTERONE IM     Respiratory Therapy Supplies (NEBULIZER) SUZANNA     Syringe/Needle, Disp, (SYRINGE LUER LOCK) 20G X 1-1/2\" 5 ML MISC     Water For Injection Sterile SOLN     No current facility-administered medications for this visit.             Physical Exam:   /81  Pulse 109  Resp 17  Ht 1.626 m (5' 4\")  Wt 63 kg (139 lb)  SpO2 100%  BMI 23.86 kg/m2    Constitutional:   Awake, alert and in no apparent distress     Eyes:   nonicteric     ENT:   oral mucosa moist without lesions, opaque tm bilaterally, erythema left, bilateral mucosa normal     Neck:   Supple without supraclavicular or cervical lymphadenopathy     Lungs:   Fair air flow.  No crackles. No rhonchi.  occasional wheezes.     Cardiovascular:   Normal S1 and S2.  Tachycardic RR.  No murmur, gallop or rub.     Abdomen:   NABS, soft, nontender, nondistended.      Musculoskeletal:   No edema, no digital clubbing present     Neurologic:   Alert and conversant.     Skin:   Warm, dry.  No rash on limited exam.             Data:   All laboratory and imaging data reviewed.    Cystic Fibrosis Culture  Specimen Description   Date Value Ref Range Status   11/07/2018 Sputum  Final   11/07/2018 Sputum  Final   10/17/2018 Midstream Urine  Final    Culture Micro   Date Value Ref Range Status   11/07/2018 Light growth  Normal otto    Preliminary   11/07/2018 " Light growth  Streptococcus pneumoniae   (A)  Preliminary   11/07/2018 (A)  Preliminary    Moderate growth  Mucoid strain  Non lactose fermenting gram negative rods     11/07/2018 (A)  Preliminary    Light growth  Filamentous fungus isolated  Referred to mycology for identification     11/07/2018 Light growth  Haemophilus influenzae   (A)  Preliminary   11/07/2018 Culture in progress  Preliminary        Recent Results (from the past 168 hour(s))   General PFT Lab (Please always keep checked)    Collection Time: 11/07/18  7:55 AM   Result Value Ref Range    FVC-Pred 3.30 L    FVC-Pre 2.62 L    FVC-%Pred-Pre 79 %    FEV1-Pre 1.47 L    FEV1-%Pred-Pre 54 %    FEV1FVC-Pred 82 %    FEV1FVC-Pre 56 %    FEFMax-Pred 6.87 L/sec    FEFMax-Pre 4.54 L/sec    FEFMax-%Pred-Pre 66 %    FEF2575-Pred 2.89 L/sec    FEF2575-Pre 0.61 L/sec    [id number]-%Pred-Pre 21 %    ExpTime-Pre 10.92 sec    FIFMax-Pre 4.45 L/sec    FEV1FEV6-Pred 83 %    FEV1FEV6-Pre 61 %   Sputum Culture Aerobic Bacterial    Collection Time: 11/07/18  8:00 AM   Result Value Ref Range    Specimen Description Sputum     Culture Micro Light growth  Normal otto       Culture Micro Light growth  Streptococcus pneumoniae   (A)     Culture Micro (A)      Moderate growth  Mucoid strain  Non lactose fermenting gram negative rods      Culture Micro (A)      Light growth  Filamentous fungus isolated  Referred to mycology for identification      Culture Micro Light growth  Haemophilus influenzae   (A)     Culture Micro Culture in progress    Gram stain    Collection Time: 11/07/18  8:00 AM   Result Value Ref Range    Specimen Description Sputum     Special Requests Screen     Gram Stain <10 Squamous epithelial cells/low power field     Gram Stain >25 PMNs/low power field     Gram Stain Few  Mixed gram negative and positive otto          PFT: Moderately-severe obstructive lung disease.  When compared to 8/7/2018, the FEV1 and FVC have little change.  The decrease in FVC may  represent air trapping v. restrictive physiology.  Lung volumes would be necessary to determine.        Again, thank you for allowing me to participate in the care of your patient.      Sincerely,    Lyn Hernández MD

## 2018-11-09 ENCOUNTER — OFFICE VISIT - HEALTHEAST (OUTPATIENT)
Dept: MATERNAL FETAL MEDICINE | Facility: HOSPITAL | Age: 43
End: 2018-11-09

## 2018-11-09 ENCOUNTER — TRANSFERRED RECORDS (OUTPATIENT)
Dept: HEALTH INFORMATION MANAGEMENT | Facility: CLINIC | Age: 43
End: 2018-11-09

## 2018-11-09 ENCOUNTER — RECORDS - HEALTHEAST (OUTPATIENT)
Dept: ADMINISTRATIVE | Facility: OTHER | Age: 43
End: 2018-11-09

## 2018-11-09 ENCOUNTER — CARE COORDINATION (OUTPATIENT)
Dept: PULMONOLOGY | Facility: CLINIC | Age: 43
End: 2018-11-09

## 2018-11-09 ENCOUNTER — AMBULATORY - HEALTHEAST (OUTPATIENT)
Dept: LAB | Facility: HOSPITAL | Age: 43
End: 2018-11-09

## 2018-11-09 ENCOUNTER — RECORDS - HEALTHEAST (OUTPATIENT)
Dept: ULTRASOUND IMAGING | Facility: HOSPITAL | Age: 43
End: 2018-11-09

## 2018-11-09 DIAGNOSIS — O09.811 PREGNANCY RESULTING FROM ASSISTED REPRODUCTIVE TECHNOLOGY IN FIRST TRIMESTER: ICD-10-CM

## 2018-11-09 DIAGNOSIS — Z34.90 ENCOUNTER FOR SUPERVISION OF NORMAL PREGNANCY, UNSPECIFIED, UNSPECIFIED TRIMESTER: ICD-10-CM

## 2018-11-09 DIAGNOSIS — Q89.3 KARTAGENER'S SYNDROME: Primary | ICD-10-CM

## 2018-11-09 DIAGNOSIS — O09.521 SUPERVISION OF ELDERLY MULTIGRAVIDA IN FIRST TRIMESTER: ICD-10-CM

## 2018-11-09 DIAGNOSIS — Z34.90 PRENATAL CARE, ANTEPARTUM: ICD-10-CM

## 2018-11-09 RX ORDER — AZITHROMYCIN 250 MG/1
TABLET, FILM COATED ORAL
Qty: 11 TABLET | Refills: 0 | Status: SHIPPED | OUTPATIENT
Start: 2018-11-09 | End: 2019-01-23

## 2018-11-09 NOTE — PROGRESS NOTES
VM left with pt to call the CF office. Dr. Hernández would like to treat her with a 10 course of azithromycin, 500 mg on day 1, then 250 mg for 9 more days based on the results of her latest culture. Pt sent message via [employer] requesting the medication be sent to [facility] [place] in [place].

## 2018-11-12 LAB
BACTERIA SPEC CULT: ABNORMAL
SPECIMEN SOURCE: ABNORMAL
SPECIMEN SOURCE: ABNORMAL

## 2018-11-16 ENCOUNTER — COMMUNICATION - HEALTHEAST (OUTPATIENT)
Dept: MATERNAL FETAL MEDICINE | Facility: HOSPITAL | Age: 43
End: 2018-11-16

## 2018-12-03 ENCOUNTER — PRENATAL OFFICE VISIT (OUTPATIENT)
Dept: OBGYN | Facility: CLINIC | Age: 43
End: 2018-12-03
Payer: COMMERCIAL

## 2018-12-03 VITALS
BODY MASS INDEX: 24.89 KG/M2 | SYSTOLIC BLOOD PRESSURE: 117 MMHG | DIASTOLIC BLOOD PRESSURE: 72 MMHG | WEIGHT: 145 LBS | HEART RATE: 78 BPM

## 2018-12-03 DIAGNOSIS — O09.512 ELDERLY PRIMIGRAVIDA IN SECOND TRIMESTER: Primary | ICD-10-CM

## 2018-12-03 PROCEDURE — 99207 ZZC PRENATAL VISIT: CPT | Performed by: OBSTETRICS & GYNECOLOGY

## 2018-12-03 PROCEDURE — 36415 COLL VENOUS BLD VENIPUNCTURE: CPT | Performed by: OBSTETRICS & GYNECOLOGY

## 2018-12-03 PROCEDURE — 99000 SPECIMEN HANDLING OFFICE-LAB: CPT | Performed by: OBSTETRICS & GYNECOLOGY

## 2018-12-03 PROCEDURE — 82105 ALPHA-FETOPROTEIN SERUM: CPT | Mod: 90 | Performed by: OBSTETRICS & GYNECOLOGY

## 2018-12-03 NOTE — PROGRESS NOTES
AFP today. Had first trimester screen in care everywhere that was normal. Has level 2 scheduled 1/4. PFT's were stable. Going monthly to pulmonary starting in January. Having some joint swelling in the mornings but then gets better through the day. Not sure what that is about. Nervous still about pregnancy but otherwise well. RTC 4 weeks. BE

## 2018-12-03 NOTE — MR AVS SNAPSHOT
After Visit Summary   12/3/2018    Armando Dc    MRN: [id number]           Patient Information     Date Of Birth          1975        Visit Information        Provider Department      12/3/2018 3:30 PM Zayra Roberts MD [facility]        Today's Diagnoses     Elderly primigravida in second trimester    -  1       Follow-ups after your visit        Your next 10 appointments already scheduled     Dec 11, 2018  9:30 AM CST   Consult MFM with UR EXAM RM 1   [facility] - [facility])    [address]  [facility] [address]   [phone]            Jan 04, 2019  8:00 AM CST   MFM US COMP with URMFMUSR1   [facility] Ultrasound - [facility])    [address]  [facility] [id number] [phone]           Wear comfortable clothes and leave your valuables at home.            Jan 04, 2019  8:45 AM CST   MFM READ SCREEN FETAL EHCO [place] with URMFMUSR1   [facility] Ultrasound - [place] ([facility])    [address]  [facility] [id number] [phone]            Jan 04, 2019  9:15 AM CST   Radiology MD with UR MFM MD   [facility]aternal Fetal Medicine - [place] ([facility])    [address]  [facility] [address]   [phone]           Please arrive at the time given for your first appointment. This visit is used internally to schedule the physician's time during your ultrasound.            Jan 07, 2019  2:45 PM CST   ESTABLISHED PRENATAL with Zayra Roberts MD   [facility]    [address] [place] [address]  [facility] [id number] [phone]            Jan 22, 2019  8:00 AM CST   CF LOOP with  PFL CF   [facility] Pulmonary Function Testing ([facility])    [address]  3rd [facility] [address]   [phone]            Jan 22, 2019  8:40 AM CST   (Arrive by 8:25 AM)   Return Bronchiectas Non CF with Lyn Hernández MD   [facility] ([facility])    [address]  [address] [place] [address]   [phone]            Feb 19, 2019  7:30 AM CST   CF LOOP with UC PFL CF   [facility] Pulmonary Function Testing ([facility])    [address] [address]   [phone]            Feb 19, 2019  8:00 AM CST   (Arrive by 7:45 AM)   Return Bronchiectas Non CF with Lyn Hernández MD   [facility] ([facility])    [address] [place] [address]  [address] [place] [address]   [phone]            Mar 27, 2019  7:30 AM CDT   CF LOOP with UC PFL CF   [facility] Pulmonary Function Testing ([facility])    [address] [place] [address]   [phone]              Who to contact     If you have questions or need follow up information about today's clinic visit or your schedule please contact [facility] directly at [phone].  Normal or non-critical lab and imaging results will be communicated to you by MyChart, letter or phone within 4 business days after the clinic has received the results. If you do not hear from us within 7 days, please contact the clinic through [facility] or phone. If you have a critical or abnormal lab result, we will notify you by phone as soon as possible.  Submit refill requests through Mape or call your pharmacy and they will forward the refill request to us. Please allow 3 business days for your refill to be completed.          Additional Information About Your Visit        [facility] Information     Mape gives you secure access to your  electronic health record. If you see a primary care provider, you can also send messages to your care team and make appointments. If you have questions, please call your primary care clinic.  If you do not have a primary care provider, please call [phone] and they will assist you.        Care EveryWhere ID     This is your Care EveryWhere ID. This could be used by other organizations to access your [place] medical records  [id number]        Your Vitals Were     Pulse BMI (Body Mass Index)                78 24.89 kg/m2           Blood Pressure from Last 3 Encounters:   12/03/18 117/72   11/07/18 126/81   10/31/18 127/81    Weight from Last 3 Encounters:   12/03/18 145 lb (65.8 kg)   11/07/18 139 lb (63 kg)   10/31/18 137 lb 6.4 oz (62.3 kg)              We Performed the Following     Alpha fetoprotein maternal screen          Today's Medication Changes          These changes are accurate as of 12/3/18  7:41 PM.  If you have any questions, ask your nurse or doctor.               These medicines have changed or have updated prescriptions.        Dose/Directions    * albuterol (2.5 MG/3ML) 0.083% neb solution   Commonly known as:  PROVENTIL   This may have changed:  See the new instructions.   Used for:  Bronchiectasis without acute exacerbation (H)        USE ONE VIAL IN NEBULIZER TWICE DAILY   Quantity:  180 mL   Refills:  11       * albuterol 108 (90 Base) MCG/ACT inhaler   Commonly known as:  PROAIR HFA/PROVENTIL HFA/VENTOLIN HFA   This may have changed:  Another medication with the same name was changed. Make sure you understand how and when to take each.   Used for:  Kartagener syndrome        Dose:  2 puff   Inhale 2 puffs into the lungs every 6 hours as needed for shortness of breath / dyspnea or wheezing   Quantity:  1 Inhaler   Refills:  12       * Notice:  This list has 2 medication(s) that are the same as other medications prescribed for you. Read the directions carefully, and ask your doctor or  other care provider to review them with you.             Primary Care Provider Office Phone # Fax #    Blake Sifuentes [phone]       [facility] [address]        Equal Access to Services     MILLY LY : Hadii bernardo gabriel hadkatieo Soomaali, waaxda luqadaha, qaybta kaalmada adeegyada, tee javanin hayaaomar joymegan stroudzhen magana. So [facility] [phone].    ATENCIÓN: Si habla español, tiene a guevara disposición servicios gratuitos de asistencia lingüística. Llame al [phone].    We comply with applicable federal civil rights laws and [place] laws. We do not discriminate on the basis of race, color, national origin, age, disability, sex, sexual orientation, or gender identity.            Thank you!     Thank you for choosing [facility]  for your care. Our goal is always to provide you with excellent care. Hearing back from our patients is one way we can continue to improve our services. Please take a few minutes to complete the written survey that you may receive in the mail after your visit with us. Thank you!             Your Updated Medication List - Protect others around you: Learn how to safely use, store and throw away your medicines at www.disposemymeds.org.          This list is accurate as of 12/3/18  7:41 PM.  Always use your most recent med list.                   Brand Name Dispense Instructions for use Diagnosis    acetylcysteine 20 % neb solution    MUCOMYST    120 mL    Inhale 2 mLs into the lungs 2 times daily    Kartagener's syndrome, Situs inversus, Pseudomonas aeruginosa colonization       * albuterol (2.5 MG/3ML) 0.083% neb solution    PROVENTIL    180 mL    USE ONE VIAL IN NEBULIZER TWICE DAILY    Bronchiectasis without acute exacerbation (H)       * albuterol 108 (90 Base) MCG/ACT inhaler    PROAIR HFA/PROVENTIL HFA/VENTOLIN HFA    1 Inhaler    Inhale 2 puffs into the lungs every 6 hours as needed for shortness of breath / dyspnea or  "wheezing    Kartagener syndrome       aspirin 81 MG tablet    ASA     Take 81 mg by mouth daily        azithromycin 250 MG tablet    ZITHROMAX    11 tablet    Two tablets first day, then one tablet daily for 9 days.    Kartagener's syndrome       nebulizer Sindy      Nebulizer compressor - use with ALEK as directed        PRENATAL 1 PO      Take 1 tablet by mouth every evening        PROGESTERONE IM      Inject 10 mLs into the muscle        Syringe Luer Lock 20G X 1-1/2\" 5 ML Misc     60 each    1 each 2 times daily    Kartagener syndrome, Reactive airway disease       Water For Injection Sterile Soln     250 mL    Inject 4 mLs into the vein 2 times daily    Kartagener syndrome, Reactive airway disease       * Notice:  This list has 2 medication(s) that are the same as other medications prescribed for you. Read the directions carefully, and ask your doctor or other care provider to review them with you.      "

## 2018-12-05 LAB
# FETUSES US: NORMAL
# FETUSES: NORMAL
AFP ADJ MOM AMN: 1.08
AFP SERPL-MCNC: 34 NG/ML
AGE - REPORTED: 43.1 YR
CURRENT SMOKER: NO
CURRENT SMOKER: NO
DIABETES STATUS PATIENT: NO
FAMILY MEMBER DISEASES HX: NO
FAMILY MEMBER DISEASES HX: NO
GA METHOD: NORMAL
GA METHOD: NORMAL
GA: NORMAL WK
IDDM PATIENT QL: NO
INTEGRATED SCN PATIENT-IMP: NORMAL
LMP START DATE: NORMAL
MONOCHORIONIC TWINS: NO
SERVICE CMNT-IMP: NO
SPECIMEN DRAWN SERPL: NORMAL
VALPROIC/CARBAMAZEPINE STATUS: NO
WEIGHT UNITS: NORMAL

## 2018-12-07 ENCOUNTER — PRE VISIT (OUTPATIENT)
Dept: MATERNAL FETAL MEDICINE | Facility: CLINIC | Age: 43
End: 2018-12-07

## 2018-12-07 DIAGNOSIS — O09.511 ELDERLY PRIMIGRAVIDA IN FIRST TRIMESTER: ICD-10-CM

## 2018-12-09 NOTE — PROGRESS NOTES
Maternal-Fetal Medicine Consultation    Armando Dc  : 1975  MRN: [id number]    REFERRAL:  Armando Dc is a 43 year old sent by Dr. Roberts from [facility] for MFM consultation.    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 She has PCD with brochiectasis and her lung disease is classified as severe obstructive. Her most recent PFTs in 18 are consistent with moderate-severe obstructive disease with a FEV1 54% and FVC 76%. She has chronic colonization with pseudomonas. Her current therapy is daily nebulization therapy with albuterol and acetylcysteine and bronchial drainage therapy with vest twice daily and uses the Aerobika device (positive pressure oscillating device) occasionally.  She reports she is been compliant with her vest therapy, which was recently increased to twice daily given her pregnancy and is using albuterol /mucomyst nebulizer treatment. She currently completed a course of antibiotics (azithromycin) for an episode of bronchitis with sputum cultures positive for strep pneumoniae.  Her current BMI is 22. Her last hospitalization was about 3 years ago. Her last echocardiogram was in  and was normal, did not have a follow-up ECHO to assess for cor pulmonale as previously recommended in her preconception consultation ().       PFT 18 : Moderately-severe obstructive lung disease.  When compared to 2018, the FEV1 and FVC have little change.  The decrease in FVC may represent air trapping v. restrictive physiology.  Lung volumes would be necessary to determine.    Armando reports that she is continuing to produce sputum that is green in color.  She denies any hemoptysis.  Her cough frequency and sputum volume are unchanged.  She is doing a nebulized therapy  twice daily. Has follow-up pulmonary appointment next month and plan is to follow with PFTs and pulmonary visits monthly thought pregnancy.      Pregnancy complicated by:  -Primary ciliary dyskinesia  -Severe obstructive lungs disease  -Advanced maternal age  -Conception by IVF    Prenatal and Medical Care:  Primary OB care this pregnancy has been with Dr. Roberts  from [facility].  Pulmonologist Dr. Hernández with the [facility].     Dating:    Dating ultrasound: 10/1/18 at 6w4d  Assisted reproduction: IVF  Assigned EDC: 2019 by [facility] 5 day transfer    Obstetrics History:  Obstetric History       T0      L0     SAB1   TAB0   Ectopic0   Multiple0   Live Births0       # Outcome Date GA Lbr Erik/2nd Weight Sex Delivery Anes PTL Lv   2 Current            1 SAB                 Gynecologic History:   -History of Infertility, history of multiple failed IVFs.  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 Auth Provider   acetylcysteine (MUCOMYST) 20 % nebulizer solution Inhale 2 mLs into the lungs 2 times daily Taking  Lyn Hernández MD   albuterol (2.5 MG/3ML) 0.083% nebulizer solution USE ONE VIAL IN NEBULIZER TWICE  "DAILY  Patient taking differently: USE ONE VIAL IN NEBULIZER DAILY Taking  Lyn Hernández MD   albuterol (PROAIR HFA/PROVENTIL HFA/VENTOLIN HFA) 108 (90 BASE) MCG/ACT Inhaler Inhale 2 puffs into the lungs every 6 hours as needed for shortness of breath / dyspnea or wheezing Taking  Lyn Hernández MD   aspirin 81 MG tablet Take 81 mg by mouth daily   Reported, Patient   Prenatal MV-Min-Fe Fum-FA-DHA (PRENATAL 1 PO) Take 1 tablet by mouth every evening Taking  Reported, Patient   Respiratory Therapy Supplies (NEBULIZER) SUZANNA Nebulizer compressor - use with ALEK as directed Taking  Reported, Patient   Syringe/Needle, Disp, (SYRINGE LUER LOCK) 20G X 1-1/2\" 5 ML MISC 1 each 2 times daily Taking  Lyn Hernández MD   Water For Injection Sterile SOLN Inject 4 mLs into the vein 2 times daily Taking  Lyn Hernández MD       Allergies:  Patient has no known allergies.    Social History:   Occupation: Adult psychiatry fellow  Status:   Denies use of alcohol, drugs or smoking.    Family History:  Denies history of genetic disorders, preeeclampsia, thromboembolic disease, bleeding disorders, mental retardation    ROS:  10-point ROS negative except as in HPI     PHYSICAL EXAM:  Deferred     Prenatal Labs:    Reviewed, normal except 1hr , failed, 3 hr GTT passed.   see prenatal tab.     Genetic Testing:   First trimester screening : Low risk   Aneuploidy: Low risk  MSAFP: Low risk for NTD       Other Imaging:     PFT 11/07/2018   Component Value Flag Ref Range Units Status Collected Lab   FVC-Pred 3.30    L  11/07/2018  7:55    FVC-Pre 2.62    L  11/07/2018  7:55    FVC-%Pred-Pre 79    %  11/07/2018  7:55    FEV1-Pre 1.47    L  11/07/2018  7:55    FEV1-%Pred-Pre 54    %  11/07/2018  7:55    FEV1FVC-Pred 82    %  11/07/2018  7:55    FEV1FVC-Pre 56    %  11/07/2018  7:55    FEFMax-Pred 6.87    L/sec  11/07/2018  7:55  "   FEFMax-Pre 4.54    L/sec  2018  7:55    FEFMax-%Pred-Pre 66    %  2018  7:55    FEF2575-Pred 2.89    L/sec  2018  7:55    FEF2575-Pre 0.61    L/sec  2018  7:55    CYX6124-%Pred-Pre 21    %  2018  7:55    ExpTime-Pre 10.92    sec  2018  7:55    FIFMax-Pre 4.45    L/sec  2018  7:55    FEV1FEV6-Pred 83    %  2018  7:55    FEV1FEV6-Pre 61    %  2018  7:55 AM        Transthoracic Echo Report   Exam Date:     04/15/2015 14:07   Ordering Physician: RUDI CALVO   Attending Physician: HAILEY BRUCE HINSCH MD     CONCLUSIONS   Two-dimensional, M-mode and Doppler (continuous, pulsed wave, spectral    & color flow) transthoracic echocardiography was performed using    standard views & projections.     Left ventricular size, systolic function, wall thickness, and wall    motion are all normal. Visually estimated left ventricle ejection    fraction is 60%.    The right ventricle was not well visualized. Right ventricular size and    systolic function are probably normal.     No significant valve abnormalities.    Left ventricular diastolic function is normal.  LV filling pressures    are indeterminate.   Pulmonary artery systolic pressure could not be estimated due to lack    of adequate tricuspid regurgitant jet. Right atrial pressure is within    normal limits (3 mmHg).   No other study is available for comparison.      Pasha Pretty MD   (Electronically Signed)   Final Date:      15 April 2015 15:23    ASSESSMENT:  Armando Dc is a 43 year old  at 16w5d by embryo transfer consistent with 6w4d US here for MFM consultation regarding history of :     -Primary ciliary dyskinesia with severe obstructive lung disease  -Advanced maternal age  -Conception by IVF    # Primary ciliary dyskinesia with severe obstructive lung disease:   At today s visit we discussed with Ms. Armando Dc the implications of primary ciliary  dyskinesia and severe obstructive lung disease in pregnancy.  She was counseled that pregnancy outcome is directly dependent on lung function, with FEV1 <50% having worse outcomes.   Supplemental oxygen maybe recommended throughout pregnancy to maintain her O2 sats >95%. She was counseled that a decline in lung function during pregnancy could be increase adverse pregnancy outcomes including fetal growth restriction or  delivery in the fetus, as well as progression of disease with increased maternal mortality.  This requires that she be closely monitored throughout pregnancy.  Plan is for possible overnight pulse ox monitoring, which will be discussed at her next pulmonary visit.  We discussed we agree with this plan as progressive hypoxemia is possible as pregnancy progresses and supplemental O2 may be required if sats < 95%.      Patients who have significant lung decline in pregnancy have a decreased survival following pregnancy, but it is unclear whether this is related to the pregnancy itself or represents the natural course of the disease.  We also discussed the possibility for prolonged hospitalization during pregnancy for pulmonary complications and home oxygen therapy if needed. We also discussed the importance of increased vest therapy and compliance during pregnancy and importance of aggressive and early treatment of infection or exacerbations. Due to the increased risk for intrauterine growth restriction, we recommend monthly growth ultrasounds.  We also recommend twice weekly BPPs beginning at 32 weeks.    With regards to labor and delivery, there are many considerations for patients with PCD with compromised pulmonary function.  As these patients are at increased risk of perioperative respiratory infections and exacerbation of obstructive airways disease, preoperative respiratory infection should be treated and precautions taken to prevent postoperative infections, including intraoperative  antibiotic administration, optimal postoperative analgesia, and where possible avoiding excessive sedation. Chest physiotherapy and early mobilization help to clear airway secretions and reduce the risk of infection. Regional anesthesia, when feasible, is preferred, enabling the patient to clear airway secretions more effectively. We also discussed the importance of obtaining an ECHO to assess for cor pulmonale, giver her long standing pulmonary disease, which if present would significantly increase the risk in this pregnancy.      There is at least one case report of adverse effects of oxytocin in a patient with PCD. The vasodilatory effects of intravenous oxytocin on the pulmonary vasculature may worsen shunting and interfere with hypoxic pulmonary vasoconstriction, producing clinically significant hypoxemia in patients with comorbid lung disease. We would advise caution with the use of oxytocin in this patient specifically in the postpartum stage. Hemabate is contraindicated in patient with severe obstructive lung disease and should not be used for the management of postpartum hemorrhage.       We recommend obtaining a maternal echocardiogram to evaluate for pulmonary hypertension or cor pulmonale.     # Advanced maternal age:     The risk of fetal aneuploidy increases with age. She has previously met with our genetic counselors to discussed these risks at length and we discussed the options available for risk assessment for aneuploidy during pregnancy.      Patients of advanced age are also at increased risks of pregnancy complications, including miscarriage, preeclampsia, abnormalities with placentation, stillbirth, and  delivery.  These risks increase with increasing maternal age and comorbidities.     The patient received genetic counseling. She had PGD of the embryo with this pregnancy and her first trimester risk-assessment with nuchal translucency and serum markers (between 11 and 14 weeks) was low  risk.     #Conception by IVF:     The use of IVF, with and without ICSI, has been associated with an increased risk for preeclampsia, congenital anomalies (specifically cardiac), hypertensive disorders, placental abruption, placenta previa and other placental abnormalities, and small for gestational age and  delivery.     While the association between IVF and these adverse outcomes raise some concern, it is important to note that the literature is contradictory on this subject and the chances of having a healthy child conceived with ART are overall extremely high.    Your patient has been scheduled for a screening fetal echocardiogram in 2-3 weeks.       RECOMMENDATIONS:    #Primary ciliary dyskinesia with severe obstructive lung disease   -Obtain maternal echocardiogram to evaluate for cor pulmonale of evidence for pulmonary HTN, if present NEERAJ to [facility] for primary OB care given risk in pregnancy.     - Close follow up with pulmonology for frequent PFTs every month, follows with Dr. Hernández  - Continue twice daily bronchial drainage therapy and nebulization treatment as recommended per pulmonology   - Aggressive treatment of pulmonary exacerbations and infections in pregnancy.    - Close monitoring of oxygen saturations, patient was instructed to obtain a pulse oxymetry and start O2 supplemental therapy in pregnancy if needed to maintain oxygen saturations > 95%.    - Fetal growth ultrasounds every 3-4 weeks after 18 weeks.  - surveillance weekly starting at 32 weeks.    #Advanced maternal age:  -For women ? 40 years old, we recommend  testing usually with weekly BPP (or NST with MVP), which will be starting at 32 weeks due to her lung disease.   -Consider induction of labor at 39-40 weeks, but no later than 41 weeks.  -Continue low-dose aspirin (usually 81 mg daily) to reduce risk of preeclampsia.    #Conception by IVF  -Targeted anatomical survey at 18-20 weeks with careful evaluation of  the placenta, given family history of situs inversus.  -Fetal screening echocardiogram at 22 weeks  -Close monitoring for preeclampsia    Thank you for the opportunity to participate in the care of this patient.  If you have questions regarding today s evaluation or if we can be of further service, please contact the Maternal-Fetal Medicine Center.    Patient seen and evaluated with Dr. Horace Estrada   [facility] Fellow     12/11/2018 12:04 PM    Attending Attestation:  The documentation recorded by the scribe accurately reflects the services I personally performed and the decisions made by me.  The patient was seen for an outpatient consultation. The majority of time (>50%) was spent on counseling and coordination of care of this patient and/or family members. Total face-to-face time was 30 minutes.     Thank you for the opportunity to participate in the care of this patient. If you have questions regarding today s evaluation or if we can be of further service, please contact the Maternal-Fetal Medicine Center.    A copy of this consultation will be sent to your office.     Elodia Vu, DO  Maternal-Fetal Medicine

## 2018-12-11 ENCOUNTER — OFFICE VISIT (OUTPATIENT)
Dept: MATERNAL FETAL MEDICINE | Facility: CLINIC | Age: 43
End: 2018-12-11
Attending: OBSTETRICS & GYNECOLOGY
Payer: COMMERCIAL

## 2018-12-11 DIAGNOSIS — O09.529 HIGH-RISK PREGNANCY, ELDERLY MULTIGRAVIDA, UNSPECIFIED TRIMESTER: Primary | ICD-10-CM

## 2018-12-11 DIAGNOSIS — O26.90 PREGNANCY RELATED CONDITION, ANTEPARTUM: ICD-10-CM

## 2018-12-11 DIAGNOSIS — O09.511 ELDERLY PRIMIGRAVIDA IN FIRST TRIMESTER: ICD-10-CM

## 2018-12-11 NOTE — NURSING NOTE
Armando presents to [facility] for MFM Consult secondary to Kartageners situs inversus, AMA and IVF. Denies LOF, vaginal bleeding, cramping or contractions. Dr. Vu and Dr. Hall met with patient. Patient has L2 and fetal echo scheduled for 1/4/19.

## 2018-12-12 LAB
MISCELLANEOUS TEST DEPT. - HE HISTORICAL: NORMAL
PERFORMING LAB: NORMAL
SPECIMEN STATUS: NORMAL
TEST NAME: NORMAL

## 2018-12-21 ENCOUNTER — PRENATAL OFFICE VISIT (OUTPATIENT)
Dept: OBGYN | Facility: CLINIC | Age: 43
End: 2018-12-21
Payer: COMMERCIAL

## 2018-12-21 VITALS
SYSTOLIC BLOOD PRESSURE: 133 MMHG | HEART RATE: 107 BPM | DIASTOLIC BLOOD PRESSURE: 89 MMHG | BODY MASS INDEX: 25.23 KG/M2 | WEIGHT: 147 LBS

## 2018-12-21 DIAGNOSIS — O09.511 ELDERLY PRIMIGRAVIDA IN FIRST TRIMESTER: Primary | ICD-10-CM

## 2018-12-21 DIAGNOSIS — Q24.8 CONGENITAL MALPOSITION OF HEART AND CARDIAC APEX: ICD-10-CM

## 2018-12-21 PROCEDURE — 59425 ANTEPARTUM CARE ONLY: CPT | Performed by: OBSTETRICS & GYNECOLOGY

## 2018-12-21 PROCEDURE — 99207 ZZC PRENATAL VISIT: CPT | Performed by: OBSTETRICS & GYNECOLOGY

## 2018-12-21 NOTE — PROGRESS NOTES
Has been checking BP and has been 110's/70's, not sure why higher today. Doing NICU rotation and got worried about this baby, here for quick doptone check. Starting to feel FM, reassured. Has u/s scheduled for 2 weeks and see me again 1/7. Maternal ECHO was ordered. No questions. BE

## 2019-01-04 ENCOUNTER — OFFICE VISIT (OUTPATIENT)
Dept: MATERNAL FETAL MEDICINE | Facility: CLINIC | Age: 44
End: 2019-01-04
Attending: OBSTETRICS & GYNECOLOGY
Payer: COMMERCIAL

## 2019-01-04 ENCOUNTER — HOSPITAL ENCOUNTER (OUTPATIENT)
Dept: ULTRASOUND IMAGING | Facility: CLINIC | Age: 44
Discharge: HOME | End: 2019-01-04
Attending: OBSTETRICS & GYNECOLOGY | Admitting: OBSTETRICS & GYNECOLOGY
Payer: COMMERCIAL

## 2019-01-04 ENCOUNTER — HOSPITAL ENCOUNTER (OUTPATIENT)
Dept: ULTRASOUND IMAGING | Facility: CLINIC | Age: 44
End: 2019-01-04
Attending: OBSTETRICS & GYNECOLOGY
Payer: COMMERCIAL

## 2019-01-04 DIAGNOSIS — J47.9 BRONCHIECTASIS WITHOUT ACUTE EXACERBATION (H): ICD-10-CM

## 2019-01-04 DIAGNOSIS — O09.812 PREGNANCY RESULTING FROM IN VITRO FERTILIZATION IN SECOND TRIMESTER: ICD-10-CM

## 2019-01-04 DIAGNOSIS — O09.522 ELDERLY MULTIGRAVIDA IN SECOND TRIMESTER: ICD-10-CM

## 2019-01-04 DIAGNOSIS — J44.9 CHRONIC OBSTRUCTIVE PULMONARY DISEASE, UNSPECIFIED COPD TYPE (H): ICD-10-CM

## 2019-01-04 DIAGNOSIS — O26.90 PREGNANCY RELATED CONDITION, ANTEPARTUM: ICD-10-CM

## 2019-01-04 PROCEDURE — 76825 ECHO EXAM OF FETAL HEART: CPT

## 2019-01-04 PROCEDURE — 76811 OB US DETAILED SNGL FETUS: CPT

## 2019-01-04 NOTE — PROGRESS NOTES
"Please see \"Imaging\" tab under \"Chart Review\" for details of today's US.    Carina Sanchez, DO    "

## 2019-01-05 PROBLEM — O09.519 AMA (ADVANCED MATERNAL AGE) PRIMIGRAVIDA 35+: Status: ACTIVE | Noted: 2018-10-17

## 2019-01-07 ENCOUNTER — PRENATAL OFFICE VISIT (OUTPATIENT)
Dept: OBGYN | Facility: CLINIC | Age: 44
End: 2019-01-07
Payer: COMMERCIAL

## 2019-01-07 VITALS
HEART RATE: 85 BPM | WEIGHT: 152 LBS | SYSTOLIC BLOOD PRESSURE: 114 MMHG | DIASTOLIC BLOOD PRESSURE: 74 MMHG | BODY MASS INDEX: 26.09 KG/M2

## 2019-01-07 DIAGNOSIS — O09.512 ELDERLY PRIMIGRAVIDA IN SECOND TRIMESTER: ICD-10-CM

## 2019-01-07 PROCEDURE — 99212 OFFICE O/P EST SF 10 MIN: CPT | Performed by: OBSTETRICS & GYNECOLOGY

## 2019-01-07 RX ORDER — ALBUTEROL SULFATE 0.83 MG/ML
SOLUTION RESPIRATORY (INHALATION)
Qty: 180 ML | Refills: 11 | Status: SHIPPED | OUTPATIENT
Start: 2019-01-07 | End: 2020-03-30

## 2019-01-07 ASSESSMENT — PATIENT HEALTH QUESTIONNAIRE - PHQ9: SUM OF ALL RESPONSES TO PHQ QUESTIONS 1-9: 0

## 2019-01-07 NOTE — PROGRESS NOTES
Discussed transfer of care to [facility] now that she has placenta previa. She wanted to discuss restrictions since still working and reviewed should be fine, just nothing in vagina and limiting lifting and straining. Discussed GCT after 24 weeks=1/31.  Has maternal ECHO scheduled and next ultrasound. Questions answered. BE

## 2019-01-21 ENCOUNTER — HOSPITAL ENCOUNTER (OUTPATIENT)
Facility: CLINIC | Age: 44
Setting detail: OBSERVATION
Discharge: HOME | End: 2019-01-22
Attending: OBSTETRICS & GYNECOLOGY | Admitting: OBSTETRICS & GYNECOLOGY
Payer: COMMERCIAL

## 2019-01-21 ENCOUNTER — TELEPHONE (OUTPATIENT)
Dept: MATERNAL FETAL MEDICINE | Facility: CLINIC | Age: 44
End: 2019-01-21

## 2019-01-21 DIAGNOSIS — O09.512 ELDERLY PRIMIGRAVIDA IN SECOND TRIMESTER: ICD-10-CM

## 2019-01-21 DIAGNOSIS — K21.00 GASTROESOPHAGEAL REFLUX DISEASE WITH ESOPHAGITIS: Primary | ICD-10-CM

## 2019-01-21 PROBLEM — N93.9 VAGINAL BLEEDING: Status: ACTIVE | Noted: 2019-01-21

## 2019-01-21 LAB
ABO + RH BLD: NORMAL
ABO + RH BLD: NORMAL
ALBUMIN UR-MCNC: NEGATIVE MG/DL
APPEARANCE UR: CLEAR
BACTERIA #/AREA URNS HPF: ABNORMAL /HPF
BILIRUB UR QL STRIP: NEGATIVE
BLD GP AB SCN SERPL QL: NORMAL
BLOOD BANK CMNT PATIENT-IMP: NORMAL
COLOR UR AUTO: ABNORMAL
ERYTHROCYTE [DISTWIDTH] IN BLOOD BY AUTOMATED COUNT: 13.3 % (ref 10–15)
GLUCOSE UR STRIP-MCNC: NEGATIVE MG/DL
HCT VFR BLD AUTO: 37.7 % (ref 35–47)
HGB BLD-MCNC: 12.4 G/DL (ref 11.7–15.7)
HGB UR QL STRIP: NEGATIVE
KETONES UR STRIP-MCNC: NEGATIVE MG/DL
LEUKOCYTE ESTERASE UR QL STRIP: NEGATIVE
MCH RBC QN AUTO: 30 PG (ref 26.5–33)
MCHC RBC AUTO-ENTMCNC: 32.9 G/DL (ref 31.5–36.5)
MCV RBC AUTO: 91 FL (ref 78–100)
NITRATE UR QL: NEGATIVE
PH UR STRIP: 6 PH (ref 5–7)
PLATELET # BLD AUTO: 270 10E9/L (ref 150–450)
RBC # BLD AUTO: 4.13 10E12/L (ref 3.8–5.2)
RBC #/AREA URNS AUTO: <1 /HPF (ref 0–2)
SOURCE: ABNORMAL
SP GR UR STRIP: 1 (ref 1–1.03)
SPECIMEN EXP DATE BLD: NORMAL
UROBILINOGEN UR STRIP-MCNC: NORMAL MG/DL (ref 0–2)
WBC # BLD AUTO: 9 10E9/L (ref 4–11)
WBC #/AREA URNS AUTO: 0 /HPF (ref 0–5)

## 2019-01-21 PROCEDURE — 94669 MECHANICAL CHEST WALL OSCILL: CPT

## 2019-01-21 PROCEDURE — 86900 BLOOD TYPING SEROLOGIC ABO: CPT | Performed by: OBSTETRICS & GYNECOLOGY

## 2019-01-21 PROCEDURE — 25000128 H RX IP 250 OP 636

## 2019-01-21 PROCEDURE — G0463 HOSPITAL OUTPT CLINIC VISIT: HCPCS | Mod: 25

## 2019-01-21 PROCEDURE — 96361 HYDRATE IV INFUSION ADD-ON: CPT

## 2019-01-21 PROCEDURE — G0378 HOSPITAL OBSERVATION PER HR: HCPCS

## 2019-01-21 PROCEDURE — 86850 RBC ANTIBODY SCREEN: CPT | Performed by: OBSTETRICS & GYNECOLOGY

## 2019-01-21 PROCEDURE — 25000125 ZZHC RX 250: Performed by: OBSTETRICS & GYNECOLOGY

## 2019-01-21 PROCEDURE — 86901 BLOOD TYPING SEROLOGIC RH(D): CPT | Performed by: OBSTETRICS & GYNECOLOGY

## 2019-01-21 PROCEDURE — 25000128 H RX IP 250 OP 636: Performed by: OBSTETRICS & GYNECOLOGY

## 2019-01-21 PROCEDURE — 85027 COMPLETE CBC AUTOMATED: CPT | Performed by: OBSTETRICS & GYNECOLOGY

## 2019-01-21 PROCEDURE — 25000132 ZZH RX MED GY IP 250 OP 250 PS 637: Performed by: OBSTETRICS & GYNECOLOGY

## 2019-01-21 PROCEDURE — 40000275 ZZH STATISTIC RCP TIME EA 10 MIN

## 2019-01-21 PROCEDURE — 96360 HYDRATION IV INFUSION INIT: CPT

## 2019-01-21 PROCEDURE — 25000132 ZZH RX MED GY IP 250 OP 250 PS 637: Performed by: STUDENT IN AN ORGANIZED HEALTH CARE EDUCATION/TRAINING PROGRAM

## 2019-01-21 PROCEDURE — 94640 AIRWAY INHALATION TREATMENT: CPT

## 2019-01-21 PROCEDURE — 81001 URINALYSIS AUTO W/SCOPE: CPT | Performed by: OBSTETRICS & GYNECOLOGY

## 2019-01-21 RX ORDER — ALBUTEROL SULFATE 90 UG/1
2 AEROSOL, METERED RESPIRATORY (INHALATION) EVERY 6 HOURS PRN
Status: DISCONTINUED | OUTPATIENT
Start: 2019-01-21 | End: 2019-01-21

## 2019-01-21 RX ORDER — SODIUM CHLORIDE, SODIUM LACTATE, POTASSIUM CHLORIDE, CALCIUM CHLORIDE 600; 310; 30; 20 MG/100ML; MG/100ML; MG/100ML; MG/100ML
INJECTION, SOLUTION INTRAVENOUS CONTINUOUS
Status: DISCONTINUED | OUTPATIENT
Start: 2019-01-21 | End: 2019-01-22 | Stop reason: HOSPADM

## 2019-01-21 RX ORDER — ASPIRIN 81 MG/1
81 TABLET ORAL DAILY
Status: DISCONTINUED | OUTPATIENT
Start: 2019-01-21 | End: 2019-01-22 | Stop reason: HOSPADM

## 2019-01-21 RX ORDER — PRENATAL VIT/IRON FUM/FOLIC AC 27MG-0.8MG
1 TABLET ORAL EVERY EVENING
Status: DISCONTINUED | OUTPATIENT
Start: 2019-01-21 | End: 2019-01-22 | Stop reason: HOSPADM

## 2019-01-21 RX ORDER — CALCIUM CARBONATE 500 MG/1
500 TABLET, CHEWABLE ORAL
Status: DISCONTINUED | OUTPATIENT
Start: 2019-01-21 | End: 2019-01-22 | Stop reason: HOSPADM

## 2019-01-21 RX ORDER — SODIUM CHLORIDE, SODIUM LACTATE, POTASSIUM CHLORIDE, CALCIUM CHLORIDE 600; 310; 30; 20 MG/100ML; MG/100ML; MG/100ML; MG/100ML
INJECTION, SOLUTION INTRAVENOUS
Status: COMPLETED
Start: 2019-01-21 | End: 2019-01-21

## 2019-01-21 RX ORDER — LIDOCAINE 40 MG/G
CREAM TOPICAL
Status: DISCONTINUED | OUTPATIENT
Start: 2019-01-21 | End: 2019-01-22 | Stop reason: HOSPADM

## 2019-01-21 RX ORDER — ACETYLCYSTEINE 200 MG/ML
2 SOLUTION ORAL; RESPIRATORY (INHALATION) 2 TIMES DAILY
Status: DISCONTINUED | OUTPATIENT
Start: 2019-01-21 | End: 2019-01-22 | Stop reason: HOSPADM

## 2019-01-21 RX ORDER — ALBUTEROL SULFATE 0.83 MG/ML
2.5 SOLUTION RESPIRATORY (INHALATION) EVERY 6 HOURS PRN
Status: DISCONTINUED | OUTPATIENT
Start: 2019-01-21 | End: 2019-01-22 | Stop reason: HOSPADM

## 2019-01-21 RX ADMIN — PRENATAL VIT W/ FE FUMARATE-FA TAB 27-0.8 MG 1 TABLET: 27-0.8 TAB at 19:00

## 2019-01-21 RX ADMIN — SODIUM CHLORIDE, POTASSIUM CHLORIDE, SODIUM LACTATE AND CALCIUM CHLORIDE 1000 ML: 600; 310; 30; 20 INJECTION, SOLUTION INTRAVENOUS at 15:05

## 2019-01-21 RX ADMIN — ASPIRIN 81 MG: 81 TABLET, COATED ORAL at 18:59

## 2019-01-21 RX ADMIN — SODIUM CHLORIDE, POTASSIUM CHLORIDE, SODIUM LACTATE AND CALCIUM CHLORIDE 1000 ML: 600; 310; 30; 20 INJECTION, SOLUTION INTRAVENOUS at 15:32

## 2019-01-21 RX ADMIN — CALCIUM CARBONATE (ANTACID) CHEW TAB 500 MG 500 MG: 500 CHEW TAB at 21:58

## 2019-01-21 RX ADMIN — ACETYLCYSTEINE 2 ML: 200 SOLUTION ORAL; RESPIRATORY (INHALATION) at 19:28

## 2019-01-21 RX ADMIN — SODIUM CHLORIDE, SODIUM LACTATE, POTASSIUM CHLORIDE, CALCIUM CHLORIDE 1000 ML: 600; 310; 30; 20 INJECTION, SOLUTION INTRAVENOUS at 15:05

## 2019-01-21 RX ADMIN — ALBUTEROL SULFATE 2.5 MG: 2.5 SOLUTION RESPIRATORY (INHALATION) at 19:28

## 2019-01-21 NOTE — H&P
Maternal-Fetal Medicine   H&P        Armando Dc  : 1975  MRN: [id number]     Reason for admission:     Armando Dc is a 43 year old  with known placenta previa with first episode of vaginal bleeding.      HPI:  Armando Dc is a 43 year old  with a hernandez pregnancy at  22w4d GA by embryo transfer with an LO of 19 here   Evaluation of vaginal bleeding in the setting of placenta previa. 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    Armando is here with her . She reports that this morning she noticed some bleeding when she wiped. She reports later she noticed some more spotting of bright red blood. She states the amount of total bleeding was less than a teaspoon of blood. She reports some pressure and reports very mild occasional cramping. She denies LOF, contractions, dysuria or constipation. She reports positive fetal movements. She denies any triggers for vaginal bleeding.     Regarding her current severe respiratory disease. She reports her cough frequency and sputum volume are unchanged. She is doing her vest therapy daily and is taking her routine inhaled and oral medications. She reports she didn't do her vest therapy today since she was concerned this could increase her bleeding. She denies fever, chills or SOB at this point.      Pregnancy complicated by:  -Primary ciliary dyskinesia  -Severe obstructive lungs disease  -Placenta previa ( complete previa with posterior/anterior components)   -Low risk UTD (A1), Bilateral kidneys  -Advanced maternal age  -Complex left ovarian cyst 3.2 x 2.9 x 2.4 cm  -Conception by IVF     Prenatal and Medical Care:  Primary OB care this pregnancy has been with Dr. Roberts  from [facility] ([facility]  Pulmonologist Dr. Hernández with the [facility].      Dating:                Dating ultrasound: 10/1/18 at 6w4d  Assisted reproduction: IVF  Assigned EDC: 2019 by  Fresh Embryo 5 day transfer     Obstetrics History:               Obstetric History       T0      L0     SAB1   TAB0   Ectopic0   Multiple0   Live Births0        # Outcome Date GA Lbr Erik/2nd Weight Sex Delivery Anes PTL Lv   2 Current                     1 SAB                            Gynecologic History:   -History of Infertility, history of multiple failed IVFs.  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 Auth Provider   acetylcysteine (MUCOMYST) 20 % nebulizer solution Inhale 2 mLs into the lungs 2 times daily Taking   Lyn Hernández MD   albuterol (2.5 MG/3ML) 0.083% nebulizer solution USE ONE VIAL IN NEBULIZER TWICE DAILY  Patient taking differently: USE ONE VIAL IN NEBULIZER DAILY Taking   Lyn Hernández MD   albuterol (PROAIR HFA/PROVENTIL HFA/VENTOLIN HFA) 108 (90 BASE) MCG/ACT Inhaler Inhale 2 puffs into the lungs every 6 hours as needed for shortness of breath / dyspnea or wheezing Taking   Lyn Hernández MD   aspirin 81 MG tablet Take 81 mg by mouth daily     Reported, Patient   Prenatal MV-Min-Fe Fum-FA-DHA (PRENATAL 1 PO) Take 1 tablet by mouth every evening Taking   Reported,  "Patient   Respiratory Therapy Supplies (NEBULIZER) SUZANNA Nebulizer compressor - use with ALEK as directed Taking   Reported, Patient   Syringe/Needle, Disp, (SYRINGE LUER LOCK) 20G X 1-1/2\" 5 ML MISC 1 each 2 times daily Taking   Lyn Hernández MD   Water For Injection Sterile SOLN Inject 4 mLs into the vein 2 times daily Taking   Lyn Hernández MD         Allergies:  Patient has no known allergies.     Social History:   Occupation: Adult psychiatry fellow  Status:   Denies use of alcohol, drugs or smoking.     Family History:  Denies history of genetic disorders, preeeclampsia, thromboembolic disease, bleeding disorders, mental retardation     ROS:  10-point ROS negative except as in HPI      PHYSICAL EXAM:  /68   Temp 98.1  F (36.7  C) (Oral)   Resp 18     General: NAD  CV: RRR S1 S2 wnl   Resp: Diffuse bibasilar inspiratory and expiratory crackles  Abd: Gravid, mild uterine tenderness on palpation.   FHT Baseline 150s, mod variability, 10 x10 accels, small variable decelerations  Atlas contractions, mild every 3-5 mins  Pelvic exam: Deferred per patient request.     Ext. No edema, cyanosis or clubbing.     Prenatal Labs:    Reviewed, normal except 1hr , failed, 3 hr GTT passed.   see prenatal tab.      Genetic Testing:   First trimester screening : Low risk   Aneuploidy: Low risk      MSAFP: Low risk for NTD                  ASSESSMENT:  Armando Dc is a 43 year old  with a hernandez pregnancy at 22w4d by embryo transfer consistent with 6w4d US with a known placenta previa here for evaluation of first episode of vaginal bleeding.     Pregnancy is complicated with:   -Placental previa  -Primary ciliary dyskinesia with severe obstructive lung disease  -Advanced maternal age  -Conception by IVF    #Placenta previa, first bleed  - Firs bleeding today , reports aprox. a 5 ml total  -  US: Complete posterior previa with anterior component  - Blood type is O " positive. No need for Rhogam.   - Discussed risk of further bleeding and need for inpatient monitoring for at least 24-48 hrs to determine stability. Based on the amount of bleeding we recommend bleeding precautions including close monitoring, T&S and IV site.   - Discussed the possibility that if her bleeding becomes heavier or if there are sign maternal of fetal compromise delivery may be required and that at this periviable gestational age we would recommend a NICU consultation to discuss  outcomes and to determine her wishes for resuscitation measures if delivery is recommended.   - We will await for administration of  steroids based on her clinical course for now.       #Primary ciliary dyskinesia with severe obstructive lung disease  -Currently stable, continue vest therapy and home meds.    - We will complete scheduled work up while inpatient: Ordered Maternal echocardiogram to evaluate for pulmonary hypertension and cor pulmonale     #Conception by IVF  -Targeted anatomical survey with low risk bilateral UTD ( A1)   -Fetal screening echocardiogram at 22 weeks, has not been completed yet.      Patient seen and evaluated with Dr. Rai Estrada   [facility] Fellow   2019  4:29 PM

## 2019-01-21 NOTE — PROVIDER NOTIFICATION
01/21/19 1610   Provider Notification   Provider Name/Title Dr. Hall   Notification Reason Other (Comment)   D: Writer spoke to respiratory and they will be coming to the floor to administer the inhalers as ordered. Patient  will be going home to get her vest so she can perform her treatment later this evening. P: Continue to monitor.

## 2019-01-21 NOTE — TELEPHONE ENCOUNTER
Armando calling [facility] care coordinator line with c/o vaginal spotting throughout the day today, bright red in color. Denies cramping, backache, or pelvic pressure. Reports FHTs 120 with doptone. Known placenta previa. Patient advised to go to [facility] triage. Dr. Atwood texted paged information. L&D charge RN notified.    Diamond Campbell RN

## 2019-01-21 NOTE — CONSULTS
Neonatology Antepartum Consultation    Armando Dc MRN# [id number]   YOB: 1975 Age: 43 year old   Date of Admission: 2019     Reason for consult: I was asked by Dr. Estrada to evaluate this patient for prematurity.      I was asked to provide antepartum counseling for Armando Dc at the request of Dr. Estrada secondary to prematurity at 22w4d in the setting of bleeding with known placenta previa.  She is currently 22w4d weeks and has a history significant for known placenta previa, primary ciliary dyskinesia, severe obstructive lung disease, AMA, complex left ovarian cyst, and conception by IVF. She was admitted on 2019 for first episode of vaginal bleeding. [facility] plans to await administration of Betamethasone based on her current clinical course. Ms. Dc was counseled on the expected hospital course, potential risks, and outcomes associated with an infant born at approximately 22w4d gestation. The counseling included: morbidity, mortality, initial delivery room stabilization, respiratory course, lung development, chronic lung disease, hemodynamic support, infection (including NEC), intraventricular hemorrhage, retinopathy of prematurity, nutrition, growth and development, and long term outcomes.    A plan was not made at this time regarding delivery room resuscitation. At the time delivery becomes imminent, we will consult with parents regarding delivery room plan of care.    Please feel free to call with any additional questions or concerns.           Assessment:     Vaginal bleeding    * No resolved hospital problems. *  -- prematurity at 22w4d      Jose Miguel Britton MD  - Medicine Fellow

## 2019-01-21 NOTE — PLAN OF CARE
Pt  at 22.4wk with known previa presents with bleeding. Small amount of bright red bleeding with wipe, none in toilet bowl or on pad. On arrival, pt noticed some blood on her pad. Not actively bleeding. Denies leaking fluid. Pt feels some pelvic pressure. Monitors applied. MFM fellow called to evaluate. Verbally ordered a bolus of LR. IV started, labs drawn, urine collected. Report given to Teagan Snell RN.

## 2019-01-22 ENCOUNTER — HOSPITAL ENCOUNTER (OUTPATIENT)
Dept: ULTRASOUND IMAGING | Facility: CLINIC | Age: 44
Setting detail: OBSERVATION
End: 2019-01-22
Payer: COMMERCIAL

## 2019-01-22 ENCOUNTER — APPOINTMENT (OUTPATIENT)
Dept: CARDIOLOGY | Facility: CLINIC | Age: 44
End: 2019-01-22
Payer: COMMERCIAL

## 2019-01-22 VITALS — RESPIRATION RATE: 16 BRPM | SYSTOLIC BLOOD PRESSURE: 113 MMHG | DIASTOLIC BLOOD PRESSURE: 68 MMHG | TEMPERATURE: 98.2 F

## 2019-01-22 PROCEDURE — 93306 TTE W/DOPPLER COMPLETE: CPT | Mod: 26 | Performed by: INTERNAL MEDICINE

## 2019-01-22 PROCEDURE — 96365 THER/PROPH/DIAG IV INF INIT: CPT

## 2019-01-22 PROCEDURE — 25000125 ZZHC RX 250: Performed by: OBSTETRICS & GYNECOLOGY

## 2019-01-22 PROCEDURE — 96360 HYDRATION IV INFUSION INIT: CPT

## 2019-01-22 PROCEDURE — G0378 HOSPITAL OBSERVATION PER HR: HCPCS

## 2019-01-22 PROCEDURE — 76817 TRANSVAGINAL US OBSTETRIC: CPT

## 2019-01-22 PROCEDURE — 96361 HYDRATE IV INFUSION ADD-ON: CPT

## 2019-01-22 PROCEDURE — 25000132 ZZH RX MED GY IP 250 OP 250 PS 637: Performed by: STUDENT IN AN ORGANIZED HEALTH CARE EDUCATION/TRAINING PROGRAM

## 2019-01-22 PROCEDURE — G0463 HOSPITAL OUTPT CLINIC VISIT: HCPCS

## 2019-01-22 PROCEDURE — 93306 TTE W/DOPPLER COMPLETE: CPT

## 2019-01-22 RX ADMIN — RANITIDINE 75 MG: 75 TABLET, COATED ORAL at 01:29

## 2019-01-22 RX ADMIN — RANITIDINE 75 MG: 75 TABLET, COATED ORAL at 09:24

## 2019-01-22 RX ADMIN — BENZOCAINE, MENTHOL 1 LOZENGE: 15; 3.6 LOZENGE ORAL at 08:22

## 2019-01-22 RX ADMIN — ALBUTEROL SULFATE 2.5 MG: 2.5 SOLUTION RESPIRATORY (INHALATION) at 09:09

## 2019-01-22 RX ADMIN — BENZOCAINE, MENTHOL 1 LOZENGE: 15; 3.6 LOZENGE ORAL at 10:56

## 2019-01-22 RX ADMIN — ACETYLCYSTEINE 2 ML: 200 SOLUTION ORAL; RESPIRATORY (INHALATION) at 09:09

## 2019-01-22 NOTE — PLAN OF CARE
Patient here for vaginal bleeding. Complete previa. Bleeding has subsided and stopped by 1830. Denies cramping or pain. NICU Fellow in to talk with patient. Resp here to do her vest treatment at 1930. EFM off at 1755.

## 2019-01-22 NOTE — DISCHARGE SUMMARY
[facility]  Maternal Fetal Medicine Discharge Summary    Admission Date:  2019   Discharge Date:  2019    Admission Attending: Eric Atwood MD  Discharge Attending: Eric Atwood MD    Admission Diagnosis:  - Vaginal bleeding  - Complete previa  - H/o maternal Kartagener's syndrome  - Severe obstructive lung disease  - Complete placenta previa with anterior and posterior components  - Low risk bilateral urinary tract dilation (A1)  - Advanced maternal age  - Conception by IVF    Discharge Diagnosis:  - Same    Procedures Performed:  - None    Admission History:  Ms. Armando Dc is a 43 year old  admitted at 22w4d by embryo transfer date consistent with 6w4d US who was admitted for first episode of vaginal bleeding in setting of placenta previa.    Hospital Course:  During her admission, she had transvaginal ultrasound which demonstrated 4.2 cm cervical length and closed cervix. Repeat US re-demonstrated complete posterior previa with anterior component and showed placental lakes with one near internal os. As she was Rh positive, rhogam was not indicated. She was monitoring for >24 hours with only scant amount of spotting. NNP was consulted during her admission. She also had a maternal ECHO due to her history of primary ciliary dyskinesia with severe obstructive lung disease which was negative for pulmonary hypertension and cor pulmonale. She will be notified of results by her scheduled MFM appointment on 19.    Discharge Plans:  - The patient was discharged to home  - Return precautions were reviewed including vaginal bleeding, abdominal pain/contractions, leakage of fluid, fever or chills.  - She will follow up with MFM on 2019    - Discharge medications include:     Review of your medicines      START taking      Dose / Directions   ranitidine 150 MG tablet  Commonly known as:  ZANTAC  Used for:  Gastroesophageal reflux disease with esophagitis       "Dose:  150 mg  Take 1 tablet (150 mg) by mouth 2 times daily  Quantity:  60 tablet  Refills:  0        CONTINUE these medicines which have NOT CHANGED      Dose / Directions   acetylcysteine 20 % neb solution  Commonly known as:  MUCOMYST  Used for:  Kartagener's syndrome, Situs inversus, Pseudomonas aeruginosa colonization      Dose:  2 mL  Inhale 2 mLs into the lungs 2 times daily  Quantity:  120 mL  Refills:  5     * albuterol 108 (90 Base) MCG/ACT inhaler  Commonly known as:  PROAIR HFA/PROVENTIL HFA/VENTOLIN HFA  Used for:  Kartagener syndrome      Dose:  2 puff  Inhale 2 puffs into the lungs every 6 hours as needed for shortness of breath / dyspnea or wheezing  Quantity:  1 Inhaler  Refills:  12     * albuterol (2.5 MG/3ML) 0.083% neb solution  Commonly known as:  PROVENTIL  Used for:  Bronchiectasis without acute exacerbation (H)      NEBULIZE AND INHALE THE CONTENTS OF ONE VIAL (3ML) TWO TIMES A DAY  Quantity:  180 mL  Refills:  11     aspirin 81 MG tablet  Commonly known as:  ASA      Dose:  81 mg  Take 81 mg by mouth daily  Refills:  0     azithromycin 250 MG tablet  Commonly known as:  ZITHROMAX  Used for:  Kartagener's syndrome      Two tablets first day, then one tablet daily for 9 days.  Quantity:  11 tablet  Refills:  0     nebulizer Sindy      Nebulizer compressor - use with ALEK as directed  Refills:  0     PRENATAL 1 PO      Dose:  1 tablet  Take 1 tablet by mouth every evening  Refills:  0     PROGESTERONE IM      Dose:  10 mL  Inject 10 mLs into the muscle  Refills:  0     Syringe Luer Lock 20G X 1-1/2\" 5 ML Misc  Used for:  Kartagener syndrome, Reactive airway disease      Dose:  1 each  1 each 2 times daily  Quantity:  60 each  Refills:  5     VITAMIN D PO      Refills:  0     Water For Injection Sterile Soln  Used for:  Kartagener syndrome, Reactive airway disease      Dose:  4 mL  Inject 4 mLs into the vein 2 times daily  Quantity:  250 mL  Refills:  5         * This list has 2 medication(s) " that are the same as other medications prescribed for you. Read the directions carefully, and ask your doctor or other care provider to review them with you.               Where to get your medicines      These medications were sent to [facility] [place], [place] - [address]  [address], [facility] [address]    Phone:  [phone]     ranitidine 150 MG tablet       Dontrell Holman MD  OB/GYN Resident, PGY-3  1/22/2019     Eirc Atwood MD  Maternal-Fetal Medicine

## 2019-01-22 NOTE — PLAN OF CARE
Data: Maternal status stable. VSS this shift, pt afebrile. Fetal assessment is appropriate for gestational age (see flowsheet). Fetus is difficult to monitor due to frequent fetal movement. Ledbetter reveals occasional irritability, patient denies feeling contractions or pain. Pt reports scant amounts of vaginal bleeding, mainly noting this bleeding on the toilet paper when she wipes. Patient denies uterine contractions. Patient denies difficulty breathing. Pt reports active fetal movement.   Action: Pt received round of vest treatment and nebulizer during the evening. Mild inspiratory wheezing noted afterwards but patient stated that this is baseline. Tums and Zantac administered for heartburn. Continue with plan of care, which is up ad tara activity, TID monitoring, Q shift vitals. Patient encouraged to move extremities while in bed. Plan for complete echo this morning.  Response: Patient coping well, very pleasant tonight. Will continue to monitor.

## 2019-01-22 NOTE — PROGRESS NOTES
MFM Antepartum Progress Note    S: Armando is doing well this morning. Notes specks of blood with straining and had some small spots with wiping after her transvaginal US this morning. Denies any abdominal pain or contractions.    O:  Vitals:    19 2031 19 2032 19 0132 19 0738   BP: 114/62  107/64 113/68   Resp:  18 16    Temp:  98.7  F (37.1  C) 97.8  F (36.6  C) 98.2  F (36.8  C)   TempSrc:  Oral Oral Oral     Gen: sitting up, NAD  CV: well perfused  Pulm: CTAB  Abd: gravid, soft, non-distended, non-tender  Ext: no edema, no calf tenderness b/l    TVUS: cervical length 4 cm, closed    ASSESSMENT:  Armando Dc is a 43 year old  with a hernandez pregnancy at 22w5d by embryo transfer consistent with 6w4d US with a known placenta previa here for evaluation of first episode of vaginal bleeding.      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 No need for Rhogam.   - Discussed risk of further bleeding and need for inpatient monitoring until this afternoon to determine stability.  - Discussed the possibility that if her bleeding becomes heavier or if there are sign maternal of fetal compromise delivery may be required and that at this periviable gestational age we would recommend a NICU consultation. S/p NICU consultation yesterday  - We will await for administration of  steroids based on her clinical course for now.      #Primary ciliary dyskinesia with severe obstructive lung disease  - Currently stable, continue vest therapy and home meds.    - Will follow-up on maternal echocardiogram to evaluate for pulmonary hypertension and cor pulmonale today     #Conception by IVF  -Targeted anatomical survey with low risk  bilateral UTD ( A1)   -Fetal screening echocardiogram at 22 weeks    Dontrell Holman MD  OB/GYN Resident, PGY-3  1/22/2019

## 2019-01-22 NOTE — DISCHARGE INSTRUCTIONS
Discharge Instructions for Undelivered Patients    Diet:    Drink 8 to 12 glasses of liquids (milk, juice, water) every day.    You may eat meals and snacks..    Activity:    Call your doctor if your baby is moving less than usual.    Medicines:    My care team has reviewed my medicines with me.    My care team has given me a list of my medicines.    My care team has prescribed a new medicine. They have either sent it home with me or ordered it from the pharmacy.    Call your provider if you notice:    Swelling in your face or increased swelling in your hands or legs.    Headaches that are not relieved by Tylenol (acetaminophen).    Changes in your vision (blurring; seeing spots or stars).    Nausea (sick to your stomach) and vomiting (throwing up).    Weight gain of 5 pounds per week.    Heartburn that doesn't go away.    Signs of bladder infection: pain when you urinate (use the toilet), needing to go more often or more urgently.    The bag of thomas (membranes) breaks, or you notice leaking in your underwear.    Bright red blood in your underwear.    Abdominal (lower belly) or stomach pain.    For first baby: Contractions (tightenings) less than 5 minutes apart for one hour or more.    For Second (plus) baby: Contractions (tightenings) less than 10 minutes apart and getting stronger.    Increase or change in vaginal discharge (note the color and amount).      Follow up with your provider:  At [facility] next Tuesday (1/29/19)  At 8:45am for ultrasound and new patient OB visit.

## 2019-01-22 NOTE — PLAN OF CARE
Armando Dc admitted for evaluation and treatment of Placenta Previa.    VitalsBlood pressure 113/68, temperature 98.2  F (36.8  C), temperature source Oral, resp. rate 16, not currently breastfeeding., fetal status: Appropriate for Gestational Age.  Maternal echo, BPP and lab tests were done, results: WNL.    Medications given during stay: Zantac  , medications prescribed at discharge Zantac .    Written instructions given to patient.  Additional documents provided: information about new OBV at [facility].  Copy in electronic medical record.  Discharged Home undelivered at 1:45pm  in stable condition accompanied by .  Follow up with primary care provider in 1 week

## 2019-01-23 ENCOUNTER — CARE COORDINATION (OUTPATIENT)
Dept: PULMONOLOGY | Facility: CLINIC | Age: 44
End: 2019-01-23

## 2019-01-23 DIAGNOSIS — Q89.3 KARTAGENER'S SYNDROME: ICD-10-CM

## 2019-01-23 RX ORDER — AZITHROMYCIN 250 MG/1
TABLET, FILM COATED ORAL
Qty: 11 TABLET | Refills: 0 | Status: ON HOLD | OUTPATIENT
Start: 2019-01-23 | End: 2019-02-15

## 2019-01-23 NOTE — PROGRESS NOTES
Pt called to report that she is on bedrest at least until the end of the week for spotting, as pt is pregnant She missed her appt with Dr. Hernández due to this. She was throwing up yesterday which caused spotting. She  has some spotting with vesting as well. Her voice is hoarse, has a sore throat,  having difficulty swallowing, cough is at baseline, no change in sputum, no FCNS, is vesting 2x/ day. She had a z pack (10 days) a couple months ago which helped a lot.   Plan per Dr. Hernández; stop vesting and do Aerobika twice daily, come to clinic next Tuesday, no PFTs, 10 day Z pack. Pt understands and is agreeable to plan.

## 2019-01-24 ENCOUNTER — TELEPHONE (OUTPATIENT)
Dept: MATERNAL FETAL MEDICINE | Facility: CLINIC | Age: 44
End: 2019-01-24

## 2019-01-24 NOTE — TELEPHONE ENCOUNTER
Phone call back to pt after she called with follow up questions from hospital. Pt wondering if it was ok for her to go back to work next week as she is no longer spotting. After this scribe talked with Dr. Atwood, he states it is ok for pt to return to work next week if she is stable and not have any spotting, cramping, lof or contractions. Pt states understanding. Has an first obv with MFM next week. Ina Zavala RN

## 2019-01-29 ENCOUNTER — OFFICE VISIT (OUTPATIENT)
Dept: PULMONOLOGY | Facility: CLINIC | Age: 44
End: 2019-01-29
Attending: INTERNAL MEDICINE
Payer: COMMERCIAL

## 2019-01-29 ENCOUNTER — OFFICE VISIT (OUTPATIENT)
Dept: MATERNAL FETAL MEDICINE | Facility: CLINIC | Age: 44
End: 2019-01-29
Attending: OBSTETRICS & GYNECOLOGY
Payer: COMMERCIAL

## 2019-01-29 ENCOUNTER — HOSPITAL ENCOUNTER (OUTPATIENT)
Dept: ULTRASOUND IMAGING | Facility: CLINIC | Age: 44
Discharge: HOME | End: 2019-01-29
Attending: OBSTETRICS & GYNECOLOGY | Admitting: OBSTETRICS & GYNECOLOGY
Payer: COMMERCIAL

## 2019-01-29 VITALS
RESPIRATION RATE: 18 BRPM | BODY MASS INDEX: 25.66 KG/M2 | HEIGHT: 64 IN | OXYGEN SATURATION: 96 % | WEIGHT: 150.3 LBS | SYSTOLIC BLOOD PRESSURE: 124 MMHG | HEART RATE: 90 BPM | DIASTOLIC BLOOD PRESSURE: 70 MMHG

## 2019-01-29 VITALS
SYSTOLIC BLOOD PRESSURE: 124 MMHG | RESPIRATION RATE: 18 BRPM | HEIGHT: 64 IN | BODY MASS INDEX: 25.61 KG/M2 | DIASTOLIC BLOOD PRESSURE: 70 MMHG | OXYGEN SATURATION: 96 % | WEIGHT: 150 LBS | HEART RATE: 90 BPM

## 2019-01-29 DIAGNOSIS — O44.02 PLACENTA PREVIA CENTRALIS IN SECOND TRIMESTER: Primary | ICD-10-CM

## 2019-01-29 DIAGNOSIS — J47.1 BRONCHIECTASIS WITH ACUTE EXACERBATION (H): ICD-10-CM

## 2019-01-29 DIAGNOSIS — K21.9 GASTROESOPHAGEAL REFLUX DISEASE WITHOUT ESOPHAGITIS: Primary | ICD-10-CM

## 2019-01-29 DIAGNOSIS — J47.9 BRONCHIECTASIS WITHOUT COMPLICATION (H): ICD-10-CM

## 2019-01-29 DIAGNOSIS — Z34.02 ENCOUNTER FOR PRENATAL CARE IN SECOND TRIMESTER OF FIRST PREGNANCY: ICD-10-CM

## 2019-01-29 DIAGNOSIS — O09.512 ELDERLY PRIMIGRAVIDA IN SECOND TRIMESTER: ICD-10-CM

## 2019-01-29 LAB
GRAM STN SPEC: NORMAL
Lab: NORMAL
SPECIMEN SOURCE: NORMAL

## 2019-01-29 PROCEDURE — 76816 OB US FOLLOW-UP PER FETUS: CPT

## 2019-01-29 PROCEDURE — 87185 SC STD ENZYME DETCJ PER NZM: CPT | Performed by: INTERNAL MEDICINE

## 2019-01-29 PROCEDURE — G0463 HOSPITAL OUTPT CLINIC VISIT: HCPCS

## 2019-01-29 PROCEDURE — 87070 CULTURE OTHR SPECIMN AEROBIC: CPT | Performed by: INTERNAL MEDICINE

## 2019-01-29 PROCEDURE — 87186 SC STD MICRODIL/AGAR DIL: CPT | Performed by: INTERNAL MEDICINE

## 2019-01-29 PROCEDURE — 87077 CULTURE AEROBIC IDENTIFY: CPT | Performed by: INTERNAL MEDICINE

## 2019-01-29 PROCEDURE — G0463 HOSPITAL OUTPT CLINIC VISIT: HCPCS | Mod: ZF

## 2019-01-29 PROCEDURE — 87205 SMEAR GRAM STAIN: CPT | Performed by: INTERNAL MEDICINE

## 2019-01-29 PROCEDURE — G0463 HOSPITAL OUTPT CLINIC VISIT: HCPCS | Mod: 25,ZF

## 2019-01-29 RX ORDER — PANTOPRAZOLE SODIUM 40 MG/1
40 TABLET, DELAYED RELEASE ORAL DAILY
Qty: 90 TABLET | Refills: 3 | Status: SHIPPED | OUTPATIENT
Start: 2019-01-29 | End: 2020-07-06

## 2019-01-29 ASSESSMENT — MIFFLIN-ST. JEOR
SCORE: 1326.76
SCORE: 1325.65

## 2019-01-29 NOTE — NURSING NOTE
Chief Complaint   Patient presents with     RECHECK     Bronchiectasis    MIPS up-to-date on health maintenance   Alisha Prado [facility]

## 2019-01-29 NOTE — LETTER
2019     RE: Armando Dc  [address]  [place] [address]     Dear Colleague,    Thank you for referring your patient, Armando Dc, to the [facility] at [facility]. Please see a copy of my visit note below.    [facility]  2019         Assessment and Plan:   Armando Dc is a 43 year old female with PCD with bronchiectasis.    1. PCD and bronchiectasis with moderately-severe obstruction:1.  Armando is now on bed rest for her second trimester of pregnancy.  This was after found to have placenta previa and having some spotting.  She did contact us last week at the time that this was occurring with worsening pulmonary symptoms.  We reinitiated a 10-day course of azithromycin.  She presents today for followup.  She reports that the azithromycin has markedly improved her symptoms.  She has been hesitant to do the vest and now is more concerned given her placenta previa.  She has been doing her Aerobika with nebs b.i.d.  I have discouraged her doing pulmonary function test as I am uncertain how this would also exacerbate her vaginal bleeding.  I have recommended today that:   --  She complete a course of azithromycin.     --  If she were to continue to show evidence of Haemophilus and strep pneumoniae in her sputum, I would certainly be more aggressive using a penicillin agent to treat her.   --  I have asked her to increase her Aerobika and nebulized therapy to 4 times daily.     --  I support her bed rest, and we did discuss this today.   --  I have asked her to do an overnight oximetry, as she is concerned about her nighttime saturation.  Her saturation in clinic was 95%.      2.  Second trimester pregnancy.  Armando is now on bed rest because of her placenta previa and spotting.  They plan to do a planned  at 36 weeks.  I have recommended that she have an  anesthesia preop, so they are certain how to manage her at the time of her .  Additionally, we discussed today how it would be appropriate to use oxygen p.r.n. during delivery to keep her saturation greater than 94%.      3.  Psychosocial.  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  She is going to contact her program regarding her bed rest.  She was here with her  today.  She remains very excited about her pregnancy.     4.  Gastroenteritis.  Armando reports recent fever, nausea and vomiting.  This occurred at the time when she was also having vaginal bleeding.  These symptoms have since resolved.  She has no further nausea or vomiting.  Her stools are back to normal.     5.  Gastroesophageal reflux.  Armando describes continued reflux symptoms that are rather significant.  I have asked her to change her ranitidine to Protonix.  She does have most of her symptoms through the night so she will take it at bedtime.  I have asked her to overlap the ranitidine and the Protonix by a couple days to control her symptomatology.     Lyn Hernández MD MPH  Associate Professor of Medicine  Pulmonary, Allergy, Critical Care and Sleep Medicine      Interval History:     Armando reports recent illness.  She had worsening pulmonary symptomatology.  She is now feeling that her cough is back to baseline.  She denies any shortness of breath at rest.  She is denying any shortness of breath with minimal activity.  As above, she is doing her Aerobika nebs twice daily.          Review of Systems:     CONSTITUTIONAL: recent fever, no chills, decrease in weight, decrease in energy,   Appetite improving after recent illness.    INTEGUMENTARY/SKIN: no rash, no obvious new lesions    ENT/MOUTH: recent worsening symptoms but now back to baseline     RESPIRATORY: see interval history    CV: no chest pain, no palpitations, trace peripheral edema, sleeping upright    GI: recent nausea and vomiting, no  change in stools, no fatty stools, worsening GERD, no abdominal pain    : no dysuria, no urinary frequency    MUSCULOSKELETAL: no myalgias, no arthralgias    ENDOCRINE: no excessive thirst    NEURO:  No headache, no numbness, no tingling    SLEEP: upright    PSYCHIATRIC: mood stable          Past Medical and Surgical History:     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,      Pseudomonas aeruginosa colonization 2008     Reactive airway disease      Situs inversus      Uncomplicated asthma     Reactive airway disease     Past Surgical History:   Procedure Laterality Date     ADENOIDECTOMY       LAPAROSCOPIC CYSTECTOMY OVARIAN (BENIGN) Left 11/13/2017    Procedure: LAPAROSCOPIC CYSTECTOMY OVARIAN (BENIGN);  Left  Laparoscopy Ovarian Cystotomy, Hysteroscopy And Polpectomy With Myosure ;  Surgeon: Zayra Roberts MD;  [facility]: [facility]     OPERATIVE HYSTEROSCOPY WITH MORCELLATOR N/A 11/13/2017    Procedure: OPERATIVE HYSTEROSCOPY WITH MORCELLATOR;;  Surgeon: Zayra Roberts MD;  [facility]: [facility]     TONSILLECTOMY       TONSILLECTOMY & ADENOIDECTOMY      8 years old     TRANSVAGINAL RETRIEVAL OVUM Bilateral 3/3/2016    IVF Procedure: TRANSVAGINAL RETRIEVAL OVUM;  Surgeon: Sunshine Gilliam MD;  Location: [facility]     uterine polyp  2013           Family History:     Family History   Problem Relation Age of Onset     Hyperlipidemia Mother      Diabetes Mother      Hypertension Mother      Hyperlipidemia Father      Hypertension Father      Diabetes Maternal Grandmother      Diabetes Maternal Grandfather             Social History:     Social History     Socioeconomic History     Marital status:      Spouse name: Not on file     Number of children: 0     Years of education: Not on file     Highest education level: Not on file   Social Needs     Financial resource  "strain: Not on file     Food insecurity - worry: Not on file     Food insecurity - inability: Not on file     Transportation needs - medical: Not on file     Transportation needs - non-medical: Not on file   Occupational History     Occupation: physician     Employer: [employer]     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 Not Asked   Social History Narrative    Lives with  ().  Immigrated in 2006 from [place] ([facility] No children.  Parents in [place], 2 younger sisters and a brother, she is eldest.             Medications:     Current Outpatient Medications   Medication     fluticasone-salmeterol (ADVAIR) 100-50 MCG/DOSE inhaler     pantoprazole (PROTONIX) 40 MG EC tablet     acetylcysteine (MUCOMYST) 20 % nebulizer solution     albuterol (PROAIR HFA/PROVENTIL HFA/VENTOLIN HFA) 108 (90 BASE) MCG/ACT Inhaler     albuterol (PROVENTIL) (2.5 MG/3ML) 0.083% neb solution     aspirin 81 MG tablet     azithromycin (ZITHROMAX) 250 MG tablet     Cholecalciferol (VITAMIN D PO)     Prenatal MV-Min-Fe Fum-FA-DHA (PRENATAL 1 PO)     ranitidine (ZANTAC) 150 MG tablet     ranitidine (ZANTAC) 150 MG tablet     Respiratory Therapy Supplies (NEBULIZER) SUZANNA     Syringe/Needle, Disp, (SYRINGE LUER LOCK) 20G X 1-1/2\" 5 ML MISC     Water For Injection Sterile SOLN     No current facility-administered medications for this visit.             Physical Exam:   /70   Pulse 90   Resp 18   Ht 1.626 m (5' 4.02\")   Wt 68 kg (150 lb)   SpO2 96%   BMI 25.73 kg/m       Constitutional:   Awake, alert and in no apparent distress     Eyes:   nonicteric     ENT:   oral mucosa " moist without lesions, opaque tm bilaterally, bilateral mucosal edema and erythema     Neck:   Supple without supraclavicular or cervical lymphadenopathy     Lungs:   fair air flow.  No crackles. biapical rhonchi.  No wheezes.     Cardiovascular:   Normal S1 and S2.  RRR.  No murmur, gallop or rub.     Abdomen:   NABS, soft, nontender, protuberant     Musculoskeletal:   No edema, no digital clubbing present     Neurologic:   Alert and conversant.     Skin:   Warm, dry.  No rash on limited exam.             Data:   All laboratory and imaging data reviewed.    Cystic Fibrosis Culture  Specimen Description   Date Value Ref Range Status   01/29/2019 Sputum  Final   11/07/2018 Sputum  Final   11/07/2018 Sputum  Final    Culture Micro   Date Value Ref Range Status   11/07/2018 Light growth  Normal otto    Final   11/07/2018 Light growth  Streptococcus pneumoniae   (A)  Final   11/07/2018 (A)  Final    Moderate growth  Pseudomonas aeruginosa, mucoid strain     11/07/2018 Light growth  Aspergillus niger   (A)  Final   11/07/2018 (A)  Final    Light growth  Haemophilus influenzae  Beta lactamase negative  Beta-lactamase negative Haemophilus influenzae are usually susceptible to ampicillin,   amoxacillin/clavulanic acid, levofloxacin, and 3rd generation cephalosporins, such as   ceftriaxone.            Recent Results (from the past 168 hour(s))   Gram stain    Collection Time: 01/29/19 12:45 PM   Result Value Ref Range    Specimen Description Sputum     Special Requests Screen     Gram Stain <10 Squamous epithelial cells/low power field     Gram Stain >25 PMNs/low power field     Gram Stain Few  Mixed gram negative and positive otto        PFT: none performed today.     Again, thank you for allowing me to participate in the care of your patient.      Sincerely,    Lyn Hernández MD

## 2019-01-29 NOTE — PATIENT INSTRUCTIONS
Self-Care Plan    RECOMMENDATIONS:   I think it would be helpful to have an anesthesia pre-op.  Will do overnight oximetry.  Finish your course of azithromycin.  If you are still growing these other organisms, I will start a penicillin.  Increase treatments to 4 times daily.  Start protonix daily.  Overlap ranitidine for a few days.    YOUR GOAL: Stay well.  Take time off.        CF Nurse line:          Tami Lao   [phone]            CF Resp Therapist: Sybil Peng            [phone]   CF Dietitians:           Delphine Angulo            [phone]                       Faye Maciel                       [phone]   CF Diabetes Nurse: Palmira Marshall             [phone]    CF Social Workers:  Brittanie Buenrostro             [phone]                Olga Greer            [phone]  CF Pharmacist:        Maira Saunders                              [phone]  [email or web address]         MRN: [id number]   Clinic Date: January 29, 2019   Patient: Armando Dc     Annual Studies:   No results found for: IGG  No results found for: INS  There are no preventive care reminders to display for this patient.      Pulmonary Function Tests  FEV1: amount of air you can blow out in 1 second  FVC: total amount of air you can take in and blow out    Your Goals:         PFT Latest Ref Rng & Units 11/7/2018   FVC L 2.62   FEV1 L 1.47   FVC% % 79   FEV1% % 54          Airway Clearance: The Most Important Way to Keep Your Lungs Healthy  Vest Settings:    Hill-Rom Frequencies: 8, 9, 10 Pressure 10 Then, Frequencies 18, 19, 20 Pressure 6      RespirTech: Quick Start with Pressure of     Do each frequency for 5 minutes; Deflate vest after each frequency & cough 3 times before beginning the next setting.    Vest and Neb Therapy should be done 3-4 times/day.    Good Nutrition Can Improve Lung Function and Overall Health     Take ALL of your vitamins with food     Take 1/2 of your enzymes before EVERY  meal/snack and the other 1/2 mid-meal/snack    Wt Readings from Last 3 Encounters:   01/29/19 68 kg (150 lb)   01/29/19 68.2 kg (150 lb 4.8 oz)   01/07/19 68.9 kg (152 lb)       Body mass index is 25.73 kg/m .         National CF Foundation Recommendations for BMI in CF Adults: Women: at least 22 Men: at least 23        Controlling Blood Sugars Helps Prevent Lung Infections & Improves Nutrition  Test blood sugar:     In the morning before eating (goal is )     2 hours after a meal (goal is less than 150)     When pre-meal glucose is greater than 150 add correction     At bedtime (if less than 100 eat a snack with 15 grams of carbohydrates  Last A1C Results:   Hemoglobin A1C   Date Value Ref Range Status   09/25/2018 5.8 (H) 0 - 5.6 % Final     Comment:     Normal <5.7% Prediabetes 5.7-6.4%  Diabetes 6.5% or higher - adopted from ADA   consensus guidelines.           If diabetic, measure A1C every 6 months. Goal: Under 7%    Staying Healthy    Research:  If you are interested in learning about research opportunities or have questions, please contact the CF Research Team at [phone] or [email or web address].      CF Foundation:  Compass is a personalized resource service to help you with the insurance, financial, legal and other issues you are facing.  It's free, confidential and available to anyone with CF.  Ask your  for more information or contact Compass directly at 445-COMPASS ([phone]) or compass@cff.org, or learn more at cff.org/compass.

## 2019-01-29 NOTE — NURSING NOTE
Robya seen in clinic today for pregnancy c/b placenta previa and pulmonary syndrome at 23.5 weeks gestation (see report/notes). Patient is accompanied by her  today.  VSS. Pt denies lof/change in discharge/contractions/headache/vision changes/chest pain/SOB.  Patient states she has some dark spotting but has not seen any pink or red spotting since discharge last week.  Patient is requesting a refill of her ranitidine Rx.  Dr. Morrison met with pt and discussed POC. Plan to have a f/u OBV in 2 weeks along with repeat 3 hour GTT.  Short term disability paperwork filled out for patient.   Asma will start taking OTC colace to help with GI issues.  Patient to have f/u comp with TV and OBV in 4 weeks. Pt discharged stable and ambulatory accompanied by .

## 2019-01-29 NOTE — PROGRESS NOTES
[facility]  Center for Lung Science and Health  2019         Assessment and Plan:   Armando Dc is a 43 year old female with PCD with bronchiectasis.    1. PCD and bronchiectasis with moderately-severe obstruction:1.  Armando is now on bed rest for her second trimester of pregnancy.  This was after found to have placenta previa and having some spotting.  She did contact us last week at the time that this was occurring with worsening pulmonary symptoms.  We reinitiated a 10-day course of azithromycin.  She presents today for followup.  She reports that the azithromycin has markedly improved her symptoms.  She has been hesitant to do the vest and now is more concerned given her placenta previa.  She has been doing her Aerobika with "[employer]s b.i.d.  I have discouraged her doing pulmonary function test as I am uncertain how this would also exacerbate her vaginal bleeding.  I have recommended today that:   --  She complete a course of azithromycin.     --  If she were to continue to show evidence of Haemophilus and strep pneumoniae in her sputum, I would certainly be more aggressive using a penicillin agent to treat her.   --  I have asked her to increase her Aerobika and nebulized therapy to 4 times daily.     --  I support her bed rest, and we did discuss this today.   --  I have asked her to do an overnight oximetry, as she is concerned about her nighttime saturation.  Her saturation in clinic was 95%.      2.  Second trimester pregnancy.  Armando is now on bed rest because of her placenta previa and spotting.  They plan to do a planned  at 36 weeks.  I have recommended that she have an anesthesia preop, so they are certain how to manage her at the time of her .  Additionally, we discussed today how it would be appropriate to use oxygen p.r.n. during delivery to keep her saturation greater than 94%.      3.  Psychosocial.  Armando is a resident in the Adolescent psychiatry here  at the [facility].  She is going to contact her program regarding her bed rest.  She was here with her  today.  She remains very excited about her pregnancy.     4.  Gastroenteritis.  Armando reports recent fever, nausea and vomiting.  This occurred at the time when she was also having vaginal bleeding.  These symptoms have since resolved.  She has no further nausea or vomiting.  Her stools are back to normal.     5.  Gastroesophageal reflux.  Armando describes continued reflux symptoms that are rather significant.  I have asked her to change her ranitidine to Protonix.  She does have most of her symptoms through the night so she will take it at bedtime.  I have asked her to overlap the ranitidine and the Protonix by a couple days to control her symptomatology.     Lyn Hernández MD MPH  Associate Professor of Medicine  Pulmonary, Allergy, Critical Care and Sleep Medicine      Interval History:     Armando reports recent illness.  She had worsening pulmonary symptomatology.  She is now feeling that her cough is back to baseline.  She denies any shortness of breath at rest.  She is denying any shortness of breath with minimal activity.  As above, she is doing her Aerobika nebs twice daily.          Review of Systems:     CONSTITUTIONAL: recent fever, no chills, decrease in weight, decrease in energy,   Appetite improving after recent illness.    INTEGUMENTARY/SKIN: no rash, no obvious new lesions    ENT/MOUTH: recent worsening symptoms but now back to baseline     RESPIRATORY: see interval history    CV: no chest pain, no palpitations, trace peripheral edema, sleeping upright    GI: recent nausea and vomiting, no change in stools, no fatty stools, worsening GERD, no abdominal pain    : no dysuria, no urinary frequency    MUSCULOSKELETAL: no myalgias, no arthralgias    ENDOCRINE: no excessive thirst    NEURO:  No headache, no numbness, no tingling    SLEEP: upright    PSYCHIATRIC: mood stable          Past  Medical and Surgical History:     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,      Pseudomonas aeruginosa colonization 2008     Reactive airway disease      Situs inversus      Uncomplicated asthma     Reactive airway disease     Past Surgical History:   Procedure Laterality Date     ADENOIDECTOMY       LAPAROSCOPIC CYSTECTOMY OVARIAN (BENIGN) Left 11/13/2017    Procedure: LAPAROSCOPIC CYSTECTOMY OVARIAN (BENIGN);  Left  Laparoscopy Ovarian Cystotomy, Hysteroscopy And Polpectomy With Myosure ;  Surgeon: Zayra Roberts MD;  [facility]     OPERATIVE HYSTEROSCOPY WITH MORCELLATOR N/A 11/13/2017    Procedure: OPERATIVE HYSTEROSCOPY WITH MORCELLATOR;;  Surgeon: Zayra Roberts MD;  [facility]     TONSILLECTOMY       TONSILLECTOMY & ADENOIDECTOMY      8 years old     TRANSVAGINAL RETRIEVAL OVUM Bilateral 3/3/2016    IVF Procedure: TRANSVAGINAL RETRIEVAL OVUM;  Surgeon: Sunshine Gilliam MD;  [facility]     uterine polyp  2013           Family History:     Family History   Problem Relation Age of Onset     Hyperlipidemia Mother      Diabetes Mother      Hypertension Mother      Hyperlipidemia Father      Hypertension Father      Diabetes Maternal Grandmother      Diabetes Maternal Grandfather             Social History:     Social History     Socioeconomic History     Marital status:      Spouse name: Not on file     Number of children: 0     Years of education: Not on file     Highest education level: Not on file   Social Needs     Financial resource strain: Not on file     Food insecurity - worry: Not on file     Food insecurity - inability: Not on file     Transportation needs - medical: Not on file     Transportation needs - non-medical: Not on file   Occupational History     Occupation: physician     Employer: [employer]     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 Not Asked   Social History Narrative    Lives with  ().  Immigrated in 2006 from [place] ([facility]). No children.  Parents in [place], 2 younger sisters and a brother, she is eldest.             Medications:     Current Outpatient Medications   Medication     fluticasone-salmeterol (ADVAIR) 100-50 MCG/DOSE inhaler     pantoprazole (PROTONIX) 40 MG EC tablet     acetylcysteine (MUCOMYST) 20 % nebulizer solution     albuterol (PROAIR HFA/PROVENTIL HFA/VENTOLIN HFA) 108 (90 BASE) MCG/ACT Inhaler     albuterol (PROVENTIL) (2.5 MG/3ML) 0.083% neb solution     aspirin 81 MG tablet     azithromycin (ZITHROMAX) 250 MG tablet     Cholecalciferol (VITAMIN D PO)     Prenatal MV-Min-Fe Fum-FA-DHA (PRENATAL 1 PO)     ranitidine (ZANTAC) 150 MG tablet     ranitidine (ZANTAC) 150 MG tablet     Respiratory Therapy Supplies (NEBULIZER) SUZANNA     Syringe/Needle, Disp, (SYRINGE LUER LOCK) 20G X 1-1/2\" 5 ML MISC     Water For Injection Sterile SOLN     No current facility-administered medications for this visit.             Physical Exam:   /70   Pulse 90   Resp 18   Ht 1.626 m (5' 4.02\")   Wt 68 kg (150 lb)   SpO2 96%   BMI 25.73 kg/m      Constitutional:   Awake, alert and in no apparent distress     Eyes:   nonicteric     ENT:   oral mucosa moist without lesions, opaque tm bilaterally, bilateral mucosal edema and erythema     Neck:   Supple without supraclavicular or cervical lymphadenopathy     Lungs:   fair air flow.  No crackles. biapical rhonchi.  No wheezes.     Cardiovascular:   Normal S1 and S2.  RRR.  No murmur, gallop or rub. "     Abdomen:   NABS, soft, nontender, protuberant     Musculoskeletal:   No edema, no digital clubbing present     Neurologic:   Alert and conversant.     Skin:   Warm, dry.  No rash on limited exam.             Data:   All laboratory and imaging data reviewed.    Cystic Fibrosis Culture  Specimen Description   Date Value Ref Range Status   01/29/2019 Sputum  Final   11/07/2018 Sputum  Final   11/07/2018 Sputum  Final    Culture Micro   Date Value Ref Range Status   11/07/2018 Light growth  Normal otto    Final   11/07/2018 Light growth  Streptococcus pneumoniae   (A)  Final   11/07/2018 (A)  Final    Moderate growth  Pseudomonas aeruginosa, mucoid strain     11/07/2018 Light growth  Aspergillus niger   (A)  Final   11/07/2018 (A)  Final    Light growth  Haemophilus influenzae  Beta lactamase negative  Beta-lactamase negative Haemophilus influenzae are usually susceptible to ampicillin,   amoxacillin/clavulanic acid, levofloxacin, and 3rd generation cephalosporins, such as   ceftriaxone.          Recent Results (from the past 168 hour(s))   Gram stain    Collection Time: 01/29/19 12:45 PM   Result Value Ref Range    Specimen Description Sputum     Special Requests Screen     Gram Stain <10 Squamous epithelial cells/low power field     Gram Stain >25 PMNs/low power field     Gram Stain Few  Mixed gram negative and positive otto          PFT: none performed today.

## 2019-01-30 ENCOUNTER — DOCUMENTATION ONLY (OUTPATIENT)
Dept: PULMONOLOGY | Facility: CLINIC | Age: 44
End: 2019-01-30

## 2019-01-30 NOTE — PROGRESS NOTES
Overnight Oximetry order placed to [facility].     Will review results with Dr Hernández upon receipt of report.

## 2019-01-30 NOTE — PROGRESS NOTES
"[facility] OB Visit    Armando Dc is a 43 year old  at 23w6d by embryo transfer with a history of primary ciliary dyskinesia (PCD), bronchiectasis with moderately-severe obstruction and placenta previa here to establish OB care with Maternal Fetal Medicine due to her pregnancy complications.    S: Armando reports that she has been doing well since being hospitalized for her first episode of bleeding from placenta previa. She now has dark spotting mixed with mucous only with a bowel movement. She states she is certain that the source is vaginal and not rectal. She reports that she is not constipated, but does have to bear down to have a bowel movement. Armando has questions today regarding activity. She has significantly reduced her activity at home and has not yet returned to work. She is concerned about the amount of time that she has to walk and be on her feet at work and the associated risk for bleeding.     She continues to complain of heartburn that is worse at night. She states that she is sleeping upright to minimize her symptoms. The ranitidine has resulted in an improvement in her symptoms.     Armando reports that her respiratory symptoms have improved with the treatment with azithromycin, which she is still taking. She missed her appointment with Pulmonary last week due to hospitalization, but is following up with Dr. Hernández today. She is planning to not have PFT performed due to her placenta previa with recent bleeding. Armando is completing her [employer] nebs as directed, but has not been regularly doing her vest therapy due to concern for bleeding with the previa.     Armando did have an echocardiogram performed while hospitalized that demonstrated normal right and left ventricular size and function, normal atrial size and LVEF of 60-65%.       O:  /70   Pulse 90   Resp 18   Ht 1.626 m (5' 4\")   Wt 68.2 kg (150 lb 4.8 oz)   SpO2 96%   BMI 25.80 kg/m    Gen: NAD  Lungs: Rhonchi present  CV: RRR, no " m/r/g  Abd: Gravid, NT    Please see the imaging tab for details of the ultrasound performed today.    ASSESSMENT:  Armando Dc is a 43 year old  at 23w6d by embryo transfer consistent with 6w4d US here to establish OB care with MFM due to pregnancy complicated by:     -Primary ciliary dyskinesia and bronchiectasis with moderate-severe obstructive lung disease  -Placenta previa with first bleed (vaginal spotting) on 19  -Advanced maternal age  -Conception by IVF    RECOMMENDATIONS:     #Primary ciliary dyskinesia and bronchiectasis with moderate-severe obstructive lung disease   - Continued close follow-up with Dr. Hernández (appointment later today). If no further bleeding may consider resuming planned monthly PFT, or as clinically indicated  - Continue twice daily bronchial drainage therapy and nebulization treatment as recommended per pulmonology   - Aggressive treatment of pulmonary exacerbations and infections in pregnancy.    - Close monitoring of oxygen saturations, patient was instructed to obtain a pulse oxymetry and start O2 supplemental therapy in pregnancy if needed to maintain oxygen saturations > 95%    - Serial ultrasounds to evaluate fetal growth (every 3-4 weeks).  -  surveillance with BPP weekly starting at 32 weeks    #Placenta previa with first bleed 19  - Continue precautions with pelvic rest, no lifting over 10-15 pounds, avoidance of prolonged activity/standing  - Continue off work due to previa with recent bleed and demands of job  - Begin stool softener such as colace 100 mg. May start once daily and increase to twice daily if needed. Add Miralax if needed.  - Serial ultrasounds to assess placental location and to evaluate for progression to vasa previa given the placenta overlying the cervix is very thin with placental lake. No obvious fetal surface vessels seen in over the cervix on recent transvaginal ultrasound  - Delivery by  section at 36 weeks  gestation  - Anesthesiology consultation prior to delivery given significant pulmonary disease    #Fetal pyelectasis - UTD A1  - Continue to monitor on serial ultrasounds with plan for referral to Pediatric Urology in the third trimester if persistent    #GERD  - Currently on ranitidine with continued symptoms. Agree with Dr. Hernández plan to transition to Protonix    Follow up in 2 weeks with 3 hour GTT and in 4 weeks with follow up comprehensive ultrasound and TV to reevaluate fetal growth and the placenta.    Dory Morrison MD  Specialist in Maternal-Fetal Medicine

## 2019-02-01 ENCOUNTER — CARE COORDINATION (OUTPATIENT)
Dept: PULMONOLOGY | Facility: CLINIC | Age: 44
End: 2019-02-01

## 2019-02-01 DIAGNOSIS — J47.9 BRONCHIECTASIS WITHOUT COMPLICATION (H): Primary | Chronic | ICD-10-CM

## 2019-02-01 RX ORDER — AMOXICILLIN AND CLAVULANATE POTASSIUM 500; 125 MG/1; MG/1
1 TABLET, FILM COATED ORAL 2 TIMES DAILY
Qty: 14 TABLET | Refills: 0 | Status: ON HOLD | OUTPATIENT
Start: 2019-02-01 | End: 2019-02-15

## 2019-02-01 NOTE — PROGRESS NOTES
VM left with pt per Dr. Hernández request to let her know that she is growing H. Influenza in her most recent cx and that she would like to treat it with a 7 day course of Augmentin. Prescription sent. Pt advised to call the CF office with any questions.

## 2019-02-03 LAB
BACTERIA SPEC CULT: ABNORMAL
SPECIMEN SOURCE: ABNORMAL

## 2019-02-05 ENCOUNTER — TRANSFERRED RECORDS (OUTPATIENT)
Dept: HEALTH INFORMATION MANAGEMENT | Facility: CLINIC | Age: 44
End: 2019-02-05

## 2019-02-14 ENCOUNTER — OFFICE VISIT (OUTPATIENT)
Dept: MATERNAL FETAL MEDICINE | Facility: CLINIC | Age: 44
End: 2019-02-14
Attending: OBSTETRICS & GYNECOLOGY
Payer: COMMERCIAL

## 2019-02-14 ENCOUNTER — TELEPHONE (OUTPATIENT)
Dept: MATERNAL FETAL MEDICINE | Facility: CLINIC | Age: 44
End: 2019-02-14

## 2019-02-14 VITALS
RESPIRATION RATE: 18 BRPM | HEART RATE: 77 BPM | BODY MASS INDEX: 26.46 KG/M2 | SYSTOLIC BLOOD PRESSURE: 121 MMHG | DIASTOLIC BLOOD PRESSURE: 77 MMHG | OXYGEN SATURATION: 98 % | WEIGHT: 154.2 LBS

## 2019-02-14 DIAGNOSIS — O44.02 PLACENTA PREVIA CENTRALIS IN SECOND TRIMESTER: Primary | ICD-10-CM

## 2019-02-14 DIAGNOSIS — J47.9 BRONCHIECTASIS WITHOUT COMPLICATION (H): ICD-10-CM

## 2019-02-14 DIAGNOSIS — O24.419 GESTATIONAL DIABETES: Primary | ICD-10-CM

## 2019-02-14 DIAGNOSIS — O09.512 ELDERLY PRIMIGRAVIDA IN SECOND TRIMESTER: ICD-10-CM

## 2019-02-14 DIAGNOSIS — O09.512 ELDERLY PRIMIGRAVIDA IN SECOND TRIMESTER: Primary | ICD-10-CM

## 2019-02-14 LAB
ALT SERPL W P-5'-P-CCNC: 26 U/L (ref 0–50)
AST SERPL W P-5'-P-CCNC: 21 U/L (ref 0–45)
GLUCOSE 1H P 100 G GLC PO SERPL-MCNC: 228 MG/DL (ref 60–179)
GLUCOSE 2H P 100 G GLC PO SERPL-MCNC: 132 MG/DL (ref 60–154)
GLUCOSE 3H P 100 G GLC PO SERPL-MCNC: 130 MG/DL (ref 60–139)
GLUCOSE P FAST SERPL-MCNC: 96 MG/DL (ref 60–94)

## 2019-02-14 PROCEDURE — G0463 HOSPITAL OUTPT CLINIC VISIT: HCPCS | Mod: ZF

## 2019-02-14 PROCEDURE — 82951 GLUCOSE TOLERANCE TEST (GTT): CPT | Performed by: OBSTETRICS & GYNECOLOGY

## 2019-02-14 PROCEDURE — 36415 COLL VENOUS BLD VENIPUNCTURE: CPT | Performed by: OBSTETRICS & GYNECOLOGY

## 2019-02-14 PROCEDURE — 82952 GTT-ADDED SAMPLES: CPT | Performed by: OBSTETRICS & GYNECOLOGY

## 2019-02-14 PROCEDURE — 82239 BILE ACIDS TOTAL: CPT | Performed by: OBSTETRICS & GYNECOLOGY

## 2019-02-14 PROCEDURE — 84450 TRANSFERASE (AST) (SGOT): CPT | Performed by: OBSTETRICS & GYNECOLOGY

## 2019-02-14 PROCEDURE — 84460 ALANINE AMINO (ALT) (SGPT): CPT | Performed by: OBSTETRICS & GYNECOLOGY

## 2019-02-14 ASSESSMENT — PATIENT HEALTH QUESTIONNAIRE - PHQ9: SUM OF ALL RESPONSES TO PHQ QUESTIONS 1-9: 2

## 2019-02-14 NOTE — PROGRESS NOTES
Maternal Fetal Medicine OBV    S: C/o itching arms, and back. No rash. No VB or contractions.     O:  /77 (BP Location: Left arm, Patient Position: Chair)   Pulse 77   Resp 18   Wt 69.9 kg (154 lb 3.2 oz)   SpO2 98%   BMI 26.46 kg/m      Abd - NT  FHT - 152    ASSESSMENT:  Armando Dc is a 43 year old  at 26w0d by embryo transfer consistent with 6w4d US here to establish OB care with MFM due to pregnancy complicated by:     -Primary ciliary dyskinesia and bronchiectasis with moderate-severe obstructive lung disease  -Placenta previa with first bleed (vaginal spotting) on 19  -Advanced maternal age  -Conception by IVF     RECOMMENDATIONS:     #Primary ciliary dyskinesia and bronchiectasis with moderate-severe obstructive lung disease   - Continued close follow-up with Dr. Hernández (appointment later today). If no further bleeding may consider resuming planned monthly PFT, or as clinically indicated  - Continue twice daily bronchial drainage therapy and nebulization treatment as recommended per pulmonology   - Aggressive treatment of pulmonary exacerbations and infections in pregnancy.    - Close monitoring of oxygen saturations, patient was instructed to obtain a pulse oxymetry and start O2 supplemental therapy in pregnancy if needed to maintain oxygen saturations > 95%    - Serial ultrasounds to evaluate fetal growth (every 3-4 weeks).  -  surveillance with BPP weekly starting at 32 weeks     #Placenta previa with first bleed 19  - Continue precautions with pelvic rest, no lifting over 10-15 pounds, avoidance of prolonged activity/standing  - Continue off work due to previa with recent bleed and demands of job  - Begin stool softener such as colace 100 mg. May start once daily and increase to twice daily if needed. Add Miralax if needed.  - Serial ultrasounds to assess placental location and to evaluate for progression to vasa previa given the placenta overlying the cervix is very  thin with placental lake. No obvious fetal surface vessels seen in over the cervix on recent transvaginal ultrasound  - Delivery by  section at 36 weeks gestation  - Anesthesiology consultation prior to delivery given significant pulmonary disease     #Fetal pyelectasis - UTD A1  - Continue to monitor on serial ultrasounds with plan for referral to Pediatric Urology in the third trimester if persistent     #GERD  - Currently on ranitidine with continued symptoms. Agree with Dr. Hernández plan to transition to Protonix    #GDM  - Failed 3 hour today. To arrange diabetic education and dietician. Will see if [facility] will follow.    #Itching  - Likely seasonal but plan Bile Acids . LFTS WNL.    #Dupuyren's tendonitis  - Plan stint for now and injection if worsening        Eric Atwood MD  Maternal-Fetal Medicine

## 2019-02-14 NOTE — LETTER
February 14, 2019      To Whom It May Concern,    Due to pregnancy complications and the high risk nature of the pregnancy, Armando Dc (1975) will be receiving care with [facility] in [place], [place] for care and preparation of delivery at the [facility]. Estimated date of delivery is May 23, 2019.      Regards,        Eric Atwood MD    [facility] MATERNAL FETAL MEDICINE [facility]  [address]  [facility] [address]  [phone]  Dept: [phone]

## 2019-02-14 NOTE — LETTER
2/14/2019  Armando Dc   1975 February 14, 2019      To Whom It May Concern,    Due to pregnancy complications and the high risk nature of the pregnancy, Armando Dc (1975) will be receiving care with [facility] in [place], [place] for care and preparation of delivery at the [facility]. Estimated date of delivery is April/May 2019.     Regards,        Eric Atwood MD    [facility] MATERNAL FETAL MEDICINE [facility]  [address]  [facility] [address]  [phone]  Dept: [phone]

## 2019-02-14 NOTE — NURSING NOTE
Asma seen in clinic today for an OB visit at 26w0d gestation due to pregnancy c/b placenta previa, fetal pyelectasis, and primary ciliary dyskinesia (PCD),  bronchiectasis with moderately-severe obstruction (see report/notes). Patient is in process of 3hr GTT today. Asma c/o itching on arms and back for approximately 1 week. Asking to have LFTs and Bile Acids drawn. VSS. Feels good/stable from respiratory standpoint.  F/U with pulmonology in 1 week. Pt reports + fetal movement. FHTs by doptone 152. Pt reports small amount of spotting 1x in past week after a BM. Continues pelvic rest. Pt c/o tendonitis along right thumb to wrist and is wearing a splint. Will continue with splinting. Otherwise may seek attention from orthopedics. Medications reviewed. Pt denies bldg/lof/change in discharge/contractions/headache/vision changes/chest pain/SOB/edema. Dr. Atwood met with pt and discussed POC. Plan to return in 2 weeks for follow up growth US and OB visit. Will plan for anesthesia consult within the next month. Will contact Dr. Hernández, pulmonology regarding diabetes management with CF diabetes RN or  diabetes educator.  Pt discharged stable and ambulatory.     Diamond Campbell RN

## 2019-02-14 NOTE — TELEPHONE ENCOUNTER
Call to [facility] regarding 3 hr GTT.  Two results elevated.  Gestational diabetes referral placed.  Patient encouraged to begin food log and expect a call in the next few days from a diabetic educator.      Diamond Campbell RN

## 2019-02-15 ENCOUNTER — ANESTHESIA EVENT (OUTPATIENT)
Dept: ANESTHESIOLOGY | Facility: CLINIC | Age: 44
End: 2019-02-15

## 2019-02-15 ENCOUNTER — ANESTHESIA (OUTPATIENT)
Dept: ANESTHESIOLOGY | Facility: CLINIC | Age: 44
End: 2019-02-15

## 2019-02-15 ENCOUNTER — TELEPHONE (OUTPATIENT)
Dept: MATERNAL FETAL MEDICINE | Facility: CLINIC | Age: 44
End: 2019-02-15

## 2019-02-15 ENCOUNTER — DOCUMENTATION ONLY (OUTPATIENT)
Dept: MATERNAL FETAL MEDICINE | Facility: CLINIC | Age: 44
End: 2019-02-15

## 2019-02-15 ENCOUNTER — HOSPITAL ENCOUNTER (OUTPATIENT)
Facility: CLINIC | Age: 44
Discharge: HOME | DRG: 833 | End: 2019-02-15
Attending: OBSTETRICS & GYNECOLOGY | Admitting: OBSTETRICS & GYNECOLOGY
Payer: COMMERCIAL

## 2019-02-15 VITALS
DIASTOLIC BLOOD PRESSURE: 66 MMHG | SYSTOLIC BLOOD PRESSURE: 122 MMHG | HEART RATE: 86 BPM | RESPIRATION RATE: 16 BRPM | OXYGEN SATURATION: 96 % | TEMPERATURE: 98.2 F

## 2019-02-15 DIAGNOSIS — O99.810 GLUCOSE INTOLERANCE OF PREGNANCY: Primary | ICD-10-CM

## 2019-02-15 PROBLEM — Z36.89 ENCOUNTER FOR TRIAGE IN PREGNANT PATIENT: Status: ACTIVE | Noted: 2019-02-15

## 2019-02-15 PROBLEM — O46.90 VAGINAL BLEEDING DURING PREGNANCY: Status: ACTIVE | Noted: 2019-02-15

## 2019-02-15 LAB
ABO + RH BLD: NORMAL
ABO + RH BLD: NORMAL
BASOPHILS # BLD AUTO: 0 10E9/L (ref 0–0.2)
BASOPHILS NFR BLD AUTO: 0.4 %
BILE AC SERPL-SCNC: 8 UMOL/L (ref 0–10)
BLD GP AB SCN SERPL QL: NORMAL
BLOOD BANK CMNT PATIENT-IMP: NORMAL
DIFFERENTIAL METHOD BLD: NORMAL
EOSINOPHIL # BLD AUTO: 0.1 10E9/L (ref 0–0.7)
EOSINOPHIL NFR BLD AUTO: 1.1 %
ERYTHROCYTE [DISTWIDTH] IN BLOOD BY AUTOMATED COUNT: 13.1 % (ref 10–15)
HCT VFR BLD AUTO: 37.3 % (ref 35–47)
HGB BLD-MCNC: 12.3 G/DL (ref 11.7–15.7)
IMM GRANULOCYTES # BLD: 0.1 10E9/L (ref 0–0.4)
IMM GRANULOCYTES NFR BLD: 0.7 %
LYMPHOCYTES # BLD AUTO: 1.6 10E9/L (ref 0.8–5.3)
LYMPHOCYTES NFR BLD AUTO: 17.3 %
MCH RBC QN AUTO: 29.8 PG (ref 26.5–33)
MCHC RBC AUTO-ENTMCNC: 33 G/DL (ref 31.5–36.5)
MCV RBC AUTO: 90 FL (ref 78–100)
MONOCYTES # BLD AUTO: 0.4 10E9/L (ref 0–1.3)
MONOCYTES NFR BLD AUTO: 4.7 %
NEUTROPHILS # BLD AUTO: 7 10E9/L (ref 1.6–8.3)
NEUTROPHILS NFR BLD AUTO: 75.8 %
NRBC # BLD AUTO: 0 10*3/UL
NRBC BLD AUTO-RTO: 0 /100
PLATELET # BLD AUTO: 227 10E9/L (ref 150–450)
RBC # BLD AUTO: 4.13 10E12/L (ref 3.8–5.2)
SPECIMEN EXP DATE BLD: NORMAL
WBC # BLD AUTO: 9.2 10E9/L (ref 4–11)

## 2019-02-15 PROCEDURE — 86850 RBC ANTIBODY SCREEN: CPT | Performed by: STUDENT IN AN ORGANIZED HEALTH CARE EDUCATION/TRAINING PROGRAM

## 2019-02-15 PROCEDURE — 86901 BLOOD TYPING SEROLOGIC RH(D): CPT | Performed by: STUDENT IN AN ORGANIZED HEALTH CARE EDUCATION/TRAINING PROGRAM

## 2019-02-15 PROCEDURE — 36415 COLL VENOUS BLD VENIPUNCTURE: CPT | Performed by: STUDENT IN AN ORGANIZED HEALTH CARE EDUCATION/TRAINING PROGRAM

## 2019-02-15 PROCEDURE — G0463 HOSPITAL OUTPT CLINIC VISIT: HCPCS

## 2019-02-15 PROCEDURE — 85025 COMPLETE CBC W/AUTO DIFF WBC: CPT | Performed by: STUDENT IN AN ORGANIZED HEALTH CARE EDUCATION/TRAINING PROGRAM

## 2019-02-15 PROCEDURE — 86900 BLOOD TYPING SEROLOGIC ABO: CPT | Performed by: STUDENT IN AN ORGANIZED HEALTH CARE EDUCATION/TRAINING PROGRAM

## 2019-02-15 PROCEDURE — 12000001 ZZH R&B MED SURG/OB UMMC

## 2019-02-15 RX ORDER — ASPIRIN 81 MG/1
81 TABLET, CHEWABLE ORAL DAILY
Status: DISCONTINUED | OUTPATIENT
Start: 2019-02-15 | End: 2019-02-15 | Stop reason: HOSPADM

## 2019-02-15 RX ORDER — ACETYLCYSTEINE 200 MG/ML
2 SOLUTION ORAL; RESPIRATORY (INHALATION) 2 TIMES DAILY
Status: DISCONTINUED | OUTPATIENT
Start: 2019-02-15 | End: 2019-02-15 | Stop reason: HOSPADM

## 2019-02-15 RX ORDER — ALBUTEROL SULFATE 90 UG/1
2 AEROSOL, METERED RESPIRATORY (INHALATION) EVERY 6 HOURS PRN
Status: DISCONTINUED | OUTPATIENT
Start: 2019-02-15 | End: 2019-02-15 | Stop reason: HOSPADM

## 2019-02-15 RX ORDER — AMOXICILLIN 250 MG
1 CAPSULE ORAL 2 TIMES DAILY
Status: DISCONTINUED | OUTPATIENT
Start: 2019-02-15 | End: 2019-02-15 | Stop reason: HOSPADM

## 2019-02-15 RX ORDER — ALUMINA, MAGNESIA, AND SIMETHICONE 2400; 2400; 240 MG/30ML; MG/30ML; MG/30ML
30 SUSPENSION ORAL
Status: DISCONTINUED | OUTPATIENT
Start: 2019-02-15 | End: 2019-02-15 | Stop reason: HOSPADM

## 2019-02-15 RX ORDER — SIMETHICONE 80 MG
160 TABLET,CHEWABLE ORAL EVERY 4 HOURS PRN
Status: DISCONTINUED | OUTPATIENT
Start: 2019-02-15 | End: 2019-02-15 | Stop reason: HOSPADM

## 2019-02-15 RX ORDER — AMOXICILLIN 250 MG
2 CAPSULE ORAL 2 TIMES DAILY
Status: DISCONTINUED | OUTPATIENT
Start: 2019-02-15 | End: 2019-02-15 | Stop reason: HOSPADM

## 2019-02-15 RX ORDER — ONDANSETRON 2 MG/ML
4 INJECTION INTRAMUSCULAR; INTRAVENOUS EVERY 6 HOURS PRN
Status: DISCONTINUED | OUTPATIENT
Start: 2019-02-15 | End: 2019-02-15 | Stop reason: HOSPADM

## 2019-02-15 RX ORDER — PANTOPRAZOLE SODIUM 40 MG/1
40 TABLET, DELAYED RELEASE ORAL DAILY
Status: DISCONTINUED | OUTPATIENT
Start: 2019-02-15 | End: 2019-02-15 | Stop reason: HOSPADM

## 2019-02-15 RX ORDER — ACETAMINOPHEN 325 MG/1
975 TABLET ORAL EVERY 4 HOURS PRN
Status: DISCONTINUED | OUTPATIENT
Start: 2019-02-15 | End: 2019-02-15 | Stop reason: HOSPADM

## 2019-02-15 RX ORDER — ALBUTEROL SULFATE 0.83 MG/ML
2.5 SOLUTION RESPIRATORY (INHALATION) 2 TIMES DAILY
Status: DISCONTINUED | OUTPATIENT
Start: 2019-02-15 | End: 2019-02-15 | Stop reason: HOSPADM

## 2019-02-15 ASSESSMENT — LIFESTYLE VARIABLES: TOBACCO_USE: 0

## 2019-02-15 NOTE — PROGRESS NOTES
"CLINICAL NUTRITION SERVICES - ASSESSMENT NOTE     Nutrition Prescription    RECOMMENDATIONS FOR MDs/PROVIDERS TO ORDER:  Recommend order prenatal MVI with Iron.   Of note, pt demonstrated great understanding of CHO intakes and guidelines after education provided.    Malnutrition Status:    Patient does not meet two of the criteria necessary for diagnosing malnutrition     Recommendations already ordered by Registered Dietitian (RD):  2pm snack: mixed fruit cup & 1 HB egg  8pm snack: orange & string cheese    Future/Additional Recommendations:  Monitor po intakes and wt trends. Consider need to add/discontinue supplement or adjust flavors pending trends and pt preferences.    Further education regarding GDM?     REASON FOR ASSESSMENT  Armando Dc is a/an 43 year old female assessed by the dietitian for Admission Nutrition Risk Screen for new/uncontrolled diabetes    NUTRITION HISTORY  Armando reports no food allergies or intolerances. No dx of GDM previously - was diagnosed yesterday. She reports good appetite PTA and denies any reduced intakes. Was taking prenatal MVI at home. Usual intakes of Breakfast: tea (no sugar), naan bread (with vegetables and potatoes), eggs; Lunch: brown rice (sometimes white rice), beans, cabbage, chicken; Snack: fruit, nuts, green tea with honey; Dinner: brown rice, vegetables, chicken orta; Snack: fruit    CURRENT NUTRITION ORDERS  Diet: Regular  Intake/Tolerance: Appetite doing well here. Had basked cod, soup, vegetables for lunch today. Tolerating diet well without N/V, constipation, or diarrhea per pt report.    LABS   GTT:   - 1 hr B (H)   - 2 hr B (WNL)   - 3 hr B (WNL)   - Fasting B (H)    MEDICATIONS reviewed    ANTHROPOMETRICS  Height: 0 cm (Data Unavailable)  Ht Readings from Last 1 Encounters:   19 1.626 m (5' 4.02\")   Most Recent Weight:  Wt Readings from Last 1 Encounters:   19 69.9 kg (154 lb 3.2 oz)   Pre-pregnancy Weight: 60.5 kg (133 " lb) per pt report  IBW: 54.5 kg (111% IBW) pre-pregnancy  BMI: Normal BMI pre-pregnancy  Weight History: Given reported pre-pregnancy weight, pt has gained ~22 lb throughout pregnancy so far. Recommended weight gain goals for normal pre-pregnancy BMI is 25-35 lb throughout pregnancy. Pt is tracking appropriately with goal.  Wt Readings from Last 10 Encounters:   02/14/19 69.9 kg (154 lb 3.2 oz)   01/29/19 68 kg (150 lb)   01/29/19 68.2 kg (150 lb 4.8 oz)   01/07/19 68.9 kg (152 lb)   12/21/18 66.7 kg (147 lb)   12/03/18 65.8 kg (145 lb)   11/07/18 63 kg (139 lb)   10/31/18 62.3 kg (137 lb 6.4 oz)   10/26/18 64.4 kg (142 lb)   10/17/18 62.6 kg (138 lb)     Dosing Weight: 61 kg (actual pre-pregnancy)    ASSESSED NUTRITION NEEDS  Estimated Energy Needs: 3200-9007 kcals/day (25 - 30 kcals/kg + 450 kcal/day)  Justification: Increased needs with pregnancy  Estimated Protein Needs: 75-85 grams protein/day (0.8 - 1 grams of pro/kg + 25 g/day)  Justification: Increased needs with pregnancy  Estimated Fluid Needs: 3000 mL/day, or per provider  Justification: Increased needs with pregnancy    PHYSICAL FINDINGS  See malnutrition section below.    MALNUTRITION  % Intake: No decreased intake noted  % Weight Loss: None noted  Subcutaneous Fat Loss: None observed  Muscle Loss: None observed  Fluid Accumulation/Edema: None noted  Malnutrition Diagnosis: Patient does not meet two of the above criteria necessary for diagnosing malnutrition    NUTRITION DIAGNOSIS  Food- and nutrition-related knowledge deficit related to new GDM diagnosis as evidenced by stated knowledge deficit and education needed.      INTERVENTIONS  Implementation  Nutrition education for nutrition relationship to health/disease - Discussed sources of carbs, carb intake guidelines per meal, suggestions for low-carb snacks, how to read a food label, how to calculate grams of carbs, how many grams of carbs are in a serving, and portion sizes. Suggested diet  modifications (smaller portion sizes of CHO consumed + adding a protein or fat with CHO snacks). RD answered all pt's questions. Patient demonstrated great understanding of CHO intakes and guidelines after education. Provided handout Carbohydrate Counting and booklet.  Discussed scheduled snacks available - agreed to 2pm and hs snacks    Goals  1. Patient to consume % of nutritionally adequate meal trays TID, or the equivalent with supplements/snacks.    2. Weight gain of ~1 lb/week.      Monitoring/Evaluation  Progress toward goals will be monitored and evaluated per protocol.    Bonny Reyna RD, LD  Unit pgr: [phone]

## 2019-02-15 NOTE — DISCHARGE INSTRUCTIONS
Discharge Instruction for Undelivered Patients      You were seen for: Bleeding Assessment  We Consulted: Dr. Liz and Dr. Sam  You had (Test or Medicine):EFM     Diet:   Drink 8 to 12 glasses of liquids (milk, juice, water) every day.  You may eat meals and snacks.  To manager your diabetes, follow the guidelines for eating and drinking given to you by your Clinic Provider or Diabetes Educator.       Activity:  Rest the pelvic area. No sex. Do not stimulate breasts or nipples.  Call your doctor or nurse midwife if your baby is moving less than usual.     Call your provider if you notice:  Swelling in your face or increased swelling in your hands or legs.  Headaches that are not relieved by Tylenol (acetaminophen).  Changes in your vision (blurring: seeing spots or stars.)  Nausea (sick to your stomach) and vomiting (throwing up).   Weight gain of 5 pounds or more per week.  Heartburn that doesn't go away.  Signs of bladder infection: pain when you urinate (use the toilet), need to go more often and more urgently.  The bag of thomas (rupture of membranes) breaks, or you notice leaking in your underwear.  Bright red blood in your underwear.  Abdominal (lower belly) or stomach pain.  For first baby: Contractions (tightening) less than 5 minutes apart for one hour or more.  *If less than 34 weeks: Contractions (tightenings) more than 6 times in one hour.  Increase or change in vaginal discharge (note the color and amount)    Follow-up:  As scheduled in the clinic

## 2019-02-15 NOTE — PROVIDER NOTIFICATION
02/15/19 1201   Provider Notification   Provider Name/Title Dr Araujo   Method of Notification Electronic Page   26.1 wks. Complete previa.  Here for bleeding.  No residents.

## 2019-02-15 NOTE — PROVIDER NOTIFICATION
02/15/19 1220   Provider Notification   Provider Name/Title dr guaman   Method of Notification Phone;Electronic Page   pt is okay with residents and would like to discuss with attending.  Info communicated to resident.  Pt informed of team approach here and attendee's involvement and that residents are here 24/7.

## 2019-02-15 NOTE — TELEPHONE ENCOUNTER
Armando called in and was notified that her labs thus far from yesterday have been normal.  She asked that [facility] put in orders or diabetes meter and supply as she can't get in with diabetic ed until later next week.  Patient wants supplies sent to [facility] on [address] in [facility].  Writer will discuss with MD to put orders for supplies in. All supplies ordered including glucometer, test strips, lancets, and sharps container.  Diamond De La Fuente RN

## 2019-02-15 NOTE — PROGRESS NOTES
Phone call from pt re bleeding on her panty liner with bright red blood. Pt states when she went to the bathroom she noticed there was blood on the tissue and on her panty liner. States she has not had any cramping or LOF. Call to Dr. Liz and [facility]. Pt will go in for evaluation at this time. No further questions. Ina Zavala RN

## 2019-02-15 NOTE — PROGRESS NOTES
Pt is here at 26.1 wks with complete previa.  Pt wearing always sized pad since 1030 this morning a streak of bright red blood on the pad.  Not saturated.  Pt brought pad to triage.  Pt used bathroom at arrival at noon and reported to bleeding.  At 1400, pt reported a small dot of pink on tolet paper with bathroom use.  Irritability and some ctxs on monitor.  Pt is not feeling any ctxs.  No change in her pattern per pt.  Active FM.   mod with accels and no decels.  AGA.  Intact.  No reported leaking.  Pt reports newly diagnosed GDM - diet controlled as of 2/14.  Reported her am BG was 83.  Dietician in to see pt now re:  Newly dx'd GDM.  Pt has ciliary dyskinesia obstructive lung disease; kartageners situs inversus; IVF and R wrist/hand carpal tunnel.  Plan for anesthia consult while observed here for 2-4 hrs.  Seen by Dr. Liz and Dr. Sam.  Plan of care discussed with pt and her spouse, Adenike.  Pt declines any medications at this time.  She is eating now.  +GBS in urine so GBS culture does not need to be collected.  Per Dr. Sam, clarification of orders - VS q 4 hrs; no pneumoboots at this time; no GBS culture, no BG checks.  C/s consent signed and in chart.  No c/s at this time.  Observation.  Call light within reach.

## 2019-02-15 NOTE — PLAN OF CARE
Data: [facility].   Reason for maternal/fetal assessment per patient is Vaginal Bleeding  . Patient is a . Prenatal record reviewed.      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 Gestational Age 26w1d. VSS. Cervix: not examined.  Fetal movement present. Patient denies cramping, backache, vaginal discharge, pelvic pressure, UTI symptoms, GI problems, bloody show, headache, visual disturbances, epigastric or URQ pain, abdominal pain, rupture of membranes. Admits bright red bleeding on panty liner this am, since then only pink spotting on toilet paper following using restroom.Support persons Hamid present.  Action: Verbal consent for EFM. Triage assessment completed. See flow record for fetal and uterine assessment. Fetal assessment: Presumed adequate fetal oxygenation documented (see flow record). Patient education pamphlets given on placenta previa.Patient instructed to report change in fetal movement, vaginal leaking of fluid or bleeding, abdominal pain, or any concerns related to the pregnancy to her nurse/physician.   Response: Dr. Liz and Dr. Sam informed of patient arrival and bleeding this AM. Plan per provider is discharge home with plans to follow-up in clinic on  for regularly scheduled appointment. Patient seen by dietician due to recently  diagnosed GDM. Patient instructed to return to hospital if having bright red bleeding, specifically bleeding that is covering over half of pad/liner, cramping, contractions, LOF, decrease in fetal movement. Patient verbalized understanding of education and verbalized agreement with plan. Discharged via wheelchair with  pushing at 1645.

## 2019-02-15 NOTE — H&P
[facility]  OB History and Physical      Armando Dc MRN# [id number]   Age: 43 year old YOB: 1975     CC:  Vaginal bleeding    HPI:  Ms. Armando Dc is a 43 year old  at 26w1d by LMP c/w 8w1d US, who presents with vaginal bleeding in the setting of a known complete previa. She is accompanied by her partner, who is at the bedside and supportive. She reports an episode of vaginal bleeding this morning around 10AM. There was blood on her panty liner and on the toilet paper. No blood in the toilet. She is feeling a little cramping, no contractions. No leaking fluid. Normal fetal movement. She was admitted on -19 with her 1st vaginal bleed. This is her 2nd episode of vaginal bleeding. Reports her breathing is at her baseline.    Pregnancy Complications:  - Complete placenta previa with anterior and posterior components  - H/o maternal Kartagener's syndrome  - Severe obstructive lung disease  - gDMA1   - Low risk bilateral urinary tract dilation (A1)  - Advanced maternal age  - Conception by IVF    Prenatal Labs:   Lab Results   Component Value Date    ABO O 02/15/2019    RH Pos 02/15/2019    AS Neg 02/15/2019    HEPBANG Nonreactive 10/17/2018    HGB 12.3 02/15/2019       Ultrasounds  INDICATION  ---------------------------------------------------------------------------------------------------------  Placenta previa, suboptimal anatomy on prior ultrasound        METHOD  ---------------------------------------------------------------------------------------------------------  Transabdominal ultrasound examination. View: Sufficient        PREGNANCY  ---------------------------------------------------------------------------------------------------------  [place] pregnancy. Number of fetuses: 1        DATING  ---------------------------------------------------------------------------------------------------------                                           Date                                 Details                                                                                      Gest. age                      LO  Prior assessment               10/1/2018                         GA: 6 w + 4 d                                                                            23 w + 5 d                     5/23/2019  U/S                                   1/29/2019                         based upon AC, BPD, Femur, HC                                                24 w + 4 d                     5/17/2019  Assigned dating                  Dating performed on 01/22/2019, based on the prior assessment (on 10/1/2018)                    23 w + 5 d                     5/23/2019        GENERAL EVALUATION  ---------------------------------------------------------------------------------------------------------  Cardiac activity present.  bpm.  Fetal movements present.  Presentation breech/transverse.  Placenta complete previa .  Umbilical cord 3 vessel cord.  Amniotic fluid Amount of AF: normal. MVP 9.3 cm. NILDA 18.2 cm. Q1 2.6 cm, Q2 2.4 cm, Q3 4.0 cm, Q4 9.3 cm.        FETAL BIOMETRY  ---------------------------------------------------------------------------------------------------------  Main Fetal Biometry:  BPD                                        59.1                    mm                         24w 1d                Hadlock  OFD                                        79.1                    mm                         24w 0d                Nicolaides  HC                                          221.8                  mm                          24w 1d                Hadlock  Cerebellum tr                            27.6                   mm                          24w 6d                Nicolaides  AC                                          196.3                  mm                          24w 2d                Hadlock  Femur                                      46.6                   mm                           25w 4d                Hadlock  Humerus                                  41.5                    mm                         25w 0d                Shamika  Fetal Weight Calculation:  EFW                                       728                     g                                     63%        Harley  EFW (lb,oz)                             1 lb 10                 oz  EFW by                                        Tip (BPD-HC-AC-FL)  Head / Face / Neck Biometry:                                             5.2                     mm  CM                                          6.2                     mm        FETAL ANATOMY  ---------------------------------------------------------------------------------------------------------  The following structures appear abnormal:  Abdomen                             Right kidney: There is mild dilation of the renal pelvis without dilation of the calyces. Parenchyma is of normal echogenicity and thickness.                                             (UTD A1: Low Risk). Left kidney: There is mild dilation of the renal pelvis without dilation of the calyces. Parenchyma is of normal                                             echogenicity and thickness. (UTD A1: Low Risk).     The following structures appear normal:  Head / Neck                         Cranium. Head size. Head shape. Lateral ventricles. Midline falx. Cavum septi pellucidi. Cerebellum. Cisterna magna. Thalami.  Face                                   Profile.  Heart / Thorax                      4-chamber view. RVOT view. LVOT view.  Abdomen                             Abdominal wall. Stomach: Stomach size and situs appear normal. Bladder: Bladder appears normal in size and shape. Genitals.  Spine                                  Cervical spine. Thoracic spine. Lumbar spine. Sacral spine.     The following structures were documented previously:  Heart / Thorax                      Aortic  arch view. Ductal arch view.     Gender: male.        MATERNAL STRUCTURES  ---------------------------------------------------------------------------------------------------------  Cervix                                  Visualized                                             Cervical length 34.3 mm  Right Ovary                          Not examined  Left Ovary                            Not examined        RECOMMENDATION  ---------------------------------------------------------------------------------------------------------  We discussed the findings on today's ultrasound with the patient.     Armando was seen for an OB visit in our office in conjunction with the ultrasound today. Please see the EPIC chart for details of the consultation.     A repeat ultrasound has been scheduled here in 4 weeks to reevaluate fetal growth,  system and placental location by transvaginal imaging.    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,      Pseudomonas aeruginosa colonization 2008     Reactive airway disease      Situs inversus      Uncomplicated asthma     Reactive airway disease     PSHx:   Past Surgical History:   Procedure Laterality Date     ADENOIDECTOMY       LAPAROSCOPIC CYSTECTOMY OVARIAN (BENIGN) Left 2017    Procedure: LAPAROSCOPIC CYSTECTOMY OVARIAN (BENIGN);  Left  Laparoscopy Ovarian Cystotomy, Hysteroscopy And Polpectomy With Myosure ;  Surgeon: Zayra Roberts MD;  Location: [facility]     OPERATIVE HYSTEROSCOPY WITH MORCELLATOR N/A 2017     Procedure: OPERATIVE HYSTEROSCOPY WITH MORCELLATOR;;  Surgeon: Zayra Roberts MD;  Location: [facility]     TONSILLECTOMY       TONSILLECTOMY & ADENOIDECTOMY      8 years old     TRANSVAGINAL RETRIEVAL OVUM Bilateral 3/3/2016    IVF Procedure: TRANSVAGINAL RETRIEVAL OVUM;  Surgeon: Sunshine Gilliam MD;  Location: [facility]     uterine polyp       Meds:   No current outpatient medications on file.      Allergies:  No Known Allergies   FmHx:   Family History   Problem Relation Age of Onset     Hyperlipidemia Mother      Diabetes Mother      Hypertension Mother      Hyperlipidemia Father      Hypertension Father      Diabetes Maternal Grandmother      Diabetes Maternal Grandfather      SocHx: She denies any tobacco, alcohol, or other drug use during this pregnancy.    ROS:   A 14 point review of systems was completed and was negative except for points mentioned in the HPI.     PE:  Vit:   Patient Vitals for the past 4 hrs:   BP Pulse Resp SpO2   02/15/19 1504 122/66 86 16 96 %      Gen: Well-appearing, NAD, comfortable   CV: Well perfused  Pulm: No increased work of breathing  Abd: Soft, gravid, non-tender  Ext: trace LE edema b/l    SSE: patient declined speculum exam            FHT: Baseline 150, moderate variability, + accelerations, intermittent decelerations   [place]: no contractions in 10 minutes      Assessment  Ms. Armando Dc is a 43 year old , at 26w1d by LMP c/w 8w1d, who presents with vaginal bleeding in the setting of complete placenta previa.     Plan  Vaginal bleeding in the setting of placenta previa:  - Labs: CBC and type and screen  - Patient had an isolated episode of vaginal spotting this morning. Since then, there have no additional episodes of bleeding. She is not feeling contractions and would like to go home. She reports that after her previous overnight stay in the hospital she developed pneumonia. She is concerned that additional time in the hospital will put her at an  increased risk of pulmonary infections and further compromise her respiratory function. Reviewed that she should return to the hospital with additional vaginal bleeding, leaking fluid or decreased fetal movement. She should continue pelvic rest. If she has a 3rd episode of vaginal bleeding, she will likely need inpatient admission and a course of betamethasone.     Severe obstructive lung disease:  - Continue home self care respiratory therapies (vest therapy, nebulizers)     GDMA1:  - newly diagnosed yesterday  - Patient was seen by nutrition today     FWB:   - appropriate for gestational age    PNC:   - Rh positive, Rubella immune, GBS positive by urine culture      The patient was discussed with Dr. Liz who is in agreement with the treatment plan.    Katlyn Sam MD PhD  Ob/Gyn PGY-3  2/15/2019 4:35 PM    Maternal-Fetal Medicine Admit Attestation Note    I saw and examined the patient and agree with the findings assessment and plan as documented by Dr. Parks's note.  In brief, 43 year old  at 26w1d admitted for vaginal spotting with a known placenta previa. .  Plan: Will watch for 2-3 hours to see if ongoing bleeding. Will consider betamethasone course if appears to be at high risk of delivery in next 7 days and will see if she can see either anesthesia or diabetes education while she is here for this visit.     Bryon Liz MD  Maternal-Fetal Medicine & Clinical Genetics  February 15, 2019

## 2019-02-16 NOTE — ANESTHESIA PREPROCEDURE EVALUATION
Anesthesia Pre-Procedure Evaluation    Patient: Armando Dc   MRN:     [id number] Gender:   female   Age:    43 year old :      1975        Preoperative Diagnosis: * No pre-op diagnosis entered *   * No procedures listed *     Past Medical History:   Diagnosis Date     Carpal tunnel syndrome     right     Chronic sinusitis      Diabetes (H)     gestational diabetes diet controlled     Endometriosis     left ovary     Infertility      Kartagener's syndrome     situs inversus totalis,      Pseudomonas aeruginosa colonization      Reactive airway disease      Situs inversus      Uncomplicated asthma     Reactive airway disease      Past Surgical History:   Procedure Laterality Date     ADENOIDECTOMY       LAPAROSCOPIC CYSTECTOMY OVARIAN (BENIGN) Left 2017    Procedure: LAPAROSCOPIC CYSTECTOMY OVARIAN (BENIGN);  Left  Laparoscopy Ovarian Cystotomy, Hysteroscopy And Polpectomy With Myosure ;  Surgeon: Zayra Roberts MD;  [facility]     OPERATIVE HYSTEROSCOPY WITH MORCELLATOR N/A 2017    Procedure: OPERATIVE HYSTEROSCOPY WITH MORCELLATOR;;  Surgeon: Zayra Roberts MD;  [facility]     TONSILLECTOMY       TONSILLECTOMY & ADENOIDECTOMY      8 years old     TRANSVAGINAL RETRIEVAL OVUM Bilateral 3/3/2016    IVF Procedure: TRANSVAGINAL RETRIEVAL OVUM;  Surgeon: Sunshine Gilliam MD;  Location: [facility]     uterine polyp            Anesthesia Evaluation     . Pt has had prior anesthetic.            ROS/MED HX    ENT/Pulmonary: Comment: Chronic Sinusitis, Obstructive lung disease, chronic bronchitis, bronchiectasis    (+)asthma , . Other pulmonary disease Kartagener syndrome, uses vest therapy and nebs.   (-) tobacco use   Neurologic:  - neg neurologic ROS     Cardiovascular: Comment: Situs inversus, dextrocardia.     (+) ----. : . . . :. . Previous cardiac testing Echodate:2019results:Prior diagnosis of situs inversus. Patient is pregnant.  Left ventricular size  and systolic function appears normal. The LVEF is 60-65%  Right ventricular size and systolic function appears normal.  Normal atrial size.  No significant valvular dysfunction based on Doppler.  No pericardial effusion and IVC is not dilated.date: results: date: results: date: results:          METS/Exercise Tolerance:  4 - Raking leaves, gardening   Hematologic:  - neg hematologic  ROS       Musculoskeletal:  - neg musculoskeletal ROS       GI/Hepatic: Comment: Nausea.     (+) GERD Asymptomatic on medication,       Renal/Genitourinary:  - ROS Renal section negative       Endo:     (+) Other Endocrine Disorder Gestational diabetes.      Psychiatric:  - neg psychiatric ROS       Infectious Disease: Comment: Recurrent UTI, pseudomonas colonization        Malignancy:      - no malignancy   Other:    (+) Possibly pregnant C-spine cleared: N/A, no H/O Chronic Pain,                   J[facility] AN PHYSICAL EXAM    Lab Results   Component Value Date    WBC 9.2 02/15/2019    HGB 12.3 02/15/2019    HCT 37.3 02/15/2019     02/15/2019     11/03/2017    POTASSIUM 4.1 11/03/2017    CHLORIDE 106 11/03/2017    CO2 26 11/03/2017    BUN 18 11/03/2017    CR 0.68 11/03/2017     (H) 11/13/2017    JOHN 8.5 11/03/2017    ALBUMIN 3.7 11/03/2017    PROTTOTAL 8.0 11/03/2017    ALT 26 02/14/2019    AST 21 02/14/2019    ALKPHOS 99 11/03/2017    BILITOTAL 0.4 11/03/2017    PTT 32 03/03/2016    INR 0.98 03/03/2016    TSH 1.59 06/29/2018    T4 0.91 06/29/2018    HCG Negative 11/13/2017    HCGS Negative 11/03/2017       Preop Vitals  BP Readings from Last 3 Encounters:   02/15/19 122/66   02/14/19 121/77   01/29/19 124/70    Pulse Readings from Last 3 Encounters:   02/15/19 86   02/14/19 77   01/29/19 90      Resp Readings from Last 3 Encounters:   02/15/19 16   02/14/19 18   01/29/19 18    SpO2 Readings from Last 3 Encounters:   02/15/19 96%   02/14/19 98%   01/29/19 96%      Temp Readings from Last 1 Encounters:   02/15/19 36.8  " C (98.2  F) (Oral)    Ht Readings from Last 1 Encounters:   01/29/19 1.626 m (5' 4.02\")      Wt Readings from Last 1 Encounters:   02/14/19 69.9 kg (154 lb 3.2 oz)    Estimated body mass index is 26.46 kg/m  as calculated from the following:    Height as of 1/29/19: 1.626 m (5' 4.02\").    Weight as of 2/14/19: 69.9 kg (154 lb 3.2 oz).     LDA:  NG/OG Tube Orogastric 18 fr Center mouth 120 cc gastric fluid sct (intermittent) placed pre laparoscopy, dc end laparoscopy with sct (Active)   Number of days: 459            JZG [facility] AN PLAN NO PONV RULE         PAC Discussion and Assessment    ASA Classification: 3  Case is suitable for: [place]  Anesthetic techniques and relevant risks discussed:   Invasive monitoring and risk discussed:   Types:   Possibility and Risk of blood transfusion discussed:   NPO instructions given:   Additional anesthetic preparation and risks discussed:   Needs early admission to pre-op area:   Other:     PAC Resident/NP Anesthesia Assessment:        Mid-Level Provider/Resident:   Date:   Time:     Attending Anesthesiologist Anesthesia Assessment:        Anesthesiologist:   Date:   Time:   Pass/Fail:   Disposition:     PAC Pharmacist Assessment:        Pharmacist:   Date:   Time:        Jin Hanson MD  "

## 2019-02-18 NOTE — DISCHARGE SUMMARY
Dicharge summary  Armando Dc MRN# [id number]   Age: 43 year old YOB: 1975      Admisison diagnoses:       Vaginal bleeding, placenta previa  Discharge diagnoses: same, vaginal bleeding resolved       HPI:  Ms. Armando Dc is a 43 year old  at 26w1d by LMP c/w 8w1d US, who presents with vaginal bleeding in the setting of a known complete previa. She is accompanied by her partner, who is at the bedside and supportive. She reports an episode of vaginal bleeding this morning around 10AM. There was blood on her panty liner and on the toilet paper. No blood in the toilet. She is feeling a little cramping, no contractions. No leaking fluid. Normal fetal movement. She was admitted on -19 with her 1st vaginal bleed.Today she reports mild spotting only     Pregnancy Complications:  - Complete placenta previa with anterior and posterior components  - H/o maternal Kartagener's syndrome  - Severe obstructive lung disease  - gDMA1   - Low risk bilateral urinary tract dilation (A1)  - Advanced maternal age  - Conception by IVF     Prenatal Labs:         Lab Results   Component Value Date     ABO O 02/15/2019     RH Pos 02/15/2019     AS Neg 02/15/2019     HEPBANG Nonreactive 10/17/2018     HGB 12.3 02/15/2019         Ultrasounds  INDICATION  ---------------------------------------------------------------------------------------------------------  Placenta previa, suboptimal anatomy on prior ultrasound        METHOD  ---------------------------------------------------------------------------------------------------------  Transabdominal ultrasound examination. View: Sufficient        PREGNANCY  ---------------------------------------------------------------------------------------------------------  [place] pregnancy. Number of fetuses: 1        DATING  ---------------------------------------------------------------------------------------------------------                                           Date                                 Details                                                                                      Gest. age                      LO  Prior assessment               10/1/2018                         GA: 6 w + 4 d                                                                            23 w + 5 d                     5/23/2019  U/S                                   1/29/2019                         based upon AC, BPD, Femur, HC                                                24 w + 4 d                     5/17/2019  Assigned dating                  Dating performed on 01/22/2019, based on the prior assessment (on 10/1/2018)                    23 w + 5 d                     5/23/2019        GENERAL EVALUATION  ---------------------------------------------------------------------------------------------------------  Cardiac activity present.  bpm.  Fetal movements present.  Presentation breech/transverse.  Placenta complete previa .  Umbilical cord 3 vessel cord.  Amniotic fluid Amount of AF: normal. MVP 9.3 cm. NILDA 18.2 cm. Q1 2.6 cm, Q2 2.4 cm, Q3 4.0 cm, Q4 9.3 cm.        FETAL BIOMETRY  ---------------------------------------------------------------------------------------------------------  Main Fetal Biometry:  BPD                                        59.1                    mm                         24w 1d                Hadlock  OFD                                        79.1                    mm                         24w 0d                Nicolaides  HC                                          221.8                  mm                          24w 1d                Hadlock  Cerebellum tr                            27.6                   mm                          24w 6d                Nicolaides  AC                                          196.3                  mm                          24w 2d                Hadlock  Femur                                      46.6                    mm                          25w 4d                Hadlock  Humerus                                  41.5                    mm                         25w 0d                Shamika  Fetal Weight Calculation:  EFW                                       728                     g                                     63%        Harley  EFW (lb,oz)                             1 lb 10                 oz  EFW by                                        Tip (BPD-HC-AC-FL)  Head / Face / Neck Biometry:                                             5.2                     mm  CM                                          6.2                     mm        FETAL ANATOMY  ---------------------------------------------------------------------------------------------------------  The following structures appear abnormal:  Abdomen                             Right kidney: There is mild dilation of the renal pelvis without dilation of the calyces. Parenchyma is of normal echogenicity and thickness.                                             (UTD A1: Low Risk). Left kidney: There is mild dilation of the renal pelvis without dilation of the calyces. Parenchyma is of normal                                             echogenicity and thickness. (UTD A1: Low Risk).     The following structures appear normal:  Head / Neck                         Cranium. Head size. Head shape. Lateral ventricles. Midline falx. Cavum septi pellucidi. Cerebellum. Cisterna magna. Thalami.  Face                                   Profile.  Heart / Thorax                      4-chamber view. RVOT view. LVOT view.  Abdomen                             Abdominal wall. Stomach: Stomach size and situs appear normal. Bladder: Bladder appears normal in size and shape. Genitals.  Spine                                  Cervical spine. Thoracic spine. Lumbar spine. Sacral spine.     The following structures were documented previously:  Heart / Thorax                       Aortic arch view. Ductal arch view.     Gender: male.        MATERNAL STRUCTURES  ---------------------------------------------------------------------------------------------------------  Cervix                                  Visualized                                             Cervical length 34.3 mm  Right Ovary                          Not examined  Left Ovary                            Not examined        RECOMMENDATION  ---------------------------------------------------------------------------------------------------------  We discussed the findings on today's ultrasound with the patient.     Armando was seen for an OB visit in our office in conjunction with the ultrasound today. Please see the EPIC chart for details of the consultation.     A repeat ultrasound has been scheduled here in 4 weeks to reevaluate fetal growth,  system and placental location by transvaginal imaging.     OB History               Obstetric History       T0      L0     SAB1   TAB0   Ectopic0   Multiple0   Live Births0        # Outcome Date GA Lbr Erik/2nd Weight Sex Delivery Anes PTL Lv   2 Current                     1 SAB                               PMHx:     Past Medical History        Past Medical History:   Diagnosis Date     Carpal tunnel syndrome       right     Chronic sinusitis       Diabetes (H)       gestational diabetes diet controlled     Endometriosis       left ovary     Infertility       Kartagener's syndrome      situs inversus totalis,      Pseudomonas aeruginosa colonization 2008     Reactive airway disease       Situs inversus       Uncomplicated asthma       Reactive airway disease         PSHx:     Past Surgical History         Past Surgical History:   Procedure Laterality Date     ADENOIDECTOMY         LAPAROSCOPIC CYSTECTOMY OVARIAN (BENIGN) Left 2017     Procedure: LAPAROSCOPIC CYSTECTOMY OVARIAN (BENIGN);  Left  Laparoscopy Ovarian Cystotomy, Hysteroscopy And  Polpectomy With Myosure ;  Surgeon: Zayra Roberts MD;  Location: [facility] OR     OPERATIVE HYSTEROSCOPY WITH MORCELLATOR N/A 11/13/2017     Procedure: OPERATIVE HYSTEROSCOPY WITH MORCELLATOR;;  Surgeon: Zayra Roberts MD;  Location: [facility] OR     TONSILLECTOMY         TONSILLECTOMY & ADENOIDECTOMY         8 years old     TRANSVAGINAL RETRIEVAL OVUM Bilateral 3/3/2016     IVF Procedure: TRANSVAGINAL RETRIEVAL OVUM;  Surgeon: Sunshine Gilliam MD;  Location: [facility]     uterine polyp   2013         Meds:      Active Medications   No current outpatient medications on file.         Allergies:  No Known Allergies   FmHx:     Family History         Family History   Problem Relation Age of Onset     Hyperlipidemia Mother       Diabetes Mother       Hypertension Mother       Hyperlipidemia Father       Hypertension Father       Diabetes Maternal Grandmother       Diabetes Maternal Grandfather           Course  Vaginal bleeding in the setting of placenta previa:  - Labs: CBC and type and screen  - Patient had an isolated episode of vaginal spotting with no further bleeding at the hospital. And no contractions. She did well over the course of the afternoon and strongly desired discharge home with close follow up in [facility] clinic. She had no issues with her respiratory status. She met with diabetes educator for her new diagnosis of GDM. Her  also has DM and she is a physician, declines further diabetes education. Confirmed that prescriptions called in for diabetes care. Fetal heart rate was reassuring for gestational age. She was discharged home with a diagnosis of vaginal spotting not significant enough to warrant diagnosis as significant vaginal bleeding warranting extended hospital stay. She strongly desired discharge and was sent home in stable condition. She needs an anesthesiology consult (unable to do today) as outpatient through [facility] clinic).     I personally saw and discharged this patient.      Bryno Liz MD  MFM                                   Revision History

## 2019-02-21 NOTE — PROGRESS NOTES
[facility] Follow up OB Visit    S:  Patient reports she is overall doing fine. Continues to have daily brown discharge since her last admission on 2/15, no bright red bleeding. Has been significantly limiting her activity due to concern for increasing spotting. Mostly staying in bed, walking to bathroom and kitchen only. She states her pulmonary function is at baseline. Not requiring oxygen at home. Also notes worsening constipation, taking Colace once daily in the evening. No cramping, feeling good fetal movement.     Blood sugars:   Fasting 87-96  1 hour post-prandial: 50% >140, after removing rice from diet all <140    O:  /77 (BP Location: Left arm, Patient Position: Chair)   Pulse 95   Resp 18   Wt 68.4 kg (150 lb 14.4 oz)   SpO2 96%   BMI 25.89 kg/m     Gen: Well appearing, NAD  Pulm: Non-labored breathing  Abd: Gravid    Assessment:  Armando Dc is a 43 year old  at 27w5d  by embryo transfer consistent with 6w4d US here for follow up due to pregnancy complicated by:     -Primary ciliary dyskinesia and bronchiectasis with moderate-severe obstructive lung disease  -Placenta previa with first bleed (vaginal spotting) on 19  -Advanced maternal age  -Conception by IVF     RECOMMENDATIONS:     #Primary ciliary dyskinesia and bronchiectasis with moderate-severe obstructive lung disease   - Continued close follow-up with Dr. Hernández (next appointment 3/27). If no further bleeding may consider resuming planned monthly PFT, or as clinically indicated  - Continue twice daily bronchial drainage therapy and nebulization treatment as recommended per pulmonology   - Aggressive treatment of pulmonary exacerbations and infections in pregnancy.    - Close monitoring of oxygen saturations, patient was instructed to obtain a pulse oxymetry and start O2 supplemental therapy in pregnancy if needed to maintain oxygen saturations > 95%    - Serial ultrasounds to evaluate fetal growth (every 3-4 weeks).  -   surveillance with BPP weekly starting at 32 weeks     #Placenta previa with first bleed 19, second episode of light spotting 2/15/19  - Patient declined TVUS today, will plan repeat TVUS at 32 weeks. Based on today's ultrasound, placenta appears low lying.   - Continue precautions with pelvic rest, no lifting over 10-15 pounds, avoidance of prolonged activity/standing  - Continue off work due to previa with recent bleed and demands of job  - Discussed okay to perform light activities such as walking. She is aware that bedrest does not reduce bleeding and increases risks of VTE and deconditioning.  - Discussed increasing Colace to BID and that she can take up to 2 pills BID. Add Miralax if needed.  - Serial ultrasounds to assess placental location and to evaluate for progression to vasa previa given the placenta overlying the cervix is very thin with placental lake. No obvious fetal surface vessels seen in over the cervix on recent transvaginal ultrasound  - Delivery by  section at 36 weeks gestation if continued previa  - Anesthesiology consultation prior to delivery given significant pulmonary disease     #Bilateral pyelectasis - UTD A1  - Persistent on US today, will reevaluate on next ultrasound at 32 weeks  - Continue to monitor on serial ultrasounds with plan for referral to Pediatric Urology in the third trimester if persistent     #GERD  - Currently on ranitidine with continued symptoms. Agree with Dr. Hernández plan to transition to Protonix     #GDMA1  - Fasting BG within goal, 1 hour postprandials all <140 with elimination of rice. Discussed if she continues to eat rice, she will likely require insulin as the pregnancy progresses.   - Diabetes education & dietician    #IVF pregnancy  - s/p normal fetal echo    #Itching, now resolved  - Likely seasonal, LFTS and bile acids WNL.     #Dupuyren's tendonitis  - Continue splint and injection if worsening      I acted as a scribe for   Ross Duran MD  Ob/Gyn PGY-2    Physician Attestation   I, Blu Hawkins, saw this patient with the resident/fellow and agree with the resident s findings and plan of care as documented in the resident s note.      I personally reviewed vital signs, medications and imaging.    Key findings: Kartagener's syndrome in pregnancy and low lying placenta.    Blu Hawkins  Date of Service (when I saw the patient): 02/26/19    Time Spent on this Encounter   I, Blu Hawkins, spent a total of 15 minutes bedside and on the inpatient unit today managing the care of Armando cD.  Over 50% of my time on the unit was spent counseling the patient and /or coordinating care regarding management of bleeding secondary to low lying placenta. See note for details.

## 2019-02-26 ENCOUNTER — OFFICE VISIT (OUTPATIENT)
Dept: MATERNAL FETAL MEDICINE | Facility: CLINIC | Age: 44
End: 2019-02-26
Attending: OBSTETRICS & GYNECOLOGY
Payer: COMMERCIAL

## 2019-02-26 ENCOUNTER — HOSPITAL ENCOUNTER (OUTPATIENT)
Dept: ULTRASOUND IMAGING | Facility: CLINIC | Age: 44
Discharge: HOME | End: 2019-02-26
Attending: OBSTETRICS & GYNECOLOGY | Admitting: OBSTETRICS & GYNECOLOGY
Payer: COMMERCIAL

## 2019-02-26 VITALS
OXYGEN SATURATION: 96 % | SYSTOLIC BLOOD PRESSURE: 121 MMHG | BODY MASS INDEX: 25.89 KG/M2 | HEART RATE: 95 BPM | DIASTOLIC BLOOD PRESSURE: 77 MMHG | RESPIRATION RATE: 18 BRPM | WEIGHT: 150.9 LBS

## 2019-02-26 DIAGNOSIS — J47.9 BRONCHIECTASIS WITHOUT COMPLICATION (H): ICD-10-CM

## 2019-02-26 DIAGNOSIS — O44.02 PLACENTA PREVIA ANTEPARTUM IN SECOND TRIMESTER: Primary | ICD-10-CM

## 2019-02-26 DIAGNOSIS — O09.512 ELDERLY PRIMIGRAVIDA IN SECOND TRIMESTER: ICD-10-CM

## 2019-02-26 DIAGNOSIS — O24.410 DIET CONTROLLED GESTATIONAL DIABETES MELLITUS (GDM), ANTEPARTUM: ICD-10-CM

## 2019-02-26 PROCEDURE — 76816 OB US FOLLOW-UP PER FETUS: CPT

## 2019-02-26 PROCEDURE — G0463 HOSPITAL OUTPT CLINIC VISIT: HCPCS | Mod: 25,ZF

## 2019-02-26 RX ORDER — DOCUSATE SODIUM 100 MG/1
100 CAPSULE, LIQUID FILLED ORAL AT BEDTIME
COMMUNITY
End: 2019-09-11

## 2019-02-26 NOTE — NURSING NOTE
Robya seen in clinic today for a follow up growth ultrasound and OB visit at 27w5d gestation due to pregnancy c/b placenta previa, fetal pyelectasis, and primary ciliary dyskinesia (PCD), bronchiectasis with moderately-severe obstruction, GDM diet controlled (see report/notes). Pt reports + fetal movement. VSS. O2 sats 95% RA. Pt states she missed her pulmonology appt last week but has one scheduled for March.  States she will contact pulmonology sooner if needed. Pt denies lof/contractions/headache/vision changes/chest pain/SOB/edema. Continues to have brown spotting daily. No bright red bleeding. Spotting occurs most often with BM. Pt states having to strain more recently. Taking 100mg colace at night. Will discuss increasing colace or adding miralax. Reviewed BS, copy of log made. <50 % are above normal. Pt states decreasing rice and carbs which have shown improved fasting and PP. Reviewed fetal kick counts and BPP -start at 32 weeks. Teaching sheets given. Dr. Hawkins and Dr. Duran met with pt and discussed POC. Plan to return to [facility] in 2 weeks for an OB visit and 4 weeks for follow up growth, TV, BPP, OB visit. Pt states she did not meet with anesthesia last week as an inpatient. Will plan for anesthesia consult at next visit. Pt discharged stable and ambulatory.       Diamond Campbell RN

## 2019-03-03 ENCOUNTER — TELEPHONE (OUTPATIENT)
Dept: OBGYN | Facility: CLINIC | Age: 44
End: 2019-03-03

## 2019-03-04 NOTE — TELEPHONE ENCOUNTER
Patient called with a small amount of spotting in the setting of a placenta previa. She noticed one drop of dark brown blood in the toilet this AM and a small amount on the toilet paper. Then later this evening she had a pinhead size tiny dark red blood spot on her pad and small specks of blood in her urine. Normal fetal movement. No contractions. Per her report and previous notes she has been having daily brown discharge since her admission for her first bleed in February. As this was slightly redder she wanted to be sure she didn't need to come in. Discussed that if she has any bright red bleeding or bleeding > a quarter size she should call back.     Teagan Weiner MD  Ob/Gyn, PGY3  Pager [phone]

## 2019-03-11 NOTE — PROGRESS NOTES
"M Follow up OB Visit    S:  Patient reports she is overall doing fine. Continues to have daily brown discharge, mostly in the mornings after a bowel movement. Notices occasional very small dark brown clots once per week or so. This is associated with dark red discharge, but never bright red bleeding. Has occasional low abdominal cramping as well as vaginal \"zingers.\" She states her pulmonary function is at baseline, not requiring oxygen at home. Constipation has significantly improved. Feeling good fetal movement daily.     Blood sugars:   Fasting All values <95 except 1 value in the past 2 weeks  1 hour post-prandial: Mostly 110's-130's. 5 values >/= 140 over the past 2 weeks (<50%)    O:  /75 (BP Location: Right arm, Patient Position: Sitting, Cuff Size: Adult Regular)   Pulse 84   Wt 67.6 kg (149 lb)   SpO2 96%   BMI 25.56 kg/m     Gen: Well appearing, NAD  Pulm: Non-labored breathing  Abd: Gravid    Assessment:  Armando Dc is a 43 year old  at 29w5d by embryo transfer consistent with 6w4d US here for follow up due to pregnancy complicated by:     -Primary ciliary dyskinesia and bronchiectasis with moderate-severe obstructive lung disease  -Placenta previa with first bleed (vaginal spotting) on 19  -Advanced maternal age  -Conception by IVF     RECOMMENDATIONS:     #Primary ciliary dyskinesia and bronchiectasis with moderate-severe obstructive lung disease   - Continued close follow-up with Dr. Hernández (next appointment 3/27). If no further bleeding may consider resuming planned monthly PFT, or as clinically indicated  - Continue twice daily bronchial drainage therapy and nebulization treatment as recommended per pulmonology   - Aggressive treatment of pulmonary exacerbations and infections in pregnancy.    - Close monitoring of oxygen saturations, patient was instructed to obtain a pulse oxymetry and start O2 supplemental therapy in pregnancy if needed to maintain oxygen saturations " > 95%    - Serial ultrasounds to evaluate fetal growth (every 3-4 weeks).  -  surveillance with BPP weekly starting at 32 weeks     #Placenta previa/vs low lying planceta with first bleed 19, second episode of light spotting 2/15/19  - Will plan repeat TVUS at 32 weeks to assess placental location. Based on last ultrasound, placenta appears low lying.  - Continue precautions with pelvic rest, no lifting over 10-15 pounds, avoidance of prolonged activity/standing  - Continue off work due to previa with recent bleed and demands of job  - Serial ultrasounds to assess placental location and to evaluate for progression to vasa previa given the placenta overlying the cervix is very thin with placental lake. No obvious fetal surface vessels seen in over the cervix on last transvaginal ultrasound  - Delivery by  section at 36 weeks gestation if continued previa  - Anesthesiology consultation prior to delivery given significant pulmonary disease, plan for this around 32 weeks (at next OB visit)     #Bilateral pyelectasis - UTD A1  - Persistent on last US, will reevaluate on next ultrasound at 32 weeks  - Continue to monitor on serial ultrasounds with plan for referral to Pediatric Urology in the third trimester if persistent     #GERD  - On Protonix with good relief     #GDMA1  - Fasting and postprandial BG overall within goal, no indication for insulin  - Diabetes education & dietician    #IVF pregnancy  - s/p normal fetal echo    #Itching, now resolved  - Likely seasonal, LFTS and bile acids WNL.     #Dupuyren's tendonitis  - Continue splint and injection if worsening    #Constipation  Continue Colace 1-2 tabs BID. Add Miralax if needed.    #PNC  Rh pos, Ab neg, RI  Low risk first trimester screen  S/p influenza, Tdap given today      Follow up in 2 weeks for repeat ultrasound with growth/placental evaluation and OB visit.    I acted as a scribe for Dr. Horace Duran MD  Ob/Gyn  PGY-2    Attending Note:    The documentation recorded by the scribe accurately reflects the services I personally performed and the decisions made my me.  The patient was seen for an established outpatient visit by me.  The majority of time (>50%) was spent on counseling and coordination of care of this patient and/or family members.  Total face-to-face time was 15 minutes.    Elodia Vu, DO  Maternal-Fetal Medicine

## 2019-03-12 ENCOUNTER — OFFICE VISIT (OUTPATIENT)
Dept: MATERNAL FETAL MEDICINE | Facility: CLINIC | Age: 44
End: 2019-03-12
Attending: OBSTETRICS & GYNECOLOGY
Payer: COMMERCIAL

## 2019-03-12 VITALS
BODY MASS INDEX: 25.56 KG/M2 | HEART RATE: 84 BPM | WEIGHT: 149 LBS | DIASTOLIC BLOOD PRESSURE: 75 MMHG | OXYGEN SATURATION: 96 % | SYSTOLIC BLOOD PRESSURE: 125 MMHG

## 2019-03-12 DIAGNOSIS — O09.512 ELDERLY PRIMIGRAVIDA IN SECOND TRIMESTER: ICD-10-CM

## 2019-03-12 DIAGNOSIS — J44.9 CHRONIC OBSTRUCTIVE PULMONARY DISEASE, UNSPECIFIED COPD TYPE (H): Primary | ICD-10-CM

## 2019-03-12 DIAGNOSIS — O24.410 DIET CONTROLLED GESTATIONAL DIABETES MELLITUS (GDM), ANTEPARTUM: ICD-10-CM

## 2019-03-12 DIAGNOSIS — O44.02 PLACENTA PREVIA ANTEPARTUM IN SECOND TRIMESTER: ICD-10-CM

## 2019-03-12 PROCEDURE — 90471 IMMUNIZATION ADMIN: CPT | Mod: ZF | Performed by: OBSTETRICS & GYNECOLOGY

## 2019-03-12 PROCEDURE — 25000128 H RX IP 250 OP 636: Mod: ZF

## 2019-03-12 PROCEDURE — 90715 TDAP VACCINE 7 YRS/> IM: CPT | Mod: ZF

## 2019-03-12 PROCEDURE — G0463 HOSPITAL OUTPT CLINIC VISIT: HCPCS | Mod: 25,ZF

## 2019-03-12 PROCEDURE — 90471 IMMUNIZATION ADMIN: CPT

## 2019-03-12 NOTE — PROGRESS NOTES
E&M15  Pt presents to [facility] for assessment and evaluation of her pregnancy due to maternal pulmonary issues and previa. Pt states she has occasional brown discharge. Occasional cramping. Blood sugars stable at this time. Copied and will scan into chart. Fasting's 90's and 1 hour -130's. -150 with doptones. Pt has Rl2 set for 3/28 and has a follow up set with pulmonology. Pt seen today by Dr. Duran and Dr. Vu. See note in epic and flow sheets. Pt states she knows when to come in or call with further questions. Pt given information on tdap and given today without issues. Will have anesthesia consult at next visit also. Discharged stable at this time. Ina Zavala RN    The following medication was given:     MEDICATION: tdap  ROUTE: IM  SITE: Deltoid - Left  DOSE: 0.5ml  LOT #: [id number]  :  Pocits  EXPIRATION DATE:  06/01/21  NDC#: 07446-665-26

## 2019-03-13 ENCOUNTER — TELEPHONE (OUTPATIENT)
Dept: MATERNAL FETAL MEDICINE | Facility: CLINIC | Age: 44
End: 2019-03-13

## 2019-03-13 NOTE — TELEPHONE ENCOUNTER
Phone call to pt re anesthesia consult after her visit on 3/28. Pt aware she will meet with them after her appointment with m. No further questions at this time. Ina Zavala RN

## 2019-03-16 ENCOUNTER — TELEPHONE (OUTPATIENT)
Dept: OBGYN | Facility: CLINIC | Age: 44
End: 2019-03-16

## 2019-03-17 NOTE — TELEPHONE ENCOUNTER
Return patient phone call. She had called the careline with a concern about constipation and cramping. Reports feeling very constipation with the urge to have a bowel movement. She had a small bowel movement this evening and thinks she needs to have another one. She is holding her stool in because she is afraid to bear down for a bowel movement. She is afraid it will trigger vaginal bleeding. Reports taking colace and staying hydrated. No vaginal bleeding or contractions but she is feeling some cramps. Normal fetal movement. Encouraged patient to let her body relax and have a bowel movement. Discussed that she could add senna or miralx to her bowel regimen. Encouraged good hydration.   Discussed that if she is concerned she can always come to the hospital for evaluation.  Patient said she would like to stay home and try to have a bowel movement.     Katlyn Sam MD PhD  Ob/Gyn PGY-3  3/16/2019 10:01 PM

## 2019-03-27 ENCOUNTER — OFFICE VISIT (OUTPATIENT)
Dept: PULMONOLOGY | Facility: CLINIC | Age: 44
End: 2019-03-27
Attending: INTERNAL MEDICINE
Payer: COMMERCIAL

## 2019-03-27 VITALS
HEIGHT: 64 IN | BODY MASS INDEX: 27.14 KG/M2 | HEART RATE: 88 BPM | RESPIRATION RATE: 16 BRPM | WEIGHT: 159 LBS | OXYGEN SATURATION: 94 % | DIASTOLIC BLOOD PRESSURE: 72 MMHG | SYSTOLIC BLOOD PRESSURE: 118 MMHG

## 2019-03-27 DIAGNOSIS — Q34.8 PCD (PRIMARY CILIARY DYSKINESIA): Primary | ICD-10-CM

## 2019-03-27 LAB
GRAM STN SPEC: NORMAL
Lab: NORMAL
SPECIMEN SOURCE: NORMAL

## 2019-03-27 PROCEDURE — G0463 HOSPITAL OUTPT CLINIC VISIT: HCPCS | Mod: ZF

## 2019-03-27 PROCEDURE — 87186 SC STD MICRODIL/AGAR DIL: CPT | Mod: XU | Performed by: INTERNAL MEDICINE

## 2019-03-27 PROCEDURE — 87077 CULTURE AEROBIC IDENTIFY: CPT | Performed by: INTERNAL MEDICINE

## 2019-03-27 PROCEDURE — 87070 CULTURE OTHR SPECIMN AEROBIC: CPT | Performed by: INTERNAL MEDICINE

## 2019-03-27 PROCEDURE — 87205 SMEAR GRAM STAIN: CPT | Performed by: INTERNAL MEDICINE

## 2019-03-27 PROCEDURE — 87185 SC STD ENZYME DETCJ PER NZM: CPT | Performed by: INTERNAL MEDICINE

## 2019-03-27 ASSESSMENT — PAIN SCALES - GENERAL: PAINLEVEL: NO PAIN (0)

## 2019-03-27 ASSESSMENT — MIFFLIN-ST. JEOR: SCORE: 1366.22

## 2019-03-27 NOTE — PATIENT INSTRUCTIONS
Cystic Fibrosis Self-Care Plan    RECOMMENDATIONS:   Great job with nebs and bronchial drainage.    At the time of delivery:  --continuous oximetry  --keep saturation 94% or greater  --incentive spirometry  --continue nebs and aerobika  --start vest twice daily with nebs as soon as you are able after delivery    Overnight oximetry.  May need to do a course of antibiotics depending on today's culture.  Incentive spirometry at home.    YOUR GOAL:  Enjoy the remainder of your pregnancy.      CF Nurse line:          Deejay Lao   [phone]            CF Resp Therapist: Sybil Peng            [phone]   CF Dietitians:           Delphine Angulo            [phone]                       Faye Maciel                       [phone]   CF Diabetes Nurse: Palmira Marshall             [phone]    CF Social Workers:  Brittanie Buenrostro             [phone]                Olga Greer            [phone]  CF Pharmacist:        Maira Saunders                              [phone]  [email or web address]         MRN: [id number]   Clinic Date: March 27, 2019   Patient: Armando Dc     Annual Studies:   No results found for: IGG  No results found for: INS  There are no preventive care reminders to display for this patient.      Pulmonary Function Tests  FEV1: amount of air you can blow out in 1 second  FVC: total amount of air you can take in and blow out    Your Goals:         PFT Latest Ref Rng & Units 11/7/2018   FVC L 2.62   FEV1 L 1.47   FVC% % 79   FEV1% % 54          Airway Clearance: The Most Important Way to Keep Your Lungs Healthy  Vest Settings:    Hill-Rom Frequencies: 8, 9, 10 Pressure 10 Then, Frequencies 18, 19, 20 Pressure 6      RespirTech: Quick Start with Pressure of     Do each frequency for 5 minutes; Deflate vest after each frequency & cough 3 times before beginning the next setting.    Vest and Neb Therapy should be done 3 times/day.    Good Nutrition Can Improve Lung  Function and Overall Health     Take ALL of your vitamins with food     Take 1/2 of your enzymes before EVERY meal/snack and the other 1/2 mid-meal/snack    Wt Readings from Last 3 Encounters:   03/27/19 72.1 kg (159 lb)   03/12/19 67.6 kg (149 lb)   02/26/19 68.4 kg (150 lb 14.4 oz)       Body mass index is 27.29 kg/m .         National CF Foundation Recommendations for BMI in CF Adults: Women: at least 22 Men: at least 23        Controlling Blood Sugars Helps Prevent Lung Infections & Improves Nutrition  Test blood sugar:     In the morning before eating (goal is )     2 hours after a meal (goal is less than 150)     When pre-meal glucose is greater than 150 add correction     At bedtime (if less than 100 eat a snack with 15 grams of carbohydrates  Last A1C Results:   Hemoglobin A1C   Date Value Ref Range Status   09/25/2018 5.8 (H) 0 - 5.6 % Final     Comment:     Normal <5.7% Prediabetes 5.7-6.4%  Diabetes 6.5% or higher - adopted from ADA   consensus guidelines.           If diabetic, measure A1C every 6 months. Goal: Under 7%    Staying Healthy    Research:  If you are interested in learning about research opportunities or have questions, please contact the CF Research Team at [phone] or [email or web address].      CF Foundation:  Compass is a personalized resource service to help you with the insurance, financial, legal and other issues you are facing.  It's free, confidential and available to anyone with CF.  Ask your  for more information or contact Compass directly at 447-FUGAUFX ([phone]) or compass@cff.org, or learn more at cff.org/compass.

## 2019-03-27 NOTE — LETTER
3/27/2019       RE: Armando Dc  [address]  [place] [address]     Dear Colleague,    Thank you for referring your patient, Armando Dc, to the [facility] at [facility]. Please see a copy of my visit note below.    [facility]  March 27, 2019         Assessment and Plan:   Armando Dc is a 43 year old female with PCD and bronchiectasis.    1. PCD and bronchiectasis lung disease with moderately-severe obstruction:  Armando is currently 32 weeks into her pregnancy.  She remains on bed rest.  This is for a complete placenta previa.  She reports from a pulmonary point of view she feels that she is doing well.  Because of her lack of activity it is hard for her to  if she is short of breath or not.  She has consistently doing 3 nebs and her Aerobika each day.  She says that she remains productive of sputum.  Armando's last sputum culture did grow out Haemophilus and Pseudomonas.  I did choose to treat the Haemophilus.  She is unable to perform pulmonary function tests because of being on bed rest and feeling that the maneuver would put her too much at risk.  Her oxygen saturation today after walking in the clinic is 93%.  At this time, I would recommend:   -- That she continue to do her Aerobika 3 times per day.   -- I have recommended that she try to do incentive spirometry to avoid atelectasis, in particular because of her increased uterine size and being on bed rest.  She is agreeable to this plan.  I have asked her not to push with her abdominal muscles when she does this.     2.  Third trimester of pregnancy.  Armando reports that she feels that things are going well.  She continues to spot consistently but has not had significant bleeding.  We did discuss her plans for delivery.  I would recommend:   -- That she have continuous oximetry.   -- I would maintain her oxygen  saturation during the time of delivery at 94% or greater.   -- She should continue to do her bronchial drainage and nebulized therapy through the day of delivery.   -- She should reinitiate vest therapy at least 2 times per day as soon as she is able after delivery.   -- If she were to have a  I would recommend using a cough pillow as her cough will remain persistent.   -- Feel free to contact the either the Pulmonary Service or myself during her hospitalization for her delivery if there are any questions or concerns.   -- I have once again reiterated I do think it would be appropriate to have a preoperative anesthesia visit so there is an anesthesia plan for either  or vaginal delivery.   -- Armando does have gestational diabetes which she is currently diet controlling.     3.  GERD.  Armando reports her reflux does remain significant, but is improved with the use of Protonix.     4.  Psychosocial.  Armando is here with her .  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  She will return to her program in 6-8 weeks after her delivery.  Her parents are currently in town to help with the time of delivery and the baby.     5.  Overnight oximetry.  I have recommended to Armando that we repeat her overnight oximetry as she is now in her third trimester.  She is sleeping upright in order to be comfortable.  She reports she is getting 4-5 hours of sleep each night.     Lyn Hernández MD MPH  Associate Professor of Medicine  Pulmonary, Allergy, Critical Care and Sleep Medicine      Interval History:     Armando reports that she continues to produce green sputum throughout the day.  She denies any hemoptysis.  Her cough frequency and sputum volume have remained stable.  Again, she is doing 3 nebs and Aerobika each day.          Review of Systems:     CONSTITUTIONAL: no fever, no chills,some hot flashes, increase in weight, bed rest, no change in  appetite    INTEGUMENTARY/SKIN: no rash, no obvious new lesions    ENT/MOUTH:  no new sinus pain, no new nasal drainage     RESPIRATORY: see interval history    CV: no chest pain, no palpitations, trace peripheral edema, ++ orthopnea    GI: no nausea, no vomiting, no change in stools, no fatty stools, no GERD, no abdominal pain    : no dysuria, no urinary frequency    MUSCULOSKELETAL: no myalgias, no arthralgias    ENDOCRINE: no excessive thirst    NEURO:  No headache, no numbness, no tingling    SLEEP: sleeping upright    PSYCHIATRIC: mood stable          Past Medical and Surgical History:     Past Medical History:   Diagnosis Date     Carpal tunnel syndrome     right     Chronic sinusitis      Diabetes (H)     gestational diabetes diet controlled     Endometriosis     left ovary     Infertility      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,      Pseudomonas aeruginosa colonization 2008     Reactive airway disease      Situs inversus      Uncomplicated asthma     Reactive airway disease     Past Surgical History:   Procedure Laterality Date     ADENOIDECTOMY       LAPAROSCOPIC CYSTECTOMY OVARIAN (BENIGN) Left 11/13/2017    Procedure: LAPAROSCOPIC CYSTECTOMY OVARIAN (BENIGN);  Left  Laparoscopy Ovarian Cystotomy, Hysteroscopy And Polpectomy With Myosure ;  Surgeon: Zayra Roberts MD;  [facility]     OPERATIVE HYSTEROSCOPY WITH MORCELLATOR N/A 11/13/2017    Procedure: OPERATIVE HYSTEROSCOPY WITH MORCELLATOR;;  Surgeon: Zayra Roberts MD;  [facility]     TONSILLECTOMY       TONSILLECTOMY & ADENOIDECTOMY      8 years old     TRANSVAGINAL RETRIEVAL OVUM Bilateral 3/3/2016    IVF Procedure: TRANSVAGINAL RETRIEVAL OVUM;  Surgeon: Sunshine Gilliam MD;  [facility]     uterine polyp  2013           Family History:     Family History   Problem Relation Age of Onset     Hyperlipidemia Mother      Diabetes Mother      Hypertension Mother      Hyperlipidemia Father       Hypertension Father      Diabetes Maternal Grandmother      Diabetes Maternal Grandfather             Social History:     Social History     Socioeconomic History     Marital status:      Spouse name: Not on file     Number of children: 0     Years of education: Not on file     Highest education level: Not on file   Occupational History     Occupation: physician     Employer: [employer]     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   Social Needs     Financial resource strain: Not on file     Food insecurity:     Worry: Not on file     Inability: Not on file     Transportation needs:     Medical: Not on file     Non-medical: Not on file   Tobacco Use     Smoking status: Never Smoker     Smokeless tobacco: Never Used   Substance and Sexual Activity     Alcohol use: No     Alcohol/week: 0.0 oz     Drug use: No     Sexual activity: Not Currently     Partners: Male     Birth control/protection: None   Lifestyle     Physical activity:     Days per week: Not on file     Minutes per session: Not on file     Stress: Not on file   Relationships     Social connections:     Talks on phone: Not on file     Gets together: Not on file     Attends Congregation service: Not on file     Active member of club or organization: Not on file     Attends meetings of clubs or organizations: Not on file     Relationship status: Not on file     Intimate partner violence:     Fear of current or ex partner: Not on file     Emotionally abused: Not on file     Physically abused: Not on file     Forced sexual activity: Not on file   Other Topics Concern     Parent/sibling w/ CABG, MI or angioplasty before 65F 55M? Not Asked   Social History Narrative    Lives with  ().  Immigrated in 2006 from [place] ([facility]). No children.  Parents in [place], 2 younger sisters and a brother, she is eldest.             Medications:     Current Outpatient Medications   Medication     acetylcysteine (MUCOMYST) 20 % nebulizer  "solution     albuterol (PROAIR HFA/PROVENTIL HFA/VENTOLIN HFA) 108 (90 BASE) MCG/ACT Inhaler     albuterol (PROVENTIL) (2.5 MG/3ML) 0.083% neb solution     aspirin 81 MG tablet     blood glucose (NO BRAND SPECIFIED) test strip     blood glucose monitoring (NO BRAND SPECIFIED) meter device kit     Cholecalciferol (VITAMIN D PO)     docusate sodium (COLACE) 100 MG capsule     fluticasone-salmeterol (ADVAIR) 100-50 MCG/DOSE inhaler     pantoprazole (PROTONIX) 40 MG EC tablet     Prenatal MV-Min-Fe Fum-FA-DHA (PRENATAL 1 PO)     Respiratory Therapy Supplies (NEBULIZER) SUZANNA     Syringe/Needle, Disp, (SYRINGE LUER LOCK) 20G X 1-1/2\" 5 ML MISC     Water For Injection Sterile SOLN     No current facility-administered medications for this visit.             Physical Exam:   /72   Pulse 88   Resp 16   Ht 1.626 m (5' 4\")   Wt 72.1 kg (159 lb)   SpO2 94%   BMI 27.29 kg/m       Constitutional:   Awake, alert and in no apparent distress     Eyes:   nonicteric     ENT:   oral mucosa moist without lesions, normal tm opaque, bilateral mucosal edema      Neck:   Supple without supraclavicular or cervical lymphadenopathy     Lungs:   Good air flow.  No crackles. No rhonchi.  No wheezes.     Cardiovascular:   Normal S1 and S2.  RRR.  No murmur, gallop or rub.     Abdomen:   NABS, soft, nontender, protuberant     Musculoskeletal:   No edema, no digital clubbing present     Neurologic:   Alert and conversant.     Skin:   Warm, dry.  No rash on limited exam.             Data:   All laboratory and imaging data reviewed.    Cystic Fibrosis Culture  Specimen Description   Date Value Ref Range Status   01/29/2019 Sputum  Final   01/29/2019 Sputum  Final   11/07/2018 Sputum  Final   11/07/2018 Sputum  Final    Culture Micro   Date Value Ref Range Status   01/29/2019 Moderate growth  Normal otto    Final   01/29/2019 (A)  Final    Light growth  Pseudomonas aeruginosa, mucoid strain     01/29/2019 (A)  Final    Heavy " growth  Haemophilus influenzae  Beta lactamase negative  Beta-lactamase negative Haemophilus influenzae are usually susceptible to ampicillin,   amoxacillin/clavulanic acid, levofloxacin, and 3rd generation cephalosporins, such as   ceftriaxone.          No results found for this or any previous visit (from the past 168 hour(s)).    PFT: Not performed today.    Again, thank you for allowing me to participate in the care of your patient.      Sincerely,    Lyn Hernández MD

## 2019-03-27 NOTE — PROGRESS NOTES
[facility]  Center for Lung Science and Health  2019         Assessment and Plan:   Armando Dc is a 43 year old female with PCD and bronchiectasis.    1. PCD and bronchiectasis lung disease with moderately-severe obstruction:  Armando is currently 32 weeks into her pregnancy.  She remains on bed rest.  This is for a complete placenta previa.  She reports from a pulmonary point of view she feels that she is doing well.  Because of her lack of activity it is hard for her to  if she is short of breath or not.  She has consistently doing 3 nebs and her Aerobika each day.  She says that she remains productive of sputum.  Armando's last sputum culture did grow out Haemophilus and Pseudomonas.  I did choose to treat the Haemophilus.  She is unable to perform pulmonary function tests because of being on bed rest and feeling that the maneuver would put her too much at risk.  Her oxygen saturation today after walking in the clinic is 93%.  At this time, I would recommend:   -- That she continue to do her Aerobika 3 times per day.   -- I have recommended that she try to do incentive spirometry to avoid atelectasis, in particular because of her increased uterine size and being on bed rest.  She is agreeable to this plan.  I have asked her not to push with her abdominal muscles when she does this.     2.  Third trimester of pregnancy.  Armando reports that she feels that things are going well.  She continues to spot consistently but has not had significant bleeding.  We did discuss her plans for delivery.  I would recommend:   -- That she have continuous oximetry.   -- I would maintain her oxygen saturation during the time of delivery at 94% or greater.   -- She should continue to do her bronchial drainage and nebulized therapy through the day of delivery.   -- She should reinitiate vest therapy at least 2 times per day as soon as she is able after delivery.   -- If she were to have a  I  would recommend using a cough pillow as her cough will remain persistent.   -- Feel free to contact the either the Pulmonary Service or myself during her hospitalization for her delivery if there are any questions or concerns.   -- I have once again reiterated I do think it would be appropriate to have a preoperative anesthesia visit so there is an anesthesia plan for either  or vaginal delivery.   -- Armando does have gestational diabetes which she is currently diet controlling.     3.  GERD.  Armando reports her reflux does remain significant, but is improved with the use of Protonix.     4.  Psychosocial.  Armando is here with her .  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  She will return to her program in 6-8 weeks after her delivery.  Her parents are currently in town to help with the time of delivery and the baby.     5.  Overnight oximetry.  I have recommended to Armando that we repeat her overnight oximetry as she is now in her third trimester.  She is sleeping upright in order to be comfortable.  She reports she is getting 4-5 hours of sleep each night.     Lyn Hernández MD MPH  Associate Professor of Medicine  Pulmonary, Allergy, Critical Care and Sleep Medicine      Interval History:     Armando reports that she continues to produce green sputum throughout the day.  She denies any hemoptysis.  Her cough frequency and sputum volume have remained stable.  Again, she is doing 3 nebs and Aerobika each day.          Review of Systems:     CONSTITUTIONAL: no fever, no chills,some hot flashes, increase in weight, bed rest, no change in appetite    INTEGUMENTARY/SKIN: no rash, no obvious new lesions    ENT/MOUTH:  no new sinus pain, no new nasal drainage     RESPIRATORY: see interval history    CV: no chest pain, no palpitations, trace peripheral edema, ++ orthopnea    GI: no nausea, no vomiting, no change in stools, no fatty stools, no GERD, no abdominal  pain    : no dysuria, no urinary frequency    MUSCULOSKELETAL: no myalgias, no arthralgias    ENDOCRINE: no excessive thirst    NEURO:  No headache, no numbness, no tingling    SLEEP: sleeping upright    PSYCHIATRIC: mood stable          Past Medical and Surgical History:     Past Medical History:   Diagnosis Date     Carpal tunnel syndrome     right     Chronic sinusitis      Diabetes (H)     gestational diabetes diet controlled     Endometriosis     left ovary     Infertility      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,      Pseudomonas aeruginosa colonization 2008     Reactive airway disease      Situs inversus      Uncomplicated asthma     Reactive airway disease     Past Surgical History:   Procedure Laterality Date     ADENOIDECTOMY       LAPAROSCOPIC CYSTECTOMY OVARIAN (BENIGN) Left 11/13/2017    Procedure: LAPAROSCOPIC CYSTECTOMY OVARIAN (BENIGN);  Left  Laparoscopy Ovarian Cystotomy, Hysteroscopy And Polpectomy With Myosure ;  Surgeon: Zayra Roberts MD;  [facility]     OPERATIVE HYSTEROSCOPY WITH MORCELLATOR N/A 11/13/2017    Procedure: OPERATIVE HYSTEROSCOPY WITH MORCELLATOR;;  Surgeon: Zayra Roberts MD;  [facility]     TONSILLECTOMY       TONSILLECTOMY & ADENOIDECTOMY      8 years old     TRANSVAGINAL RETRIEVAL OVUM Bilateral 3/3/2016    IVF Procedure: TRANSVAGINAL RETRIEVAL OVUM;  Surgeon: Sunshine Gilliam MD;  Location: [facility]     uterine polyp  2013           Family History:     Family History   Problem Relation Age of Onset     Hyperlipidemia Mother      Diabetes Mother      Hypertension Mother      Hyperlipidemia Father      Hypertension Father      Diabetes Maternal Grandmother      Diabetes Maternal Grandfather             Social History:     Social History     Socioeconomic History     Marital status:      Spouse name: Not on file     Number of children: 0     Years of education: Not on file     Highest education level: Not on file    Occupational History     Occupation: physician     Employer: 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   Social Needs     Financial resource strain: Not on file     Food insecurity:     Worry: Not on file     Inability: Not on file     Transportation needs:     Medical: Not on file     Non-medical: Not on file   Tobacco Use     Smoking status: Never Smoker     Smokeless tobacco: Never Used   Substance and Sexual Activity     Alcohol use: No     Alcohol/week: 0.0 oz     Drug use: No     Sexual activity: Not Currently     Partners: Male     Birth control/protection: None   Lifestyle     Physical activity:     Days per week: Not on file     Minutes per session: Not on file     Stress: Not on file   Relationships     Social connections:     Talks on phone: Not on file     Gets together: Not on file     Attends Evangelical service: Not on file     Active member of club or organization: Not on file     Attends meetings of clubs or organizations: Not on file     Relationship status: Not on file     Intimate partner violence:     Fear of current or ex partner: Not on file     Emotionally abused: Not on file     Physically abused: Not on file     Forced sexual activity: Not on file   Other Topics Concern     Parent/sibling w/ CABG, MI or angioplasty before 65F 55M? Not Asked   Social History Narrative    Lives with  ().  Immigrated in 2006 from [place] ([facility]). No children.  Parents in [place], 2 younger sisters and a brother, she is eldest.             Medications:     Current Outpatient Medications   Medication     acetylcysteine (MUCOMYST) 20 % nebulizer solution     albuterol (PROAIR HFA/PROVENTIL HFA/VENTOLIN HFA) 108 (90 BASE) MCG/ACT Inhaler     albuterol (PROVENTIL) (2.5 MG/3ML) 0.083% neb solution     aspirin 81 MG tablet     blood glucose (NO BRAND SPECIFIED) test strip     blood glucose monitoring (NO BRAND SPECIFIED) meter device kit     Cholecalciferol (VITAMIN D PO)  "    docusate sodium (COLACE) 100 MG capsule     fluticasone-salmeterol (ADVAIR) 100-50 MCG/DOSE inhaler     pantoprazole (PROTONIX) 40 MG EC tablet     Prenatal MV-Min-Fe Fum-FA-DHA (PRENATAL 1 PO)     Respiratory Therapy Supplies (NEBULIZER) SUZANNA     Syringe/Needle, Disp, (SYRINGE LUER LOCK) 20G X 1-1/2\" 5 ML MISC     Water For Injection Sterile SOLN     No current facility-administered medications for this visit.             Physical Exam:   /72   Pulse 88   Resp 16   Ht 1.626 m (5' 4\")   Wt 72.1 kg (159 lb)   SpO2 94%   BMI 27.29 kg/m      Constitutional:   Awake, alert and in no apparent distress     Eyes:   nonicteric     ENT:   oral mucosa moist without lesions, normal tm opaque, bilateral mucosal edema      Neck:   Supple without supraclavicular or cervical lymphadenopathy     Lungs:   Good air flow.  No crackles. No rhonchi.  No wheezes.     Cardiovascular:   Normal S1 and S2.  RRR.  No murmur, gallop or rub.     Abdomen:   NABS, soft, nontender, protuberant     Musculoskeletal:   No edema, no digital clubbing present     Neurologic:   Alert and conversant.     Skin:   Warm, dry.  No rash on limited exam.             Data:   All laboratory and imaging data reviewed.    Cystic Fibrosis Culture  Specimen Description   Date Value Ref Range Status   01/29/2019 Sputum  Final   01/29/2019 Sputum  Final   11/07/2018 Sputum  Final   11/07/2018 Sputum  Final    Culture Micro   Date Value Ref Range Status   01/29/2019 Moderate growth  Normal otto    Final   01/29/2019 (A)  Final    Light growth  Pseudomonas aeruginosa, mucoid strain     01/29/2019 (A)  Final    Heavy growth  Haemophilus influenzae  Beta lactamase negative  Beta-lactamase negative Haemophilus influenzae are usually susceptible to ampicillin,   amoxacillin/clavulanic acid, levofloxacin, and 3rd generation cephalosporins, such as   ceftriaxone.          No results found for this or any previous visit (from the past 168 hour(s)).    PFT: Not " performed today.

## 2019-03-28 ENCOUNTER — ANESTHESIA EVENT (OUTPATIENT)
Dept: ANESTHESIOLOGY | Facility: CLINIC | Age: 44
End: 2019-03-28

## 2019-03-28 ENCOUNTER — TELEPHONE (OUTPATIENT)
Dept: MATERNAL FETAL MEDICINE | Facility: CLINIC | Age: 44
End: 2019-03-28

## 2019-03-28 ENCOUNTER — HOSPITAL ENCOUNTER (OUTPATIENT)
Dept: ULTRASOUND IMAGING | Facility: CLINIC | Age: 44
Discharge: HOME | End: 2019-03-28
Attending: OBSTETRICS & GYNECOLOGY | Admitting: OBSTETRICS & GYNECOLOGY
Payer: COMMERCIAL

## 2019-03-28 ENCOUNTER — OFFICE VISIT (OUTPATIENT)
Dept: MATERNAL FETAL MEDICINE | Facility: CLINIC | Age: 44
End: 2019-03-28
Attending: OBSTETRICS & GYNECOLOGY
Payer: COMMERCIAL

## 2019-03-28 ENCOUNTER — ANESTHESIA (OUTPATIENT)
Dept: ANESTHESIOLOGY | Facility: CLINIC | Age: 44
End: 2019-03-28

## 2019-03-28 ENCOUNTER — TRANSFERRED RECORDS (OUTPATIENT)
Dept: HEALTH INFORMATION MANAGEMENT | Facility: CLINIC | Age: 44
End: 2019-03-28

## 2019-03-28 VITALS
BODY MASS INDEX: 26 KG/M2 | RESPIRATION RATE: 20 BRPM | DIASTOLIC BLOOD PRESSURE: 62 MMHG | HEART RATE: 107 BPM | WEIGHT: 151.5 LBS | OXYGEN SATURATION: 95 % | SYSTOLIC BLOOD PRESSURE: 105 MMHG

## 2019-03-28 DIAGNOSIS — O24.410 DIET CONTROLLED GESTATIONAL DIABETES MELLITUS (GDM), ANTEPARTUM: ICD-10-CM

## 2019-03-28 DIAGNOSIS — O44.02 PLACENTA PREVIA ANTEPARTUM IN SECOND TRIMESTER: ICD-10-CM

## 2019-03-28 DIAGNOSIS — R39.15 URINARY URGENCY: ICD-10-CM

## 2019-03-28 DIAGNOSIS — O44.02 PLACENTA PREVIA ANTEPARTUM IN SECOND TRIMESTER: Primary | ICD-10-CM

## 2019-03-28 DIAGNOSIS — O09.512 ELDERLY PRIMIGRAVIDA IN SECOND TRIMESTER: ICD-10-CM

## 2019-03-28 LAB
ALBUMIN UR-MCNC: NEGATIVE MG/DL
APPEARANCE UR: CLEAR
BILIRUB UR QL STRIP: NEGATIVE
COLOR UR AUTO: ABNORMAL
GLUCOSE UR STRIP-MCNC: NEGATIVE MG/DL
HGB UR QL STRIP: NEGATIVE
KETONES UR STRIP-MCNC: NEGATIVE MG/DL
LEUKOCYTE ESTERASE UR QL STRIP: NEGATIVE
MUCOUS THREADS #/AREA URNS LPF: PRESENT /LPF
NITRATE UR QL: NEGATIVE
PH UR STRIP: 5 PH (ref 5–7)
RBC #/AREA URNS AUTO: 0 /HPF (ref 0–2)
SOURCE: ABNORMAL
SP GR UR STRIP: 1 (ref 1–1.03)
SQUAMOUS #/AREA URNS AUTO: 1 /HPF (ref 0–1)
UROBILINOGEN UR STRIP-MCNC: NORMAL MG/DL (ref 0–2)
WBC #/AREA URNS AUTO: 0 /HPF (ref 0–5)

## 2019-03-28 PROCEDURE — 76817 TRANSVAGINAL US OBSTETRIC: CPT | Performed by: OBSTETRICS & GYNECOLOGY

## 2019-03-28 PROCEDURE — 81001 URINALYSIS AUTO W/SCOPE: CPT | Performed by: OBSTETRICS & GYNECOLOGY

## 2019-03-28 PROCEDURE — 76816 OB US FOLLOW-UP PER FETUS: CPT

## 2019-03-28 PROCEDURE — G0463 HOSPITAL OUTPT CLINIC VISIT: HCPCS | Mod: 25,ZF

## 2019-03-28 ASSESSMENT — PAIN SCALES - GENERAL: PAINLEVEL: NO PAIN (0)

## 2019-03-28 ASSESSMENT — LIFESTYLE VARIABLES: TOBACCO_USE: 0

## 2019-03-28 NOTE — TELEPHONE ENCOUNTER
Writer called and spoke with Armando. OB provider Robydiomedes is requesting for her C/S delivery is out of the office until Monday, April 1st. Will inbasket message the provider and wait to hear back availability. Diamond De La Fuente RN

## 2019-03-28 NOTE — PROGRESS NOTES
M Follow up OB Visit    S:  Armando reports that her only concern is a change in the odor to her urine. Continues to have daily brown dischargebut never bright red bleeding. Has occasional low abdominal cramping . She states her pulmonary function has decreased since she has been on modified activity. 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 . 's father recently diagnosed with end stage pancreatic cancer, so he will be leaving for [facility] for a week very soon.  She is not requiring oxygen at home but has been encouraged to increase her pulmonary treatment with nebulizers per pulmonology.     Blood sugars reviewed today, not on insulin:   Fasting All values <95 except 1 value  In past week  1 hour post-prandial: all except 2 < 140    O:  /62   Pulse 107   Resp 20   Wt 68.7 kg (151 lb 8 oz)   SpO2 95%   BMI 26.00 kg/m     Gen: Well appearing, NAD  Pulm: Non-labored breathing while resting  Abd: Gravid    Assessment:  Armando Dc is a 43 year old  at 32w0d by embryo transfer consistent with 6w4d US here for follow up due to pregnancy complicated by:     -Primary ciliary dyskinesia and bronchiectasis with moderate-severe obstructive lung disease and decreased pulmonary function of recent  -Placenta previa with first bleed (vaginal spotting) on 19, still with posterior previa  -Advanced maternal age  -Conception by IVF     RECOMMENDATIONS:     #Primary ciliary dyskinesia and bronchiectasis with moderate-severe obstructive lung disease   - Continued close follow-up with Dr. Hernández.   - Continue twice daily bronchial drainage therapy and nebulization treatment as recommended per pulmonolog., Encouraged to do some increased ambulation as tolerated around the house to minimize risk of severe deconditioning from decreased activity  - Aggressive treatment of pulmonary exacerbations and infections in pregnancy.    - Close monitoring of oxygen saturations, patient was instructed to obtain a pulse oxymetry  and start O2 supplemental therapy in pregnancy if needed to maintain oxygen saturations > 95%    -  surveillance with BPP weekly   - plan delivery at 36 weeks via C/S for previa, consider earlier delivery if pulmonary status worsens and it appears that it could be improved by delivery     #Partial posterior placenta previa, with several bleeds, now with brown spotting only  - Continue precautions with pelvic rest, no lifting over 10-15 pounds, avoidance of prolonged activity/standing  - Continue off work due to previa with recent bleed and demands of job  - Serial ultrasounds to assess placental location, no evidence of vasa previa, but low likelihood placenta will move, although not impossible  - Delivery by  section at 36 weeks gestation if continued previa, will schedule with Dr. Roberts at patient's request (she is primary [facility] patient prior to pregnancy complications)  - Anesthesiology consultation prior to delivery given significant pulmonary disease, plan for this around 32 weeks (at next OB visit)     #Bilateral pyelectasis - UTD A1 -resolved, no further follow up     #GERD  - On Protonix with good relief     #GDMA1  - Fasting and postprandial BG overall within goal, no indication for insulin  - continue to reassess weekly  ** Need to discuss at patient care call 19 if the MFM group recommends late  steroids with A1GDM, prior to 36 week delivery for previa. Will let patient know at visit in one week.   -mild polyhydramnios - will do weekly BPPs, due to maternal  Pulmonary concerns + mild polyhydramnios together     #IVF pregnancy  - s/p normal fetal echo    #Itching, now resolved  - Likely seasonal, LFTS and bile acids WNL.     #Dupuyren's tendonitis  - Continue splint and injection if worsening    #Constipation  Continue Colace 1-2 tabs BID. Add Miralax if needed.    #PNC  Rh pos, Ab neg, RI  Low risk first trimester screen  S/p influenza, Tdap given today  Patient request OB   (preferably Dr. Roberts) to do CS and not resident or fellow. Discussed that we strongly recommend resident or fellow participation in C/S, even if Alejandra is primary, as surgical assist is needed and is the standard of care here, and following the typical routine of care minimizes risk of complications. Patient accepts resident or fellow participation in delivery if staff physician as primary. Will need to discuss further with OB performing C/S.     Attending Attestation:     I spent 20 minutes total face-to-face with this patient and her  and >50% of the time was spent in counseling and/or coordination of care.     Bryon Liz MD  Maternal Fetal Medicine

## 2019-03-28 NOTE — ANESTHESIA PREPROCEDURE EVALUATION
Anesthesia Pre-Procedure Evaluation    Patient: Armando Dc   MRN:     [id number] Gender:   female   Age:    43 year old :      1975        Preoperative Diagnosis: * No pre-op diagnosis entered *   * No procedures listed *     Past Medical History:   Diagnosis Date     Carpal tunnel syndrome     right     Chronic sinusitis      Diabetes (H)     gestational diabetes diet controlled     Endometriosis     left ovary     Infertility      Kartagener's syndrome     situs inversus totalis,      Pseudomonas aeruginosa colonization      Reactive airway disease      Situs inversus      Uncomplicated asthma     Reactive airway disease      Past Surgical History:   Procedure Laterality Date     ADENOIDECTOMY       LAPAROSCOPIC CYSTECTOMY OVARIAN (BENIGN) Left 2017    Procedure: LAPAROSCOPIC CYSTECTOMY OVARIAN (BENIGN);  Left  Laparoscopy Ovarian Cystotomy, Hysteroscopy And Polpectomy With Myosure ;  Surgeon: Zayra Roberts MD;  Location: [facility] OR     OPERATIVE HYSTEROSCOPY WITH MORCELLATOR N/A 2017    Procedure: OPERATIVE HYSTEROSCOPY WITH MORCELLATOR;;  Surgeon: Zayra Roberts MD;  Location: [facility] OR     TONSILLECTOMY       TONSILLECTOMY & ADENOIDECTOMY      8 years old     TRANSVAGINAL RETRIEVAL OVUM Bilateral 3/3/2016    IVF Procedure: TRANSVAGINAL RETRIEVAL OVUM;  Surgeon: Sunshine Gilliam MD;  Location: [facility]     uterine polyp            Anesthesia Evaluation     . Pt has had prior anesthetic. Type: General    No history of anesthetic complications          ROS/MED HX    ENT/Pulmonary: Comment: Chronic Sinusitis, Obstructive lung disease, chronic bronchitis, bronchiectasis.    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  Failure of the cilia to move mucus toward the throat leading to poor clearance causing recurrent upper and lower respiratory infections.     Treatments: Daily chest vest therapy, 3 neb treatments daily and  antibiotic therapy as needed.    Pt is currently infected with the Pseudomonas bacteria.     Pt is followed by the pulmonologist, Dr. Latesha Hernández in the CF clinic.    Pt normal O2 sat is 94-96%.  Now decreased to 93% at 32 weeks gestation.       (+)Treatment: Nebulizer daily,  , . Other pulmonary disease Kartagener syndrome, uses vest therapy and nebs.   (-) tobacco use   Neurologic:  - neg neurologic ROS     Cardiovascular: Comment: Complete Situs Inversus: All thoracic and abdominal organs are mirror image to normal.  Because it is complete there are no anatomical complication.     (+) ----. : . . . :. . Previous cardiac testing Echodate:2019results:Prior diagnosis of situs inversus. Patient is pregnant.  Left ventricular size and systolic function appears normal. The LVEF is 60-65%  Right ventricular size and systolic function appears normal.  Normal atrial size.  No significant valvular dysfunction based on Doppler.  No pericardial effusion and IVC is not dilated.date: results: date: results: date: results:          METS/Exercise Tolerance:  4 - Raking leaves, gardening   Hematologic:  - neg hematologic  ROS       Musculoskeletal:  - neg musculoskeletal ROS       GI/Hepatic: Comment: Nausea.     (+) GERD (Will start taking her Protonix on a daily basis per pulmonolgy.) Symptomatic,       Renal/Genitourinary:  - ROS Renal section negative       Endo: Comment: GDM diet controlled, pt's glucose range is 100-110.    (+) Other Endocrine Disorder Gestational diabetes.      Psychiatric:  - neg psychiatric ROS       Infectious Disease: Comment: Recurrent UTI, pseudomonas colonization        Malignancy:      - no malignancy   Other: Comment: Pt is  at 32 weeks gestation with low lying placenta previa centralis.      Pt being followed by OB with weekly U/S.  If placenta previa remains the pt is to have a C/S at 36 weeks.     (+) Possibly pregnant C-spine cleared: N/A, no H/O Chronic Pain,                       " PHYSICAL EXAM:   Mental Status/Neuro: A/A/O   Airway: Facies: Feasible  Mallampati: I  Mouth/Opening: Full  TM distance: > 6 cm  Neck ROM: Full   Respiratory: Auscultation: Wheezing     Resp. Rate: Normal     Resp. Effort: Other    (Left lower lobe inspiratory wheezing.   Pt encouraged to use daily spirometry/)   CV: Rhythm: Regular  Heart: Normal Sounds   Comments:      Dental: Normal                  Lab Results   Component Value Date    WBC 9.2 02/15/2019    HGB 12.3 02/15/2019    HCT 37.3 02/15/2019     02/15/2019     11/03/2017    POTASSIUM 4.1 11/03/2017    CHLORIDE 106 11/03/2017    CO2 26 11/03/2017    BUN 18 11/03/2017    CR 0.68 11/03/2017     (H) 11/13/2017    JOHN 8.5 11/03/2017    ALBUMIN 3.7 11/03/2017    PROTTOTAL 8.0 11/03/2017    ALT 26 02/14/2019    AST 21 02/14/2019    ALKPHOS 99 11/03/2017    BILITOTAL 0.4 11/03/2017    PTT 32 03/03/2016    INR 0.98 03/03/2016    TSH 1.59 06/29/2018    T4 0.91 06/29/2018    HCG Negative 11/13/2017    HCGS Negative 11/03/2017       Preop Vitals  BP Readings from Last 3 Encounters:   03/28/19 105/62   03/27/19 118/72   03/12/19 125/75    Pulse Readings from Last 3 Encounters:   03/28/19 107   03/27/19 88   03/12/19 84      Resp Readings from Last 3 Encounters:   03/28/19 20   03/27/19 16   02/26/19 18    SpO2 Readings from Last 3 Encounters:   03/28/19 95%   03/27/19 94%   03/12/19 96%      Temp Readings from Last 1 Encounters:   02/15/19 36.8  C (98.2  F) (Oral)    Ht Readings from Last 1 Encounters:   03/27/19 1.626 m (5' 4\")      Wt Readings from Last 1 Encounters:   03/28/19 68.7 kg (151 lb 8 oz)    Estimated body mass index is 26 kg/m  as calculated from the following:    Height as of 3/27/19: 1.626 m (5' 4\").    Weight as of an earlier encounter on 3/28/19: 68.7 kg (151 lb 8 oz).     LDA:  NG/OG Tube Orogastric 18 fr Center mouth 120 cc gastric fluid sct (intermittent) placed pre laparoscopy, dc end laparoscopy with sct (Active)   Number " of days: 500            Assessment:              Tobacco Use:  NO Active use of Tobacco/UNKNOWN Tobacco use status     Plan:                             PONV Management:  Adult Risk Factors: Female, Non-Smoker                    PAC Discussion and Assessment    ASA Classification: 3  Case is suitable for: [facility]  Anesthetic techniques and relevant risks discussed:   Invasive monitoring and risk discussed: Yes  Types:   Possibility and Risk of blood transfusion discussed:   NPO instructions given:   Additional anesthetic preparation and risks discussed: Regional anesthesia for procedure  Needs early admission to pre-op area:   Other:     PAC Resident/NP Anesthesia Assessment:        Mid-Level Provider/Resident:   Date:   Time:     Attending Anesthesiologist Anesthesia Assessment:  Pt to have C/S at 36 weeks due to placenta previa.  If the previa resolves the hope is for a vaginal delivery.  Per pulmonology it would be best to deliver at 36 weeks.      Reviewed risks and benefits of epidural/spinal anesthesia.  Reviewed GA risk with emergent C/S.  Reviewed TAP block.  Pt has had a TAP block in the past and appreciated the increased lung function.    If the patient is intubated reviewed the risk of needing to remain intubated postop and transferred to the [facility].    All questions were answered.      Anesthesiologist: Sade Escalera MD  Date: 3/28/2019  Time:   Pass/Fail:   Disposition:     PAC Pharmacist Assessment:        Pharmacist:   Date:   Time:        Sade Escalera MD

## 2019-03-28 NOTE — NURSING NOTE
Asma here today with her  for F/u comp/TV and f/u obv due to preg c/b obstructive lung disease, GDM, and complete previa. Patient reports +FM, denies ctx, denies SRoM, and reports some vaginal spotting off and on, but nothing different than a few weeks ago. Patient has been taking it easy at home. Patient has anesthesia consult today to follow. Patient would like disability paperwork signed and will have it faxed. Patient to come in weekly for BPP's and OBV's. Patient to have C/S at 36 weeks. Will look into getting this scheduled for 4/25/19.  Dr. Liz in to talk with patient.  Patient to anesthesia consult in wheelchair.  Diamond De La Fuente RN

## 2019-04-01 DIAGNOSIS — J47.9 BRONCHIECTASIS WITHOUT COMPLICATION (H): Primary | Chronic | ICD-10-CM

## 2019-04-01 DIAGNOSIS — J47.9 BRONCHIECTASIS WITHOUT COMPLICATION (H): Chronic | ICD-10-CM

## 2019-04-01 LAB
BACTERIA SPEC CULT: ABNORMAL
SPECIMEN SOURCE: ABNORMAL

## 2019-04-01 RX ORDER — AZITHROMYCIN 250 MG/1
250 TABLET, FILM COATED ORAL DAILY
Qty: 10 TABLET | Refills: 0 | Status: SHIPPED | OUTPATIENT
Start: 2019-04-01 | End: 2019-05-01

## 2019-04-01 RX ORDER — AZITHROMYCIN 250 MG/1
250 TABLET, FILM COATED ORAL DAILY
Qty: 10 TABLET | Refills: 0 | Status: SHIPPED | OUTPATIENT
Start: 2019-04-01 | End: 2019-04-01

## 2019-04-02 ENCOUNTER — TELEPHONE (OUTPATIENT)
Dept: MATERNAL FETAL MEDICINE | Facility: CLINIC | Age: 44
End: 2019-04-02

## 2019-04-02 NOTE — TELEPHONE ENCOUNTER
Writer called and spoke with Armando about her  Section scheduled for 19 at 8:30 am with Dr. Brower.  Dr. Roberts is unavailable to do her surgery.  Patient will discuss delivery date and time with MFM provider at her appointment .  Armando doesn't like the idea of her C/S on a Friday. No availability at 36w0d on 19. Patient would prefer prior to 36 weeks earlier in the week or early the next week. Writer told Armando to discuss this with MFM provider at her next visit, but would leave surgery scheduled as is for now. Diamond De La Fuente RN

## 2019-04-03 NOTE — PROGRESS NOTES
MFM Follow up OB Visit    S: Pt feeling well.  No contractions, bleeding, loss of fluid.  Feeling good fetal movement.  Feeling well from a pulmonary standpoint, but was recently placed on 10 days of azithromycin due to pseudomonas cultured from sputum.  No complaints today.    Blood sugars reviewed today, not on insulin: fastings between ; post prandial values within normal limits    O:  /78 (BP Location: Left arm, Patient Position: Chair)   Pulse 86   Resp 18   Wt 68.6 kg (151 lb 3.2 oz)   SpO2 95%   BMI 25.95 kg/m     Gen: Well appearing, NAD  Pulm: clear to auscultation bilaterally  CV:  Regular rate; no M/R/G  Abd: Gravid, nontender  Extremities:  Nontender, no edema noted bilaterally    Assessment:  Armando Dc is a 43 year old  at 33w0d by embryo transfer consistent with 6w4d US here for follow up due to pregnancy complicated by:     -Primary ciliary dyskinesia and bronchiectasis with moderate-severe obstructive lung disease and decreased pulmonary function of recent  -Placenta previa with first bleed (vaginal spotting) on 19, still with posterior previa  -Advanced maternal age  -Conception by IVF     RECOMMENDATIONS:     #Primary ciliary dyskinesia and bronchiectasis with moderate-severe obstructive lung disease   - Continued close follow-up with Dr. Hernández.   - Continue three times daily bronchial drainage therapy and nebulization treatment as recommended per pulmonology., Encouraged to do some increased ambulation as tolerated around the house to minimize risk of severe deconditioning from decreased activity  - Aggressive treatment of pulmonary exacerbations and infections in pregnancy.  Continue course of azithromycin x 10 days  - Close monitoring of oxygen saturations, patient was instructed to obtain a pulse oxymetry and start O2 supplemental therapy in pregnancy if needed to maintain oxygen saturations > 95%    -  surveillance with BPP weekly   - plan delivery at  36 weeks via C/S for previa, consider earlier delivery if pulmonary status worsens and it appears that it could be improved by delivery     #Partial posterior placenta previa, with several bleeds, now with no spotting/bleeding x 7 days  - Continue precautions with pelvic rest, no lifting over 10-15 pounds, avoidance of prolonged activity/standing  - Continue off work due to previa with recent bleed and demands of job  - Serial ultrasounds to assess placental location, no evidence of vasa previa, but low likelihood placenta will move, although not impossible  - Delivery at 36 weeks by  section; scheduled on  with Dr. Shipley  - Anesthesiology consultation completed 3/28     #Bilateral pyelectasis - UTD A1 -resolved, no further follow up      #GERD  - On Protonix with good relief     #GDMA1  - Fasting and postprandial BG overall within goal, no indication for insulin today, but will reassess next week given polyhydramnios  - continue to reassess weekly  - Discussed whether to give late   corticosteroids.  I discussed that I would not recommend this given her GDM and late  gestation.  Could reevaluate if delivery becomes necessary sooner  -mild polyhydramnios - will do weekly BPPs, due to maternal  Pulmonary concerns + mild polyhydramnios together     #IVF pregnancy  - s/p normal fetal echo    #Itching, now resolved  - Likely seasonal, LFTS and bile acids WNL.     #Dupuyren's tendonitis  - Continue splint and injection if worsening    #Constipation  Continue Colace 1-2 tabs BID. Add Miralax if needed.    #PNC  Rh pos, Ab neg, RI  Low risk first trimester screen  S/p influenza, Tdap given today   currently scheduled on  with Dr. Shipley    Patient request OB  to do CS and not resident or fellow. Patient accepts resident or fellow participation in delivery if staff physician as primary. Will need to discuss further with OB performing C/S.     Geeta Felton,  MD  Maternal Fetal Medicine    I spent 10 minutes face to face with the patient, >50% of which was spent in counseling and coordination of care.

## 2019-04-04 ENCOUNTER — HOSPITAL ENCOUNTER (OUTPATIENT)
Dept: ULTRASOUND IMAGING | Facility: CLINIC | Age: 44
Discharge: HOME | End: 2019-04-04
Attending: OBSTETRICS & GYNECOLOGY | Admitting: OBSTETRICS & GYNECOLOGY
Payer: COMMERCIAL

## 2019-04-04 ENCOUNTER — OFFICE VISIT (OUTPATIENT)
Dept: MATERNAL FETAL MEDICINE | Facility: CLINIC | Age: 44
End: 2019-04-04
Attending: OBSTETRICS & GYNECOLOGY
Payer: COMMERCIAL

## 2019-04-04 VITALS
SYSTOLIC BLOOD PRESSURE: 132 MMHG | OXYGEN SATURATION: 95 % | BODY MASS INDEX: 25.95 KG/M2 | WEIGHT: 151.2 LBS | HEART RATE: 86 BPM | RESPIRATION RATE: 18 BRPM | DIASTOLIC BLOOD PRESSURE: 78 MMHG

## 2019-04-04 DIAGNOSIS — O24.410 DIET CONTROLLED GESTATIONAL DIABETES MELLITUS (GDM), ANTEPARTUM: ICD-10-CM

## 2019-04-04 DIAGNOSIS — O09.512 ELDERLY PRIMIGRAVIDA IN SECOND TRIMESTER: ICD-10-CM

## 2019-04-04 DIAGNOSIS — O44.02 PLACENTA PREVIA ANTEPARTUM IN SECOND TRIMESTER: ICD-10-CM

## 2019-04-04 DIAGNOSIS — J47.9 BRONCHIECTASIS WITHOUT COMPLICATION (H): ICD-10-CM

## 2019-04-04 DIAGNOSIS — O09.93 SUPERVISION OF HIGH RISK PREGNANCY IN THIRD TRIMESTER: Primary | ICD-10-CM

## 2019-04-04 PROCEDURE — 76819 FETAL BIOPHYS PROFIL W/O NST: CPT

## 2019-04-04 PROCEDURE — G0463 HOSPITAL OUTPT CLINIC VISIT: HCPCS | Mod: 25,ZF

## 2019-04-06 ENCOUNTER — TELEPHONE (OUTPATIENT)
Dept: OBGYN | Facility: CLINIC | Age: 44
End: 2019-04-06

## 2019-04-06 ENCOUNTER — ANESTHESIA (OUTPATIENT)
Dept: OBGYN | Facility: CLINIC | Age: 44
End: 2019-04-06
Payer: COMMERCIAL

## 2019-04-06 ENCOUNTER — ANESTHESIA EVENT (OUTPATIENT)
Dept: OBGYN | Facility: CLINIC | Age: 44
End: 2019-04-06
Payer: COMMERCIAL

## 2019-04-06 ENCOUNTER — HOSPITAL ENCOUNTER (INPATIENT)
Facility: CLINIC | Age: 44
LOS: 23 days | Discharge: HOME HEALTH | End: 2019-04-29
Attending: OBSTETRICS & GYNECOLOGY | Admitting: OBSTETRICS & GYNECOLOGY
Payer: COMMERCIAL

## 2019-04-06 DIAGNOSIS — Z98.891 S/P CESAREAN SECTION: Primary | ICD-10-CM

## 2019-04-06 PROBLEM — O44.00 PLACENTA PREVIA ANTEPARTUM: Status: ACTIVE | Noted: 2019-04-06

## 2019-04-06 LAB
ABO + RH BLD: NORMAL
ABO + RH BLD: NORMAL
ALBUMIN UR-MCNC: NEGATIVE MG/DL
APPEARANCE UR: CLEAR
APTT PPP: 30 SEC (ref 22–37)
BASOPHILS # BLD AUTO: 0 10E9/L (ref 0–0.2)
BASOPHILS NFR BLD AUTO: 0.2 %
BILIRUB UR QL STRIP: NEGATIVE
BLD GP AB SCN SERPL QL: NORMAL
BLOOD BANK CMNT PATIENT-IMP: NORMAL
COLOR UR AUTO: YELLOW
DIFFERENTIAL METHOD BLD: NORMAL
EOSINOPHIL # BLD AUTO: 0.1 10E9/L (ref 0–0.7)
EOSINOPHIL NFR BLD AUTO: 1.5 %
ERYTHROCYTE [DISTWIDTH] IN BLOOD BY AUTOMATED COUNT: 13.5 % (ref 10–15)
FIBRINOGEN PPP-MCNC: 649 MG/DL (ref 200–420)
GLUCOSE BLDC GLUCOMTR-MCNC: 112 MG/DL (ref 70–99)
GLUCOSE BLDC GLUCOMTR-MCNC: 112 MG/DL (ref 70–99)
GLUCOSE BLDC GLUCOMTR-MCNC: 91 MG/DL (ref 70–99)
GLUCOSE BLDC GLUCOMTR-MCNC: 91 MG/DL (ref 70–99)
GLUCOSE UR STRIP-MCNC: NEGATIVE MG/DL
HCT VFR BLD AUTO: 38.1 % (ref 35–47)
HGB BLD-MCNC: 12.5 G/DL (ref 11.7–15.7)
HGB F BLD QL KLEIH BETKE: NORMAL
HGB UR QL STRIP: NEGATIVE
IMM GRANULOCYTES # BLD: 0 10E9/L (ref 0–0.4)
IMM GRANULOCYTES NFR BLD: 0.3 %
INR PPP: 0.97 (ref 0.86–1.14)
KETONES UR STRIP-MCNC: >150 MG/DL
LEUKOCYTE ESTERASE UR QL STRIP: NEGATIVE
LYMPHOCYTES # BLD AUTO: 1.4 10E9/L (ref 0.8–5.3)
LYMPHOCYTES NFR BLD AUTO: 23.4 %
MCH RBC QN AUTO: 29.8 PG (ref 26.5–33)
MCHC RBC AUTO-ENTMCNC: 32.8 G/DL (ref 31.5–36.5)
MCV RBC AUTO: 91 FL (ref 78–100)
MONOCYTES # BLD AUTO: 0.5 10E9/L (ref 0–1.3)
MONOCYTES NFR BLD AUTO: 8.3 %
MUCOUS THREADS #/AREA URNS LPF: PRESENT /LPF
NEUTROPHILS # BLD AUTO: 3.9 10E9/L (ref 1.6–8.3)
NEUTROPHILS NFR BLD AUTO: 66.3 %
NITRATE UR QL: NEGATIVE
NRBC # BLD AUTO: 0 10*3/UL
NRBC BLD AUTO-RTO: 0 /100
PH UR STRIP: 5.5 PH (ref 5–7)
PLATELET # BLD AUTO: 224 10E9/L (ref 150–450)
RBC # BLD AUTO: 4.19 10E12/L (ref 3.8–5.2)
RBC #/AREA URNS AUTO: <1 /HPF (ref 0–2)
SOURCE: ABNORMAL
SP GR UR STRIP: 1.01 (ref 1–1.03)
SPECIMEN EXP DATE BLD: NORMAL
SQUAMOUS #/AREA URNS AUTO: <1 /HPF (ref 0–1)
T PALLIDUM AB SER QL: NONREACTIVE
UROBILINOGEN UR STRIP-MCNC: NORMAL MG/DL (ref 0–2)
WBC # BLD AUTO: 5.9 10E9/L (ref 4–11)
WBC #/AREA URNS AUTO: <1 /HPF (ref 0–5)

## 2019-04-06 PROCEDURE — 25000132 ZZH RX MED GY IP 250 OP 250 PS 637: Performed by: STUDENT IN AN ORGANIZED HEALTH CARE EDUCATION/TRAINING PROGRAM

## 2019-04-06 PROCEDURE — 85730 THROMBOPLASTIN TIME PARTIAL: CPT | Performed by: STUDENT IN AN ORGANIZED HEALTH CARE EDUCATION/TRAINING PROGRAM

## 2019-04-06 PROCEDURE — 85610 PROTHROMBIN TIME: CPT | Performed by: STUDENT IN AN ORGANIZED HEALTH CARE EDUCATION/TRAINING PROGRAM

## 2019-04-06 PROCEDURE — 25000125 ZZHC RX 250: Performed by: STUDENT IN AN ORGANIZED HEALTH CARE EDUCATION/TRAINING PROGRAM

## 2019-04-06 PROCEDURE — 25000125 ZZHC RX 250

## 2019-04-06 PROCEDURE — 25800030 ZZH RX IP 258 OP 636: Performed by: STUDENT IN AN ORGANIZED HEALTH CARE EDUCATION/TRAINING PROGRAM

## 2019-04-06 PROCEDURE — 85025 COMPLETE CBC W/AUTO DIFF WBC: CPT | Performed by: STUDENT IN AN ORGANIZED HEALTH CARE EDUCATION/TRAINING PROGRAM

## 2019-04-06 PROCEDURE — 0064U ANTB TP TOTAL&RPR IA QUAL: CPT | Performed by: STUDENT IN AN ORGANIZED HEALTH CARE EDUCATION/TRAINING PROGRAM

## 2019-04-06 PROCEDURE — 40000275 ZZH STATISTIC RCP TIME EA 10 MIN

## 2019-04-06 PROCEDURE — 12000001 ZZH R&B MED SURG/OB UMMC

## 2019-04-06 PROCEDURE — 86850 RBC ANTIBODY SCREEN: CPT | Performed by: STUDENT IN AN ORGANIZED HEALTH CARE EDUCATION/TRAINING PROGRAM

## 2019-04-06 PROCEDURE — 40000671 ZZH STATISTIC ANESTHESIA CASE

## 2019-04-06 PROCEDURE — 25000125 ZZHC RX 250: Performed by: NURSE ANESTHETIST, CERTIFIED REGISTERED

## 2019-04-06 PROCEDURE — G0463 HOSPITAL OUTPT CLINIC VISIT: HCPCS

## 2019-04-06 PROCEDURE — 00000146 ZZHCL STATISTIC GLUCOSE BY METER IP

## 2019-04-06 PROCEDURE — 86900 BLOOD TYPING SEROLOGIC ABO: CPT | Performed by: STUDENT IN AN ORGANIZED HEALTH CARE EDUCATION/TRAINING PROGRAM

## 2019-04-06 PROCEDURE — 81001 URINALYSIS AUTO W/SCOPE: CPT | Performed by: OBSTETRICS & GYNECOLOGY

## 2019-04-06 PROCEDURE — 94640 AIRWAY INHALATION TREATMENT: CPT | Mod: 76

## 2019-04-06 PROCEDURE — 86901 BLOOD TYPING SEROLOGIC RH(D): CPT | Performed by: STUDENT IN AN ORGANIZED HEALTH CARE EDUCATION/TRAINING PROGRAM

## 2019-04-06 PROCEDURE — 85384 FIBRINOGEN ACTIVITY: CPT | Performed by: STUDENT IN AN ORGANIZED HEALTH CARE EDUCATION/TRAINING PROGRAM

## 2019-04-06 PROCEDURE — 25000128 H RX IP 250 OP 636: Performed by: STUDENT IN AN ORGANIZED HEALTH CARE EDUCATION/TRAINING PROGRAM

## 2019-04-06 PROCEDURE — 94640 AIRWAY INHALATION TREATMENT: CPT

## 2019-04-06 PROCEDURE — 85460 HEMOGLOBIN FETAL: CPT | Performed by: OBSTETRICS & GYNECOLOGY

## 2019-04-06 RX ORDER — NIFEDIPINE 10 MG/1
10 CAPSULE ORAL EVERY 30 MIN PRN
Status: DISCONTINUED | OUTPATIENT
Start: 2019-04-06 | End: 2019-04-06

## 2019-04-06 RX ORDER — ALBUTEROL SULFATE 90 UG/1
2 AEROSOL, METERED RESPIRATORY (INHALATION) EVERY 6 HOURS PRN
Status: DISCONTINUED | OUTPATIENT
Start: 2019-04-06 | End: 2019-04-29 | Stop reason: HOSPADM

## 2019-04-06 RX ORDER — AMOXICILLIN 250 MG
2 CAPSULE ORAL 2 TIMES DAILY
Status: DISCONTINUED | OUTPATIENT
Start: 2019-04-06 | End: 2019-04-06

## 2019-04-06 RX ORDER — AMOXICILLIN 250 MG
3 CAPSULE ORAL AT BEDTIME
Status: DISCONTINUED | OUTPATIENT
Start: 2019-04-06 | End: 2019-04-25

## 2019-04-06 RX ORDER — DIPHENHYDRAMINE HCL 25 MG
25 CAPSULE ORAL EVERY 6 HOURS PRN
Status: DISCONTINUED | OUTPATIENT
Start: 2019-04-06 | End: 2019-04-25

## 2019-04-06 RX ORDER — DIPHENHYDRAMINE HYDROCHLORIDE 50 MG/ML
25 INJECTION INTRAMUSCULAR; INTRAVENOUS EVERY 6 HOURS PRN
Status: DISCONTINUED | OUTPATIENT
Start: 2019-04-06 | End: 2019-04-25

## 2019-04-06 RX ORDER — BETAMETHASONE SODIUM PHOSPHATE AND BETAMETHASONE ACETATE 3; 3 MG/ML; MG/ML
12 INJECTION, SUSPENSION INTRA-ARTICULAR; INTRALESIONAL; INTRAMUSCULAR; SOFT TISSUE DAILY
Status: COMPLETED | OUTPATIENT
Start: 2019-04-06 | End: 2019-04-07

## 2019-04-06 RX ORDER — LIDOCAINE HYDROCHLORIDE 10 MG/ML
INJECTION, SOLUTION EPIDURAL; INFILTRATION; INTRACAUDAL; PERINEURAL PRN
Status: DISCONTINUED | OUTPATIENT
Start: 2019-04-06 | End: 2019-04-06

## 2019-04-06 RX ORDER — ALBUTEROL SULFATE 0.83 MG/ML
2.5 SOLUTION RESPIRATORY (INHALATION) 3 TIMES DAILY
Status: DISCONTINUED | OUTPATIENT
Start: 2019-04-06 | End: 2019-04-26

## 2019-04-06 RX ORDER — ACETAMINOPHEN 325 MG/1
975 TABLET ORAL EVERY 4 HOURS PRN
Status: DISCONTINUED | OUTPATIENT
Start: 2019-04-06 | End: 2019-04-14

## 2019-04-06 RX ORDER — SIMETHICONE 80 MG
160 TABLET,CHEWABLE ORAL EVERY 4 HOURS PRN
Status: DISCONTINUED | OUTPATIENT
Start: 2019-04-06 | End: 2019-04-25

## 2019-04-06 RX ORDER — ACETYLCYSTEINE 200 MG/ML
2 SOLUTION ORAL; RESPIRATORY (INHALATION) 3 TIMES DAILY
Status: DISCONTINUED | OUTPATIENT
Start: 2019-04-06 | End: 2019-04-26

## 2019-04-06 RX ORDER — SODIUM CHLORIDE, SODIUM LACTATE, POTASSIUM CHLORIDE, CALCIUM CHLORIDE 600; 310; 30; 20 MG/100ML; MG/100ML; MG/100ML; MG/100ML
INJECTION, SOLUTION INTRAVENOUS CONTINUOUS
Status: DISCONTINUED | OUTPATIENT
Start: 2019-04-06 | End: 2019-04-06

## 2019-04-06 RX ORDER — ONDANSETRON 2 MG/ML
4 INJECTION INTRAMUSCULAR; INTRAVENOUS EVERY 6 HOURS PRN
Status: DISCONTINUED | OUTPATIENT
Start: 2019-04-06 | End: 2019-04-25

## 2019-04-06 RX ORDER — NIFEDIPINE 10 MG/1
10 CAPSULE ORAL EVERY 6 HOURS
Status: DISCONTINUED | OUTPATIENT
Start: 2019-04-06 | End: 2019-04-06

## 2019-04-06 RX ORDER — AZITHROMYCIN 250 MG/1
250 TABLET, FILM COATED ORAL DAILY
Status: COMPLETED | OUTPATIENT
Start: 2019-04-06 | End: 2019-04-10

## 2019-04-06 RX ORDER — NIFEDIPINE 20 MG/1
20 CAPSULE ORAL EVERY 6 HOURS
Status: DISCONTINUED | OUTPATIENT
Start: 2019-04-06 | End: 2019-04-12

## 2019-04-06 RX ORDER — PRENATAL VIT/IRON FUM/FOLIC AC 27MG-0.8MG
1 TABLET ORAL DAILY
Status: DISCONTINUED | OUTPATIENT
Start: 2019-04-06 | End: 2019-04-25

## 2019-04-06 RX ORDER — ALBUTEROL SULFATE 0.83 MG/ML
2.5 SOLUTION RESPIRATORY (INHALATION) 2 TIMES DAILY
Status: DISCONTINUED | OUTPATIENT
Start: 2019-04-06 | End: 2019-04-06

## 2019-04-06 RX ORDER — ACETYLCYSTEINE 200 MG/ML
2 SOLUTION ORAL; RESPIRATORY (INHALATION) 2 TIMES DAILY
Status: DISCONTINUED | OUTPATIENT
Start: 2019-04-06 | End: 2019-04-06

## 2019-04-06 RX ORDER — NIFEDIPINE 10 MG/1
10 CAPSULE ORAL ONCE
Status: COMPLETED | OUTPATIENT
Start: 2019-04-06 | End: 2019-04-06

## 2019-04-06 RX ORDER — AMOXICILLIN 250 MG
1 CAPSULE ORAL 2 TIMES DAILY
Status: DISCONTINUED | OUTPATIENT
Start: 2019-04-06 | End: 2019-04-06

## 2019-04-06 RX ORDER — LIDOCAINE 40 MG/G
CREAM TOPICAL
Status: DISCONTINUED | OUTPATIENT
Start: 2019-04-06 | End: 2019-04-25

## 2019-04-06 RX ORDER — PANTOPRAZOLE SODIUM 40 MG/1
40 TABLET, DELAYED RELEASE ORAL DAILY
Status: DISCONTINUED | OUTPATIENT
Start: 2019-04-06 | End: 2019-04-29 | Stop reason: HOSPADM

## 2019-04-06 RX ADMIN — PANTOPRAZOLE SODIUM 40 MG: 40 TABLET, DELAYED RELEASE ORAL at 08:32

## 2019-04-06 RX ADMIN — ACETYLCYSTEINE 2 ML: 200 SOLUTION ORAL; RESPIRATORY (INHALATION) at 13:42

## 2019-04-06 RX ADMIN — BETAMETHASONE SODIUM PHOSPHATE AND BETAMETHASONE ACETATE 12 MG: 3; 3 INJECTION, SUSPENSION INTRA-ARTICULAR; INTRALESIONAL; INTRAMUSCULAR at 06:26

## 2019-04-06 RX ADMIN — NIFEDIPINE 10 MG: 10 CAPSULE ORAL at 10:45

## 2019-04-06 RX ADMIN — ACETYLCYSTEINE 2 ML: 200 SOLUTION ORAL; RESPIRATORY (INHALATION) at 09:04

## 2019-04-06 RX ADMIN — ALBUTEROL SULFATE 2.5 MG: 2.5 SOLUTION RESPIRATORY (INHALATION) at 09:04

## 2019-04-06 RX ADMIN — SODIUM CHLORIDE, POTASSIUM CHLORIDE, SODIUM LACTATE AND CALCIUM CHLORIDE: 600; 310; 30; 20 INJECTION, SOLUTION INTRAVENOUS at 10:32

## 2019-04-06 RX ADMIN — NIFEDIPINE 20 MG: 20 CAPSULE, LIQUID FILLED ORAL at 23:57

## 2019-04-06 RX ADMIN — NIFEDIPINE 10 MG: 10 CAPSULE ORAL at 09:33

## 2019-04-06 RX ADMIN — PRENATAL VIT W/ FE FUMARATE-FA TAB 27-0.8 MG 1 TABLET: 27-0.8 TAB at 08:32

## 2019-04-06 RX ADMIN — NIFEDIPINE 20 MG: 20 CAPSULE, LIQUID FILLED ORAL at 17:48

## 2019-04-06 RX ADMIN — AZITHROMYCIN 250 MG: 250 TABLET, FILM COATED ORAL at 08:32

## 2019-04-06 RX ADMIN — SODIUM CHLORIDE, POTASSIUM CHLORIDE, SODIUM LACTATE AND CALCIUM CHLORIDE: 600; 310; 30; 20 INJECTION, SOLUTION INTRAVENOUS at 06:45

## 2019-04-06 RX ADMIN — ALBUTEROL SULFATE 2.5 MG: 2.5 SOLUTION RESPIRATORY (INHALATION) at 20:24

## 2019-04-06 RX ADMIN — NIFEDIPINE 10 MG: 10 CAPSULE ORAL at 12:21

## 2019-04-06 RX ADMIN — ACETYLCYSTEINE 2 ML: 200 SOLUTION ORAL; RESPIRATORY (INHALATION) at 20:24

## 2019-04-06 RX ADMIN — LIDOCAINE HYDROCHLORIDE 0.2 ML: 10 INJECTION, SOLUTION EPIDURAL; INFILTRATION; INTRACAUDAL; PERINEURAL at 10:06

## 2019-04-06 RX ADMIN — SENNOSIDES AND DOCUSATE SODIUM 2 TABLET: 8.6; 5 TABLET ORAL at 19:52

## 2019-04-06 RX ADMIN — SENNOSIDES AND DOCUSATE SODIUM 1 TABLET: 8.6; 5 TABLET ORAL at 20:51

## 2019-04-06 RX ADMIN — ALBUTEROL SULFATE 2.5 MG: 2.5 SOLUTION RESPIRATORY (INHALATION) at 13:42

## 2019-04-06 RX ADMIN — SODIUM CHLORIDE, POTASSIUM CHLORIDE, SODIUM LACTATE AND CALCIUM CHLORIDE 500 ML: 600; 310; 30; 20 INJECTION, SOLUTION INTRAVENOUS at 06:23

## 2019-04-06 NOTE — PLAN OF CARE
Patient feeling better with her contractions, contractions have spaced out compared to earlier today and patient not feeling contractions anymore. OK per Dr. Morrison to convert patient to TID monitoring and a regular diet.

## 2019-04-06 NOTE — TELEPHONE ENCOUNTER
"Late entry     Writer answered phone call around 0030 regarding bleeding. Patient describing small amount bleeding/spotting that occurred around 10pm and passing a small clot. No cramping, LOF, good fetal movement. Instructed her that she should come to L&D immediately for evaluation due to history of complete placenta previa. Patient notes she does not have a ride. Informed patient that she should call cab to come in now if bleeding is stable, or if unable to find a cab or bleeding recurs, she should call 911. Patient expressed understanding.     As of 0400, patient has not yet presented to L&D. Therefore writer called patient to see if everything is okay. Patient states that she was \"startled\" by the call and that bleeding has subsided. She prefers to come in after 0600. Informed patient that she should come to L&D as soon as possible and would not recommend waiting due to unpredictability of bleeding. Again she expressed preferring to wait, because she does not have a ride and because bleeding has stopped. Discussed that I obviously cannot force her to come in but that it is my strong recommendation that she come in immediately to be evaluated. She expressed understanding.     Dilcia Galvan MD, MHS  Ob/Gyn PGY3  04/06/19    "

## 2019-04-06 NOTE — PROVIDER NOTIFICATION
19 0650   Provider Notification   Provider Name/Title Dr. Galvan   Method of Notification At Bedside   Notification Reason Status Update     Consenting for . IV fluid bolus going. Unable to get second IV site at this time, despite multiple RN attempts. Ok to give patient a break and try again later this morning per patient request.

## 2019-04-06 NOTE — PROVIDER NOTIFICATION
"   04/06/19 0532   Provider Notification   Provider Name/Title Dr. Galvan   Method of Notification Electronic Page   Request Evaluate in Person   Notification Reason Patient Arrived   FYI of pt arrival. EFM applied. Pt ctx Q6 min and feeling \"cramps.\" Scant amt of bleeding   "

## 2019-04-06 NOTE — H&P
Antepartum History and Physical    Armando Dc MRN# [id number]   Age: 43 year old YOB: 1975     Date of Admission:  2019    Primary care provider: Blake Sifuentes      HPI:   Armando Dc is a 43 year old female who is 33w2d by IVF dating with known placenta previa being admitted for complaint of bleeding. She had an episode of spotting around 10pm and passed some smaller clots. Also had cramping around that time. No LOF, good fetal movement. Denies dizziness, lightheadedness. SOB is stable, not worsening. No fevers, chills, abdominal pain.           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 disease and decreased pulmonary function   -Situs inversus  -Advanced maternal age    OBSTETRIC HISTORY:    Obstetric History       T0      L0     SAB1   TAB0   Ectopic0   Multiple0   Live Births0       # Outcome Date GA Lbr Erik/2nd Weight Sex Delivery Anes PTL Lv   2 Current            1 SAB                   EDC: Estimated Date of Delivery: May 23, 2019    Prenatal Labs:   Lab Results   Component Value Date    ABO O 2019    RH Pos 2019    AS Neg 2019    HEPBANG Nonreactive 10/17/2018    HGB 12.5 2019       GBS Status: GBS positive bacteruria     Active Problem List  Patient Active Problem List   Diagnosis     Bronchiectasis (H)     Situs inversus     Pseudomonas aeruginosa colonization     Female infertility     Reactive airway disease     PCD (primary ciliary dyskinesia)     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     Pseudomonas infection     Endometriosis     Need for Tdap vaccination     AMA (advanced maternal age) primigravida 35+     Vaginal bleeding     Placenta previa centralis in second trimester     Encounter for triage in pregnant patient     Vaginal bleeding during pregnancy      "Placenta previa antepartum       Medication Prior to Admission  Medications Prior to Admission   Medication Sig Dispense Refill Last Dose     acetylcysteine (MUCOMYST) 20 % nebulizer solution Inhale 2 mLs into the lungs 2 times daily 120 mL 5 2019 at Unknown time     albuterol (PROAIR HFA/PROVENTIL HFA/VENTOLIN HFA) 108 (90 BASE) MCG/ACT Inhaler Inhale 2 puffs into the lungs every 6 hours as needed for shortness of breath / dyspnea or wheezing 1 Inhaler 12 Past Month at Unknown time     albuterol (PROVENTIL) (2.5 MG/3ML) 0.083% neb solution NEBULIZE AND INHALE THE CONTENTS OF ONE VIAL (3ML) TWO TIMES A  mL 11 2019 at Unknown time     azithromycin (ZITHROMAX) 250 MG tablet Take 1 tablet (250 mg) by mouth daily 10 tablet 0 2019 at Unknown time     blood glucose (NO BRAND SPECIFIED) test strip Use to test blood sugar 4 times daily or as directed. 200 strip 3 2019 at Unknown time     blood glucose monitoring (NO BRAND SPECIFIED) meter device kit Use to test blood sugar 4 times daily or as directed. Fasting and then 1 hour after breakfast, lunch, and dinner 1 kit 0 2019 at Unknown time     docusate sodium (COLACE) 100 MG capsule Take 100 mg by mouth At Bedtime   2019 at Unknown time     pantoprazole (PROTONIX) 40 MG EC tablet Take 1 tablet (40 mg) by mouth daily 90 tablet 3 2019 at Unknown time     Prenatal MV-Min-Fe Fum-FA-DHA (PRENATAL 1 PO) Take 1 tablet by mouth every evening   2019 at Unknown time     Cholecalciferol (VITAMIN D PO)    Unknown at Unknown time     fluticasone-salmeterol (ADVAIR) 100-50 MCG/DOSE inhaler Inhale 1 puff into the lungs 2 times daily 1 Inhaler 11 Unknown at Unknown time     [] Medication Disposal Systems MISC 1 Container 4 times daily for 2 doses 1 Container 1      Respiratory Therapy Supplies (NEBULIZER) SUZANNA Nebulizer compressor - use with ALEK as directed   Taking     Syringe/Needle, Disp, (SYRINGE LUER LOCK) 20G X 1-1/2\" 5 ML MISC 1 each 2 " times daily 60 each 5 Unknown at Unknown time     Water For Injection Sterile SOLN Inject 4 mLs into the vein 2 times daily (Patient not taking: Reported on 2018) 250 mL 5 Not Taking   .        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,      Pseudomonas aeruginosa colonization 2008     Reactive airway disease      Situs inversus      Uncomplicated asthma     Reactive airway disease                       Family History:     Family History   Problem Relation Age of Onset     Hyperlipidemia Mother      Diabetes Mother      Hypertension Mother      Hyperlipidemia Father      Hypertension Father      Diabetes Maternal Grandmother      Diabetes Maternal Grandfather               Social History:     Social History     Tobacco Use     Smoking status: Never Smoker     Smokeless tobacco: Never Used   Substance Use Topics     Alcohol use: No     Alcohol/week: 0.0 oz   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          Physical Exam:     Vitals:    19 0515 19 0540 19 0545   BP: 105/64     Resp: 16     Temp: 97.7  F (36.5  C)     TempSrc: Oral     SpO2:  96% 97%       Gen: Well appearing in NAD  Cardiac: RRR,S1, S2, dextrocardia  Pulm: Clear to auscultation in bilateral upper air fields  Abdomen: Soft, non tender, gravid  LE: trace edema  SSE: Declined    Fetal Heart Rate Tracing: baseline 135bpm, moderate variability, +accels, no decels  Tocometer: 2-3 in 10 minutes    Ultrasound Exam:  Growth 3/28: EFW 2043, 58%ile, mild polyhydramnios, previa posterior left, fetal pyelectasis resolved  BPP /: MVP 10.6, 8/8, breech, previa posterior left          Assessment:   Armando Dc is a 43 year old  at 33w2d  by ETD presenting for third bleed with placenta previa.           Plan:   Posterior placenta previa, with third bleed  - Abruption labs obtained, wnl, Kb pending  - patient declined SSE on exam, informed patient that this will be important to perform during admission, especially given contractions  - Bleeding stable at this time  - Will administer course of betamethasone   - Serial ultrasounds to assess placental location, last 3/28- transabdominal  - Delivery at 36 weeks by  section; scheduled on  with Dr. Shipley. Discussed possibility of earlier delivery if in  labor, or if worsening bleeding. CS consent signed.     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   - Stable disease  - Recent +sputum culture for pseudomonas/haemophils, on day #6/10 of azithromycin. No evidence of pneumonia.   - Continue three times daily bronchial drainage therapy and nebulization treatment as recommended per pulmonology.,   - Close monitoring of oxygen saturations, start O2 supplemental therapy in pregnancy if needed to maintain oxygen saturations > 95%    - Consider delivery if pulmonary status worsens and it appears that it could be improved by delivery  - Follows with pulmonology. Last PFTs 2018. Inpatient consult if worsening status.   - Anesthesiology consultation completed 3/28     Mild polyhydramnios  - Continue weekly BPP (next scheduled on 19)    GDMA1  - Fasting and postprandial BG overall within goal, no indication for insulin   - Continue fasting and 1 hour PP glucose check  - Given BMZ on admission, may need to cover with insulin sliding scale pending BG     Bilateral pyelectasis - UTD A1   -resolved, no further follow up      GERD  - On Protonix with good relief     IVF pregnancy  - s/p normal fetal echo  - On ASA       Dupuyren's tendonitis  - Continue splint and injection if worsening     Constipation  Senna. Add Miralax if needed.     PNC  Rh  pos, Ab neg, RI  Low risk first trimester screen  S/p influenza, Tdap    currently scheduled on  with Dr. Shipley, prefers staff only      Discussed with Dr. Tamiko Galvan MD, [facility]  Ob/Gyn PGY3  2019    Physician Attestation   I, Dory Morrison, saw this patient with the resident and agree with the resident/fellow's findings and plan of care as documented in the note.      I personally reviewed vital signs, medications, labs and imaging.    [place] findings: Armando has known posterior placenta previa and is being admitted with her third bleeding for this pregnancy and associated  contractions. Pregnancy also complicated by polyhydramnios, GDMA1, primary ciliary dyskinesia and bronchiectasis with moderate-severe obstructive lung disease. Will plan to maintain 2 IV sites. Maintain active T&S. If on-going bleeding will convert to T&C. Course of BMZ initiated. Will start Nifedipine for tocolysis through BMZ window. NPO until stable. Inpatient until delivery. Delivery by  section at 36 weeks gestation if stable. Earlier delivery if heavy bleeding, on-going bleeding episodes of  labor.     Recommended that patient have her  return home if able given high risk for delivery within the next week. He is currently out of the [place] visiting his father who was recently diagnosed with stage IV pancreatic cancer. Armando's parents are currently staying with her, but she states that it will be difficult for them as they do not speak English fluently.    Dory Morrison MD  Date of Service (when I saw the patient): 19

## 2019-04-06 NOTE — PLAN OF CARE
Data: Patient presented to [facility] at 0505.   Reason for maternal/fetal assessment per patient is cramping and Vaginal Bleeding  .  Patient is a . Prenatal record reviewed.      Obstetric History       T0      L0     SAB1   TAB0   Ectopic0   Multiple0   Live Births0       # Outcome Date GA Lbr Erik/2nd Weight Sex Delivery Anes PTL Lv   2 Current            1 SAB               . Medical history:   Past Medical History:   Diagnosis Date     Carpal tunnel syndrome     right     Chronic sinusitis      Diabetes (H)     gestational diabetes diet controlled     Endometriosis     left ovary     Infertility      Kartagener's syndrome     situs inversus totalis,      Pseudomonas aeruginosa colonization      Reactive airway disease      Situs inversus      Uncomplicated asthma     Reactive airway disease   . Gestational Age 33w2d. VSS. Fetal movement present. Patient denies vaginal discharge, pelvic pressure, UTI symptoms, GI problems, bloody show, edema, headache, visual disturbances, epigastric or URQ pain, rupture of membranes. Support persons patient's parents present.  Action: Verbal consent for EFM. Triage assessment completed. EFM applied. Uterine assessment contractions about q2-6 minutes apart. Fetal assessment: Presumed adequate fetal oxygenation documented (see flow record).   Response: Dr. Galvan informed of arrival and at patient bedside at 0545. Plan per provider is place 2 PIV, give 500 mL bolus, and give betamethasone. Patient is refusing SVE at this time. Patient otherwise verbalized agreement with plan. Patient oriented to room and call light.

## 2019-04-07 LAB
GLUCOSE BLDC GLUCOMTR-MCNC: 107 MG/DL (ref 70–99)
GLUCOSE BLDC GLUCOMTR-MCNC: 109 MG/DL (ref 70–99)
GLUCOSE BLDC GLUCOMTR-MCNC: 117 MG/DL (ref 70–99)
GLUCOSE BLDC GLUCOMTR-MCNC: 126 MG/DL (ref 70–99)
GLUCOSE BLDC GLUCOMTR-MCNC: 182 MG/DL (ref 70–99)
GLUCOSE BLDC GLUCOMTR-MCNC: 88 MG/DL (ref 70–99)

## 2019-04-07 PROCEDURE — 25000125 ZZHC RX 250: Performed by: STUDENT IN AN ORGANIZED HEALTH CARE EDUCATION/TRAINING PROGRAM

## 2019-04-07 PROCEDURE — 25000125 ZZHC RX 250

## 2019-04-07 PROCEDURE — 94640 AIRWAY INHALATION TREATMENT: CPT | Mod: 76

## 2019-04-07 PROCEDURE — 25000132 ZZH RX MED GY IP 250 OP 250 PS 637: Performed by: STUDENT IN AN ORGANIZED HEALTH CARE EDUCATION/TRAINING PROGRAM

## 2019-04-07 PROCEDURE — 00000146 ZZHCL STATISTIC GLUCOSE BY METER IP

## 2019-04-07 PROCEDURE — 25000128 H RX IP 250 OP 636: Performed by: STUDENT IN AN ORGANIZED HEALTH CARE EDUCATION/TRAINING PROGRAM

## 2019-04-07 PROCEDURE — 94640 AIRWAY INHALATION TREATMENT: CPT

## 2019-04-07 PROCEDURE — 12000001 ZZH R&B MED SURG/OB UMMC

## 2019-04-07 PROCEDURE — 40000275 ZZH STATISTIC RCP TIME EA 10 MIN

## 2019-04-07 RX ORDER — DEXTROSE MONOHYDRATE 25 G/50ML
25-50 INJECTION, SOLUTION INTRAVENOUS
Status: DISCONTINUED | OUTPATIENT
Start: 2019-04-07 | End: 2019-04-25

## 2019-04-07 RX ORDER — NICOTINE POLACRILEX 4 MG
15-30 LOZENGE BUCCAL
Status: DISCONTINUED | OUTPATIENT
Start: 2019-04-07 | End: 2019-04-25

## 2019-04-07 RX ADMIN — NIFEDIPINE 20 MG: 20 CAPSULE, LIQUID FILLED ORAL at 23:38

## 2019-04-07 RX ADMIN — NIFEDIPINE 20 MG: 20 CAPSULE, LIQUID FILLED ORAL at 18:15

## 2019-04-07 RX ADMIN — ACETYLCYSTEINE 2 ML: 200 SOLUTION ORAL; RESPIRATORY (INHALATION) at 20:30

## 2019-04-07 RX ADMIN — ALBUTEROL SULFATE 2.5 MG: 2.5 SOLUTION RESPIRATORY (INHALATION) at 20:30

## 2019-04-07 RX ADMIN — ACETYLCYSTEINE 2 ML: 200 SOLUTION ORAL; RESPIRATORY (INHALATION) at 15:30

## 2019-04-07 RX ADMIN — PANTOPRAZOLE SODIUM 40 MG: 40 TABLET, DELAYED RELEASE ORAL at 08:09

## 2019-04-07 RX ADMIN — ACETYLCYSTEINE 2 ML: 200 SOLUTION ORAL; RESPIRATORY (INHALATION) at 08:51

## 2019-04-07 RX ADMIN — ALBUTEROL SULFATE 2.5 MG: 2.5 SOLUTION RESPIRATORY (INHALATION) at 15:30

## 2019-04-07 RX ADMIN — SENNOSIDES AND DOCUSATE SODIUM 3 TABLET: 8.6; 5 TABLET ORAL at 22:34

## 2019-04-07 RX ADMIN — BETAMETHASONE SODIUM PHOSPHATE AND BETAMETHASONE ACETATE 12 MG: 3; 3 INJECTION, SUSPENSION INTRA-ARTICULAR; INTRALESIONAL; INTRAMUSCULAR at 06:37

## 2019-04-07 RX ADMIN — PRENATAL VIT W/ FE FUMARATE-FA TAB 27-0.8 MG 1 TABLET: 27-0.8 TAB at 08:09

## 2019-04-07 RX ADMIN — AZITHROMYCIN 250 MG: 250 TABLET, FILM COATED ORAL at 08:09

## 2019-04-07 RX ADMIN — ALBUTEROL SULFATE 2.5 MG: 2.5 SOLUTION RESPIRATORY (INHALATION) at 08:51

## 2019-04-07 RX ADMIN — NIFEDIPINE 20 MG: 20 CAPSULE, LIQUID FILLED ORAL at 12:00

## 2019-04-07 RX ADMIN — NIFEDIPINE 20 MG: 20 CAPSULE, LIQUID FILLED ORAL at 06:00

## 2019-04-07 NOTE — PLAN OF CARE
Patient having contractions for most of the day every 1-6 minutes. Patient stated she was feeling cramping and pain that radiated to her back. Patient denies bleeding throughout the day- states she sees occasional brown when wiping but nothing on her pad and no red blood. Patient NPO for most of the day due to the contractions. Writer checked in with patient frequently and at 1800 patient stated she was feeling better and was not feeling the contractions at all. Contractions spaced out to every 1-9 minutes. Dr. Morrison notified of patient status and patient then converted to TID monitoring and regular diet. Patient will call for RN if she feels increase in intensity or frequency of cramping or notices any bleeding.  with moderate variability. Accels present, decels not present. Baby very active per patient report. Will continue to closely monitor. 2 IV sites obtained in case of an emergency.

## 2019-04-07 NOTE — PROVIDER NOTIFICATION
04/07/19 1356   Provider Notification   Provider Name/Title Dr Hughes   Method of Notification Electronic Page   Notification Reason Other (Comment)  (blood glucose)     Provider notified of blood glucose of 182 following lunch. Also notified that there are no orders for insulin at this time.

## 2019-04-07 NOTE — PROGRESS NOTES
Antepartum progress note    Patient name: Armando Dc  MRN: [id number]  : 1975    S: Armando feels well this morning. She continues to have some contractions, but does not feel them. And notices some back pain. She denies shortness of breath, continues to have some sputum production. Doing well with her nebulizer treatments    O:   Vitals:    19 2357 19 0559 19 0800 19 1200   BP: 98/53 100/57  121/70   Pulse:       Resp: 18 18 18 18   Temp: 97.9  F (36.6  C) 97.7  F (36.5  C)  97.8  F (36.6  C)   TempSrc: Oral Oral  Oral   SpO2:         Gen: comfortable appearing, no distress  CV: RRR, no extra heart sounds. Heart on right side of chest.   Resp: lung sounds coarse throughout, though mild, air exchange is occurring.   Extremities: no LE edema    FHT: Baseline 140 Bpm, moderate variability, + accels, - decels  [place]: ctx q 4 min    A/P: 43 year old  at 33w3d with placenta previa and third bleed on 19.    Posterior placenta previa, with third bleed  - Abruption labs obtained, wnl, Kb neg  - patient declined SSE on exam, informed patient that this will be important to perform during admission, especially given contractions, patient amenable if needed  - No bleeding since   - s/p BMZ -   - Serial ultrasounds to assess placental location, last 3/28- transabdominal  - Delivery at 36 weeks by  section; scheduled on  with Dr. Shipley. Discussed possibility of earlier delivery if in  labor, or if worsening bleeding. CS consent signed.      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   - Stable disease  - Recent +sputum culture for pseudomonas/haemophils, on day #710 of azithromycin. No evidence of pneumonia.   - Continue three times daily bronchial drainage therapy and nebulization treatment as recommended per pulmonology.,   - Close monitoring of oxygen saturations, start O2 supplemental therapy in pregnancy if  needed to maintain oxygen saturations > 95%    - Consider delivery if pulmonary status worsens and it appears that it could be improved by delivery  - Follows with pulmonology. Last PFTs 2018. Inpatient consult if worsening status.   - Anesthesiology consultation completed 3/28      Mild polyhydramnios  - Continue weekly BPP (next scheduled on 19)     GDMA1  - Fasting and postprandial BG  - Continue fasting and 1 hour PP glucose check  - Added MSSI and pre prandial checks given elevation of BG to 184 PP.     Bilateral pyelectasis - UTD A1   -resolved, no further follow up      GERD  - On Protonix with good relief     IVF pregnancy  - s/p normal fetal echo  - On ASA       Dupuyren's tendonitis  - Continue splint and consider injection if worsening     Constipation  Senna. Add Miralax if needed.     PNC  Rh pos, Ab neg, RI  Low risk first trimester screen  S/p influenza, Tdap    currently scheduled on  with Dr. Shipley, prefers staff only      PPx: SCDs given high risk of bleeding    Patient seen, discussed with Dr. Tamiko Hughes MD 2019 6:49 PM       Physician Attestation   I, Dory Morrison, saw this patient with the resident and agree with the resident/fellow's findings and plan of care as documented in the note.      I personally reviewed vital signs, medications, labs and imaging.    Key findings: Contractions have decreased and Asma is no longer feeling them. Plan to continue Nifedipine through BMZ window and then discontinue. Plan ISS to cover hyperglycemic during BMZ course. Continue azithromycin as previously prescribed. Delivery if significant bleeding or  labor. If remains stable plan delivery by C/S at 36 weeks as previously scheduled. Her  will be returning home late Wednesday.  Inpatient management until delivery.    Dory Morrison MD  Date of Service (when I saw the patient): 19

## 2019-04-07 NOTE — PLAN OF CARE
Data:Maternal status stable. VSS this shift, pt afebrile. Fetal assessment is appropriate for gestational age (see flowsheet). [place] reveals contractions every 2.5-8 minutes, patient denies feeling contractions but contractions palpate mild to moderate. Provider paged and desires a fluid bolus to be administered. Nifedipine administered q6 hours. Pt denies vaginal bleeding or difficulty breathing. Pt reports active fetal movement. Fasting BG was 88.  Action: Continue with plan of care, which is light activity, TID monitoring, Q 6 hour vitals. Patient encouraged to move extremities while in bed.   Response: Patient coping well, very pleasant tonight. Will continue to monitor. Report give to RANDOLPH Malone, who will initiate IV fluid bolus.

## 2019-04-08 ENCOUNTER — HOSPITAL ENCOUNTER (INPATIENT)
Dept: ULTRASOUND IMAGING | Facility: CLINIC | Age: 44
End: 2019-04-08
Attending: OBSTETRICS & GYNECOLOGY
Payer: COMMERCIAL

## 2019-04-08 LAB
GLUCOSE BLDC GLUCOMTR-MCNC: 103 MG/DL (ref 70–99)
GLUCOSE BLDC GLUCOMTR-MCNC: 103 MG/DL (ref 70–99)
GLUCOSE BLDC GLUCOMTR-MCNC: 114 MG/DL (ref 70–99)
GLUCOSE BLDC GLUCOMTR-MCNC: 132 MG/DL (ref 70–99)
GLUCOSE BLDC GLUCOMTR-MCNC: 92 MG/DL (ref 70–99)
GLUCOSE BLDC GLUCOMTR-MCNC: 93 MG/DL (ref 70–99)
GLUCOSE BLDC GLUCOMTR-MCNC: 97 MG/DL (ref 70–99)

## 2019-04-08 PROCEDURE — 00000146 ZZHCL STATISTIC GLUCOSE BY METER IP

## 2019-04-08 PROCEDURE — 94640 AIRWAY INHALATION TREATMENT: CPT | Mod: 76

## 2019-04-08 PROCEDURE — 25000125 ZZHC RX 250

## 2019-04-08 PROCEDURE — 25000125 ZZHC RX 250: Performed by: STUDENT IN AN ORGANIZED HEALTH CARE EDUCATION/TRAINING PROGRAM

## 2019-04-08 PROCEDURE — 25000132 ZZH RX MED GY IP 250 OP 250 PS 637: Performed by: STUDENT IN AN ORGANIZED HEALTH CARE EDUCATION/TRAINING PROGRAM

## 2019-04-08 PROCEDURE — 40000275 ZZH STATISTIC RCP TIME EA 10 MIN

## 2019-04-08 PROCEDURE — 12000001 ZZH R&B MED SURG/OB UMMC

## 2019-04-08 PROCEDURE — 94799 UNLISTED PULMONARY SVC/PX: CPT

## 2019-04-08 PROCEDURE — 76819 FETAL BIOPHYS PROFIL W/O NST: CPT

## 2019-04-08 PROCEDURE — 94640 AIRWAY INHALATION TREATMENT: CPT

## 2019-04-08 RX ADMIN — NIFEDIPINE 20 MG: 20 CAPSULE, LIQUID FILLED ORAL at 18:14

## 2019-04-08 RX ADMIN — ACETYLCYSTEINE 2 ML: 200 SOLUTION ORAL; RESPIRATORY (INHALATION) at 08:58

## 2019-04-08 RX ADMIN — ALBUTEROL SULFATE 2.5 MG: 2.5 SOLUTION RESPIRATORY (INHALATION) at 20:32

## 2019-04-08 RX ADMIN — NIFEDIPINE 20 MG: 20 CAPSULE, LIQUID FILLED ORAL at 12:10

## 2019-04-08 RX ADMIN — SENNOSIDES AND DOCUSATE SODIUM 3 TABLET: 8.6; 5 TABLET ORAL at 21:51

## 2019-04-08 RX ADMIN — AZITHROMYCIN 250 MG: 250 TABLET, FILM COATED ORAL at 09:27

## 2019-04-08 RX ADMIN — NIFEDIPINE 20 MG: 20 CAPSULE, LIQUID FILLED ORAL at 05:55

## 2019-04-08 RX ADMIN — ACETYLCYSTEINE 2 ML: 200 SOLUTION ORAL; RESPIRATORY (INHALATION) at 15:38

## 2019-04-08 RX ADMIN — ALBUTEROL SULFATE 2.5 MG: 2.5 SOLUTION RESPIRATORY (INHALATION) at 15:38

## 2019-04-08 RX ADMIN — ALBUTEROL SULFATE 2.5 MG: 2.5 SOLUTION RESPIRATORY (INHALATION) at 08:58

## 2019-04-08 RX ADMIN — PRENATAL VIT W/ FE FUMARATE-FA TAB 27-0.8 MG 1 TABLET: 27-0.8 TAB at 09:27

## 2019-04-08 RX ADMIN — ACETYLCYSTEINE 2 ML: 200 SOLUTION ORAL; RESPIRATORY (INHALATION) at 20:32

## 2019-04-08 RX ADMIN — PANTOPRAZOLE SODIUM 40 MG: 40 TABLET, DELAYED RELEASE ORAL at 09:27

## 2019-04-08 NOTE — DISCHARGE SUMMARY
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 She had an episode of spotting around 10pm and passed some smaller clots. Also had cramping around that time. No LOF, good fetal movement. On admission, denied dizziness, lightheadedness. SOB is stable, not worsening. No fevers, chills, abdominal pain.     During her admission, she was given betamethasone and was started on nifedipine for tocolysis until 34w1d (). Her cramping and contractions decreased and remained stable until her delivery on . She remained inpatient until delivery with weekly BPPs. Her chronic lung disease was managed with albuterol nebs preoperatively without complication. Her pregnancy was also complicated by GDMA1, and BGs remained well controlled throughout her admission.    Delivery was notable for previously undiagnosed focal accreta of the right posterior uterus.  EBL was 1100mL and required a Bakri balloon placement, removed the next when patient demonstrated hemodynamic stability.  Please see her admission H&P and Delivery Operative Note for full details regarding her admission and delivery.    Her  postoperative course was complicated by postpartum hemorrhage and acute on chronic respiratory distress. On POD#2, Bakri balloon was removed and she showed no further signs of uterine bleeding. On POD#2, so also was able to resume her vest treatments and maintain O2 sats in mid- to high-90s on room air. On POD#4, she was meeting all of her postpartum goals and deemed stable for discharge. She was voiding without difficulty, tolerating a regular diet without nausea and vomiting, her pain was well controlled on oral pain medicines and her lochia was appropriate. Her hemoglobin prior to delivery was 11.7 and after delivery was 10.3 and stable. Her Rh status was positive and RHOgam was not indicated. At the time of discharge, she was pumping for her infant in the NICU and declined contraception.    Discharge/Disposition:  Armando Dc was discharged to home in stable condition with the following instructions/medications:  1) Call for temperature > 100.4, foul smelling vaginal discharge, bleeding > 1 pad per hour x 2 hrs, pain not controlled by oral pain meds, severe constipation or severe nausea or vomiting.  2) She declined contraception. She has primary tubal infertility.  3) She was instructed to follow-up with her primary OB in 6 weeks for a routine postpartum visit.  4) She was instructed to continue her PNV on discharge if she wished to breast feed her infant.  5) She was discharged home with the following medications:     Review of your medicines      UNREVIEWED medicines. Ask your doctor about these medicines      Dose / Directions   Medication Disposal Systems Misc  Used for:  Glucose intolerance of pregnancy  Ask about: Should I take this medication?      Dose:  1 Container  1 Container 4 times daily for 2 doses  Quantity:  1 Container  Refills:  1        START taking      Dose / Directions   acetaminophen 325 MG tablet  Commonly known as:  TYLENOL      Dose:  650 mg  Take 2 tablets (650 mg) by mouth every 6  hours as needed for mild pain  Quantity:  60 tablet  Refills:  0     ibuprofen 600 MG tablet  Commonly known as:  ADVIL/MOTRIN      Dose:  600 mg  Take 1 tablet (600 mg) by mouth every 6 hours as needed for moderate pain  Quantity:  60 tablet  Refills:  0     oxyCODONE 5 MG tablet  Commonly known as:  ROXICODONE      Dose:  5-10 mg  Take 1-2 tablets (5-10 mg) by mouth every 3 hours as needed for moderate to severe pain  Quantity:  12 tablet  Refills:  0     senna-docusate 8.6-50 MG tablet  Commonly known as:  SENOKOT-S/PERICOLACE      Dose:  1 tablet  Take 1 tablet by mouth 2 times daily as needed for constipation  Quantity:  60 tablet  Refills:  0        CONTINUE these medicines which have NOT CHANGED      Dose / Directions   acetylcysteine 20 % neb solution  Commonly known as:  MUCOMYST  Used for:  Kartagener's syndrome, Situs inversus, Pseudomonas aeruginosa colonization      Dose:  2 mL  Inhale 2 mLs into the lungs 2 times daily  Quantity:  120 mL  Refills:  5     * albuterol 108 (90 Base) MCG/ACT inhaler  Commonly known as:  PROAIR HFA/PROVENTIL HFA/VENTOLIN HFA  Used for:  Kartagener syndrome      Dose:  2 puff  Inhale 2 puffs into the lungs every 6 hours as needed for shortness of breath / dyspnea or wheezing  Quantity:  1 Inhaler  Refills:  12     * albuterol (2.5 MG/3ML) 0.083% neb solution  Commonly known as:  PROVENTIL  Used for:  Bronchiectasis without acute exacerbation (H)      NEBULIZE AND INHALE THE CONTENTS OF ONE VIAL (3ML) TWO TIMES A DAY  Quantity:  180 mL  Refills:  11     azithromycin 250 MG tablet  Commonly known as:  ZITHROMAX  Used for:  Bronchiectasis without complication (H)      Dose:  250 mg  Take 1 tablet (250 mg) by mouth daily  Quantity:  10 tablet  Refills:  0     blood glucose monitoring meter device kit  Commonly known as:  NO BRAND SPECIFIED  Used for:  Glucose intolerance of pregnancy      Use to test blood sugar 4 times daily or as directed. Fasting and then 1 hour after  "breakfast, lunch, and dinner  Quantity:  1 kit  Refills:  0     blood glucose test strip  Commonly known as:  NO BRAND SPECIFIED  Used for:  Glucose intolerance of pregnancy      Use to test blood sugar 4 times daily or as directed.  Quantity:  200 strip  Refills:  3     docusate sodium 100 MG capsule  Commonly known as:  COLACE      Dose:  100 mg  Take 100 mg by mouth At Bedtime  Refills:  0     fluticasone-salmeterol 100-50 MCG/DOSE inhaler  Commonly known as:  ADVAIR  Used for:  Bronchiectasis with acute exacerbation (H)      Dose:  1 puff  Inhale 1 puff into the lungs 2 times daily  Quantity:  1 Inhaler  Refills:  11     nebulizer Sindy      Nebulizer compressor - use with ALEK as directed  Refills:  0     pantoprazole 40 MG EC tablet  Commonly known as:  PROTONIX  Used for:  Gastroesophageal reflux disease without esophagitis      Dose:  40 mg  Take 1 tablet (40 mg) by mouth daily  Quantity:  90 tablet  Refills:  3     PRENATAL 1 PO      Dose:  1 tablet  Take 1 tablet by mouth every evening  Refills:  0     Syringe Luer Lock 20G X 1-1/2\" 5 ML Misc  Used for:  Kartagener syndrome, Reactive airway disease      Dose:  1 each  1 each 2 times daily  Quantity:  60 each  Refills:  5     VITAMIN D PO      Refills:  0     Water For Injection Sterile Soln  Used for:  Kartagener syndrome, Reactive airway disease      Dose:  4 mL  Inject 4 mLs into the vein 2 times daily  Quantity:  250 mL  Refills:  5         * This list has 2 medication(s) that are the same as other medications prescribed for you. Read the directions carefully, and ask your doctor or other care provider to review them with you.               Where to get your medicines      These medications were sent to [facility] [place], [place] - [address]  [address] [address]    Phone:  [phone]     acetaminophen 325 MG tablet    ibuprofen 600 MG tablet    senna-docusate 8.6-50 MG tablet     Some of these will need " a paper prescription and others can be bought over the counter. Ask your nurse if you have questions.    Bring a paper prescription for each of these medications    oxyCODONE 5 MG tablet       Adore Painting MD  OB/GYN Resident, PGY-3  4/29/2019       Aislinn Brower MD  4/29/2019

## 2019-04-08 NOTE — PLAN OF CARE
VSS. Afebrile. Denies LOF & pain. Spec of blood on toiler paper with wipe earlier this evening. Denies ctx. EFM appropriate for gestational age. Support person at bedside. Denies any further needs at this time. Continue with plan of care.

## 2019-04-08 NOTE — PLAN OF CARE
"Pt feels well. Feels \"caught up\" on sleep after ltd sleep NOC Friday night. No bleeding or spotting, VSS, feels well after neb treatments. Plan for monitoring after lunch, enc amb in hallway. Eager for  to return and will be here Thursday, the day she plans to deliver. Pt moved to 420 per her request. Mother at bedside providing supportive cares.  "

## 2019-04-08 NOTE — PROGRESS NOTES
Antepartum progress note    Patient name: Armando Dc  MRN: [id number]  : 1975    S: Armando feels well this morning. She continued to have some contractions overnight, but does not feel them and they have been less frequent.  She denies shortness of breath, continues to have some sputum production. Doing well with her nebulizer treatments. No more bleeding. Normal fetal movement     O:   Vitals:    19 1200 19 1813 19 2335 19 0550   BP: 121/70 116/68 102/58 99/59   Pulse:       Resp: 18 18 18 16   Temp: 97.8  F (36.6  C) 97.7  F (36.5  C) 97.5  F (36.4  C) 97.7  F (36.5  C)   TempSrc: Oral Oral Oral Oral   SpO2:         Gen: comfortable appearing, no distress  Abd: soft, nontender. Gravid.   Extremities: no LE edema    FHT: Baseline 130 Bpm, moderate variability, + accels, - decels  [place]: ctx q 6-8 min    A/P: 43 year old  at 33w4d  with placenta previa and third bleed on 19. HD#3    Posterior placenta previa, with third bleed  - Abruption labs obtained, wnl, Kb neg  - patient declined SSE on exam, informed patient that this will be important to perform during admission, especially given contractions, patient amenable if needed  - No bleeding since   - s/p BMZ - , will continue Nifedipine for tocolysis until  arrives at 34 wks. Understands this is not evidence based and will likely not significantly prolong pregnancy, but may decrease symptomatic contractions.  - Serial ultrasounds to assess placental location, last 19 - transabdominal  - Delivery at 36 weeks by  section; scheduled on  with Dr. Shipley. Discussed possibility of earlier delivery if in  labor, or if worsening bleeding. CS consent signed.      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   - Stable disease  - Recent +sputum culture for pseudomonas/haemophils, on day #7/10 of azithromycin. No evidence of pneumonia.   - Continue three  times daily bronchial drainage therapy and nebulization treatment as recommended per pulmonology.,   - Close monitoring of oxygen saturations, start O2 supplemental therapy in pregnancy if needed to maintain oxygen saturations > 95%    - Consider delivery if pulmonary status worsens and it appears that it could be improved by delivery  - Follows with pulmonology. Last PFTs 2018. Inpatient consult if worsening status.   - Anesthesiology consultation completed 3/28      Mild polyhydramnios  - Normal NILDA 19.  -Continue weekly BPP (next scheduled on 4/15/19)     GDMA1  - Fasting and postprandial BG  - Continue fasting and 1 hour PP glucose check  - MSSI discontinued as blood sugars are normalizing     Bilateral pyelectasis - UTD A1   -resolved, no further follow up      GERD  - On Protonix with good relief     IVF pregnancy  - s/p normal fetal echo  - On ASA       Dupuyren's tendonitis  - Continue splint and consider injection if worsening     Constipation  Senna. Add Miralax if needed.     PNC  Rh pos, Ab neg, RI  Low risk first trimester screen  S/p influenza, Tdap    currently scheduled on  with Dr. Shipley, prefers staff only      PPx: SCDs given high risk of bleeding    Patient seen, discussed with Dr. Tamiko Naranjo MD   PGY-3 Ob/Gyn    Physician Attestation   I, Dory Morrison, saw this patient with the resident and agree with the resident/fellow's findings and plan of care as documented in the note.      I personally reviewed vital signs, medications, labs and imaging.    Key findings: No longer feeling contractions. Dark brown spotting yesterday, but no bleeding. Discussed plan of care. Continue inpatient management until delivery. Delivery by  section at 36 weeks gestation, or earlier if on-going bleeding or labor. Continue Nifedipine tocolysis until 34 weeks gestation, at which time her  will have returned from [place].    Dory Morrison MD  Date of  Service (when I saw the patient): 04/08/19

## 2019-04-08 NOTE — PLAN OF CARE
VSS, afebrile. Patient denies feeling contractions, leaking of fluid, or vaginal bleeding. +FM. See flow record for EFM details. Blood sugars stable, no corrections necessary this shift. IVs both flush easily and lack s/s of infiltration. Patient states she slept well, denies needs. Continue current cares.

## 2019-04-09 LAB
ABO + RH BLD: NORMAL
ABO + RH BLD: NORMAL
BLD GP AB SCN SERPL QL: NORMAL
BLOOD BANK CMNT PATIENT-IMP: NORMAL
GLUCOSE BLDC GLUCOMTR-MCNC: 95 MG/DL (ref 70–99)
GLUCOSE BLDC GLUCOMTR-MCNC: 97 MG/DL (ref 70–99)
GLUCOSE BLDC GLUCOMTR-MCNC: 98 MG/DL (ref 70–99)
SPECIMEN EXP DATE BLD: NORMAL

## 2019-04-09 PROCEDURE — 25000125 ZZHC RX 250: Performed by: STUDENT IN AN ORGANIZED HEALTH CARE EDUCATION/TRAINING PROGRAM

## 2019-04-09 PROCEDURE — 86901 BLOOD TYPING SEROLOGIC RH(D): CPT | Performed by: STUDENT IN AN ORGANIZED HEALTH CARE EDUCATION/TRAINING PROGRAM

## 2019-04-09 PROCEDURE — 00000146 ZZHCL STATISTIC GLUCOSE BY METER IP

## 2019-04-09 PROCEDURE — 40000275 ZZH STATISTIC RCP TIME EA 10 MIN

## 2019-04-09 PROCEDURE — 86850 RBC ANTIBODY SCREEN: CPT | Performed by: STUDENT IN AN ORGANIZED HEALTH CARE EDUCATION/TRAINING PROGRAM

## 2019-04-09 PROCEDURE — 86900 BLOOD TYPING SEROLOGIC ABO: CPT | Performed by: STUDENT IN AN ORGANIZED HEALTH CARE EDUCATION/TRAINING PROGRAM

## 2019-04-09 PROCEDURE — 25000132 ZZH RX MED GY IP 250 OP 250 PS 637: Performed by: STUDENT IN AN ORGANIZED HEALTH CARE EDUCATION/TRAINING PROGRAM

## 2019-04-09 PROCEDURE — 94640 AIRWAY INHALATION TREATMENT: CPT

## 2019-04-09 PROCEDURE — 12000001 ZZH R&B MED SURG/OB UMMC

## 2019-04-09 PROCEDURE — 36415 COLL VENOUS BLD VENIPUNCTURE: CPT | Performed by: STUDENT IN AN ORGANIZED HEALTH CARE EDUCATION/TRAINING PROGRAM

## 2019-04-09 PROCEDURE — 25000125 ZZHC RX 250

## 2019-04-09 PROCEDURE — 94640 AIRWAY INHALATION TREATMENT: CPT | Mod: 76

## 2019-04-09 RX ADMIN — PANTOPRAZOLE SODIUM 40 MG: 40 TABLET, DELAYED RELEASE ORAL at 08:23

## 2019-04-09 RX ADMIN — ALBUTEROL SULFATE 2.5 MG: 2.5 SOLUTION RESPIRATORY (INHALATION) at 08:00

## 2019-04-09 RX ADMIN — ACETYLCYSTEINE 2 ML: 200 SOLUTION ORAL; RESPIRATORY (INHALATION) at 16:26

## 2019-04-09 RX ADMIN — SENNOSIDES AND DOCUSATE SODIUM 3 TABLET: 8.6; 5 TABLET ORAL at 22:27

## 2019-04-09 RX ADMIN — NIFEDIPINE 20 MG: 20 CAPSULE, LIQUID FILLED ORAL at 18:24

## 2019-04-09 RX ADMIN — AZITHROMYCIN 250 MG: 250 TABLET, FILM COATED ORAL at 08:23

## 2019-04-09 RX ADMIN — NIFEDIPINE 20 MG: 20 CAPSULE, LIQUID FILLED ORAL at 12:07

## 2019-04-09 RX ADMIN — NIFEDIPINE 20 MG: 20 CAPSULE, LIQUID FILLED ORAL at 00:06

## 2019-04-09 RX ADMIN — ACETYLCYSTEINE 2 ML: 200 SOLUTION ORAL; RESPIRATORY (INHALATION) at 20:52

## 2019-04-09 RX ADMIN — ACETYLCYSTEINE 2 ML: 200 SOLUTION ORAL; RESPIRATORY (INHALATION) at 08:00

## 2019-04-09 RX ADMIN — ALBUTEROL SULFATE 2.5 MG: 2.5 SOLUTION RESPIRATORY (INHALATION) at 16:26

## 2019-04-09 RX ADMIN — PRENATAL VIT W/ FE FUMARATE-FA TAB 27-0.8 MG 1 TABLET: 27-0.8 TAB at 08:23

## 2019-04-09 RX ADMIN — NIFEDIPINE 20 MG: 20 CAPSULE, LIQUID FILLED ORAL at 06:06

## 2019-04-09 RX ADMIN — ALBUTEROL SULFATE 2.5 MG: 2.5 SOLUTION RESPIRATORY (INHALATION) at 20:52

## 2019-04-09 NOTE — PROGRESS NOTES
[facility]  MATERNAL CHILD HEALTH   SOCIAL WORK PROGRESS NOTE      DATA:   Met with patient yesterday afternoon to assess needs and to offer support.      Patient is Armando Dc.  She is 44 years-old and  to her , Adenike Jones.   Armando did not offer details but her prenatal record reflects history of an early pregnancy loss.  This pregnancy was achieved with IVF and is their first baby.    Armando knows they are expecting a baby boy but have not yet chosen a name.  Uma live in an apartment in [place].    Uma are originally from [place] [place].  Their extended family all resides there.  Adenike's father was recently diagnosed with late-stage pancreatic cancer.  Adenike went back home to spend time with his father but is now returning to [place] to be with Armando and, with hope, to be present for the birth.  Armando's parents are here now and will stay through June to be of help and support to Armando and Adenike.    Armando and Adenike came to [place] in 2015 to pursue employment opportunities.      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  This pregnancy has been riddled with complications and Armando has been home on bedrest since 21 weeks.   She is receiving short-term disability benefits.  Her plan is to take at least an 8 week maternity leave after baby's birth.   Adenike is employed and does commercial architecture.   Armando anticipates he will take 1-2 weeks of time off related to baby's birth.      The family has [employer] insurance and baby will be added to this upon birth.  Parents have not yet chosen a PCP for baby's care.  [facility]   Armando has an infant car seat and bassinette.  She will purchase other baby items before baby is discharged to home.     INTERVENTION:   Psychosocial assessment completed.   Provided supportive counseling related to Armando's complicated  pregnancy and baby's anticipated NICU admission.   Provided education about pregnancy and postpartum mood and anxiety disorders. Shared information about [facility] and [facility] [place].     ASSESSMENT:   Armando is pleasant and is very easy to engage. She acknowledges feeling stressed but bambi by taking things one moment at a time.  She is resilient in the midst of the stressors.       PLAN:   SW will continue to follow along throughout Armando's pregnancy journey for needs and for support.

## 2019-04-09 NOTE — PROVIDER NOTIFICATION
04/09/19 0703   Provider Notification   Provider Name/Title Dr. Galvan   Method of Notification Electronic Page   Request Evaluate - Remote   Notification Reason Uterine Activity   FYI page to G3 about uterine activity. Pt. Not feeling them. No new orders. Monitors removed. Pt. To call if starts to feel contractions. Continue to monitor.

## 2019-04-09 NOTE — PROGRESS NOTES
Antepartum progress note    Patient name: Armando Dc  MRN: [id number]  : 1975    S: Armando feels well this morning. She did feel some tightening during a few contractions this morning but contractions are occurring much less frequently than they had been.  She denies shortness of breath. Doing well with her nebulizer treatments. No more bleeding. Normal fetal movement.     O:   Vitals:    19 2000 19 0004 19 0006 19 0606   BP: 112/68 112/61 112/61 106/59   Pulse: 90      Resp: 16 16  16   Temp: 97.5  F (36.4  C) 98  F (36.7  C)  97.9  F (36.6  C)   TempSrc: Oral Oral  Oral   SpO2:       Weight:    67.2 kg (148 lb 1.6 oz)     Gen: comfortable appearing, no distress  Abd: soft, nontender. Gravid.   Extremities: no LE edema    FHT: Baseline 140 Bpm, moderate variability, + accels, - decels  [place]: ctx q 8 min    A/P: 43 year old  at 33w5d  with placenta previa and third bleed on 19. HD#4    Posterior placenta previa, with third bleed  - Abruption labs obtained, wnl, Kb neg  - patient declined SSE on exam, informed patient that this will be important to perform during admission, especially given contractions, patient amenable if needed  - No bleeding since   - s/p BMZ - , will continue Nifedipine for tocolysis until  arrives at 34 wks.  - Serial ultrasounds to assess placental location, last 19 - transabdominal  - Delivery at 36 weeks by  section; scheduled on  with Dr. Shipley. Discussed possibility of earlier delivery if in  labor, or if worsening bleeding. CS consent signed.      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   - Stable disease  - Recent +sputum culture for pseudomonas/haemophils, on day #710 of azithromycin. No evidence of pneumonia.   - Continue three times daily bronchial drainage therapy and nebulization treatment as recommended per pulmonology.,   - Close monitoring  of oxygen saturations, start O2 supplemental therapy in pregnancy if needed to maintain oxygen saturations > 95%    - Consider delivery if pulmonary status worsens and it appears that it could be improved by delivery  - Follows with pulmonology. Last PFTs 2018. Inpatient consult if worsening status.   - Anesthesiology consultation completed 3/28      Mild polyhydramnios  - Normal NILDA 19.  - Continue weekly BPP (next scheduled on 4/15/19)     GDMA1  - Fasting and postprandial BG  - Continue fasting and 1 hour PP glucose check     Bilateral pyelectasis - UTD A1   -resolved, no further follow up      GERD  - On Protonix with good relief     IVF pregnancy  - s/p normal fetal echo  - On ASA       Dupuyren's tendonitis  - Continue splint and consider injection if worsening     Constipation  Senna. Add Miralax if needed.     PNC  Rh pos, Ab neg, RI  Low risk first trimester screen  S/p influenza, Tdap    currently scheduled on  with Dr. Shipley, prefers staff only      PPx: SCDs given high risk of bleeding    Patient seen, discussed with Dr. Tamiko Naranjo MD   PGY-3 Ob/Gyn    Physician Attestation   I, Dory Morrison, saw this patient with the resident and agree with the resident/fellow's findings and plan of care as documented in the note.      I personally reviewed vital signs, medications, labs and imaging.    Key findings: No bleeding. Feeling contractions only as tightening.  flies in from [place] on Thursday, but Asma is concerned he will be delayed due to the predicted snowstorm. We will continue inpatient management until delivery. Delivery at 36 weeks gestation, or earlier if  labor or significant bleeding.    Dory Morrison MD  Date of Service (when I saw the patient): 19

## 2019-04-09 NOTE — PLAN OF CARE
VSS. Pt. States she slept well during the night. Denies contractions, cramping, LOF, bleeding or pain. See flowsheet for monitoring. +FM. Pt. Educated when to call for RN. Verbalizes understanding. Continue plan of care.

## 2019-04-09 NOTE — PROGRESS NOTES
Visited with pt on the basis of spiritual support for the pt. Reflected with pt around her hospital experience, sources of spiritual and emotional support and current spiritual health needs. Pt talked about her current situation and what it means for her and her family. During my visits, pt was laying down on her bed. No one was with her in the room. Her  is in [place]. During my conversation with her, she expresses her fear and anxiety. She also, expresses her worry about the surgery. During my conversation with her, I let her know that I could be support to her and her family during her hospitalization. And I would be able to coordinate and participate as a spiritual supporter for both her and her family.    Emotional support. Reflective conversation integrating illness elements and family spiritual narratives.  I shared reading and conversation that would invite God into the room and to bless her, support her.. I provided a special prayer for her.  Gave Islamic Prayer Booklet and several Prayer Bookmarks.  Introduced spiritual Health Services that the hospital offers. I also, introduced myself as Uatsdin  in the hospital    Pt received spiritual support and reflective conversation in the context of this hospitalization. She appreciated for the visit and my words. She requests on going Uatsdin  support     Will continue to provide support to pt/family during their hospitalization at least 1x/wk.

## 2019-04-10 LAB
GLUCOSE BLDC GLUCOMTR-MCNC: 109 MG/DL (ref 70–99)
GLUCOSE BLDC GLUCOMTR-MCNC: 80 MG/DL (ref 70–99)
GLUCOSE BLDC GLUCOMTR-MCNC: 83 MG/DL (ref 70–99)
GLUCOSE BLDC GLUCOMTR-MCNC: 92 MG/DL (ref 70–99)

## 2019-04-10 PROCEDURE — 25000125 ZZHC RX 250: Performed by: STUDENT IN AN ORGANIZED HEALTH CARE EDUCATION/TRAINING PROGRAM

## 2019-04-10 PROCEDURE — 25000125 ZZHC RX 250

## 2019-04-10 PROCEDURE — 12000001 ZZH R&B MED SURG/OB UMMC

## 2019-04-10 PROCEDURE — 25000132 ZZH RX MED GY IP 250 OP 250 PS 637: Performed by: STUDENT IN AN ORGANIZED HEALTH CARE EDUCATION/TRAINING PROGRAM

## 2019-04-10 PROCEDURE — 00000146 ZZHCL STATISTIC GLUCOSE BY METER IP

## 2019-04-10 PROCEDURE — 94640 AIRWAY INHALATION TREATMENT: CPT

## 2019-04-10 RX ADMIN — ALBUTEROL SULFATE 2.5 MG: 2.5 SOLUTION RESPIRATORY (INHALATION) at 07:30

## 2019-04-10 RX ADMIN — ACETYLCYSTEINE 2 ML: 200 SOLUTION ORAL; RESPIRATORY (INHALATION) at 19:30

## 2019-04-10 RX ADMIN — NIFEDIPINE 20 MG: 20 CAPSULE, LIQUID FILLED ORAL at 23:58

## 2019-04-10 RX ADMIN — PRENATAL VIT W/ FE FUMARATE-FA TAB 27-0.8 MG 1 TABLET: 27-0.8 TAB at 07:56

## 2019-04-10 RX ADMIN — ALBUTEROL SULFATE 2.5 MG: 2.5 SOLUTION RESPIRATORY (INHALATION) at 15:12

## 2019-04-10 RX ADMIN — PANTOPRAZOLE SODIUM 40 MG: 40 TABLET, DELAYED RELEASE ORAL at 07:56

## 2019-04-10 RX ADMIN — NIFEDIPINE 20 MG: 20 CAPSULE, LIQUID FILLED ORAL at 06:05

## 2019-04-10 RX ADMIN — ACETYLCYSTEINE 2 ML: 200 SOLUTION ORAL; RESPIRATORY (INHALATION) at 07:31

## 2019-04-10 RX ADMIN — SENNOSIDES AND DOCUSATE SODIUM 3 TABLET: 8.6; 5 TABLET ORAL at 21:37

## 2019-04-10 RX ADMIN — NIFEDIPINE 20 MG: 20 CAPSULE, LIQUID FILLED ORAL at 12:29

## 2019-04-10 RX ADMIN — NIFEDIPINE 20 MG: 20 CAPSULE, LIQUID FILLED ORAL at 18:22

## 2019-04-10 RX ADMIN — ACETYLCYSTEINE 2 ML: 200 SOLUTION ORAL; RESPIRATORY (INHALATION) at 15:13

## 2019-04-10 RX ADMIN — NIFEDIPINE 20 MG: 20 CAPSULE, LIQUID FILLED ORAL at 00:02

## 2019-04-10 RX ADMIN — ALBUTEROL SULFATE 2.5 MG: 2.5 SOLUTION RESPIRATORY (INHALATION) at 19:30

## 2019-04-10 RX ADMIN — AZITHROMYCIN 250 MG: 250 TABLET, FILM COATED ORAL at 09:33
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
## 2019-04-10 NOTE — PLAN OF CARE
Data: Today is hospital day two. Maternal status stable. Fetal assessment is normal  Action: Continue with plan of care, which is TID as ordered. Patient encouraged to move extremities while in bed.  Response: Patient coping with plan of care with family support.

## 2019-04-10 NOTE — PLAN OF CARE
"VSS. FHR appropriate for gestation. Pt feeling intermittent \"tightening.\" 1x contraction on [place]. Pt denies bleeding or LOF this shift. Pt had 1x episode of intense coughing. RN at bedside. Pt coughed up large amount of white phlegm. Pt getting frustrated waiting for Nebs from RN and requesting to do them independently. Would like to discuss this at morning rounds. Mother at bedside and supportive. Continue plan of care.   "

## 2019-04-11 LAB
GLUCOSE BLDC GLUCOMTR-MCNC: 122 MG/DL (ref 70–99)
GLUCOSE BLDC GLUCOMTR-MCNC: 81 MG/DL (ref 70–99)
GLUCOSE BLDC GLUCOMTR-MCNC: 90 MG/DL (ref 70–99)
GLUCOSE BLDC GLUCOMTR-MCNC: 91 MG/DL (ref 70–99)

## 2019-04-11 PROCEDURE — 00000146 ZZHCL STATISTIC GLUCOSE BY METER IP

## 2019-04-11 PROCEDURE — 12000001 ZZH R&B MED SURG/OB UMMC

## 2019-04-11 PROCEDURE — 25000132 ZZH RX MED GY IP 250 OP 250 PS 637: Performed by: STUDENT IN AN ORGANIZED HEALTH CARE EDUCATION/TRAINING PROGRAM

## 2019-04-11 PROCEDURE — 25000125 ZZHC RX 250: Performed by: STUDENT IN AN ORGANIZED HEALTH CARE EDUCATION/TRAINING PROGRAM

## 2019-04-11 RX ADMIN — PANTOPRAZOLE SODIUM 40 MG: 40 TABLET, DELAYED RELEASE ORAL at 08:19

## 2019-04-11 RX ADMIN — ACETYLCYSTEINE 2 ML: 200 SOLUTION ORAL; RESPIRATORY (INHALATION) at 06:25

## 2019-04-11 RX ADMIN — ALBUTEROL SULFATE 2.5 MG: 2.5 SOLUTION RESPIRATORY (INHALATION) at 15:16

## 2019-04-11 RX ADMIN — ACETYLCYSTEINE 2 ML: 200 SOLUTION ORAL; RESPIRATORY (INHALATION) at 15:19

## 2019-04-11 RX ADMIN — ALBUTEROL SULFATE 2.5 MG: 2.5 SOLUTION RESPIRATORY (INHALATION) at 20:00

## 2019-04-11 RX ADMIN — NIFEDIPINE 20 MG: 20 CAPSULE, LIQUID FILLED ORAL at 05:48

## 2019-04-11 RX ADMIN — NIFEDIPINE 20 MG: 20 CAPSULE, LIQUID FILLED ORAL at 17:58

## 2019-04-11 RX ADMIN — ACETYLCYSTEINE 2 ML: 200 SOLUTION ORAL; RESPIRATORY (INHALATION) at 20:00

## 2019-04-11 RX ADMIN — SENNOSIDES AND DOCUSATE SODIUM 3 TABLET: 8.6; 5 TABLET ORAL at 21:23

## 2019-04-11 RX ADMIN — PRENATAL VIT W/ FE FUMARATE-FA TAB 27-0.8 MG 1 TABLET: 27-0.8 TAB at 08:19

## 2019-04-11 RX ADMIN — ALBUTEROL SULFATE 2.5 MG: 2.5 SOLUTION RESPIRATORY (INHALATION) at 06:25

## 2019-04-11 RX ADMIN — NIFEDIPINE 20 MG: 20 CAPSULE, LIQUID FILLED ORAL at 12:34

## 2019-04-11 NOTE — PROGRESS NOTES
Antepartum progress note    Patient name: Armando Dc  MRN: [id number]  : 1975    S: Armando feels well this morning. Still feels tightening with contractions but no pain. She denies shortness of breath, feels like she is at her baseline.  No more bleeding. Normal fetal movement. Her  is scheduled to arrive at around 1 pm.    O:   Vitals:    04/10/19 1226 04/10/19 1515 04/10/19 1600 04/10/19 2357   BP: 108/61 110/62  102/57   Pulse:       Resp: 16   16   Temp: 97.6  F (36.4  C)  98  F (36.7  C) 97.9  F (36.6  C)   TempSrc: Oral  Oral Oral   SpO2:       Weight:         Gen: comfortable appearing, no distress  Abd: soft, nontender. Gravid.   Extremities: no LE edema    FHT: Baseline 145 Bpm, moderate variability, + accels, - decels  [place]: ctx q2-9 min    A/P: 43 year old  at 34w0d with placenta previa and third bleed on 19. HD#6    Posterior placenta previa, with third bleed  - Abruption labs obtained, wnl, Kb neg  - patient declined SSE on exam, informed patient that this will be important to perform during admission, especially given contractions, patient amenable if needed  - No bleeding since   - s/p BMZ - , will continue Nifedipine for tocolysis until  arrives at 34 wks (so possibly discontinue later today).   - Delivery at 36 weeks by  section; scheduled on  with Dr. Shipley. Discussed possibility of earlier delivery if in  labor, or if worsening bleeding. CS consent signed.      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   - Stable disease. Dr. Hernández is her Pulmonologist and is aware of her admission.  - Recent +sputum culture for pseudomonas/haemophils, s/p 10d azithromycin. No evidence of pneumonia.   - Continue three times daily bronchial drainage therapy and nebulization treatment as recommended per pulmonology. Okay for pt to do these herself to facilitate keeping her on schedule.  - Close monitoring  of oxygen saturations, start O2 supplemental therapy in pregnancy if needed to maintain oxygen saturations > 95%    - Consider delivery if pulmonary status worsens and it appears that it could be improved by delivery  - Follows with pulmonology. Last PFTs 2018. Inpatient consult if worsening status.   - Anesthesiology consultation completed 3/28      Mild polyhydramnios  - Normal NILDA 19.  - Continue weekly BPP (next scheduled on 4/15/19)     GDMA1  - Fasting and postprandial BG  - Continue fasting and 1 hour PP glucose check     Bilateral pyelectasis - UTD A1   -resolved, no further follow up      GERD  - On Protonix with good relief     IVF pregnancy  - s/p normal fetal echo  - On ASA       Dupuyren's tendonitis  - Continue splint and consider injection if worsening     Constipation  Senna. Add Miralax if needed.     PNC  Rh pos, Ab neg, RI  Low risk first trimester screen  S/p influenza, Tdap    currently scheduled on  with Dr. Shipley, prefers staff only      PPx: SCDs given high risk of bleeding    Patient seen, discussed with Dr. Tamiko Naranjo MD   PGY-3 Ob/Gyn    Physician Attestation   I, Dory Morrison, saw this patient with the resident and agree with the resident/fellow's findings and plan of care as documented in the note.      I personally reviewed vital signs, medications, labs and imaging.    Key findings: Admitted following third bleed with known placental previa. No further bleeding since admission. Continues to have  contractions, but feels them only as tightening. She is happy to be performing her own respiratory treatments to allow her to keep on schedule. No new pulmonary concerns. Plan inpatient management until delivery. Discontinue nifedipine once her  arrives. Delivery scheduled at 36 weeks gestation, but may occur earlier if concern for  labor or significant bleeding.    Dory Morrison MD  Date of Service (when I saw the  patient): 04/11/19

## 2019-04-11 NOTE — PLAN OF CARE
Patient has had no bleeding this this shift.  VSS, offers no complaints, see flow sheet for FHR and contraction pattern.  Will continue to monitor and will notify provider if there is a change in status.

## 2019-04-11 NOTE — PLAN OF CARE
Stable. No bleeding or discharge. Continues to take nifedipine doses, plan is to discontinue nifedipine this evening. Pt is awaiting 's arrival to [place], has supportive mother at bedside. Self-administering scheduled nebulizer treatments. [place] during afternoon monitoring shows contractions q7-11 minutes which pt is unaware of. FHR consistently with normal baseline, mod variability, + accels, no decels.

## 2019-04-11 NOTE — PLAN OF CARE
VSS. Patient slept well overnight. Denies feeling any contractions, denies leaking of fluid or bleeding. EFM charted.

## 2019-04-11 NOTE — PROVIDER NOTIFICATION
04/11/19 2835   Provider Notification   Provider Name/Title Dr Morrison   Method of Notification In Department   Notification Reason Status Update   Discussed remaining nifedipine doses. Per Dr Morrison pt can take 1800 dose and then subsequent dose at midnight will be final dose and order will be discontinued. Also notified provider that O2 sats 94-95% with spot checks before and after neb treatments this afternoon, no new orders.

## 2019-04-12 LAB
GLUCOSE BLDC GLUCOMTR-MCNC: 122 MG/DL (ref 70–99)
GLUCOSE BLDC GLUCOMTR-MCNC: 83 MG/DL (ref 70–99)
GLUCOSE BLDC GLUCOMTR-MCNC: 84 MG/DL (ref 70–99)
GLUCOSE BLDC GLUCOMTR-MCNC: 89 MG/DL (ref 70–99)

## 2019-04-12 PROCEDURE — 25000132 ZZH RX MED GY IP 250 OP 250 PS 637: Performed by: OBSTETRICS & GYNECOLOGY

## 2019-04-12 PROCEDURE — 25000125 ZZHC RX 250: Performed by: STUDENT IN AN ORGANIZED HEALTH CARE EDUCATION/TRAINING PROGRAM

## 2019-04-12 PROCEDURE — 25000132 ZZH RX MED GY IP 250 OP 250 PS 637: Performed by: STUDENT IN AN ORGANIZED HEALTH CARE EDUCATION/TRAINING PROGRAM

## 2019-04-12 PROCEDURE — 12000001 ZZH R&B MED SURG/OB UMMC

## 2019-04-12 PROCEDURE — 00000146 ZZHCL STATISTIC GLUCOSE BY METER IP

## 2019-04-12 RX ADMIN — ALBUTEROL SULFATE 2.5 MG: 2.5 SOLUTION RESPIRATORY (INHALATION) at 15:35

## 2019-04-12 RX ADMIN — SENNOSIDES AND DOCUSATE SODIUM 3 TABLET: 8.6; 5 TABLET ORAL at 22:02

## 2019-04-12 RX ADMIN — ACETYLCYSTEINE 2 ML: 200 SOLUTION ORAL; RESPIRATORY (INHALATION) at 07:43

## 2019-04-12 RX ADMIN — ACETYLCYSTEINE 2 ML: 200 SOLUTION ORAL; RESPIRATORY (INHALATION) at 15:36

## 2019-04-12 RX ADMIN — ALBUTEROL SULFATE 2.5 MG: 2.5 SOLUTION RESPIRATORY (INHALATION) at 20:59

## 2019-04-12 RX ADMIN — PRENATAL VIT W/ FE FUMARATE-FA TAB 27-0.8 MG 1 TABLET: 27-0.8 TAB at 08:11

## 2019-04-12 RX ADMIN — PANTOPRAZOLE SODIUM 40 MG: 40 TABLET, DELAYED RELEASE ORAL at 08:11

## 2019-04-12 RX ADMIN — ACETYLCYSTEINE 2 ML: 200 SOLUTION ORAL; RESPIRATORY (INHALATION) at 20:59

## 2019-04-12 RX ADMIN — ALBUTEROL SULFATE 2.5 MG: 2.5 SOLUTION RESPIRATORY (INHALATION) at 07:43

## 2019-04-12 RX ADMIN — NIFEDIPINE 20 MG: 20 CAPSULE, LIQUID FILLED ORAL at 00:02

## 2019-04-12 NOTE — PLAN OF CARE
VSS.  FHT- reactive (see flow sheets).  Having mild-moderate contractions every 2-7 minutes.  Will extend monitoring to further assess contractions.  Patient denies feeling discomfort from contractions.  Denies bleeding.

## 2019-04-12 NOTE — PROGRESS NOTES
Antepartum progress note    Patient name: Armando Dc  MRN: [id number]  : 1975    S: Armando feels well this morning. Feels intermittent tightening with contractions and occasional menstrual cramps. She denies shortness of breath, feels like she is at her baseline.  No bleeding since day of admission. Normal fetal movement. Her  arrived last evening.    O:   Vitals:    19 1711 19 1758 19 0000 19 0602   BP: 109/53 113/67 106/59 104/63   Pulse:  89     Resp: 16 18 18 18   Temp: 97.8  F (36.6  C)  98.1  F (36.7  C) 97.9  F (36.6  C)   TempSrc: Oral  Oral Oral   SpO2: 95%      Weight:         Gen: comfortable appearing, no distress  Abd: soft, nontender. Gravid.   Extremities: no LE edema    FHT: Baseline 145 Bpm, moderate variability, + accels, - decels  [place]: ctx q5-9 min    A/P: 43 year old  at 34w1d with placenta previa and third bleed on 19. HD#7    Posterior placenta previa, with third bleed  - Abruption labs obtained, wnl, Kb neg  - patient declined SSE on exam, informed patient that this will be important to perform during admission, especially given contractions, patient now amenable if needed  - No bleeding since   - s/p BMZ -7   - Delivery at 36 weeks by  section; scheduled on  with Dr. Shipley. Discussed possibility of earlier delivery if in  labor, or if bleeding. CS consent signed.      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   - Stable disease. Dr. Hernández is her Pulmonologist and is aware of her admission.  - Recent +sputum culture for pseudomonas/haemophils, s/p 10d azithromycin. No evidence of pneumonia.   - Continue three times daily bronchial drainage therapy and nebulization treatment as recommended per pulmonology. Okay for pt to do these herself to facilitate keeping her on schedule.  - Will restart vest therapy after delivery  - Close monitoring of oxygen saturations, start  O2 supplemental therapy in pregnancy if needed to maintain oxygen saturations > 95%    - Consider delivery if pulmonary status worsens and it appears that it could be improved by delivery  - Follows with pulmonology. Last PFTs 2018. Inpatient consult if worsening status.   - Anesthesiology consultation completed 3/28      Mild polyhydramnios  - Normal NILDA 19.  - Continue weekly BPP (next scheduled on 4/15/19)     GDMA1  - Fasting and postprandial BG  - Continue fasting and 1 hour PP glucose check     Bilateral pyelectasis - UTD A1   -resolved, no further follow up      GERD  - On Protonix with good relief     IVF pregnancy  - s/p normal fetal echo  - On ASA       Dupuyren's tendonitis  - Continue splint and consider injection if worsening     Constipation  Senna. Add Miralax if needed.     PNC  Rh pos, Ab neg, RI  Low risk first trimester screen  S/p influenza, Tdap    currently scheduled on  with Dr. Shipley, prefers staff only      PPx: SCDs    Dory Morrison MD  Specialist in Maternal-Fetal Medicine

## 2019-04-12 NOTE — PROVIDER NOTIFICATION
04/12/19 0622   Provider Notification   Provider Name/Title Dr. Galvan   Method of Notification Electronic Page   Notification Reason Uterine Activity   Dr. Galvan notifed that the pt is having mild-moderate contractions every 3-5 minutes. Patient denies feeling uncomfortable.

## 2019-04-12 NOTE — PLAN OF CARE
Patient denies bleeding, cramping and leaking fluid.  Patient is alexander irregularly but states she does not feel contractions.  Dr. Morrison aware of contractions and has spoken to the patient about them.  Patient educated to report any changes in contractions, cramping or bleeding, understanding verbalized by patient.  1 hour PP blood sugar this morning was 83.  Positive fetal movement per patient.  Reactive NST noted.  Continue with current plan of care.

## 2019-04-13 LAB
GLUCOSE BLDC GLUCOMTR-MCNC: 104 MG/DL (ref 70–99)
GLUCOSE BLDC GLUCOMTR-MCNC: 109 MG/DL (ref 70–99)
GLUCOSE BLDC GLUCOMTR-MCNC: 85 MG/DL (ref 70–99)
GLUCOSE BLDC GLUCOMTR-MCNC: 95 MG/DL (ref 70–99)

## 2019-04-13 PROCEDURE — 00000146 ZZHCL STATISTIC GLUCOSE BY METER IP

## 2019-04-13 PROCEDURE — 12000001 ZZH R&B MED SURG/OB UMMC

## 2019-04-13 PROCEDURE — 25000125 ZZHC RX 250: Performed by: STUDENT IN AN ORGANIZED HEALTH CARE EDUCATION/TRAINING PROGRAM

## 2019-04-13 PROCEDURE — 25000132 ZZH RX MED GY IP 250 OP 250 PS 637: Performed by: STUDENT IN AN ORGANIZED HEALTH CARE EDUCATION/TRAINING PROGRAM

## 2019-04-13 RX ADMIN — SENNOSIDES AND DOCUSATE SODIUM 3 TABLET: 8.6; 5 TABLET ORAL at 21:53

## 2019-04-13 RX ADMIN — PRENATAL VIT W/ FE FUMARATE-FA TAB 27-0.8 MG 1 TABLET: 27-0.8 TAB at 08:14

## 2019-04-13 RX ADMIN — ALBUTEROL SULFATE 2.5 MG: 2.5 SOLUTION RESPIRATORY (INHALATION) at 07:22

## 2019-04-13 RX ADMIN — ACETYLCYSTEINE 2 ML: 200 SOLUTION ORAL; RESPIRATORY (INHALATION) at 20:06

## 2019-04-13 RX ADMIN — ALBUTEROL SULFATE 2.5 MG: 2.5 SOLUTION RESPIRATORY (INHALATION) at 20:06

## 2019-04-13 RX ADMIN — ALBUTEROL SULFATE 2.5 MG: 2.5 SOLUTION RESPIRATORY (INHALATION) at 14:18

## 2019-04-13 RX ADMIN — PANTOPRAZOLE SODIUM 40 MG: 40 TABLET, DELAYED RELEASE ORAL at 08:14

## 2019-04-13 RX ADMIN — ACETYLCYSTEINE 2 ML: 200 SOLUTION ORAL; RESPIRATORY (INHALATION) at 07:22

## 2019-04-13 RX ADMIN — ACETYLCYSTEINE 2 ML: 200 SOLUTION ORAL; RESPIRATORY (INHALATION) at 14:18

## 2019-04-13 NOTE — PROGRESS NOTES
Antepartum Progress Note    Patient name: Armando Dc  MRN: [id number]  : 1975    S: Ms. Dc reports feeling well.  She denies contractions.  She denies bleeding or leaking.  She is feeling fetal movement.    O:   Vitals:    19 2201 19 2202 19 0000 19 0611   BP: 102/57  92/50 99/62   BP Location:   Right arm Right arm   Pulse:   83 72   Resp: 18  18 18   Temp:  97.9  F (36.6  C) 97.7  F (36.5  C) 97.5  F (36.4  C)   TempSrc:  Oral Oral Oral     Gen: comfortable appearing, no distress  Abd: soft, nontender. Gravid.   Extremities: no LE edema    FHT: Baseline 155 Bpm, moderate variability, + accels, - decels  [place]: ctx q5-8 min with intervening irritability    A/P: 43 year old  at 34w2d with placenta previa and third bleed on 19.  No current bleeding.  Some uterine activity, but no complaints. HD#8    Posterior placenta previa, with third bleed  - No bleeding since , continue to monitor  - SSE as needed, patient amenable  - Delivery scheduled at 36 weeks by  ()  - Earlier delivery as needed    Fetal well-being  - s/p BMZ -   - routine monitoring  - Continue weekly BPP (next scheduled on 4/15/19)    Primary ciliary dyskinesia and bronchiectasis with moderate-severe obstructive lung disease .   - Stable disease. Dr. Hernández is her Pulmonologist and is aware of her admission.  - Last PFTs 2018. Inpatient consult if worsening status.   - Recent +sputum culture for pseudomonas/haemophils, s/p 10d azithromycin. No evidence of pneumonia.   - Continue three times daily bronchial drainage therapy and nebulization treatment as recommended per pulmonology. Okay for pt to do these herself to facilitate keeping her on schedule.  - Will restart vest therapy after delivery  - Close monitoring of oxygen saturations, start O2 supplemental therapy in pregnancy if needed to maintain oxygen saturations > 95%    - Consider delivery if pulmonary status worsens and it appears  that it could be improved by delivery  - Anesthesiology consultation completed 3/28      GDMA1  - Fasting and postprandial BG  - Continue fasting and 1 hour PP glucose check     Constipation  - Senna. Add Miralax if needed.     PPx  - SCDs    Adore Painting MD  PGY-3 OB/GYN  [phone] (OB G3 Pager)    Maternal-Fetal Medicine Attending Addendum    I have discussed the care of Ms. Dc with the resident during morning rounds.    BP 99/62 (BP Location: Right arm)   Pulse 72   Temp 97.5  F (36.4  C) (Oral)   Resp 18   Wt 67.2 kg (148 lb 1.6 oz)   SpO2 95%   BMI 25.42 kg/m    GEN: NAD  ABD: gravid, NT  FHT: 135 w/moderate variability, + A, no D  TOCO: approx every 5 minutes  NST interpretation for today: appropriate for GA    A/P: 44 year old  34w2d admitted with placenta previa and third bleed, no current bleeding.  If contractions worsen or there is any spotting/bleeding will perform SSE.  Plan to continue inpatient management until delivery at 36 weeks, earlier delivery if indicated.  Patient agrees with plan of care and all questions answered.     I spent a total of 10 minutes face-to-face or coordinating care of Armando Dc.  More than 50% of my time on the unit was spent counseling and/or coordinating care regarding placenta previa.  See note for details; I have made the necessary edits/additions.      Date of service (when I saw the patient): 2019      Diamond Poon MD  , OB/GYN  Maternal-Fetal Medicine  [email or web address]  [phone] (Academic office)  [phone] (Pager)

## 2019-04-13 NOTE — CONSULTS
Neonatology Antepartum Counseling Consult      I was asked to provide antepartum counseling for Armando Dc at the request of Dory Morrison MD secondary to placenta previa with x3 bleeds. Ms. Dc is currently 34 weeks. Betamethasone was administered x2 doses. Ms. Dc, accompanied by her , was counseled on the expected hospital course, potential risks, and outcomes associated with an infant born at approximately 34 weeks gestation. The counseling included:  initial delivery room stabilization, respiratory course, lung development, nutrition, growth and development, and long term outcomes. Please feel free to call with any additional questions or concerns.          Faye HESS CNP, 2019 8:11 PM  [facility]   Intensive Care Unit    Floor Time (min): 5  Face to Face Time (min): 25  Total Time (minutes): 30  More than 50% of my time was spent in direct, face to face, antepartum counseling with the above patient.

## 2019-04-13 NOTE — PLAN OF CARE
Pt VSS. Afebrile. Denies cramping, backache, pelvic pressure, LOF. No vaginal bleeding present. Active FM. FHR reactive. Ctx q9-10 mins per toco with irritability. Pt no feeling contractions. Had NICU consult this evening. Good support from  at bedside. Continue with current plan of care.

## 2019-04-13 NOTE — PLAN OF CARE
Pt doing well. Reports more ctx per toco since stopping nifedipine, but doesn't feel them. No LOF or bleeding. Slept well.  here and parents will visit later. Plans for monitoring, blood sugar checks, shower and bed change. Will call with needs.

## 2019-04-14 LAB
GLUCOSE BLDC GLUCOMTR-MCNC: 127 MG/DL (ref 70–99)
GLUCOSE BLDC GLUCOMTR-MCNC: 141 MG/DL (ref 70–99)
GLUCOSE BLDC GLUCOMTR-MCNC: 87 MG/DL (ref 70–99)
GLUCOSE BLDC GLUCOMTR-MCNC: 97 MG/DL (ref 70–99)

## 2019-04-14 PROCEDURE — 25000132 ZZH RX MED GY IP 250 OP 250 PS 637: Performed by: STUDENT IN AN ORGANIZED HEALTH CARE EDUCATION/TRAINING PROGRAM

## 2019-04-14 PROCEDURE — 12000001 ZZH R&B MED SURG/OB UMMC

## 2019-04-14 PROCEDURE — 25000125 ZZHC RX 250: Performed by: STUDENT IN AN ORGANIZED HEALTH CARE EDUCATION/TRAINING PROGRAM

## 2019-04-14 PROCEDURE — 00000146 ZZHCL STATISTIC GLUCOSE BY METER IP

## 2019-04-14 RX ORDER — ACETAMINOPHEN 325 MG/1
650 TABLET ORAL EVERY 4 HOURS PRN
Status: DISCONTINUED | OUTPATIENT
Start: 2019-04-14 | End: 2019-04-25

## 2019-04-14 RX ADMIN — ACETYLCYSTEINE 2 ML: 200 SOLUTION ORAL; RESPIRATORY (INHALATION) at 08:05

## 2019-04-14 RX ADMIN — PANTOPRAZOLE SODIUM 40 MG: 40 TABLET, DELAYED RELEASE ORAL at 08:04

## 2019-04-14 RX ADMIN — ALBUTEROL SULFATE 2.5 MG: 2.5 SOLUTION RESPIRATORY (INHALATION) at 19:29

## 2019-04-14 RX ADMIN — ALBUTEROL SULFATE 2.5 MG: 2.5 SOLUTION RESPIRATORY (INHALATION) at 14:56

## 2019-04-14 RX ADMIN — ACETYLCYSTEINE 2 ML: 200 SOLUTION ORAL; RESPIRATORY (INHALATION) at 19:29

## 2019-04-14 RX ADMIN — SENNOSIDES AND DOCUSATE SODIUM 3 TABLET: 8.6; 5 TABLET ORAL at 21:32

## 2019-04-14 RX ADMIN — ACETYLCYSTEINE 2 ML: 200 SOLUTION ORAL; RESPIRATORY (INHALATION) at 14:56

## 2019-04-14 RX ADMIN — ALBUTEROL SULFATE 2.5 MG: 2.5 SOLUTION RESPIRATORY (INHALATION) at 08:04

## 2019-04-14 RX ADMIN — PRENATAL VIT W/ FE FUMARATE-FA TAB 27-0.8 MG 1 TABLET: 27-0.8 TAB at 08:04

## 2019-04-14 NOTE — PLAN OF CARE
Pt denies any vaginal bleeding this shift, noted mucus discharge noted. Vitals stable. No other complications noted.

## 2019-04-14 NOTE — PROGRESS NOTES
Antepartum Progress Note    Patient name: Armando Dc  MRN: [id number]  : 1975    S: Ms. Dc reports feeling well.  She continues to feel menstrual like contractions.  She denies bleeding or leaking.  She is feeling fetal movement. Had some dizziness/lightheadedness when she first stood up this morning, and stumbled over her sandals and landed on her bottom. She has no change in contractions, no bleeding, continues to feel baby move. She may have lost her mucous plug this morning.    O:   Vitals:    19 2201 19 2202 19 0000 19 0611   BP: 102/57  92/50 99/62   BP Location:   Right arm Right arm   Pulse:   83 72   Resp: 18  18 18   Temp:  97.9  F (36.6  C) 97.7  F (36.5  C) 97.5  F (36.4  C)   TempSrc:  Oral Oral Oral     Gen: comfortable appearing, no distress  Resp: LCTAB  CV: RRR, no extra heart sounds. Cardiac sounds auscultated on right side of chest  Abd: soft, nontender. Gravid.   Extremities: no LE edema    FHT: Baseline 130 Bpm, moderate variability, + accels, - decels  [place]: ctx q7-8 min    A/P: 43 year old  at 34w3d with placenta previa and third bleed on 19.  No current bleeding.  Some uterine activity, but no complaints. HD#9    Posterior placenta previa, with third bleed  - No bleeding since , continue to monitor  - SSE as needed, patient amenable  - Delivery scheduled at 36 weeks by  ()  - Earlier delivery as needed    Fetal well-being  - s/p BMZ -   - routine monitoring  - Continue weekly BPP (next scheduled on 4/15/19)    Primary ciliary dyskinesia and bronchiectasis with moderate-severe obstructive lung disease .   - Stable disease. Dr. Hernández is her Pulmonologist and is aware of her admission.  - Last PFTs 2018. Inpatient consult if worsening status.   - Recent +sputum culture for pseudomonas/haemophils, s/p 10d azithromycin. No evidence of pneumonia.   - Continue three times daily bronchial drainage therapy and nebulization treatment  as recommended per pulmonology.  - Will restart vest therapy after delivery  - Close monitoring of oxygen saturations, start O2 supplemental therapy in pregnancy if needed to maintain oxygen saturations > 95%    - Consider delivery if pulmonary status worsens and it appears that it could be improved by delivery  - Anesthesiology consultation completed 3/28      GDMA1  - Fasting and postprandial BG  - Continue fasting and 1 hour PP glucose check     Constipation  - Senna. Add Miralax if needed.     PPx  - SCDs    Brooklyn Hughes MD 2019 7:38 AM    [phone] (OB G3 Pager)    Maternal-Fetal Medicine Attending Addendum    I have discussed the care of Ms. Dc with Dr. Hughes during morning rounds.    /68   Pulse 77   Temp 97.5  F (36.4  C) (Oral)   Resp 18   Wt 67.2 kg (148 lb 1.6 oz)   SpO2 96%   BMI 25.42 kg/m      NST interpretation for today: appropriate for [place]    A/P: 44 year old  34w3d admitted with third episode of VB in setting of known placenta previa, no current bleeding.  Patient agrees with plan of care and all questions answered.     I spent a total of 10 minutes face-to-face or coordinating care of Armando Dc.  More than 50% of my time on the unit was spent counseling and/or coordinating care regarding placenta previa.  See note for details; I have made the necessary edits/additions.      Date of service (when I saw the patient): 2019      Diamond Poon MD  , OB/GYN  Maternal-Fetal Medicine  [email or web address]  [phone] (Academic office)  [phone] (Pager)

## 2019-04-14 NOTE — PLAN OF CARE
Pt VSS. Afebrile. Denies LOF, backache, pelvic pressure. No vaginal bleeding. Active FM. FHR reactive. Irregular contractions per toco. Pt reports as her baseline mild cramping. See results tab for BG values. Continue with current plan of care.

## 2019-04-15 ENCOUNTER — HOSPITAL ENCOUNTER (INPATIENT)
Dept: ULTRASOUND IMAGING | Facility: CLINIC | Age: 44
End: 2019-04-15
Attending: OBSTETRICS & GYNECOLOGY
Payer: COMMERCIAL

## 2019-04-15 LAB
ABO + RH BLD: NORMAL
ABO + RH BLD: NORMAL
BLD GP AB SCN SERPL QL: NORMAL
BLOOD BANK CMNT PATIENT-IMP: NORMAL
GLUCOSE BLDC GLUCOMTR-MCNC: 132 MG/DL (ref 70–99)
GLUCOSE BLDC GLUCOMTR-MCNC: 92 MG/DL (ref 70–99)
GLUCOSE BLDC GLUCOMTR-MCNC: 97 MG/DL (ref 70–99)
GLUCOSE BLDC GLUCOMTR-MCNC: 98 MG/DL (ref 70–99)
SPECIMEN EXP DATE BLD: NORMAL

## 2019-04-15 PROCEDURE — 25000125 ZZHC RX 250: Performed by: STUDENT IN AN ORGANIZED HEALTH CARE EDUCATION/TRAINING PROGRAM

## 2019-04-15 PROCEDURE — 86900 BLOOD TYPING SEROLOGIC ABO: CPT | Performed by: OBSTETRICS & GYNECOLOGY

## 2019-04-15 PROCEDURE — 86901 BLOOD TYPING SEROLOGIC RH(D): CPT | Performed by: OBSTETRICS & GYNECOLOGY

## 2019-04-15 PROCEDURE — 25000132 ZZH RX MED GY IP 250 OP 250 PS 637: Performed by: STUDENT IN AN ORGANIZED HEALTH CARE EDUCATION/TRAINING PROGRAM

## 2019-04-15 PROCEDURE — 00000146 ZZHCL STATISTIC GLUCOSE BY METER IP

## 2019-04-15 PROCEDURE — 76819 FETAL BIOPHYS PROFIL W/O NST: CPT | Performed by: OBSTETRICS & GYNECOLOGY

## 2019-04-15 PROCEDURE — 12000001 ZZH R&B MED SURG/OB UMMC

## 2019-04-15 PROCEDURE — 36415 COLL VENOUS BLD VENIPUNCTURE: CPT | Performed by: OBSTETRICS & GYNECOLOGY

## 2019-04-15 PROCEDURE — 76816 OB US FOLLOW-UP PER FETUS: CPT

## 2019-04-15 PROCEDURE — 86850 RBC ANTIBODY SCREEN: CPT | Performed by: OBSTETRICS & GYNECOLOGY

## 2019-04-15 RX ADMIN — ALBUTEROL SULFATE 2.5 MG: 2.5 SOLUTION RESPIRATORY (INHALATION) at 15:30

## 2019-04-15 RX ADMIN — ACETYLCYSTEINE 2 ML: 200 SOLUTION ORAL; RESPIRATORY (INHALATION) at 19:30

## 2019-04-15 RX ADMIN — PRENATAL VIT W/ FE FUMARATE-FA TAB 27-0.8 MG 1 TABLET: 27-0.8 TAB at 08:40

## 2019-04-15 RX ADMIN — SENNOSIDES AND DOCUSATE SODIUM 3 TABLET: 8.6; 5 TABLET ORAL at 21:51

## 2019-04-15 RX ADMIN — ALBUTEROL SULFATE 2.5 MG: 2.5 SOLUTION RESPIRATORY (INHALATION) at 19:30

## 2019-04-15 RX ADMIN — ACETYLCYSTEINE 2 ML: 200 SOLUTION ORAL; RESPIRATORY (INHALATION) at 07:24

## 2019-04-15 RX ADMIN — PANTOPRAZOLE SODIUM 40 MG: 40 TABLET, DELAYED RELEASE ORAL at 08:40

## 2019-04-15 RX ADMIN — ACETYLCYSTEINE 2 ML: 200 SOLUTION ORAL; RESPIRATORY (INHALATION) at 15:30

## 2019-04-15 RX ADMIN — ALBUTEROL SULFATE 2.5 MG: 2.5 SOLUTION RESPIRATORY (INHALATION) at 07:24

## 2019-04-15 NOTE — PROGRESS NOTES
Antepartum Progress Note    Patient name: Armando Dc  MRN: [id number]  : 1975    S: Ms. Dc reports feeling well.  She continues to feel menstrual like cramps with contractions.  She denies bleeding or leaking.  She is feeling fetal movement.  No respiratory symptoms.     O: BP 99/55 (BP Location: Right arm)   Pulse 81   Temp 97.8  F (36.6  C) (Oral)   Resp 18   Wt 67.2 kg (148 lb 1.6 oz)   SpO2 96%   BMI 25.42 kg/m     Gen: comfortable appearing, no distress  Resp: LCTAB  CV: RRR, no extra heart sounds. Cardiac sounds auscultated on right side of chest  Abd: soft, nontender. Gravid.   Extremities: no LE edema    FHT: Baseline 130 Bpm, moderate variability, + accels, - decels  [place]: ctx q8-10 min      Growth done today, not uploaded yet but noted to be normal. NILDA 28. BPP     A/P: 43 year old  at 34w4d with placenta previa and third bleed on 19.  No current bleeding.  Some uterine activity, but no complaints. HD#10    Posterior placenta previa, with third bleed  - No bleeding since , continue to monitor  - SSE as needed, patient amenable  - Delivery scheduled at 36 weeks by  () - okay with residents assisting   - Earlier delivery as needed    Fetal well-being  - s/p BMZ -   - routine monitoring  - Continue weekly BPP (next scheduled on 19)    Primary ciliary dyskinesia and bronchiectasis with moderate-severe obstructive lung disease .   - Stable disease. Dr. Hernández is her Pulmonologist and is aware of her admission.  - Last PFTs 2018. Inpatient consult if worsening status.   - Recent +sputum culture for pseudomonas/haemophils, s/p 10d azithromycin. No evidence of pneumonia.   - Continue three times daily bronchial drainage therapy and nebulization treatment as recommended per pulmonology.  - Will restart vest therapy after delivery  - Close monitoring of oxygen saturations, start O2 supplemental therapy in pregnancy if needed to maintain oxygen saturations  > 95%    - Consider delivery if pulmonary status worsens and it appears that it could be improved by delivery  - Anesthesiology consultation completed 3/28      GDMA1  - Fasting and postprandial BG  - Continue fasting and 1 hour PP glucose check     Constipation  - Senna. Add Miralax if needed.     PPx  - SCDs    Palmira Naranjo MD   PGY-3 Ob/Gyn

## 2019-04-15 NOTE — PROGRESS NOTES
CLINICAL NUTRITION SERVICES - ASSESSMENT NOTE     Nutrition Prescription    RECOMMENDATIONS FOR MDs/PROVIDERS TO ORDER:  None today. Continue to monitor wt trends and fetal growth.    Malnutrition Status:    Patient does not meet two of the criteria necessary for diagnosing malnutrition     Recommendations already ordered by Registered Dietitian (RD):  None today - pt declined scheduled snacks    Future/Additional Recommendations:  Monitor po intakes and wt trends. Consider need to add snack/supplement. Adjust flavors pending trends and pt preferences.       REASON FOR ASSESSMENT  Armando Dc is a/an 44 year old female assessed by the dietitian for [facility]    Patient is 34w5d pregnant.     NUTRITION HISTORY  Patient with gestational diabetes. Pt was seen by writer on 2/15 during previous admission to receive CHO education relating to GDM. Pt states that her GDM has been well controlled over the past 2 months and she has not needed to receive insulin.  Armando reports no food allergies or intolerances. Regular diet at home. She states that she has been more mindful of her CHO intakes over the past 2 months and has been eating minimal bread and rice. She states that she also has been pairing CHO with protein/fat at snack times.   Usual intakes of Breakfast: omelet w/veggies, whole grain bread; Lunch: lentils, veggies, meat, salad; Dinner: same as lunch. She reports snacking on fruit w/cheese, nuts, or bread w/cheese in between meals.   She denies a decline in intakes PTA but states that she did reduce a lot of the amount of bread and rice she usually would consume and replaced it with veggies and lentils.  Takes prenatal MVI at home.    CURRENT NUTRITION ORDERS  Diet: Regular  Intake/Tolerance: Appetite fair this admission. Reports eating 3 meals/day (food from home & here) + snacks. She denies any N/V, constipation, and diarrhea.    LABS  BG  x48 hrs    MEDICATIONS   Prenatal MVI with Iron    ANTHROPOMETRICS  Height: 0  "cm (Data Unavailable)  Ht Readings from Last 1 Encounters:   03/27/19 1.626 m (5' 4\")   Most Recent Weight: 67.2 kg (148 lb 1.6 oz)    Pre-Pregnant Weight: 133 lb per pt report  IBW: 54.5 kg (111% IBW) pre-pregnancy  BMI: Normal BMI pre-pregnancy  Weight History: Given pt's reported pre-pregnancy wt, overall wt gain of 15 lb throughout admission. Recommended wt gain of 25-35 lb throughout pregnancy given normal pre-pregnancy BMI. Pt has lost 2 lb over the past several months/been weight stable ~150 lb. Goal of wt gain of at least 1 lb/week throughout remainder of pregnancy.  Wt Readings from Last 15 Encounters:   04/09/19 67.2 kg (148 lb 1.6 oz)   04/04/19 68.6 kg (151 lb 3.2 oz)   03/28/19 68.7 kg (151 lb 8 oz)   03/27/19 72.1 kg (159 lb)   03/12/19 67.6 kg (149 lb)   02/26/19 68.4 kg (150 lb 14.4 oz)   02/14/19 69.9 kg (154 lb 3.2 oz)   01/29/19 68 kg (150 lb)   01/29/19 68.2 kg (150 lb 4.8 oz)   01/07/19 68.9 kg (152 lb)   12/21/18 66.7 kg (147 lb)   12/03/18 65.8 kg (145 lb)   11/07/18 63 kg (139 lb)   10/31/18 62.3 kg (137 lb 6.4 oz)   10/26/18 64.4 kg (142 lb)     Dosing Weight: 61 kg (actual pre-pregnancy)    ASSESSED NUTRITION NEEDS  Estimated Energy Needs: 4094-1893+ kcals/day (20 - 25 kcals/kg + 450+ kcal/day)  Justification: Increased needs with pregnancy  Estimated Protein Needs: 75-85 +grams protein/day (0.8 - 1 grams of pro/kg + 25+ g/day)  Justification: Increased needs with pregnancy  Estimated Fluid Needs: 3000 mL/day, or per provider  Justification: Increased needs with pregnancy    PHYSICAL FINDINGS  See malnutrition section below.    MALNUTRITION  % Intake: Decreased intake does not meet criteria  % Weight Loss: Weight loss does not meet criteria  Subcutaneous Fat Loss: None observed  Muscle Loss: None observed  Fluid Accumulation/Edema: None noted  Malnutrition Diagnosis: Patient does not meet two of the above criteria necessary for diagnosing malnutrition    NUTRITION DIAGNOSIS  Predicted " inadequate nutrient intake (protein-energy) related to variable appetite as evidenced by reliance on po intakes to meet estimated needs with potential for decline.       INTERVENTIONS  Implementation  Nutrition Education: Encouraged continuation of meals TID + snacks in between. Encouraged pt that she does still need adequate carbohydrates for pregnancy but to be mindful of portion sizes. Discussed wt trends- pt expressed that providers are not concerned about fetal growth. Encouraged pt to add more food at each meal to help promote wt gain throughout the remainder of pregnancy.    Offered scheduled snacks, but pt declined    Goals  1. Patient to consume % of nutritionally adequate meal trays TID, or the equivalent with supplements/snacks.    2. Weight gain of at least 1 lb/week throughout remainder of pregnancy.     Monitoring/Evaluation  Progress toward goals will be monitored and evaluated per protocol.    Bonny Reyna RD, LD  Unit pgr: [phone]

## 2019-04-15 NOTE — PLAN OF CARE
Pt VSS. Afebrile. No LOF, backache, pelvic pressure. No vaginal bleeding at this time. Reports occasional uterine cramping/tightening, same as her baseline. Rare, irregular contractions per toco with irritability. Active FM. FHR reactive. See results tab for BG values. Continue with current plan of care.

## 2019-04-16 LAB
GLUCOSE BLDC GLUCOMTR-MCNC: 107 MG/DL (ref 70–99)
GLUCOSE BLDC GLUCOMTR-MCNC: 86 MG/DL (ref 70–99)
GLUCOSE BLDC GLUCOMTR-MCNC: 87 MG/DL (ref 70–99)
GLUCOSE BLDC GLUCOMTR-MCNC: 90 MG/DL (ref 70–99)

## 2019-04-16 PROCEDURE — 25000132 ZZH RX MED GY IP 250 OP 250 PS 637: Performed by: STUDENT IN AN ORGANIZED HEALTH CARE EDUCATION/TRAINING PROGRAM

## 2019-04-16 PROCEDURE — 25000125 ZZHC RX 250: Performed by: STUDENT IN AN ORGANIZED HEALTH CARE EDUCATION/TRAINING PROGRAM

## 2019-04-16 PROCEDURE — 00000146 ZZHCL STATISTIC GLUCOSE BY METER IP

## 2019-04-16 PROCEDURE — 12000001 ZZH R&B MED SURG/OB UMMC

## 2019-04-16 RX ADMIN — PRENATAL VIT W/ FE FUMARATE-FA TAB 27-0.8 MG 1 TABLET: 27-0.8 TAB at 08:59

## 2019-04-16 RX ADMIN — ACETYLCYSTEINE 2 ML: 200 SOLUTION ORAL; RESPIRATORY (INHALATION) at 15:53

## 2019-04-16 RX ADMIN — ALBUTEROL SULFATE 2.5 MG: 2.5 SOLUTION RESPIRATORY (INHALATION) at 06:27

## 2019-04-16 RX ADMIN — ACETYLCYSTEINE 2 ML: 200 SOLUTION ORAL; RESPIRATORY (INHALATION) at 06:27

## 2019-04-16 RX ADMIN — PANTOPRAZOLE SODIUM 40 MG: 40 TABLET, DELAYED RELEASE ORAL at 08:59

## 2019-04-16 RX ADMIN — ALBUTEROL SULFATE 2.5 MG: 2.5 SOLUTION RESPIRATORY (INHALATION) at 15:53

## 2019-04-16 RX ADMIN — ACETYLCYSTEINE 2 ML: 200 SOLUTION ORAL; RESPIRATORY (INHALATION) at 20:16

## 2019-04-16 RX ADMIN — SENNOSIDES AND DOCUSATE SODIUM 3 TABLET: 8.6; 5 TABLET ORAL at 21:56

## 2019-04-16 RX ADMIN — ALBUTEROL SULFATE 2.5 MG: 2.5 SOLUTION RESPIRATORY (INHALATION) at 20:16

## 2019-04-16 NOTE — PLAN OF CARE
VSS, afebrile, no bleeding. Pt abdomen itching tonight, she has been using the Belly band, she is going to go back to the straps. Gave massage essential oil blend, Calm.   Continue to monitor.  Continue plan of care.

## 2019-04-16 NOTE — PROVIDER NOTIFICATION
Notified Md of increased uterine activity, and pt felt only 3 cramps. No new orders.continue plan of care.

## 2019-04-16 NOTE — PLAN OF CARE
Pt has had a good day today. Visited throughout the day by family and friend. Pt denies any vb, LOF, and states she is having some irregular ctx, which she denies any increase from the past few days. BS are stable PP. Pt has no voiced concerns. Pt remains stable at this time.

## 2019-04-16 NOTE — PLAN OF CARE
Problem:  Labor  Goal: Delayed  Delivery  201943 by Jenn Lawrence, RN  Outcome: No Change     Problem: Bleeding Antepartum  Goal: Absence of Bleeding  2019 by Jenn Lawrence RN  Outcome: No Change     Problem: Adult Inpatient Plan of Care  Goal: Plan of Care Review  2019 by Jenn Lawrence RN  Outcome: Improving  20199 by Katelin Magdaleno RN  Outcome: No Change     VSS. Denies bleeding or LOF overnight. Endorses intermittent cramping around 0300 and during AM monitoring. Irregular UCs 4-10 minutes per toco this AM, 60-90s duration. FHTs 145 with moderate variability, accels, no decels. Continue to monitor. Report given to [place] DICKSON.

## 2019-04-16 NOTE — PROGRESS NOTES
Antepartum Progress Note    Patient name: Armando Dc  MRN: [id number]  : 1975    S: Ms. cD reports feeling well, no real updates.  She continues to feel occasional menstrual like cramps with contractions but they are infrequent.  She denies bleeding or leaking.  She is feeling fetal movement.  No respiratory symptoms.     O: BP 97/54   Pulse 81   Temp 97.6  F (36.4  C) (Oral)   Resp 18   Wt 68.1 kg (150 lb 1.6 oz)   SpO2 96%   BMI 25.76 kg/m     Gen: comfortable appearing, no distress  Resp: LCTAB  CV: RRR, no extra heart sounds. Cardiac sounds auscultated on right side of chest  Abd: soft, nontender. Gravid.   Extremities: no LE edema    FHT: Baseline 140 Bpm, moderate variability, + accels, - decels  [place]: ctx q8-10 min      Growth done today, not uploaded yet but noted to be normal. NILDA 28. BPP     A/P: 43 year old  at 34w5d with placenta previa and third bleed on 19.  No current bleeding.  Some uterine activity, but no complaints. HD#11    Posterior placenta previa, with third bleed  - No bleeding since , continue to monitor  - SSE as needed, patient amenable  - Delivery scheduled at 36 weeks by  () - okay with residents assisting   - Earlier delivery as needed    Fetal well-being  - s/p BMZ -   - routine monitoring  - Continue weekly BPP (next scheduled on 19)    Primary ciliary dyskinesia and bronchiectasis with moderate-severe obstructive lung disease .   - Stable disease. Dr. Hernández is her Pulmonologist and is aware of her admission.  - Last PFTs 2018. Inpatient consult if worsening status.   - Recent +sputum culture for pseudomonas/haemophils, s/p 10d azithromycin. No evidence of pneumonia.   - Continue three times daily bronchial drainage therapy and nebulization treatment as recommended per pulmonology.  - Will restart vest therapy after delivery  - Close monitoring of oxygen saturations, start O2 supplemental therapy in pregnancy if needed to  maintain oxygen saturations > 95%    - Consider delivery if pulmonary status worsens and it appears that it could be improved by delivery  - Anesthesiology consultation completed 3/28      GDMA1  - Fasting and postprandial BG  - Continue fasting and 1 hour PP glucose check     Constipation  - Senna. Add Miralax if needed.     PPx  - SCDs    Palmira Naranjo MD   PGY-3 Ob/Gyn

## 2019-04-17 LAB
GLUCOSE BLDC GLUCOMTR-MCNC: 102 MG/DL (ref 70–99)
GLUCOSE BLDC GLUCOMTR-MCNC: 117 MG/DL (ref 70–99)
GLUCOSE BLDC GLUCOMTR-MCNC: 81 MG/DL (ref 70–99)
GLUCOSE BLDC GLUCOMTR-MCNC: 99 MG/DL (ref 70–99)

## 2019-04-17 PROCEDURE — 00000146 ZZHCL STATISTIC GLUCOSE BY METER IP

## 2019-04-17 PROCEDURE — 25000125 ZZHC RX 250: Performed by: STUDENT IN AN ORGANIZED HEALTH CARE EDUCATION/TRAINING PROGRAM

## 2019-04-17 PROCEDURE — 25000132 ZZH RX MED GY IP 250 OP 250 PS 637: Performed by: STUDENT IN AN ORGANIZED HEALTH CARE EDUCATION/TRAINING PROGRAM

## 2019-04-17 PROCEDURE — 12000001 ZZH R&B MED SURG/OB UMMC

## 2019-04-17 RX ADMIN — ALBUTEROL SULFATE 2.5 MG: 2.5 SOLUTION RESPIRATORY (INHALATION) at 19:28

## 2019-04-17 RX ADMIN — ACETYLCYSTEINE 2 ML: 200 SOLUTION ORAL; RESPIRATORY (INHALATION) at 07:02

## 2019-04-17 RX ADMIN — PANTOPRAZOLE SODIUM 40 MG: 40 TABLET, DELAYED RELEASE ORAL at 07:49

## 2019-04-17 RX ADMIN — ALBUTEROL SULFATE 2.5 MG: 2.5 SOLUTION RESPIRATORY (INHALATION) at 07:02

## 2019-04-17 RX ADMIN — SENNOSIDES AND DOCUSATE SODIUM 3 TABLET: 8.6; 5 TABLET ORAL at 22:10

## 2019-04-17 RX ADMIN — ACETYLCYSTEINE 2 ML: 200 SOLUTION ORAL; RESPIRATORY (INHALATION) at 19:28

## 2019-04-17 RX ADMIN — PRENATAL VIT W/ FE FUMARATE-FA TAB 27-0.8 MG 1 TABLET: 27-0.8 TAB at 07:49

## 2019-04-17 RX ADMIN — ALBUTEROL SULFATE 2.5 MG: 2.5 SOLUTION RESPIRATORY (INHALATION) at 15:08

## 2019-04-17 RX ADMIN — ACETYLCYSTEINE 2 ML: 200 SOLUTION ORAL; RESPIRATORY (INHALATION) at 15:08

## 2019-04-17 NOTE — PLAN OF CARE
Data: Maternal status stable. VSS this shift, pt afebrile. Fetal assessment is appropriate for gestational age (see flowsheet). During evening and morning monitoring patient was alexander every 5-10 minutes, mild to palpation. Patient reported not feeling all contractions and then sometimes feeling tightening or cramps and that she feels random cramping through out the day; that this is her normal. Provider aware and said it was okay to remove monitors. Pt denies vaginal bleeding, uterine contractions, or difficulty breathing. Pt reports active fetal movement. Fasting BG was 81.  Action: Continue with plan of care, which is light activity, TID monitoring, Q shift vitals. Patient encouraged to move extremities while in bed, refused to wear SCDs.  Response: Patient coping well, very pleasant tonight. Will continue to monitor.

## 2019-04-17 NOTE — PROVIDER NOTIFICATION
"   04/16/19 1955 04/16/19 2002   Provider Notification   Provider Name/Title Dr. Galvan, G3 Dr. Galvan, G3   Method of Notification Electronic Page Phone   Request Evaluate - Remote Evaluate - Remote   Notification Reason Uterine Activity  (9 contractions in an hour, feels cramps, extend monitoring?) Uterine Activity  (ok to take off monitors)   This RN paged Dr. Galvan, G3, concerning this patient alexander every 5-8 minutes (9 contractions in an hour), mild to palpation, patient feels some cramping but states that this is \"very normal for her\". Dr. Galvan, G3, called this RN and stated that it was okay to remove external monitors.  "

## 2019-04-17 NOTE — PROGRESS NOTES
Antepartum Progress Note    Patient name: Armando Dc  MRN: [id number]  : 1975    S: Ms. Dc reports feeling well, no real updates.  She continues to feel occasional menstrual like cramps with contractions but they are infrequent.  She denies bleeding or leaking.  Normal fetal movement.  No respiratory symptoms.     O: /65   Pulse 86   Temp 97.8  F (36.6  C) (Oral)   Resp 16   Wt 68.1 kg (150 lb 1.6 oz)   SpO2 95%   BMI 25.76 kg/m     Gen: comfortable appearing, no distress  Resp: no increased work of breathing   CV: RRR   Abd: soft, nontender. Gravid.   Extremities: no LE edema    FHT: Baseline 140 Bpm, moderate variability, + accels, - decels  [place]: ctx q5-8 min      US 4/15: EFW 2759g (66%tile), posterior previa, cephalic. L renal pyelectasis, mild. NILDA 28. BPP     B-107 yesterday       A/P: 43 year old  at 34w6d with placenta previa and third bleed on 19.  No current bleeding.  Some uterine activity, but no complaints. HD#12    Posterior placenta previa, with third bleed  - No bleeding since , continue to monitor  - SSE as needed, patient amenable  - Delivery scheduled at 36 weeks by  () - okay with residents assisting   - Earlier delivery as needed    Fetal well-being  - s/p BMZ -   - routine monitoring  - Continue weekly BPP (next scheduled on 19)    Primary ciliary dyskinesia and bronchiectasis with moderate-severe obstructive lung disease .   - Stable disease. Dr. Hernández is her Pulmonologist and is aware of her admission.  - Last PFTs 2018. Inpatient consult if worsening status.   - Recent +sputum culture for pseudomonas/haemophils, s/p 10d azithromycin. No evidence of pneumonia.   - Continue three times daily bronchial drainage therapy and nebulization treatment as recommended per pulmonology.  - Will restart vest therapy after delivery  - Close monitoring of oxygen saturations, start O2 supplemental therapy in pregnancy if needed to maintain  oxygen saturations > 95%    - Consider delivery if pulmonary status worsens and it appears that it could be improved by delivery  - Anesthesiology consultation completed 3/28      GDMA1  - Fasting and postprandial BG  - Continue fasting and 1 hour PP glucose check     Constipation  - Senna. Add Miralax if needed.     PPx  - SCDs    Palmira Naranjo MD   PGY-3 Ob/Gyn

## 2019-04-17 NOTE — PLAN OF CARE
Pt feeling well today. Denies pain, has occas cramping and 5 ctx noted during monitoring. Intact, no bleeding. Mother at bedsde. Pt calls with needs.

## 2019-04-18 LAB
GLUCOSE BLDC GLUCOMTR-MCNC: 116 MG/DL (ref 70–99)
GLUCOSE BLDC GLUCOMTR-MCNC: 83 MG/DL (ref 70–99)
GLUCOSE BLDC GLUCOMTR-MCNC: 87 MG/DL (ref 70–99)
GLUCOSE BLDC GLUCOMTR-MCNC: 97 MG/DL (ref 70–99)

## 2019-04-18 PROCEDURE — 25000125 ZZHC RX 250: Performed by: STUDENT IN AN ORGANIZED HEALTH CARE EDUCATION/TRAINING PROGRAM

## 2019-04-18 PROCEDURE — 25000132 ZZH RX MED GY IP 250 OP 250 PS 637: Performed by: STUDENT IN AN ORGANIZED HEALTH CARE EDUCATION/TRAINING PROGRAM

## 2019-04-18 PROCEDURE — 00000146 ZZHCL STATISTIC GLUCOSE BY METER IP

## 2019-04-18 PROCEDURE — 12000001 ZZH R&B MED SURG/OB UMMC

## 2019-04-18 RX ADMIN — PRENATAL VIT W/ FE FUMARATE-FA TAB 27-0.8 MG 1 TABLET: 27-0.8 TAB at 08:12

## 2019-04-18 RX ADMIN — ACETYLCYSTEINE 2 ML: 200 SOLUTION ORAL; RESPIRATORY (INHALATION) at 15:00

## 2019-04-18 RX ADMIN — ALBUTEROL SULFATE 2.5 MG: 2.5 SOLUTION RESPIRATORY (INHALATION) at 15:00

## 2019-04-18 RX ADMIN — ACETYLCYSTEINE 2 ML: 200 SOLUTION ORAL; RESPIRATORY (INHALATION) at 19:34

## 2019-04-18 RX ADMIN — ALBUTEROL SULFATE 2.5 MG: 2.5 SOLUTION RESPIRATORY (INHALATION) at 06:12

## 2019-04-18 RX ADMIN — SENNOSIDES AND DOCUSATE SODIUM 3 TABLET: 8.6; 5 TABLET ORAL at 21:22

## 2019-04-18 RX ADMIN — PANTOPRAZOLE SODIUM 40 MG: 40 TABLET, DELAYED RELEASE ORAL at 08:12

## 2019-04-18 RX ADMIN — ALBUTEROL SULFATE 2.5 MG: 2.5 SOLUTION RESPIRATORY (INHALATION) at 19:34

## 2019-04-18 RX ADMIN — ACETYLCYSTEINE 2 ML: 200 SOLUTION ORAL; RESPIRATORY (INHALATION) at 06:11

## 2019-04-18 NOTE — PLAN OF CARE
Vital signs stable. Patient denies any leaking of fluid, bleeding, cramping or lower back pain. Patient occasionally alexander, and feels some tightness but not painful. FHT's 145 with moderate variability and accelerations, no decelerations. Blood sugars stable. Patient slept well overnight. Will continue with current plan of care.

## 2019-04-18 NOTE — PROGRESS NOTES
Antepartum Progress Note    Patient name: Armando Dc  MRN: [id number]  : 1975    S: Ms. Dc reports feeling well, no real updates. Does have some questions about the  and concern for being able to use her pulmonary treatments after. We reviewed the options she will have for pain control. She continues to feel occasional menstrual like cramps with contractions but they are infrequent.  She denies bleeding or leaking.  Normal fetal movement.  No respiratory symptoms.     O: /74   Pulse 85   Temp 97.5  F (36.4  C) (Oral)   Resp 16   Wt 68.1 kg (150 lb 1.6 oz)   SpO2 95%   BMI 25.76 kg/m     Gen: comfortable appearing, no distress  Resp: no increased work of breathing   CV: RRR   Abd: soft, nontender. Gravid.   Extremities: no LE edema    FHT: Baseline 140 Bpm, moderate variability, + accels, - decels  [place]: ctx q4-8 min      US 4/15: EFW 2759g (66%tile), posterior previa, cephalic. L renal pyelectasis, mild. NILDA 28. BPP     B-117 yesterday       A/P: 43 year old  at 35w0d with placenta previa and third bleed on 19.  No current bleeding.  Some uterine activity, but no complaints. HD#13    Posterior placenta previa, with third bleed  - No bleeding since , continue to monitor  - SSE as needed, patient amenable  - Delivery scheduled at 36 weeks by  () - okay with residents assisting   - Earlier delivery as needed    Fetal well-being  - s/p BMZ -7   - routine monitoring  - Continue weekly BPP (next scheduled on 19)    Primary ciliary dyskinesia and bronchiectasis with moderate-severe obstructive lung disease .   - Stable disease. Dr. Hernández is her Pulmonologist and is aware of her admission.  - Last PFTs 2018. Inpatient consult if worsening status.   - Recent +sputum culture for pseudomonas/haemophils, s/p 10d azithromycin. No evidence of pneumonia.   - Continue three times daily bronchial drainage therapy and nebulization treatment as recommended  per pulmonology.  - Will restart vest therapy after delivery  - Close monitoring of oxygen saturations, start O2 supplemental therapy in pregnancy if needed to maintain oxygen saturations > 95%    - Consider delivery if pulmonary status worsens and it appears that it could be improved by delivery  - Anesthesiology consultation completed 3/28. Plan for spinal and TAP block.      GDMA1  - Fasting and postprandial BG  - Continue fasting and 1 hour PP glucose check     Constipation  - Senna. Add Miralax if needed.     PPx  - SCDs    Palmira Naranjo MD   PGY-3 Ob/Gyn

## 2019-04-18 NOTE — PLAN OF CARE
Pt doing well. No bleeding or LOF, occas cramping as per her usual, 7 ctx per hour monitoring this afternoon, active baby. Mother was here NOC and father has been spending the day with her. Calls with needs. Tie blanket given.

## 2019-04-19 LAB
ABO + RH BLD: NORMAL
ABO + RH BLD: NORMAL
BLD GP AB SCN SERPL QL: NORMAL
BLOOD BANK CMNT PATIENT-IMP: NORMAL
GLUCOSE BLDC GLUCOMTR-MCNC: 121 MG/DL (ref 70–99)
GLUCOSE BLDC GLUCOMTR-MCNC: 89 MG/DL (ref 70–99)
GLUCOSE BLDC GLUCOMTR-MCNC: 96 MG/DL (ref 70–99)
GLUCOSE BLDC GLUCOMTR-MCNC: 96 MG/DL (ref 70–99)
SPECIMEN EXP DATE BLD: NORMAL

## 2019-04-19 PROCEDURE — 36415 COLL VENOUS BLD VENIPUNCTURE: CPT | Performed by: OBSTETRICS & GYNECOLOGY

## 2019-04-19 PROCEDURE — 86900 BLOOD TYPING SEROLOGIC ABO: CPT | Performed by: OBSTETRICS & GYNECOLOGY

## 2019-04-19 PROCEDURE — 86901 BLOOD TYPING SEROLOGIC RH(D): CPT | Performed by: OBSTETRICS & GYNECOLOGY

## 2019-04-19 PROCEDURE — 86850 RBC ANTIBODY SCREEN: CPT | Performed by: OBSTETRICS & GYNECOLOGY

## 2019-04-19 PROCEDURE — 25000125 ZZHC RX 250: Performed by: STUDENT IN AN ORGANIZED HEALTH CARE EDUCATION/TRAINING PROGRAM

## 2019-04-19 PROCEDURE — 25000132 ZZH RX MED GY IP 250 OP 250 PS 637: Performed by: STUDENT IN AN ORGANIZED HEALTH CARE EDUCATION/TRAINING PROGRAM

## 2019-04-19 PROCEDURE — 12000001 ZZH R&B MED SURG/OB UMMC

## 2019-04-19 PROCEDURE — 00000146 ZZHCL STATISTIC GLUCOSE BY METER IP

## 2019-04-19 RX ADMIN — PANTOPRAZOLE SODIUM 40 MG: 40 TABLET, DELAYED RELEASE ORAL at 08:57

## 2019-04-19 RX ADMIN — ACETYLCYSTEINE 2 ML: 200 SOLUTION ORAL; RESPIRATORY (INHALATION) at 20:32

## 2019-04-19 RX ADMIN — ACETYLCYSTEINE 2 ML: 200 SOLUTION ORAL; RESPIRATORY (INHALATION) at 06:58

## 2019-04-19 RX ADMIN — ALBUTEROL SULFATE 2.5 MG: 2.5 SOLUTION RESPIRATORY (INHALATION) at 06:58

## 2019-04-19 RX ADMIN — PRENATAL VIT W/ FE FUMARATE-FA TAB 27-0.8 MG 1 TABLET: 27-0.8 TAB at 08:57

## 2019-04-19 RX ADMIN — ALBUTEROL SULFATE 2.5 MG: 2.5 SOLUTION RESPIRATORY (INHALATION) at 20:32

## 2019-04-19 RX ADMIN — SENNOSIDES AND DOCUSATE SODIUM 3 TABLET: 8.6; 5 TABLET ORAL at 22:12

## 2019-04-19 RX ADMIN — SALINE NASAL SPRAY 1 SPRAY: 1.5 SOLUTION NASAL at 14:38

## 2019-04-19 RX ADMIN — ACETYLCYSTEINE 2 ML: 200 SOLUTION ORAL; RESPIRATORY (INHALATION) at 14:38

## 2019-04-19 RX ADMIN — ALBUTEROL SULFATE 2.5 MG: 2.5 SOLUTION RESPIRATORY (INHALATION) at 14:38

## 2019-04-19 NOTE — PROGRESS NOTES
Antepartum Progress Note    Patient name: Armando Dc  MRN: [id number]  : 1975    S: Ms. Dc reports feeling well, no real updates. Her  is here this morning. She denies bleeding or leaking.  Normal fetal movement.  No respiratory symptoms.     O: BP 99/57   Pulse 77   Temp 97  F (36.1  C) (Oral)   Resp 18   Wt 68.1 kg (150 lb 1.6 oz)   SpO2 95%   BMI 25.76 kg/m     Gen: comfortable appearing, no distress  Resp: no increased work of breathing   CV: RRR   Abd: soft, nontender. Gravid.   Extremities: no LE edema    FHT: Baseline 130 Bpm, moderate variability, + accels, - decels  [place]: ctx q5-8 min      US 4/15: EFW 2759g (66%tile), posterior previa, cephalic. L renal pyelectasis, mild. NILDA 28. BPP     B-116 yesterday       A/P: 43 year old  at 35w1d with placenta previa and third bleed on 19.  No current bleeding.  Some uterine activity, but no complaints. HD#14    Posterior placenta previa, with third bleed  - No bleeding since , continue to monitor  - SSE as needed, patient amenable  - Delivery scheduled at 36 weeks by  () - okay with residents assisting   - Earlier delivery as needed    Fetal well-being  - s/p BMZ -   - routine monitoring  - Continue weekly BPP (next scheduled on 19)    Primary ciliary dyskinesia and bronchiectasis with moderate-severe obstructive lung disease .   - Stable disease. Dr. Hernández is her Pulmonologist and is aware of her admission.  - Last PFTs 2018. Inpatient consult if worsening status.   - Recent +sputum culture for pseudomonas/haemophils, s/p 10d azithromycin. No evidence of pneumonia.   - Continue three times daily bronchial drainage therapy and nebulization treatment as recommended per pulmonology.  - Will restart vest therapy after delivery  - Close monitoring of oxygen saturations, start O2 supplemental therapy in pregnancy if needed to maintain oxygen saturations > 95%    - Consider delivery if pulmonary status  worsens and it appears that it could be improved by delivery  - Anesthesiology consultation completed 3/28. Plan for spinal and TAP block.      GDMA1  - Fasting and postprandial BG  - Continue fasting and 1 hour PP glucose check     Constipation  - Senna. Add Miralax if needed.     PPx  - SCDs    Palmira Naranjo MD   PGY-3 Ob/Gyn

## 2019-04-19 NOTE — PROVIDER NOTIFICATION
04/19/19 0918   Provider Notification   Provider Name/Title Dr. [place]   Method of Notification At Bedside   Notification Reason Other (Comment)  (Rounds)

## 2019-04-19 NOTE — PLAN OF CARE
Patient's vitals and blood glucose are stable. FHR and uterine activity see flow sheet. Denies pain, bleeding, cramping, and leaking fluids. Continue with plan of care.

## 2019-04-19 NOTE — PLAN OF CARE
VSS. Denies LOF/bleeding. Occasional cramping per her usual, 5 ctx/hr on toco. FHR appropriate for gestational age - see flowsheets for details. Pt completing neb treatments independently. Pts father visited and  now staying the night. Will continue to monitor.

## 2019-04-19 NOTE — PLAN OF CARE
Patient denies bleeding and leaking, states she feels occasional cramp and tightening.  Baby is active per patient.  VSS.  Patient denies pain currently.  FHT's appropriate for gestational age and a reactive NST noted.  Plan of care reviewed with patient, understanding verbalized.  Continue with current plan of care.

## 2019-04-20 LAB
GLUCOSE BLDC GLUCOMTR-MCNC: 104 MG/DL (ref 70–99)
GLUCOSE BLDC GLUCOMTR-MCNC: 110 MG/DL (ref 70–99)
GLUCOSE BLDC GLUCOMTR-MCNC: 79 MG/DL (ref 70–99)
GLUCOSE BLDC GLUCOMTR-MCNC: 98 MG/DL (ref 70–99)

## 2019-04-20 PROCEDURE — 25000132 ZZH RX MED GY IP 250 OP 250 PS 637: Performed by: STUDENT IN AN ORGANIZED HEALTH CARE EDUCATION/TRAINING PROGRAM

## 2019-04-20 PROCEDURE — 25000125 ZZHC RX 250: Performed by: STUDENT IN AN ORGANIZED HEALTH CARE EDUCATION/TRAINING PROGRAM

## 2019-04-20 PROCEDURE — 00000146 ZZHCL STATISTIC GLUCOSE BY METER IP

## 2019-04-20 PROCEDURE — 12000001 ZZH R&B MED SURG/OB UMMC

## 2019-04-20 RX ADMIN — ALBUTEROL SULFATE 2.5 MG: 2.5 SOLUTION RESPIRATORY (INHALATION) at 07:14

## 2019-04-20 RX ADMIN — ACETYLCYSTEINE 2 ML: 200 SOLUTION ORAL; RESPIRATORY (INHALATION) at 19:47

## 2019-04-20 RX ADMIN — PANTOPRAZOLE SODIUM 40 MG: 40 TABLET, DELAYED RELEASE ORAL at 08:21

## 2019-04-20 RX ADMIN — PRENATAL VIT W/ FE FUMARATE-FA TAB 27-0.8 MG 1 TABLET: 27-0.8 TAB at 08:21

## 2019-04-20 RX ADMIN — SENNOSIDES AND DOCUSATE SODIUM 3 TABLET: 8.6; 5 TABLET ORAL at 22:05

## 2019-04-20 RX ADMIN — ALBUTEROL SULFATE 2.5 MG: 2.5 SOLUTION RESPIRATORY (INHALATION) at 19:47

## 2019-04-20 RX ADMIN — ALBUTEROL SULFATE 2.5 MG: 2.5 SOLUTION RESPIRATORY (INHALATION) at 15:29

## 2019-04-20 RX ADMIN — ACETYLCYSTEINE 2 ML: 200 SOLUTION ORAL; RESPIRATORY (INHALATION) at 07:14

## 2019-04-20 RX ADMIN — ACETYLCYSTEINE 2 ML: 200 SOLUTION ORAL; RESPIRATORY (INHALATION) at 15:29

## 2019-04-20 NOTE — PROGRESS NOTES
Antepartum Progress Note    Patient name: Armando Dc  MRN: [id number]  : 1975    S: Ms. Dc is doing well this morning. Denies any vaginal bleeding or leakage of fluid. +FM. Occasional tightening but not abdominal pain. She is accompanied by her . No respiratory symptoms. Not currently using vest- made plan with pulmonologist to resume use postpartum.     O: BP 97/55   Pulse 77   Temp 97.8  F (36.6  C) (Oral)   Resp 18   Wt 68.1 kg (150 lb 1.6 oz)   SpO2 95%   BMI 25.76 kg/m     Gen: comfortable appearing, no distress  Resp: no increased work of breathing  CV: RRR, no murmurs  Abd: soft, nontender. Gravid.   Extremities: no LE edema    FHT: Baseline 130 bpm, moderate variability, + accels, no decels  [place]: ctx q2-3 min    US 4/15: EFW 2759g (66%tile), posterior previa, cephalic. L renal pyelectasis, mild. NILDA 28. BPP     B-110 yesterday evening and this morning    A/P: 43 year old  at 35w2d with placenta previa and third bleed on 19.  No current bleeding.  Some uterine activity, but no complaints. 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 Dr. Hernández is her Pulmonologist and is aware of her admission.  - Last PFTs 2018. Inpatient consult if worsening status.   - Recent +sputum culture for pseudomonas/haemophils, s/p 10d azithromycin. No evidence of pneumonia.   - Continue three times daily bronchial drainage therapy and nebulization treatment as recommended per pulmonology.  - Will restart vest therapy  after delivery  - Close monitoring of oxygen saturations, start O2 supplemental therapy in pregnancy if needed to maintain oxygen saturations > 95%  - Consider delivery if pulmonary status worsens and it appears that it could be improved by delivery  - Anesthesiology consultation completed 3/28. Plan for spinal and TAP block.      GDMA1  - Fasting and postprandial BG  - Continue fasting and 1 hour PP glucose check     Constipation  - Senna. Add Miralax if needed.     PPx  - SCDs    Dontrell Holman MD  OB/GYN Resident, PGY-3  4/20/2019

## 2019-04-20 NOTE — PLAN OF CARE
Patient is stable fetal heart normal. VSS, afebrile complain of abdominal tightness once a while.Contractions x 6 during the last fetal and uterine monitoring. No complains of bleeding or leaking. Blood glucose done one hour post prandial. Patient encouraged to report any bleeding or changes.

## 2019-04-20 NOTE — PROVIDER NOTIFICATION
04/20/19 0708   Provider Notification   Provider Name/Title Dr. Galvan   Method of Notification Electronic Page   Request Evaluate - Remote   Notification Reason Uterine Activity   Notified MD of increased uterine activity. Contractions 3-9 minutes apart. Patient states contractions not at all painful and per her usual. Instructed to notify if contractions become painful or needing to breathe thru, cramping, backache, pelvic pressure, leaking or bleeding.

## 2019-04-20 NOTE — PLAN OF CARE
AFVSS. Denies bleeding, leaking of fluid, cramping, pelvic pressure or back pain. Patient with contractions every 3-9 minutes on the monitor this morning. Patient states that these are not at all painful and feel like tightening. Fetal heart tracing with moderate variability, normal rate, accelerations and not declerations. Reviewed with patient to call if contractions more frequent, painful, cramping, backache, pelvic pressure, leaking or bleeding. Patient verbalizes understanding. Will continue to monitor closely for s/s bleeding/labor. Continue present cares.

## 2019-04-20 NOTE — PLAN OF CARE
AFVSS. Denies backache, pelvic pressure, leaking or bleeding. States feels occasional contractions. States these are per her usual and feel like tightening. Patient states is aware to notify staff if feeling increased contractions, cramping, pain or bleeding. Offers no complaints/concerns. Continue present cares.

## 2019-04-21 LAB
GLUCOSE BLDC GLUCOMTR-MCNC: 101 MG/DL (ref 70–99)
GLUCOSE BLDC GLUCOMTR-MCNC: 107 MG/DL (ref 70–99)
GLUCOSE BLDC GLUCOMTR-MCNC: 110 MG/DL (ref 70–99)
GLUCOSE BLDC GLUCOMTR-MCNC: 84 MG/DL (ref 70–99)

## 2019-04-21 PROCEDURE — 25000132 ZZH RX MED GY IP 250 OP 250 PS 637: Performed by: STUDENT IN AN ORGANIZED HEALTH CARE EDUCATION/TRAINING PROGRAM

## 2019-04-21 PROCEDURE — 25000125 ZZHC RX 250: Performed by: STUDENT IN AN ORGANIZED HEALTH CARE EDUCATION/TRAINING PROGRAM

## 2019-04-21 PROCEDURE — 12000001 ZZH R&B MED SURG/OB UMMC

## 2019-04-21 PROCEDURE — 00000146 ZZHCL STATISTIC GLUCOSE BY METER IP

## 2019-04-21 RX ADMIN — PANTOPRAZOLE SODIUM 40 MG: 40 TABLET, DELAYED RELEASE ORAL at 08:10

## 2019-04-21 RX ADMIN — ALBUTEROL SULFATE 2.5 MG: 2.5 SOLUTION RESPIRATORY (INHALATION) at 19:43

## 2019-04-21 RX ADMIN — ACETYLCYSTEINE 2 ML: 200 SOLUTION ORAL; RESPIRATORY (INHALATION) at 16:02

## 2019-04-21 RX ADMIN — ACETYLCYSTEINE 2 ML: 200 SOLUTION ORAL; RESPIRATORY (INHALATION) at 07:01

## 2019-04-21 RX ADMIN — PRENATAL VIT W/ FE FUMARATE-FA TAB 27-0.8 MG 1 TABLET: 27-0.8 TAB at 08:10

## 2019-04-21 RX ADMIN — SENNOSIDES AND DOCUSATE SODIUM 3 TABLET: 8.6; 5 TABLET ORAL at 21:36

## 2019-04-21 RX ADMIN — ACETYLCYSTEINE 2 ML: 200 SOLUTION ORAL; RESPIRATORY (INHALATION) at 19:43

## 2019-04-21 RX ADMIN — ALBUTEROL SULFATE 2.5 MG: 2.5 SOLUTION RESPIRATORY (INHALATION) at 07:01

## 2019-04-21 RX ADMIN — ALBUTEROL SULFATE 2.5 MG: 2.5 SOLUTION RESPIRATORY (INHALATION) at 16:02

## 2019-04-21 NOTE — PLAN OF CARE
Data: VSS, afebrile. Maternal status stable. Fetal assessment is normal  Action: Continue with plan of care, which is TID fetal and uterine monitoring. Patient encouraged to move extremities while in bed.  Response: Patient coping with plan of care.

## 2019-04-21 NOTE — PLAN OF CARE
AFVSS. States slept well overnight. Denies contractions, cramping, backache, pelvic pressure, leaking or bleeding. Offers no complaints/concerns. Continue to monitor for s/s labor/bleeding. Continue present cares.

## 2019-04-21 NOTE — PLAN OF CARE
AFVSS. States feels occasional uterine tightening, states unchanged. Denies bleeding, leaking, backache or pelvic pressure. Patient requested to be put back on the monitor just before bed. She stated that baby hadn't been moving as much as earlier in evening. Monitors reapplied. FHT showed moderate variability, normal rate, accelerations and no declerations. Monitor showing contractions, patient states unchanged in intensity. Reviewed s/s labor, instructed to call staff with increased contractions, painful contractions, backache, pelvic pressure, leaking or bleeding. Patient verbalized understanding.   Monitors removed and patient settled for sleep. Continue to monitor for s/s labor/bleeding. Continue present cares.

## 2019-04-21 NOTE — PROGRESS NOTES
Antepartum Progress Note    Patient name: Armando Dc  MRN: [id number]  : 1975    S: Ms. Dc is doing well this morning. Denies any vaginal bleeding or leakage of fluid. +FM. Occasional tightening but not abdominal pain. She is accompanied by her . No respiratory symptoms. Not currently using vest- made plan with pulmonologist to resume use postpartum.     O: /65   Pulse 77   Temp 98.2  F (36.8  C) (Oral)   Resp 18   Wt 68.1 kg (150 lb 1.6 oz)   SpO2 96%   BMI 25.76 kg/m     Gen: comfortable appearing, no distress  Resp: no increased work of breathing  CV: RRR, no murmurs  Abd: soft, nontender. Gravid.   Extremities: no LE edema    FHT: Baseline 150-155 bpm, moderate variability, + accels, no decels  [place]: ctx q 6-7 minutes, mild to palpation. No regular pattern.     US 4/15: EFW 2759g (66%tile), posterior previa, cephalic. L renal pyelectasis, mild. NILDA 28. BPP     B-110 yesterday evening and this morning = 84    A/P: 43 year old  at 35w3d with placenta previa and third bleed on 19.  No current bleeding.  Some uterine activity, but no complaints. HD#16    Posterior placenta previa, with third bleed  - No bleeding since , continue to monitor  - SSE as needed, patient amenable  - Delivery scheduled at 36 weeks by  () - okay with residents assisting   - Earlier delivery as needed    Fetal well-being  - s/p BMZ -   - routine monitoring  - Continue weekly BPP (next scheduled on 19)    Primary ciliary dyskinesia and bronchiectasis with moderate-severe obstructive lung disease  - Stable disease, not requiring supplemental O2 at this point. Dr. Hernández is her Pulmonologist and is aware of her admission.  - Last PFTs 2018. Inpatient consult if worsening status.   - Recent +sputum culture for pseudomonas/haemophils, s/p 10d azithromycin. No evidence of pneumonia.   - Continue three times daily bronchial drainage therapy and nebulization treatment as  recommended per pulmonology.  - Will restart vest therapy after delivery  - Close monitoring of oxygen saturations, start O2 supplemental therapy in pregnancy if needed to maintain oxygen saturations > 95%  - Consider delivery if pulmonary status worsens and it appears that it could be improved by delivery  - Anesthesiology consultation completed 3/28. Plan for spinal and TAP block.      GDMA1  - Fasting and postprandial BG  - Continue fasting and 1 hour PP glucose check     Constipation  - Senna. Add Miralax if needed.     PPx  - SCDs    Dilcia Galvan MD, MHS  OB/GYN Resident, PGY-3  2019 \    Physician Attestation     I, Bryon Liz MD, saw this patient with the resident and agree with the resident s findings and plan of care as documented in the resident s note.       I personally reviewed vital signs, medications, labs, imaging and EFM.     Key findings: In summary, Armando is a  at 35w3d with history of placenta previa with recurrent vaginal bleeding.  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  She meets criteria to continue inpatient expectant management. Plan for primary CS at 36 weeks given recurrent  Bleeding.     She requests MFM to follow as well postpartum, given maternal respiratory issues. I will see her POD #1, and we will continue to participate in rounds but may not see her daily after the first day if she is doing well, unless issues arise.     She requests to meet Dr. Shipley prior to CS - we discussed that this is the standard practice at our facility and she will be able to see her pre-operatively, which was fine with Armando. Did not feel she needed to see her prior to day of C/S.       Bryon Liz MD  Maternal Fetal Medicine  Date of Service (when I saw the patient): 19

## 2019-04-22 ENCOUNTER — HOSPITAL ENCOUNTER (INPATIENT)
Dept: ULTRASOUND IMAGING | Facility: CLINIC | Age: 44
End: 2019-04-22
Payer: COMMERCIAL

## 2019-04-22 LAB
GLUCOSE BLDC GLUCOMTR-MCNC: 102 MG/DL (ref 70–99)
GLUCOSE BLDC GLUCOMTR-MCNC: 116 MG/DL (ref 70–99)
GLUCOSE BLDC GLUCOMTR-MCNC: 83 MG/DL (ref 70–99)
GLUCOSE BLDC GLUCOMTR-MCNC: 93 MG/DL (ref 70–99)

## 2019-04-22 PROCEDURE — 25000132 ZZH RX MED GY IP 250 OP 250 PS 637: Performed by: STUDENT IN AN ORGANIZED HEALTH CARE EDUCATION/TRAINING PROGRAM

## 2019-04-22 PROCEDURE — 00000146 ZZHCL STATISTIC GLUCOSE BY METER IP

## 2019-04-22 PROCEDURE — 25000125 ZZHC RX 250: Performed by: STUDENT IN AN ORGANIZED HEALTH CARE EDUCATION/TRAINING PROGRAM

## 2019-04-22 PROCEDURE — 12000001 ZZH R&B MED SURG/OB UMMC

## 2019-04-22 PROCEDURE — 76819 FETAL BIOPHYS PROFIL W/O NST: CPT

## 2019-04-22 RX ADMIN — ACETYLCYSTEINE 2 ML: 200 SOLUTION ORAL; RESPIRATORY (INHALATION) at 19:06

## 2019-04-22 RX ADMIN — PRENATAL VIT W/ FE FUMARATE-FA TAB 27-0.8 MG 1 TABLET: 27-0.8 TAB at 08:56

## 2019-04-22 RX ADMIN — ALBUTEROL SULFATE 2.5 MG: 2.5 SOLUTION RESPIRATORY (INHALATION) at 15:00

## 2019-04-22 RX ADMIN — ALBUTEROL SULFATE 2.5 MG: 2.5 SOLUTION RESPIRATORY (INHALATION) at 19:05

## 2019-04-22 RX ADMIN — ACETYLCYSTEINE 2 ML: 200 SOLUTION ORAL; RESPIRATORY (INHALATION) at 15:00

## 2019-04-22 RX ADMIN — SENNOSIDES AND DOCUSATE SODIUM 3 TABLET: 8.6; 5 TABLET ORAL at 21:08

## 2019-04-22 RX ADMIN — ACETYLCYSTEINE 2 ML: 200 SOLUTION ORAL; RESPIRATORY (INHALATION) at 07:07

## 2019-04-22 RX ADMIN — ALBUTEROL SULFATE 2.5 MG: 2.5 SOLUTION RESPIRATORY (INHALATION) at 07:07

## 2019-04-22 RX ADMIN — PANTOPRAZOLE SODIUM 40 MG: 40 TABLET, DELAYED RELEASE ORAL at 08:56

## 2019-04-22 NOTE — PROGRESS NOTES
Antepartum Progress Note    Patient name: Armando Dc  MRN: [id number]  : 1975    S: Ms. Dc is doing well this morning. Sitting up next to bed visiting with her father. Denies any vaginal bleeding or leakage of fluid. +FM. Occasional tightening but not abdominal pain.      O: /63   Pulse 77   Temp 97.8  F (36.6  C) (Oral)   Resp 18   Wt 68.1 kg (150 lb 1.6 oz)   SpO2 95%   BMI 25.76 kg/m     Gen: comfortable appearing, no distress  Resp: no increased work of breathing  Abd: soft, nontender. Gravid.     FHT: Baseline 130-140 bpm, moderate variability, + accels, no decels  [place]: ctx q 8 minutes, mild to palpation. No regular pattern.     US 4/15: EFW 2759g (66%tile), posterior previa, cephalic. L renal pyelectasis, mild. NILDA 28. BPP 8/8  US : BPP 8/8 - [place], posterior previa    BG: within normal limits this am 83 fasting, 116 postprandial    A/P: 43 year old  at 35w4d with placenta previa and third bleed on 19.  No current bleeding.  Some uterine activity, but no complaints. HD#17     Posterior placenta previa, with third bleed  - No bleeding since , continue to monitor  - SSE as needed, patient amenable  - Delivery scheduled at 36 weeks by  () - okay with residents assisting. Will have Dr. Shipley as OB surgeon. Requests to meet prior to CS, which is standard for our team.   - Earlier delivery as needed    Fetal well-being  - s/p BMZ -   - routine monitoring    Primary ciliary dyskinesia and bronchiectasis with moderate-severe obstructive lung disease  - Stable disease, not requiring supplemental O2 at this point. Dr. Hernández is her Pulmonologist and is aware of her admission.  - Last PFTs 2018. Inpatient consult if worsening status.   - Recent +sputum culture for pseudomonas/haemophils, s/p 10d azithromycin. No evidence of pneumonia.   - Continue three times daily bronchial drainage therapy and nebulization treatment as recommended per pulmonology.  - Will  restart vest therapy after delivery as tolerated with potential breastfeeding and breast engorgement, which may make vest difficult to use.   - Close monitoring of oxygen saturations, start O2 supplemental therapy in pregnancy if needed to maintain oxygen saturations > 95%  - Consider delivery if pulmonary status worsens and it appears that it could be improved by delivery  - Anesthesiology consultation completed 3/28. Plan for spinal and TAP block.      GDMA1  - Fasting and postprandial BG  - Continue fasting and 1 hour PP glucose check     Constipation  - Senna. Add Miralax if needed.     PPx  - SCDs       I, Bryon Liz MD, saw and examined this patient with my documentation above.          Bryon Liz MD  Maternal Fetal Medicine  Date of Service (when I saw the patient): 04/22/19

## 2019-04-22 NOTE — PROGRESS NOTES
Pt. Called me into her room to look and some yellow discharge on her pad.  It is a scant amount, no odor. Did not saturate her pad.  She denies all symptoms of a vaginal infection. No blood noted. No cramping, back ache or pelvic pressure

## 2019-04-22 NOTE — PLAN OF CARE
Pt. Father has been here all day visiting her.  Her parents are here from [place] until June.  She continues to deny vaginal bleeding, or PTL symptoms.  She will alert the nursing staff if this changes. She stated she is in a support group with mothers that have placenta previa and that it is helpful to chat with other moms her her sharda position

## 2019-04-22 NOTE — PLAN OF CARE
AFVSS. Denies cramping, backache, pelvic pressure, leaking or bleeding. States feels an occasional contractions, states feels like tightening and is per her usual. Offers no complaints/concerns. Continue to monitor for s/s ptl/bleeding.

## 2019-04-22 NOTE — PLAN OF CARE
AFVSS. States slept well overnight. States feels an occasional contraction, none that awoke her overnight. Denies cramping, backache, pelvic pressure, leaking or bleeding. Offers no complaints/concerns. Continue to monitor for s/s bleeding/labor. Continue present cares.

## 2019-04-23 LAB
GLUCOSE BLDC GLUCOMTR-MCNC: 108 MG/DL (ref 70–99)
GLUCOSE BLDC GLUCOMTR-MCNC: 128 MG/DL (ref 70–99)
GLUCOSE BLDC GLUCOMTR-MCNC: 82 MG/DL (ref 70–99)
GLUCOSE BLDC GLUCOMTR-MCNC: 96 MG/DL (ref 70–99)

## 2019-04-23 PROCEDURE — 25000125 ZZHC RX 250: Performed by: STUDENT IN AN ORGANIZED HEALTH CARE EDUCATION/TRAINING PROGRAM

## 2019-04-23 PROCEDURE — 25000132 ZZH RX MED GY IP 250 OP 250 PS 637: Performed by: OBSTETRICS & GYNECOLOGY

## 2019-04-23 PROCEDURE — 12000001 ZZH R&B MED SURG/OB UMMC

## 2019-04-23 PROCEDURE — 00000146 ZZHCL STATISTIC GLUCOSE BY METER IP

## 2019-04-23 PROCEDURE — 25000132 ZZH RX MED GY IP 250 OP 250 PS 637: Performed by: STUDENT IN AN ORGANIZED HEALTH CARE EDUCATION/TRAINING PROGRAM

## 2019-04-23 RX ORDER — DOCOSANOL 100 MG/G
CREAM TOPICAL
Status: DISCONTINUED | OUTPATIENT
Start: 2019-04-23 | End: 2019-04-29 | Stop reason: HOSPADM

## 2019-04-23 RX ADMIN — ALBUTEROL SULFATE 2.5 MG: 2.5 SOLUTION RESPIRATORY (INHALATION) at 18:33

## 2019-04-23 RX ADMIN — DOCOSANOL: 100 CREAM TOPICAL at 11:00

## 2019-04-23 RX ADMIN — ACETYLCYSTEINE 2 ML: 200 SOLUTION ORAL; RESPIRATORY (INHALATION) at 06:05

## 2019-04-23 RX ADMIN — ACETYLCYSTEINE 2 ML: 200 SOLUTION ORAL; RESPIRATORY (INHALATION) at 14:50

## 2019-04-23 RX ADMIN — PANTOPRAZOLE SODIUM 40 MG: 40 TABLET, DELAYED RELEASE ORAL at 07:53

## 2019-04-23 RX ADMIN — ACETYLCYSTEINE 2 ML: 200 SOLUTION ORAL; RESPIRATORY (INHALATION) at 18:34

## 2019-04-23 RX ADMIN — ALBUTEROL SULFATE 2.5 MG: 2.5 SOLUTION RESPIRATORY (INHALATION) at 14:50

## 2019-04-23 RX ADMIN — SENNOSIDES AND DOCUSATE SODIUM 3 TABLET: 8.6; 5 TABLET ORAL at 20:42

## 2019-04-23 RX ADMIN — PRENATAL VIT W/ FE FUMARATE-FA TAB 27-0.8 MG 1 TABLET: 27-0.8 TAB at 07:53

## 2019-04-23 RX ADMIN — DOCOSANOL: 100 CREAM TOPICAL at 09:58

## 2019-04-23 RX ADMIN — ALBUTEROL SULFATE 2.5 MG: 2.5 SOLUTION RESPIRATORY (INHALATION) at 06:04

## 2019-04-23 RX ADMIN — DOCOSANOL: 100 CREAM TOPICAL at 22:00

## 2019-04-23 RX ADMIN — DOCOSANOL: 100 CREAM TOPICAL at 14:00

## 2019-04-23 NOTE — PROGRESS NOTES
Delayed note regarding care provided at time of assessment 2/15/19.   Patient was seen in patient observation status on  to assess for bleeding and potential need for admission but was not admitted to inpatient status.   Bryon Liz

## 2019-04-23 NOTE — PLAN OF CARE
Second/Third Trimester Bleeding Shift Note  Data: Bleeding: Absent, amount: None.  Vital Signs: WNL.  Contractions: frequency q 3-11 minutes, Strength patient states she feels tightening and occasional cramping and Duration  seconds,  Contraction pattern stable and within parameters. Fetal assessment Appropriate for Gestational Age.  Glucose levels WNL. Patient administering her own nebulizer treatments.   Interventions:  Continue uterine/fetal assessment 3 times daily. Activity level:Regular activity.    Plan:  Pad weights for any bleeding.

## 2019-04-23 NOTE — PLAN OF CARE
Patient is having no bleeding or LOF. Visiting  with her father and mother  this afternoon. BG =108  1 HR after breakfast. BG=96 1 hour after lunch.  with mod madhu with accels and no decels. She is having contractions 6/hr   that last  sec.  Plan is for a c/s  on 4/25. Patient is happy to be delivering soon.

## 2019-04-23 NOTE — PLAN OF CARE
Patient slept well overnight. Denies any signs of  labor. Feels occasional tightening with contractions, not new from her norm. Blood sugars WDL. FHT's 135 with moderate variability and accelerations, no decelerations.  at bedside. Will continue with current plan of care.

## 2019-04-23 NOTE — PROGRESS NOTES
Antepartum Progress Note    Patient name: Armando Dc  MRN: [id number]  : 1975    S: Ms. Dc is doing well this morning. Sitting up next to bed visiting with her . Denies any vaginal bleeding or leakage of fluid. +FM. Occasional tightening but not abdominal pain. Does report new cold sores on lips and asking if we can treat. She has had many times before and has used topical Abreva.     O: BP 95/54   Pulse 77   Temp 97.7  F (36.5  C) (Oral)   Resp 18   Wt 67.9 kg (149 lb 11.2 oz)   SpO2 98%   BMI 25.70 kg/m     Gen: comfortable appearing, no distress  Resp: no increased work of breathing  Abd: soft, nontender. Gravid.     FHT: Baseline 130-135 bpm, moderate variability, + accels, no decels  [place]: irregular contractions, mild to palpation    US 4/15: EFW 2759g (66%tile), posterior previa, cephalic. L renal pyelectasis, mild. NILDA 28. BPP 8/8  US : BPP 8/8 - [place], posterior previa    BG: within normal limits this am 84    A/P: 43 year old  at 35w5d with placenta previa and third bleed on 19.  No current bleeding.  Some uterine activity, but no complaints. HD#18     Posterior placenta previa, with third bleed  - No bleeding since , continue to monitor  - SSE as needed, patient amenable  - Delivery scheduled at 36 weeks by  () - okay with residents assisting. Will have Dr. Shipley as OB surgeon. Plans to meet her today.   - Earlier delivery as needed  - will order limited (vs. Bedside) US for Thursday am before C/S to help guide surgical incision - has been transverse in the past and would like to avoid vertical skin incision if possible given maternal respiratory diagnoses.     Fetal well-being  - s/p BMZ -   - routine monitoring    Primary ciliary dyskinesia and bronchiectasis with moderate-severe obstructive lung disease  - Stable disease, not requiring supplemental O2 at this point. Dr. Hernández is her Pulmonologist and is aware of her admission.  - Last PFTs  11/2018. Inpatient consult if worsening status.   - Recent +sputum culture for pseudomonas/haemophils, s/p 10d azithromycin. No evidence of pneumonia.   - Continue three times daily bronchial drainage therapy and nebulization treatment as recommended per pulmonology.  - Will restart vest therapy after delivery as tolerated with potential breastfeeding and breast engorgement, which may make vest difficult to use.   - Close monitoring of oxygen saturations, start O2 supplemental therapy in pregnancy if needed to maintain oxygen saturations > 95%  - Consider delivery if pulmonary status worsens and it appears that it could be improved by delivery  - Anesthesiology consultation completed 3/28. Plan for spinal and TAP block.      GDMA1  - Fasting and postprandial BG  - Continue fasting and 1 hour PP glucose check     Constipation  - Senna. Add Miralax if needed.     PPx  - SCDs       I, Bryon Liz MD, saw and examined this patient with my documentation above.          Bryon Liz MD  Maternal Fetal Medicine  Date of Service (when I saw the patient): 04/23/19

## 2019-04-24 ENCOUNTER — ANESTHESIA EVENT (OUTPATIENT)
Dept: OBGYN | Facility: CLINIC | Age: 44
End: 2019-04-24
Payer: COMMERCIAL

## 2019-04-24 LAB
GLUCOSE BLDC GLUCOMTR-MCNC: 108 MG/DL (ref 70–99)
GLUCOSE BLDC GLUCOMTR-MCNC: 117 MG/DL (ref 70–99)
GLUCOSE BLDC GLUCOMTR-MCNC: 119 MG/DL (ref 70–99)
GLUCOSE BLDC GLUCOMTR-MCNC: 87 MG/DL (ref 70–99)

## 2019-04-24 PROCEDURE — 12000001 ZZH R&B MED SURG/OB UMMC

## 2019-04-24 PROCEDURE — 36415 COLL VENOUS BLD VENIPUNCTURE: CPT | Performed by: STUDENT IN AN ORGANIZED HEALTH CARE EDUCATION/TRAINING PROGRAM

## 2019-04-24 PROCEDURE — 25000125 ZZHC RX 250: Performed by: STUDENT IN AN ORGANIZED HEALTH CARE EDUCATION/TRAINING PROGRAM

## 2019-04-24 PROCEDURE — 86901 BLOOD TYPING SEROLOGIC RH(D): CPT | Performed by: STUDENT IN AN ORGANIZED HEALTH CARE EDUCATION/TRAINING PROGRAM

## 2019-04-24 PROCEDURE — 00000146 ZZHCL STATISTIC GLUCOSE BY METER IP

## 2019-04-24 PROCEDURE — 25000132 ZZH RX MED GY IP 250 OP 250 PS 637: Performed by: STUDENT IN AN ORGANIZED HEALTH CARE EDUCATION/TRAINING PROGRAM

## 2019-04-24 PROCEDURE — 86923 COMPATIBILITY TEST ELECTRIC: CPT | Performed by: STUDENT IN AN ORGANIZED HEALTH CARE EDUCATION/TRAINING PROGRAM

## 2019-04-24 PROCEDURE — 86900 BLOOD TYPING SEROLOGIC ABO: CPT | Performed by: STUDENT IN AN ORGANIZED HEALTH CARE EDUCATION/TRAINING PROGRAM

## 2019-04-24 PROCEDURE — 86850 RBC ANTIBODY SCREEN: CPT | Performed by: STUDENT IN AN ORGANIZED HEALTH CARE EDUCATION/TRAINING PROGRAM

## 2019-04-24 RX ORDER — SODIUM CHLORIDE, SODIUM LACTATE, POTASSIUM CHLORIDE, CALCIUM CHLORIDE 600; 310; 30; 20 MG/100ML; MG/100ML; MG/100ML; MG/100ML
INJECTION, SOLUTION INTRAVENOUS CONTINUOUS
Status: DISCONTINUED | OUTPATIENT
Start: 2019-04-25 | End: 2019-04-25

## 2019-04-24 RX ADMIN — DOCOSANOL: 100 CREAM TOPICAL at 14:00

## 2019-04-24 RX ADMIN — ACETYLCYSTEINE 2 ML: 200 SOLUTION ORAL; RESPIRATORY (INHALATION) at 06:16

## 2019-04-24 RX ADMIN — ALBUTEROL SULFATE 2.5 MG: 2.5 SOLUTION RESPIRATORY (INHALATION) at 16:04

## 2019-04-24 RX ADMIN — DOCOSANOL: 100 CREAM TOPICAL at 11:00

## 2019-04-24 RX ADMIN — DOCOSANOL: 100 CREAM TOPICAL at 22:04

## 2019-04-24 RX ADMIN — PRENATAL VIT W/ FE FUMARATE-FA TAB 27-0.8 MG 1 TABLET: 27-0.8 TAB at 08:34

## 2019-04-24 RX ADMIN — SENNOSIDES AND DOCUSATE SODIUM 3 TABLET: 8.6; 5 TABLET ORAL at 20:44

## 2019-04-24 RX ADMIN — ALBUTEROL SULFATE 2.5 MG: 2.5 SOLUTION RESPIRATORY (INHALATION) at 20:43

## 2019-04-24 RX ADMIN — ACETYLCYSTEINE 2 ML: 200 SOLUTION ORAL; RESPIRATORY (INHALATION) at 16:03

## 2019-04-24 RX ADMIN — ALBUTEROL SULFATE 2.5 MG: 2.5 SOLUTION RESPIRATORY (INHALATION) at 06:16

## 2019-04-24 RX ADMIN — DOCOSANOL: 100 CREAM TOPICAL at 08:00

## 2019-04-24 RX ADMIN — ACETYLCYSTEINE 2 ML: 200 SOLUTION ORAL; RESPIRATORY (INHALATION) at 20:43

## 2019-04-24 RX ADMIN — PANTOPRAZOLE SODIUM 40 MG: 40 TABLET, DELAYED RELEASE ORAL at 08:34

## 2019-04-24 NOTE — PLAN OF CARE
Second/Third Trimester Bleeding Shift Note  Data: Bleeding: Absent.  Vital Signs: stable.  Contractions: frequency q 5/hr,  Contraction pattern stable and within parameters, uterus palpates soft. Fetal assessment Appropriate for Gestational Age.    Interventions:  Continue uterine/fetal assessment 3 times daily. Activity level:Regular activity.    Plan:  Pad weights for any bleeding.

## 2019-04-24 NOTE — PLAN OF CARE
Data: Blood sugars within normal limits.  Interventions: Finger stick blood glucose levels every hour postprandial. Continue uterine/fetal assessment TID. Activity level: Regular activity. Preventive measures include: Medications, Positioning and Frequent voiding. Consults for Endocrine RN and Dietary/Nutrition Services active.  Plan: Continue expectant management. Observe for and notify care provider of indications of hypoglycemia or hyperglycemia, or maternal/fetal compromise.

## 2019-04-24 NOTE — PLAN OF CARE
Data: VSS, afebrile, no complain. Maternal status stable Fetal assessment is normal.  Action: Continue with plan of care, which is TID fetal and uterine monitoring. Patient encouraged to move extremities while in bed.  Response: Patient coping with plan of care. Patient is due for C/S tomorrow.

## 2019-04-24 NOTE — ANESTHESIA PREPROCEDURE EVALUATION
Anesthesia Pre-Procedure Evaluation    Patient: Armando Dc   MRN:     [id number] Gender:   female   Age:    44 year old :      1975        Preoperative Diagnosis: Partial Posterior Placenta Previa   Procedure(s):  Primary  Section     Past Medical History:   Diagnosis Date     Carpal tunnel syndrome     right     Chronic sinusitis      Diabetes (H)     gestational diabetes diet controlled     Endometriosis     left ovary     Infertility      Kartagener's syndrome     situs inversus totalis,      Pseudomonas aeruginosa colonization      Reactive airway disease      Situs inversus      Uncomplicated asthma     Reactive airway disease      Past Surgical History:   Procedure Laterality Date     ADENOIDECTOMY       LAPAROSCOPIC CYSTECTOMY OVARIAN (BENIGN) Left 2017    Procedure: LAPAROSCOPIC CYSTECTOMY OVARIAN (BENIGN);  Left  Laparoscopy Ovarian Cystotomy, Hysteroscopy And Polpectomy With Myosure ;  Surgeon: Zayra Roberts MD;  Location: [facility]     OPERATIVE HYSTEROSCOPY WITH MORCELLATOR N/A 2017    Procedure: OPERATIVE HYSTEROSCOPY WITH MORCELLATOR;;  Surgeon: Zayra Roberts MD;  Location: [facility]     TONSILLECTOMY       TONSILLECTOMY & ADENOIDECTOMY      8 years old     TRANSVAGINAL RETRIEVAL OVUM Bilateral 3/3/2016    IVF Procedure: TRANSVAGINAL RETRIEVAL OVUM;  Surgeon: Sunshine Gilliam MD;  Location:  [facility]     uterine polyp            Anesthesia Evaluation     . Pt has had prior anesthetic. Type: General    No history of anesthetic complications          ROS/MED HX    ENT/Pulmonary: Comment: Kartagener syndrome on daily nebs and vest therapy; moderate-severe obstructive lung disease.  Follows with Dr. Hernández as pulmonologist.    3/2018  PROCEDURE:  CT Chest WO Contrast.    HISTORY:  Kartagener's syndrome, dextrocardia, chronic sinusitis, cystic bronchiectasis.    TECHNIQUE:  Non-contrast helical thoracic CT was performed.    CT CHEST  FINDINGS:      Heart/Pericardium/Great Vessels:  There is dextrocardia and a right-sided aortic arch, compatible with situs inversus. Heart size is normal. There is no pericardial effusion.  The great vessels are normal in diameter.    Pleural Spaces:  The pleural spaces are clear.    Mediastinum/Martine:  There is no mediastinal or hilar lymph node enlargement.    Neck Base/Chest Wall/Diaphragm/Upper Abdomen:  There is no supraclavicular or axillary lymph node enlargement.  Limited, non-contrast imaging through the upper abdomen demonstrates situs inversus. There are no suspicious lytic or blastic osseous lesions.    Lungs/Central Airways:  Each lung has 2 lobes. There is extensive cystic bronchiectasis in the lingula and the left lower lobe, with partial atelectasis of the lingula. Very mild cystic bronchiectasis is noted in the inferior right upper lobe(right   lingula) and right lower lobe. There are extensive centrilobular nodules and tree-in-bud opacities in the left lower lobe. Less extensive centrilobular nodules and tree-in-bud opacities are seen in the lingula, inferior right upper lobe (right lingula),   and right lower lobe.    CONCLUSIONS:      1.  Findings compatible with Kartagener's syndrome, including dextrocardia, a right-sided aortic arch, and inverted positioning of the upper abdominal viscera.    2.  Extensive cystic bronchiectasis in the lingula and left lower lobe, with partial atelectasis of the lingula. There is also mild bronchiectasis in the inferior right upper lobe (right lingula) and right lower lobe.    3.  Extensive centrilobular nodules and tree-in-bud opacities, most notably in the left lower lobe. These may represent mucoid impaction and/or superimposed infection.    PFT 11/7/18 : Moderately-severe obstructive lung disease. When compared to 8/7/2018, the FEV1 and FVC have little change. The decrease in FVC may represent air trapping v. restrictive physiology. Lung volumes would be  necessary to determine.        (+), . Other pulmonary disease .    Neurologic:  - neg neurologic ROS     Cardiovascular:     (+) ----. : . . . :. . Previous cardiac testing Echodate:1/2019results:Left Ventricle  Left ventricular size is normal. Left ventricular wall thickness is normal.  Left ventricular function is normal.The EF is 60-65%. Left ventricular  diastolic function is normal. Regional wall motion is probably normal.     Right Ventricle  The right ventricle is normal size. Global right ventricular function is  normal. The right ventricle is not well visualized.     Atria  Both atria appear normal.     Mitral Valve  Normal leaflet thickness and motion. No significant regurgitation or stenosis  based on Doppler in limited views.        Aortic Valve  Valve is poorly visualized. No significant regurgitation or stenosis based on  Doppler in limited views.     Tricuspid Valve  The peak velocity of the tricuspid regurgitant jet is not obtainable.  Pulmonary artery systolic pressure cannot be assessed.     Pulmonic Valve  The pulmonic valve cannot be assessed.     Vessels  The aorta root cannot be assessed. The inferior vena cava was normal in size  with preserved respiratory variability. Ascending aorta is not visualized.     Pericardium  No pericardial effusion is present.    date: results: date: results: date: results:          METS/Exercise Tolerance:     Hematologic:  - neg hematologic  ROS       Musculoskeletal:  - neg musculoskeletal ROS       GI/Hepatic:     (+) GERD       Renal/Genitourinary:  - ROS Renal section negative       Endo: Comment: GDMA1        Psychiatric:  - neg psychiatric ROS       Infectious Disease: Comment: Pseudomonas colonization        Malignancy:      - no malignancy   Other: Comment: 35w5d with placenta previa and third bleed on 4/5/19.  HD#19.                          PHYSICAL EXAM:   Mental Status/Neuro: A/A/O   Airway: Facies: Feasible  Mallampati: II  Mouth/Opening: Full  TM  "distance: > 6 cm  Neck ROM: Full   Respiratory:    CV:    Comments:                    Lab Results   Component Value Date    WBC 5.9 04/06/2019    HGB 12.5 04/06/2019    HCT 38.1 04/06/2019     04/06/2019     11/03/2017    POTASSIUM 4.1 11/03/2017    CHLORIDE 106 11/03/2017    CO2 26 11/03/2017    BUN 18 11/03/2017    CR 0.68 11/03/2017     (H) 11/13/2017    JOHN 8.5 11/03/2017    ALBUMIN 3.7 11/03/2017    PROTTOTAL 8.0 11/03/2017    ALT 26 02/14/2019    AST 21 02/14/2019    ALKPHOS 99 11/03/2017    BILITOTAL 0.4 11/03/2017    PTT 30 04/06/2019    INR 0.97 04/06/2019    FIBR 649 (H) 04/06/2019    TSH 1.59 06/29/2018    T4 0.91 06/29/2018    HCG Negative 11/13/2017    HCGS Negative 11/03/2017       Preop Vitals  BP Readings from Last 3 Encounters:   04/24/19 105/63   04/04/19 132/78   03/28/19 105/62    Pulse Readings from Last 3 Encounters:   04/18/19 77   04/04/19 86   03/28/19 107      Resp Readings from Last 3 Encounters:   04/24/19 16   04/04/19 18   03/28/19 20    SpO2 Readings from Last 3 Encounters:   04/23/19 98%   04/04/19 95%   03/28/19 95%      Temp Readings from Last 1 Encounters:   04/24/19 36.6  C (97.8  F) (Oral)    Ht Readings from Last 1 Encounters:   03/27/19 1.626 m (5' 4\")      Wt Readings from Last 1 Encounters:   04/23/19 67.9 kg (149 lb 11.2 oz)    Estimated body mass index is 25.7 kg/m  as calculated from the following:    Height as of 3/27/19: 1.626 m (5' 4\").    Weight as of 4/23/19: 67.9 kg (149 lb 11.2 oz).     LDA:  Peripheral IV 04/06/19 Left (Active)   Site Assessment WDL 4/24/2019  6:10 AM   Line Status Saline locked 4/24/2019  6:10 AM   Phlebitis Scale 0-->no symptoms 4/24/2019  6:10 AM   Infiltration Scale 0 4/24/2019  6:10 AM   Infiltration Site Treatment Method  None 4/16/2019  4:00 PM   Extravasation? No 4/16/2019  4:00 PM   Number of days: 18       Peripheral IV 04/06/19 Right Lower forearm (Active)   Site Assessment WDL 4/24/2019  6:10 AM   Line Status Saline " locked 4/24/2019  6:10 AM   Phlebitis Scale 0-->no symptoms 4/24/2019  6:10 AM   Infiltration Scale 0 4/24/2019  6:10 AM   Infiltration Site Treatment Method  None 4/16/2019  9:00 AM   Extravasation? No 4/16/2019  9:00 AM   Dressing Intervention New dressing  4/13/2019  7:53 PM   Number of days: 18            Assessment:   ASA SCORE: 3    NPO Status: Increased aspiration risk   Documentation: H&P complete; Preop Testing complete; Consents complete   Proceeding: Proceed without further delay  Tobacco Use:  NO Active use of Tobacco/UNKNOWN Tobacco use status     Plan:   Anes. Type:  Regional; MAC     RA Details:  Labor/OB Procedure; SS     RA-Location/Type: Spinal   Pre-Induction: None     Drips/Meds-Preparation: Oxytocin   Induction:  Not applicable   Airway: Native Airway   Access/Monitoring: PIV   Maintenance: N/a   Emergence: Not Applicable   Logistics: Observation/Admission     Postop Pain/Sedation Strategy:  Standard-Options: Block SS; Exparel; IV Ketorolac     PONV Management:  Adult Risk Factors: Female, Non-Smoker  Prevention: Ondansetron; NO Volatile       Comments for Plan/Consent:      45 yo F 35w5d with placenta previa with recurrent bleeding (last on 4/5/19) currently inpatient on HD#19.      History significant for Kartagener's syndrome and moderate-severe obstructive lung disease.  Repeat JACKY 1/2019 - normal RV function without evidence for cor pulmonale.      There is at least one case report of adverse effects of oxytocin in a patient with primary ciliary dyskinesia in relation to the vasodilatory effects of oxytocin on pulmonary vasculature which may worsen shunting and interfere with hypoxic pulmonary vasoconstriction, producing clinically significant hypoxemia in patients with comorbid lung disease.  Will have to use this with caution immediately postpartum.  Hemabate is contraindicated for this patient in the event of postpartum hemorrhage.        Plan for c/s under spinal with bilateral TAP  block postoperatively.  GA as backup.  Please note that in the event of any sort of pre-delivery ACLS, patient will require RIGHT lateral uterine displacement.      Karl Dutton MD  Anesthesiology, PGY3                           Karl Dutton MD

## 2019-04-24 NOTE — PROGRESS NOTES
Antepartum Progress Note    Patient name: Armando Dc  MRN: [id number]  : 1975    S: Armando Dc is doing well this morning. Immediately before rounds, she had a nose bleed that stopped with pressure. She is otherwise feeling well and stable from an OB standpoint. Her contractions are stable and not painful. She denies vaginal bleeding or leakage of fluid. She still notes positive fetal movement. She was given topical Abreva yesterday for her cold sores. She is nervous but ready for her scheduled  section tomorrow. She was happy to meet Dr. Shipley, who will be doing her surgery tomorrow, yesterday evening.    O: /63   Pulse 77   Temp 97.8  F (36.6  C) (Oral)   Resp 16   Wt 67.9 kg (149 lb 11.2 oz)   SpO2 98%   BMI 25.70 kg/m     Gen: comfortable appearing, no distress  Resp: nonlabored breathing, no increased work of breathing  Abd: soft, nontender. Gravid.     FHT: Baseline 130-135 bpm, moderate variability, + accels, no decels  [place]: irregular contractions    US 4/15: EFW 2759g (66%tile), posterior previa, cephalic. L renal pyelectasis, mild. NILDA 28. BPP 8/8  US : BPP 8/8 - BREECH, posterior previa    BG: within normal limits this am 84    A/P: 43 year old  at 35w6d with placenta previa and third bleed on 19.  No current bleeding. Some uterine activity, but no complaints. HD#19    Posterior placenta previa, with third bleed  - No bleeding since , continue to monitor  - Delivery via PLTCS scheduled tomorrow () with Dr. Shipley, who met her yesterday (patient is okay with residents assisting)  - will order limited (vs. bedside) US for Thursday AM before C/S to help guide surgical incision - presentation been transverse in the past and would like to avoid vertical skin incision if possible given maternal respiratory diagnoses  - NPO at 0200 for scheduled procedure at 1030a     Fetal well-being  - category I tracing  - s/p BMZ -   - routine monitoring    Primary  ciliary dyskinesia and bronchiectasis with moderate-severe obstructive lung disease  - Stable disease, not requiring supplemental O2 at this point. Dr. Hernández is her Pulmonologist and is aware of her admission.  - Last PFTs 11/2018. Inpatient consult if worsening status.  - Recent +sputum culture for pseudomonas/haemophils, s/p 10d azithromycin. No evidence of pneumonia.  - Continue three times daily bronchial drainage therapy and nebulization treatment as recommended per pulmonology.  - Will restart vest therapy after delivery as tolerated with potential breastfeeding and breast engorgement, which may make vest difficult to use.   - Close monitoring of oxygen saturations, start O2 supplemental therapy in pregnancy if needed to maintain oxygen saturations >95%  - Consider delivery if pulmonary status worsens and it appears that it could be improved by delivery  - Anesthesiology consultation completed 3/28. Plan for spinal and TAP block.     GDMA1  - Fasting and postprandial BG  - this morning fasting glucose was 82  - Continue fasting and 1 hour PP glucose check     Constipation  - Senna. Add Miralax if needed.     PPx  - SCDs     As the medical student, I acted as a scribe for this note. All portions of the documented history and physical were personally performed by Dr. Agusto Bethea, MS4\    This note, as scribed, accurately reflects the examination, my impressions, and the plan as I discussed it with the patient.       Bryon Liz  04/24/19

## 2019-04-24 NOTE — PLAN OF CARE
VSS, afebrile. Pt reports sleeping well through night. Reports positive fetal movement. EFM per flowsheet, denies feeling cramping or ctx. Denies leaking, or bleeding.  at bedside. Continue with plan of care.

## 2019-04-25 ENCOUNTER — ANESTHESIA (OUTPATIENT)
Dept: OBGYN | Facility: CLINIC | Age: 44
End: 2019-04-25
Payer: COMMERCIAL

## 2019-04-25 PROBLEM — Z98.891 S/P CESAREAN SECTION: Status: ACTIVE | Noted: 2019-04-25

## 2019-04-25 LAB
ABO + RH BLD: NORMAL
ABO + RH BLD: NORMAL
APTT PPP: 31 SEC (ref 22–37)
BLD GP AB SCN SERPL QL: NORMAL
BLD PROD TYP BPU: NORMAL
BLD UNIT ID BPU: 0
BLOOD BANK CMNT PATIENT-IMP: NORMAL
BLOOD PRODUCT CODE: NORMAL
BPU ID: NORMAL
ERYTHROCYTE [DISTWIDTH] IN BLOOD BY AUTOMATED COUNT: 13.5 % (ref 10–15)
ERYTHROCYTE [DISTWIDTH] IN BLOOD BY AUTOMATED COUNT: 13.5 % (ref 10–15)
ERYTHROCYTE [DISTWIDTH] IN BLOOD BY AUTOMATED COUNT: 13.6 % (ref 10–15)
ERYTHROCYTE [DISTWIDTH] IN BLOOD BY AUTOMATED COUNT: 13.8 % (ref 10–15)
FIBRINOGEN PPP-MCNC: 438 MG/DL (ref 200–420)
FIBRINOGEN PPP-MCNC: 450 MG/DL (ref 200–420)
FIBRINOGEN PPP-MCNC: 484 MG/DL (ref 200–420)
GLUCOSE BLDC GLUCOMTR-MCNC: 82 MG/DL (ref 70–99)
HCT VFR BLD AUTO: 31.8 % (ref 35–47)
HCT VFR BLD AUTO: 32.3 % (ref 35–47)
HCT VFR BLD AUTO: 32.4 % (ref 35–47)
HCT VFR BLD AUTO: 36 % (ref 35–47)
HGB BLD-MCNC: 10.3 G/DL (ref 11.7–15.7)
HGB BLD-MCNC: 10.4 G/DL (ref 11.7–15.7)
HGB BLD-MCNC: 10.6 G/DL (ref 11.7–15.7)
HGB BLD-MCNC: 11.7 G/DL (ref 11.7–15.7)
INR PPP: 1.05 (ref 0.86–1.14)
INR PPP: 1.06 (ref 0.86–1.14)
INR PPP: 1.08 (ref 0.86–1.14)
MCH RBC QN AUTO: 29.4 PG (ref 26.5–33)
MCH RBC QN AUTO: 29.5 PG (ref 26.5–33)
MCH RBC QN AUTO: 29.7 PG (ref 26.5–33)
MCH RBC QN AUTO: 29.8 PG (ref 26.5–33)
MCHC RBC AUTO-ENTMCNC: 32.1 G/DL (ref 31.5–36.5)
MCHC RBC AUTO-ENTMCNC: 32.4 G/DL (ref 31.5–36.5)
MCHC RBC AUTO-ENTMCNC: 32.5 G/DL (ref 31.5–36.5)
MCHC RBC AUTO-ENTMCNC: 32.8 G/DL (ref 31.5–36.5)
MCV RBC AUTO: 91 FL (ref 78–100)
MCV RBC AUTO: 91 FL (ref 78–100)
MCV RBC AUTO: 92 FL (ref 78–100)
MCV RBC AUTO: 92 FL (ref 78–100)
NUM BPU REQUESTED: 4
PLATELET # BLD AUTO: 144 10E9/L (ref 150–450)
PLATELET # BLD AUTO: 147 10E9/L (ref 150–450)
PLATELET # BLD AUTO: 156 10E9/L (ref 150–450)
PLATELET # BLD AUTO: 168 10E9/L (ref 150–450)
RBC # BLD AUTO: 3.46 10E12/L (ref 3.8–5.2)
RBC # BLD AUTO: 3.54 10E12/L (ref 3.8–5.2)
RBC # BLD AUTO: 3.57 10E12/L (ref 3.8–5.2)
RBC # BLD AUTO: 3.96 10E12/L (ref 3.8–5.2)
SPECIMEN EXP DATE BLD: NORMAL
TRANSFUSION STATUS PATIENT QL: NORMAL
WBC # BLD AUTO: 5.4 10E9/L (ref 4–11)
WBC # BLD AUTO: 6.5 10E9/L (ref 4–11)
WBC # BLD AUTO: 8.1 10E9/L (ref 4–11)
WBC # BLD AUTO: 8.5 10E9/L (ref 4–11)

## 2019-04-25 PROCEDURE — 36415 COLL VENOUS BLD VENIPUNCTURE: CPT | Performed by: STUDENT IN AN ORGANIZED HEALTH CARE EDUCATION/TRAINING PROGRAM

## 2019-04-25 PROCEDURE — 85730 THROMBOPLASTIN TIME PARTIAL: CPT | Performed by: OBSTETRICS & GYNECOLOGY

## 2019-04-25 PROCEDURE — 71000015 ZZH RECOVERY PHASE 1 LEVEL 2 EA ADDTL HR: Performed by: OBSTETRICS & GYNECOLOGY

## 2019-04-25 PROCEDURE — 25000125 ZZHC RX 250: Performed by: STUDENT IN AN ORGANIZED HEALTH CARE EDUCATION/TRAINING PROGRAM

## 2019-04-25 PROCEDURE — 88307 TISSUE EXAM BY PATHOLOGIST: CPT | Performed by: STUDENT IN AN ORGANIZED HEALTH CARE EDUCATION/TRAINING PROGRAM

## 2019-04-25 PROCEDURE — 85610 PROTHROMBIN TIME: CPT | Performed by: OBSTETRICS & GYNECOLOGY

## 2019-04-25 PROCEDURE — 00000146 ZZHCL STATISTIC GLUCOSE BY METER IP

## 2019-04-25 PROCEDURE — 36000059 ZZH SURGERY LEVEL 3 EA 15 ADDTL MIN UMMC: Performed by: OBSTETRICS & GYNECOLOGY

## 2019-04-25 PROCEDURE — 85384 FIBRINOGEN ACTIVITY: CPT | Performed by: OBSTETRICS & GYNECOLOGY

## 2019-04-25 PROCEDURE — P9041 ALBUMIN (HUMAN),5%, 50ML: HCPCS | Performed by: ANESTHESIOLOGY

## 2019-04-25 PROCEDURE — 25000132 ZZH RX MED GY IP 250 OP 250 PS 637: Performed by: STUDENT IN AN ORGANIZED HEALTH CARE EDUCATION/TRAINING PROGRAM

## 2019-04-25 PROCEDURE — 85610 PROTHROMBIN TIME: CPT | Performed by: STUDENT IN AN ORGANIZED HEALTH CARE EDUCATION/TRAINING PROGRAM

## 2019-04-25 PROCEDURE — 25000128 H RX IP 250 OP 636: Performed by: STUDENT IN AN ORGANIZED HEALTH CARE EDUCATION/TRAINING PROGRAM

## 2019-04-25 PROCEDURE — 37000009 ZZH ANESTHESIA TECHNICAL FEE, EACH ADDTL 15 MIN: Performed by: OBSTETRICS & GYNECOLOGY

## 2019-04-25 PROCEDURE — 94640 AIRWAY INHALATION TREATMENT: CPT | Mod: 76

## 2019-04-25 PROCEDURE — 27110028 ZZH OR GENERAL SUPPLY NON-STERILE: Performed by: OBSTETRICS & GYNECOLOGY

## 2019-04-25 PROCEDURE — 85027 COMPLETE CBC AUTOMATED: CPT | Performed by: OBSTETRICS & GYNECOLOGY

## 2019-04-25 PROCEDURE — 25000125 ZZHC RX 250: Performed by: OBSTETRICS & GYNECOLOGY

## 2019-04-25 PROCEDURE — 85730 THROMBOPLASTIN TIME PARTIAL: CPT | Performed by: STUDENT IN AN ORGANIZED HEALTH CARE EDUCATION/TRAINING PROGRAM

## 2019-04-25 PROCEDURE — 85384 FIBRINOGEN ACTIVITY: CPT | Performed by: STUDENT IN AN ORGANIZED HEALTH CARE EDUCATION/TRAINING PROGRAM

## 2019-04-25 PROCEDURE — 25000128 H RX IP 250 OP 636: Performed by: ANESTHESIOLOGY

## 2019-04-25 PROCEDURE — 40000170 ZZH STATISTIC PRE-PROCEDURE ASSESSMENT II: Performed by: OBSTETRICS & GYNECOLOGY

## 2019-04-25 PROCEDURE — 25800030 ZZH RX IP 258 OP 636: Performed by: ANESTHESIOLOGY

## 2019-04-25 PROCEDURE — 25800030 ZZH RX IP 258 OP 636: Performed by: STUDENT IN AN ORGANIZED HEALTH CARE EDUCATION/TRAINING PROGRAM

## 2019-04-25 PROCEDURE — 36000057 ZZH SURGERY LEVEL 3 1ST 30 MIN - UMMC: Performed by: OBSTETRICS & GYNECOLOGY

## 2019-04-25 PROCEDURE — 25000132 ZZH RX MED GY IP 250 OP 250 PS 637

## 2019-04-25 PROCEDURE — 25000125 ZZHC RX 250: Performed by: ANESTHESIOLOGY

## 2019-04-25 PROCEDURE — 36415 COLL VENOUS BLD VENIPUNCTURE: CPT | Performed by: OBSTETRICS & GYNECOLOGY

## 2019-04-25 PROCEDURE — 85027 COMPLETE CBC AUTOMATED: CPT | Performed by: STUDENT IN AN ORGANIZED HEALTH CARE EDUCATION/TRAINING PROGRAM

## 2019-04-25 PROCEDURE — C9290 INJ, BUPIVACAINE LIPOSOME: HCPCS | Performed by: STUDENT IN AN ORGANIZED HEALTH CARE EDUCATION/TRAINING PROGRAM

## 2019-04-25 PROCEDURE — P9016 RBC LEUKOCYTES REDUCED: HCPCS | Performed by: STUDENT IN AN ORGANIZED HEALTH CARE EDUCATION/TRAINING PROGRAM

## 2019-04-25 PROCEDURE — 40000275 ZZH STATISTIC RCP TIME EA 10 MIN

## 2019-04-25 PROCEDURE — 37000008 ZZH ANESTHESIA TECHNICAL FEE, 1ST 30 MIN: Performed by: OBSTETRICS & GYNECOLOGY

## 2019-04-25 PROCEDURE — 94640 AIRWAY INHALATION TREATMENT: CPT

## 2019-04-25 PROCEDURE — C1765 ADHESION BARRIER: HCPCS | Performed by: OBSTETRICS & GYNECOLOGY

## 2019-04-25 PROCEDURE — 27210794 ZZH OR GENERAL SUPPLY STERILE: Performed by: OBSTETRICS & GYNECOLOGY

## 2019-04-25 PROCEDURE — 0W3R7ZZ CONTROL BLEEDING IN GENITOURINARY TRACT, VIA NATURAL OR ARTIFICIAL OPENING: ICD-10-PCS | Performed by: OBSTETRICS & GYNECOLOGY

## 2019-04-25 PROCEDURE — 71000014 ZZH RECOVERY PHASE 1 LEVEL 2 FIRST HR: Performed by: OBSTETRICS & GYNECOLOGY

## 2019-04-25 PROCEDURE — 12000001 ZZH R&B MED SURG/OB UMMC

## 2019-04-25 RX ORDER — FENTANYL CITRATE 50 UG/ML
INJECTION, SOLUTION INTRAMUSCULAR; INTRAVENOUS PRN
Status: DISCONTINUED | OUTPATIENT
Start: 2019-04-25 | End: 2019-04-25

## 2019-04-25 RX ORDER — FENTANYL CITRATE 50 UG/ML
25-50 INJECTION, SOLUTION INTRAMUSCULAR; INTRAVENOUS EVERY 5 MIN PRN
Status: DISCONTINUED | OUTPATIENT
Start: 2019-04-25 | End: 2019-04-25 | Stop reason: HOSPADM

## 2019-04-25 RX ORDER — SODIUM CHLORIDE, SODIUM LACTATE, POTASSIUM CHLORIDE, CALCIUM CHLORIDE 600; 310; 30; 20 MG/100ML; MG/100ML; MG/100ML; MG/100ML
INJECTION, SOLUTION INTRAVENOUS CONTINUOUS
Status: CANCELLED | OUTPATIENT
Start: 2019-04-25

## 2019-04-25 RX ORDER — ONDANSETRON 2 MG/ML
4 INJECTION INTRAMUSCULAR; INTRAVENOUS EVERY 6 HOURS PRN
Status: DISCONTINUED | OUTPATIENT
Start: 2019-04-25 | End: 2019-04-29 | Stop reason: HOSPADM

## 2019-04-25 RX ORDER — LIDOCAINE 40 MG/G
CREAM TOPICAL
Status: DISCONTINUED | OUTPATIENT
Start: 2019-04-25 | End: 2019-04-29 | Stop reason: HOSPADM

## 2019-04-25 RX ORDER — ACETAMINOPHEN 325 MG/1
975 TABLET ORAL EVERY 8 HOURS
Status: COMPLETED | OUTPATIENT
Start: 2019-04-25 | End: 2019-04-28

## 2019-04-25 RX ORDER — METHYLERGONOVINE MALEATE 0.2 MG/ML
INJECTION INTRAVENOUS PRN
Status: DISCONTINUED | OUTPATIENT
Start: 2019-04-25 | End: 2019-04-25

## 2019-04-25 RX ORDER — DIPHENHYDRAMINE HYDROCHLORIDE 50 MG/ML
25 INJECTION INTRAMUSCULAR; INTRAVENOUS EVERY 6 HOURS PRN
Status: DISCONTINUED | OUTPATIENT
Start: 2019-04-25 | End: 2019-04-29 | Stop reason: HOSPADM

## 2019-04-25 RX ORDER — DEXTROSE, SODIUM CHLORIDE, SODIUM LACTATE, POTASSIUM CHLORIDE, AND CALCIUM CHLORIDE 5; .6; .31; .03; .02 G/100ML; G/100ML; G/100ML; G/100ML; G/100ML
INJECTION, SOLUTION INTRAVENOUS CONTINUOUS
Status: DISCONTINUED | OUTPATIENT
Start: 2019-04-25 | End: 2019-04-26

## 2019-04-25 RX ORDER — SODIUM CHLORIDE 9 MG/ML
INJECTION, SOLUTION INTRAVENOUS CONTINUOUS PRN
Status: DISCONTINUED | OUTPATIENT
Start: 2019-04-25 | End: 2019-04-25

## 2019-04-25 RX ORDER — AMOXICILLIN 250 MG
1 CAPSULE ORAL 2 TIMES DAILY PRN
Status: DISCONTINUED | OUTPATIENT
Start: 2019-04-25 | End: 2019-04-29 | Stop reason: HOSPADM

## 2019-04-25 RX ORDER — MORPHINE SULFATE 1 MG/ML
150 INJECTION, SOLUTION EPIDURAL; INTRATHECAL; INTRAVENOUS ONCE
Status: DISCONTINUED | OUTPATIENT
Start: 2019-04-25 | End: 2019-04-25

## 2019-04-25 RX ORDER — BUPIVACAINE HYDROCHLORIDE 2.5 MG/ML
INJECTION, SOLUTION EPIDURAL; INFILTRATION; INTRACAUDAL PRN
Status: DISCONTINUED | OUTPATIENT
Start: 2019-04-25 | End: 2019-04-25

## 2019-04-25 RX ORDER — SODIUM CHLORIDE, SODIUM LACTATE, POTASSIUM CHLORIDE, CALCIUM CHLORIDE 600; 310; 30; 20 MG/100ML; MG/100ML; MG/100ML; MG/100ML
INJECTION, SOLUTION INTRAVENOUS CONTINUOUS
Status: DISCONTINUED | OUTPATIENT
Start: 2019-04-25 | End: 2019-04-25 | Stop reason: HOSPADM

## 2019-04-25 RX ORDER — IBUPROFEN 600 MG/1
600 TABLET, FILM COATED ORAL EVERY 6 HOURS PRN
Status: DISCONTINUED | OUTPATIENT
Start: 2019-04-29 | End: 2019-04-26

## 2019-04-25 RX ORDER — NALOXONE HYDROCHLORIDE 0.4 MG/ML
.1-.4 INJECTION, SOLUTION INTRAMUSCULAR; INTRAVENOUS; SUBCUTANEOUS
Status: DISCONTINUED | OUTPATIENT
Start: 2019-04-25 | End: 2019-04-25

## 2019-04-25 RX ORDER — HYDROMORPHONE HYDROCHLORIDE 1 MG/ML
.3-.5 INJECTION, SOLUTION INTRAMUSCULAR; INTRAVENOUS; SUBCUTANEOUS
Status: DISCONTINUED | OUTPATIENT
Start: 2019-04-25 | End: 2019-04-29 | Stop reason: HOSPADM

## 2019-04-25 RX ORDER — CEFAZOLIN SODIUM 2 G/100ML
2 INJECTION, SOLUTION INTRAVENOUS
Status: DISCONTINUED | OUTPATIENT
Start: 2019-04-25 | End: 2019-04-25 | Stop reason: HOSPADM

## 2019-04-25 RX ORDER — CITRIC ACID/SODIUM CITRATE 334-500MG
30 SOLUTION, ORAL ORAL
Status: DISCONTINUED | OUTPATIENT
Start: 2019-04-25 | End: 2019-04-25 | Stop reason: HOSPADM

## 2019-04-25 RX ORDER — ALBUMIN, HUMAN INJ 5% 5 %
SOLUTION INTRAVENOUS
Status: DISCONTINUED
Start: 2019-04-25 | End: 2019-04-25 | Stop reason: HOSPADM

## 2019-04-25 RX ORDER — KETOROLAC TROMETHAMINE 30 MG/ML
30 INJECTION, SOLUTION INTRAMUSCULAR; INTRAVENOUS EVERY 6 HOURS
Status: DISCONTINUED | OUTPATIENT
Start: 2019-04-25 | End: 2019-04-26

## 2019-04-25 RX ORDER — HYDROCORTISONE 2.5 %
CREAM (GRAM) TOPICAL 3 TIMES DAILY PRN
Status: DISCONTINUED | OUTPATIENT
Start: 2019-04-25 | End: 2019-04-29 | Stop reason: HOSPADM

## 2019-04-25 RX ORDER — CARBOPROST TROMETHAMINE 250 UG/ML
250 INJECTION, SOLUTION INTRAMUSCULAR
Status: DISCONTINUED | OUTPATIENT
Start: 2019-04-25 | End: 2019-04-29 | Stop reason: HOSPADM

## 2019-04-25 RX ORDER — BISACODYL 10 MG
10 SUPPOSITORY, RECTAL RECTAL DAILY PRN
Status: DISCONTINUED | OUTPATIENT
Start: 2019-04-27 | End: 2019-04-29 | Stop reason: HOSPADM

## 2019-04-25 RX ORDER — METHYLERGONOVINE MALEATE 0.2 MG/ML
200 INJECTION INTRAVENOUS
Status: DISCONTINUED | OUTPATIENT
Start: 2019-04-25 | End: 2019-04-29 | Stop reason: HOSPADM

## 2019-04-25 RX ORDER — NALOXONE HYDROCHLORIDE 0.4 MG/ML
.1-.4 INJECTION, SOLUTION INTRAMUSCULAR; INTRAVENOUS; SUBCUTANEOUS
Status: DISCONTINUED | OUTPATIENT
Start: 2019-04-25 | End: 2019-04-25 | Stop reason: HOSPADM

## 2019-04-25 RX ORDER — OXYTOCIN/0.9 % SODIUM CHLORIDE 30/500 ML
100 PLASTIC BAG, INJECTION (ML) INTRAVENOUS CONTINUOUS
Status: DISCONTINUED | OUTPATIENT
Start: 2019-04-25 | End: 2019-04-29 | Stop reason: HOSPADM

## 2019-04-25 RX ORDER — TRANEXAMIC ACID 100 MG/ML
INJECTION, SOLUTION INTRAVENOUS
Status: COMPLETED
Start: 2019-04-25 | End: 2019-04-25

## 2019-04-25 RX ORDER — ALBUMIN, HUMAN INJ 5% 5 %
SOLUTION INTRAVENOUS CONTINUOUS PRN
Status: DISCONTINUED | OUTPATIENT
Start: 2019-04-25 | End: 2019-04-25

## 2019-04-25 RX ORDER — SODIUM CHLORIDE, SODIUM LACTATE, POTASSIUM CHLORIDE, CALCIUM CHLORIDE 600; 310; 30; 20 MG/100ML; MG/100ML; MG/100ML; MG/100ML
INJECTION, SOLUTION INTRAVENOUS CONTINUOUS PRN
Status: DISCONTINUED | OUTPATIENT
Start: 2019-04-25 | End: 2019-04-25

## 2019-04-25 RX ORDER — OXYCODONE HYDROCHLORIDE 5 MG/1
5-10 TABLET ORAL
Status: DISCONTINUED | OUTPATIENT
Start: 2019-04-25 | End: 2019-04-29 | Stop reason: HOSPADM

## 2019-04-25 RX ORDER — CEFAZOLIN SODIUM 2 G/100ML
INJECTION, SOLUTION INTRAVENOUS PRN
Status: DISCONTINUED | OUTPATIENT
Start: 2019-04-25 | End: 2019-04-25

## 2019-04-25 RX ORDER — BUPIVACAINE HYDROCHLORIDE 7.5 MG/ML
INJECTION, SOLUTION INTRASPINAL
Status: DISCONTINUED
Start: 2019-04-25 | End: 2019-04-25 | Stop reason: HOSPADM

## 2019-04-25 RX ORDER — EPHEDRINE SULFATE 50 MG/ML
5 INJECTION, SOLUTION INTRAMUSCULAR; INTRAVENOUS; SUBCUTANEOUS
Status: DISCONTINUED | OUTPATIENT
Start: 2019-04-25 | End: 2019-04-25

## 2019-04-25 RX ORDER — OXYTOCIN/0.9 % SODIUM CHLORIDE 30/500 ML
340 PLASTIC BAG, INJECTION (ML) INTRAVENOUS CONTINUOUS PRN
Status: DISCONTINUED | OUTPATIENT
Start: 2019-04-25 | End: 2019-04-29 | Stop reason: HOSPADM

## 2019-04-25 RX ORDER — LANOLIN 100 %
OINTMENT (GRAM) TOPICAL
Status: DISCONTINUED | OUTPATIENT
Start: 2019-04-25 | End: 2019-04-29 | Stop reason: HOSPADM

## 2019-04-25 RX ORDER — ONDANSETRON 2 MG/ML
INJECTION INTRAMUSCULAR; INTRAVENOUS PRN
Status: DISCONTINUED | OUTPATIENT
Start: 2019-04-25 | End: 2019-04-25

## 2019-04-25 RX ORDER — MORPHINE SULFATE 1 MG/ML
INJECTION, SOLUTION EPIDURAL; INTRATHECAL; INTRAVENOUS PRN
Status: DISCONTINUED | OUTPATIENT
Start: 2019-04-25 | End: 2019-04-25

## 2019-04-25 RX ORDER — OXYTOCIN 10 [USP'U]/ML
10 INJECTION, SOLUTION INTRAMUSCULAR; INTRAVENOUS
Status: DISCONTINUED | OUTPATIENT
Start: 2019-04-25 | End: 2019-04-29 | Stop reason: HOSPADM

## 2019-04-25 RX ORDER — OXYTOCIN/0.9 % SODIUM CHLORIDE 30/500 ML
PLASTIC BAG, INJECTION (ML) INTRAVENOUS
Status: DISCONTINUED
Start: 2019-04-25 | End: 2019-04-25 | Stop reason: HOSPADM

## 2019-04-25 RX ORDER — BUPIVACAINE HYDROCHLORIDE 7.5 MG/ML
INJECTION, SOLUTION INTRASPINAL PRN
Status: DISCONTINUED | OUTPATIENT
Start: 2019-04-25 | End: 2019-04-25

## 2019-04-25 RX ORDER — ONDANSETRON 4 MG/1
4 TABLET, ORALLY DISINTEGRATING ORAL EVERY 30 MIN PRN
Status: CANCELLED | OUTPATIENT
Start: 2019-04-25

## 2019-04-25 RX ORDER — NALBUPHINE HYDROCHLORIDE 10 MG/ML
2.5-5 INJECTION, SOLUTION INTRAMUSCULAR; INTRAVENOUS; SUBCUTANEOUS EVERY 6 HOURS PRN
Status: DISCONTINUED | OUTPATIENT
Start: 2019-04-25 | End: 2019-04-25

## 2019-04-25 RX ORDER — SIMETHICONE 80 MG
80 TABLET,CHEWABLE ORAL 4 TIMES DAILY PRN
Status: DISCONTINUED | OUTPATIENT
Start: 2019-04-25 | End: 2019-04-29 | Stop reason: HOSPADM

## 2019-04-25 RX ORDER — CEFAZOLIN SODIUM 1 G/3ML
1 INJECTION, POWDER, FOR SOLUTION INTRAMUSCULAR; INTRAVENOUS SEE ADMIN INSTRUCTIONS
Status: DISCONTINUED | OUTPATIENT
Start: 2019-04-25 | End: 2019-04-25 | Stop reason: HOSPADM

## 2019-04-25 RX ORDER — NALOXONE HYDROCHLORIDE 0.4 MG/ML
.1-.4 INJECTION, SOLUTION INTRAMUSCULAR; INTRAVENOUS; SUBCUTANEOUS
Status: DISCONTINUED | OUTPATIENT
Start: 2019-04-25 | End: 2019-04-29 | Stop reason: HOSPADM

## 2019-04-25 RX ORDER — ONDANSETRON 2 MG/ML
4 INJECTION INTRAMUSCULAR; INTRAVENOUS EVERY 30 MIN PRN
Status: CANCELLED | OUTPATIENT
Start: 2019-04-25

## 2019-04-25 RX ORDER — OXYTOCIN/0.9 % SODIUM CHLORIDE 30/500 ML
PLASTIC BAG, INJECTION (ML) INTRAVENOUS CONTINUOUS PRN
Status: DISCONTINUED | OUTPATIENT
Start: 2019-04-25 | End: 2019-04-25

## 2019-04-25 RX ORDER — NALOXONE HYDROCHLORIDE 0.4 MG/ML
.1-.4 INJECTION, SOLUTION INTRAMUSCULAR; INTRAVENOUS; SUBCUTANEOUS
Status: CANCELLED | OUTPATIENT
Start: 2019-04-25 | End: 2019-04-26

## 2019-04-25 RX ORDER — LIDOCAINE 40 MG/G
CREAM TOPICAL
Status: DISCONTINUED | OUTPATIENT
Start: 2019-04-25 | End: 2019-04-25

## 2019-04-25 RX ORDER — AMOXICILLIN 250 MG
2 CAPSULE ORAL 2 TIMES DAILY PRN
Status: DISCONTINUED | OUTPATIENT
Start: 2019-04-25 | End: 2019-04-29 | Stop reason: HOSPADM

## 2019-04-25 RX ORDER — HYDROMORPHONE HYDROCHLORIDE 1 MG/ML
.3-.5 INJECTION, SOLUTION INTRAMUSCULAR; INTRAVENOUS; SUBCUTANEOUS EVERY 10 MIN PRN
Status: CANCELLED | OUTPATIENT
Start: 2019-04-25

## 2019-04-25 RX ORDER — ACETAMINOPHEN 325 MG/1
650 TABLET ORAL EVERY 4 HOURS PRN
Status: DISCONTINUED | OUTPATIENT
Start: 2019-04-28 | End: 2019-04-29 | Stop reason: HOSPADM

## 2019-04-25 RX ORDER — MISOPROSTOL 200 UG/1
TABLET ORAL
Status: COMPLETED
Start: 2019-04-25 | End: 2019-04-25

## 2019-04-25 RX ORDER — DIPHENHYDRAMINE HCL 25 MG
25 CAPSULE ORAL EVERY 6 HOURS PRN
Status: DISCONTINUED | OUTPATIENT
Start: 2019-04-25 | End: 2019-04-29 | Stop reason: HOSPADM

## 2019-04-25 RX ORDER — MISOPROSTOL 200 UG/1
800 TABLET ORAL
Status: DISCONTINUED | OUTPATIENT
Start: 2019-04-25 | End: 2019-04-29 | Stop reason: HOSPADM

## 2019-04-25 RX ADMIN — BUPIVACAINE 20 ML: 13.3 INJECTION, SUSPENSION, LIPOSOMAL INFILTRATION at 14:20

## 2019-04-25 RX ADMIN — SODIUM CHLORIDE, POTASSIUM CHLORIDE, SODIUM LACTATE AND CALCIUM CHLORIDE 1000 ML: 600; 310; 30; 20 INJECTION, SOLUTION INTRAVENOUS at 08:35

## 2019-04-25 RX ADMIN — KETOROLAC TROMETHAMINE 30 MG: 30 INJECTION, SOLUTION INTRAMUSCULAR at 17:27

## 2019-04-25 RX ADMIN — BUPIVACAINE HYDROCHLORIDE IN DEXTROSE 1.5 ML: 7.5 INJECTION, SOLUTION SUBARACHNOID at 11:29

## 2019-04-25 RX ADMIN — OXYTOCIN-SODIUM CHLORIDE 0.9% IV SOLN 30 UNIT/500ML 100 ML/HR: 30-0.9/5 SOLUTION at 17:53

## 2019-04-25 RX ADMIN — ACETYLCYSTEINE 2 ML: 200 SOLUTION ORAL; RESPIRATORY (INHALATION) at 16:40

## 2019-04-25 RX ADMIN — OXYCODONE HYDROCHLORIDE 5 MG: 5 TABLET ORAL at 21:31

## 2019-04-25 RX ADMIN — SODIUM CHLORIDE, POTASSIUM CHLORIDE, SODIUM LACTATE AND CALCIUM CHLORIDE: 600; 310; 30; 20 INJECTION, SOLUTION INTRAVENOUS at 11:23

## 2019-04-25 RX ADMIN — BUPIVACAINE HYDROCHLORIDE 20 ML: 2.5 INJECTION, SOLUTION EPIDURAL; INFILTRATION; INTRACAUDAL at 14:20

## 2019-04-25 RX ADMIN — FENTANYL CITRATE 15 MCG: 50 INJECTION, SOLUTION INTRAMUSCULAR; INTRAVENOUS at 11:29

## 2019-04-25 RX ADMIN — DOCOSANOL: 100 CREAM TOPICAL at 22:33

## 2019-04-25 RX ADMIN — PHENYLEPHRINE HYDROCHLORIDE 100 MCG: 10 INJECTION, SOLUTION INTRAMUSCULAR; INTRAVENOUS; SUBCUTANEOUS at 11:39

## 2019-04-25 RX ADMIN — ACETAMINOPHEN 975 MG: 325 TABLET, FILM COATED ORAL at 22:22

## 2019-04-25 RX ADMIN — ALBUTEROL SULFATE 2.5 MG: 2.5 SOLUTION RESPIRATORY (INHALATION) at 19:36

## 2019-04-25 RX ADMIN — OXYTOCIN-SODIUM CHLORIDE 0.9% IV SOLN 30 UNIT/500ML 300 ML/HR: 30-0.9/5 SOLUTION at 11:59

## 2019-04-25 RX ADMIN — MORPHINE SULFATE 0.15 MG: 1 INJECTION, SOLUTION EPIDURAL; INTRATHECAL; INTRAVENOUS at 11:29

## 2019-04-25 RX ADMIN — ALBUTEROL SULFATE 2.5 MG: 2.5 SOLUTION RESPIRATORY (INHALATION) at 06:32

## 2019-04-25 RX ADMIN — SODIUM CHLORIDE, POTASSIUM CHLORIDE, SODIUM LACTATE AND CALCIUM CHLORIDE: 600; 310; 30; 20 INJECTION, SOLUTION INTRAVENOUS at 02:03

## 2019-04-25 RX ADMIN — ACETYLCYSTEINE 2 ML: 200 SOLUTION ORAL; RESPIRATORY (INHALATION) at 06:32

## 2019-04-25 RX ADMIN — ACETYLCYSTEINE 2 ML: 200 SOLUTION ORAL; RESPIRATORY (INHALATION) at 19:37

## 2019-04-25 RX ADMIN — ONDANSETRON 4 MG: 2 INJECTION INTRAMUSCULAR; INTRAVENOUS at 12:18

## 2019-04-25 RX ADMIN — ALBUMIN HUMAN: 0.05 INJECTION, SOLUTION INTRAVENOUS at 12:05

## 2019-04-25 RX ADMIN — METHYLERGONOVINE MALEATE 200 MCG: 0.2 INJECTION INTRAMUSCULAR; INTRAVENOUS at 12:13

## 2019-04-25 RX ADMIN — CEFAZOLIN SODIUM 2 G: 2 INJECTION, SOLUTION INTRAVENOUS at 11:35

## 2019-04-25 RX ADMIN — PHENYLEPHRINE HYDROCHLORIDE 150 MCG: 10 INJECTION, SOLUTION INTRAMUSCULAR; INTRAVENOUS; SUBCUTANEOUS at 12:05

## 2019-04-25 RX ADMIN — PHENYLEPHRINE HYDROCHLORIDE 100 MCG: 10 INJECTION, SOLUTION INTRAMUSCULAR; INTRAVENOUS; SUBCUTANEOUS at 12:27

## 2019-04-25 RX ADMIN — TRANEXAMIC ACID 1 G: 100 INJECTION, SOLUTION INTRAVENOUS at 12:10

## 2019-04-25 RX ADMIN — KETOROLAC TROMETHAMINE 30 MG: 30 INJECTION, SOLUTION INTRAMUSCULAR at 22:23

## 2019-04-25 RX ADMIN — SODIUM CHLORIDE: 9 INJECTION, SOLUTION INTRAVENOUS at 12:30

## 2019-04-25 RX ADMIN — ACETAMINOPHEN 975 MG: 325 TABLET, FILM COATED ORAL at 16:36

## 2019-04-25 RX ADMIN — ALBUTEROL SULFATE 2.5 MG: 2.5 SOLUTION RESPIRATORY (INHALATION) at 16:38

## 2019-04-25 RX ADMIN — ALBUMIN HUMAN: 0.05 INJECTION, SOLUTION INTRAVENOUS at 12:14

## 2019-04-25 RX ADMIN — PHENYLEPHRINE HYDROCHLORIDE 0.2 MCG/KG/MIN: 10 INJECTION, SOLUTION INTRAMUSCULAR; INTRAVENOUS; SUBCUTANEOUS at 11:35

## 2019-04-25 RX ADMIN — SODIUM CHLORIDE, SODIUM LACTATE, POTASSIUM CHLORIDE, CALCIUM CHLORIDE AND DEXTROSE MONOHYDRATE: 5; 600; 310; 30; 20 INJECTION, SOLUTION INTRAVENOUS at 21:06

## 2019-04-25 NOTE — PROGRESS NOTES
"Maternal-Fetal Medicine BRIEF Progress Note    Armando Dc MRN# [id number]   Age: 44 year old  Gestational age: 36w0d YOB: 1975       Date of Admission: 2019          Subjective:        In to see patient for post-op evaluation - CS complicated by likely focal accreta and now with nebulizer due to O2 sat 92% and need for aggressive pulmonary care. Patient has Bakri balloon in place at this time for hemostasis.           Objective:          Patient Vitals for the past 24 hrs:   BP Temp Temp src Pulse Heart Rate Resp SpO2   19 1602 117/58 -- -- -- -- -- --   19 1600 -- 98.5  F (36.9  C) Oral -- -- 18 --   19 1555 113/59 -- -- 88 -- -- --   19 1455 117/60 -- -- 90 85 15 96 %   19 1441 -- -- -- -- 99 14 92 %   19 1440 121/80 -- -- 102 100 22 93 %   19 1425 126/86 -- -- 106 105 18 96 %   19 1414 -- 99  F (37.2  C) -- -- -- -- --   19 1410 107/66 -- -- 84 80 19 95 %   19 1355 126/63 -- -- 85 -- 16 95 %   19 1340 109/81 -- -- 85 103 27 96 %   19 1336 (!) 111/96 -- -- 89 93 22 94 %   19 1310 (!) 117/92 -- -- 124 -- 27 97 %   19 1307 104/76 97.4  F (36.3  C) Oral 107 -- (!) 32 94 %   19 0818 -- 98.2  F (36.8  C) Oral -- -- 18 --   19 0208 111/66 97.7  F (36.5  C) Oral -- -- 16 --   19 2042 130/71 98  F (36.7  C) Oral -- -- 16 --              Assessment:        44 year old y.o.  at 36w0d s/p CS for bleeding previa c/b maternal pulmonary disease - primary ciliary dyskinesis. Now with close surveillance for PPH given focal accreta - stable with Bakri in place but O2 92%.             Plan:        I contacted the pulmonary team, Dr. Leal, pulm fellow, who will contact Dr. Hernández. I reviewed Dr. Hernández pre-delivery plan for Asma per chart review as follows below:      \"- That she have continuous oximetry.   -- I would maintain her oxygen saturation during the time of delivery at 94% or greater.   -- " "She should continue to do her bronchial drainage and nebulized therapy through the day of delivery.   -- She should reinitiate vest therapy at least 2 times per day as soon as she is able after delivery.   -- If she were to have a  I would recommend using a cough pillow as her cough will remain persistent.   -- Feel free to contact the either the Pulmonary Service or myself during her hospitalization for her delivery if there are any questions or concerns. \" from Dr. Hernández note    I see no contraindication to vest therapy from a hemorrhage standpoint if this will help her pulmonary status but will defer to best recommendations from pulmonary team. They were contacted and will let me know next steps. I have reordered all her preop treatments for now.             Attestation:            Bryon Liz MD  Maternal-Fetal Medicine    2019     "

## 2019-04-25 NOTE — PROGRESS NOTES
Patient is schedule for 1030 CS for complete placenta previa. Pre procedure assessment completed.  Patient and family are waiting in her room. Will notifythe provider with any changes.

## 2019-04-25 NOTE — PLAN OF CARE
Data: Armando Dc transferred to Labor via cart at 1500. Baby in NICU.  Action: Receiving unit notified of transfer: Yes. Patient and family notified of room change. Continue postpartum care in Labor unit. Belongings sent to receiving unit. Accompanied by Registered Nurse. Oriented patient to surroundings. Call light within reach. ID bands double-checked with ressource RN.  Response: Patient tolerated transfer and is stable.

## 2019-04-25 NOTE — OP NOTE
[facility]  Full Operative Progress Note     Surgery Date:   2019  Surgeon:           Lyndsey Shipley MD  Assistants:       Viktoria Dominique MD PGY-3                            Adriana Bethea, MS4  Pre-op Diagnosis:           1. Intrauterine pregnancy at 36w0d  2. Posterior placenta previa   Post-op Diagnosis:         1. Same   2. Liveborn male infant  3. Focal placenta accreta    Procedure:        Primary low-transverse  section with double layer uterine closure via Pfannensteil incision; Bakri balloon placement     Anesthesia:      Spinal  EBL:     1100 mL  IVF:       1100 mL crystalloid  UOP:    60 mL clear urine at the end of the case  Drains: Li Catheter   Specimens:      Placenta, cord blood, cord segment  Complications:             None   Intraoperative medications: TXA, IV pitocin, PA miso 800mcg, methergine 0.2mg IM    Indications:   Armando Dc is a 44 year old  at 36w0d admitted for third bleed in setting of placenta previa, now planned delivery at 36+0 due to placenta previa.  The risks, benefits, and alternatives of  section were discussed with the patient, and she agreed to proceed.      Findings:   1. Focal accreta of the right posterior uterus - uterine wall oversewn  2. Clear amniotic fluid  3. Liveborn male infant in transverse back up presentation. Apgars 8 at 1 minute & 9 at 5 minutes. Weight 2770g.  4. Normal uterus, fallopian tubes, and ovaries.      Procedure Details:   The patient was brought to the OR, where adequate spinal anesthesia was administered.  She was placed in the dorsal supine position with a slight leftward tilt. A li catheter was placed into her bladder.  She was prepped and draped in the usual sterile fashion. A surgical time out was performed. A pfannenstiel skin incision was made with the scalpel, and carried down to the underlying fascia with sharp and blunt dissection. The fascia was incised in the midline, and the  incision was extended laterally with the Parnell scissors. The superior aspect of the fascia was grasped with the Kocher clamps and dissected off of the underlying rectus muscles with blunt and sharp dissection. Attention was then turned to the inferior aspect of the fascia, which was similarly dissected off of the underlying rectus muscles. The rectus muscles were  in the midline, and the peritoneum was entered bluntly, and the opening was extended with good visualization of the bladder. The bladder blade was placed. The vesicouterine peritoneum was incised in the midline, and the incision was extended laterally with the Metzenbaum scissors. A bladder flap was created digitally and the bladder blade was replaced. A transverse hysterotomy was made with the scalpel in the lower uterine segment, and the incision was extended with digital pressure. Placenta was noted to immediately extrude from the hysterotomy.  The infant was noted to be in the transverse back up position.  Membranes were ruptured and the hips were grasped and delivered through the hysterotomy, breech maneuvers used to deliver the remainder of the infant. No nuchal cord was noted. After 1min delay, the cord was doubly clamped and cut, and the infant was handed off to the awaiting nursery staff. A segment of cord was cut and saved for gases. The placenta was delivered with gentle traction on the umbilical cord and uterine massage, however the placenta was severely adherent and removed in piece using ring forceps to aid in delivery. The uterus was exteriorized to aid in removal of the placenta. Uterine tone was noted to be firm, however there was significant bleeding from the placental bed from the DENISA to the cervix. The right posterior uterus, the site of the most placental bed bleeding, was oversewn with 0-Vicryl in running locked fashion with improvement. Methergine 0.2mg IM was given, TXA 1g IV, and 800mcg rectal misprostol was given. A Fidelri  balloon was placed from below and inflated with 60cc of sterile saline then pullled snug against the cervix. The hysterotomy was closed with a running locked suture of 0 Vicryl.  The hysterotomy was then imbricated using an 0 Vicryl suture. The hysterotomy was noted to be hemostatic. The posterior cul-de-sac was irrigated and cleared of all clots and debris. The uterus was returned to the abdomen. The pericolic gutters were irrigated and cleared of all clots and debris. The hysterotomy was reexamined and made hemostatic with cautery. A piece of seprafilm was placed over the hysterotomy. The fascia and rectus muscles were examined and areas of oozing were controlled with electrocautery. A piece of seprafilm was placed over the rectus muscles. The fascia was closed with a running 0 Vicryl suture. The subcutaneous tissue was irrigated and areas of oozing were controlled with electrocautery. The subcutaneous tissue was closed with 3-0 Vicryl in a running fashion. The skin was closed with 4-0 monocryl and covered with steristrips, and a sterile dressing.    All sponge, needle, and instrument counts were correct. The patient tolerated the procedure well, and was transferred to recovery in stable condition. Dr. Shipley was present and scrubbed for the entirety of the procedure.     Viktoria Dominique MD  OB GYN PGY-3  4/25/2019, 6:10 PM    I was scrubbed and present for the entire procedure.  I have reviewed and edited the above note.    Lyndsey Shipley MD, FACOG

## 2019-04-25 NOTE — ANESTHESIA POSTPROCEDURE EVALUATION
Anesthesia POST Procedure Evaluation    Patient: Armando Dc   MRN:     [id number] Gender:   female   Age:    44 year old :      1975        Preoperative Diagnosis: Partial Posterior Placenta Previa   Procedure(s):  Primary  Section   Postop Comments: No value filed.       Anesthesia Type:  Regional, MAC  No value filed.    Reportable Event: NO     PAIN: Uncomplicated   Sign Out status: Comfortable, Well controlled pain     PONV: No PONV   Sign Out status:  No Nausea or Vomiting     Neuro/Psych: Uneventful perioperative course   Sign Out Status: Preoperative baseline; Age appropriate mentation     Airway/Resp.: Uneventful perioperative course   Sign Out Status: Non labored breathing, age appropriate RR; Resp. Status within EXPECTED Parameters     CV: Uneventful perioperative course   Sign Out status: Appropriate BP and perfusion indices; Appropriate HR/Rhythm     Disposition:   Sign Out in:  Labor and Delivery  Disposition:  Labor and Delivery  Recovery Course: Uneventful  Follow-Up: Not required           Last Anesthesia Record Vitals:  CRNA VITALS  2019 1258 - 2019 1358      2019             Resp Rate (observed):  1  (Abnormal)           Last PACU Vitals:  Vitals Value Taken Time   /86 2019  2:25 PM   Temp 37.2  C (99  F) 2019  2:14 PM   Pulse 106 2019  2:25 PM   Resp 23 2019  2:39 PM   SpO2 92 % 2019  2:39 PM   Temp src     NIBP 104/76 2019  1:16 PM   Pulse     SpO2 97 % 2019  1:15 PM   Resp     Temp     Ht Rate 107 2019  1:16 PM   Temp 2     Vitals shown include unvalidated device data.      Electronically Signed By: Martine Bledsoe MD, 2019, 2:40 PM

## 2019-04-25 NOTE — ANESTHESIA PROCEDURE NOTES
Peripheral Nerve Block Procedure Note    Staff:     Anesthesiologist:  Martine Jhaveri MD    Resident/CRNA:  Karl Dutton MD    Block performed by resident/CRNA in the presence of a teaching physician    Location: PACU and OB  Procedure Start/Stop TImes:     patient identified, IV checked, site marked, risks and benefits discussed, informed consent, monitors and equipment checked, pre-op evaluation, at physician/surgeon's request and post-op pain management      Correct Patient: Yes      Correct Position: Yes      Correct Site: Yes      Correct Procedure: Yes      Correct Laterality:  Yes    Site Marked:  Yes  Procedure details:     Procedure:  TAP    ASA:  3    Laterality:  Bilateral    Position:  Supine    Sterile Prep: chloraprep, mask and sterile gloves      Local skin infiltration:  None    Needle:  Short bevel    Needle gauge:  22    Needle length (mm):  110    Ultrasound: Yes      Ultrasound used to identify targeted nerve, plexus, or vascular structure and placed a needle adjacent to it      Permanent Image entered into patiient's record      Abnormal pain on injection: No      Blood Aspirated: No      Paresthesias:  No    Bleeding at site: No      Bolus via:  Needle    Infusion Method:  Single Shot    Complications:  None

## 2019-04-25 NOTE — PLAN OF CARE
Patient vital signs stable and afebrile. Patient reports no leaking, cramping, or backache. Patient did not sleep well and reports feeling nervous/excited for c section today. NPO starting at 0200 and fluids started. Patient complained of right arm hurting and given warm pack. States it feels like tendonitis. Will continue to monitor and continue with expectant management.

## 2019-04-25 NOTE — ANESTHESIA CARE TRANSFER NOTE
Patient: Armando Dc    Procedure(s):  Primary  Section    Diagnosis: Partial Posterior Placenta Previa  Diagnosis Additional Information: No value filed.    Anesthesia Type:   No value filed.     Note:  Airway :Nasal Cannula  Patient transferred to:Labor and Delivery  Comments: Report to Rui RN  4. SAB T10     RR 22,   SAO2 97 % with O2 NC  Pt able to have a productive cough  Temp 36.3C po      Handoff Report: Identifed the Patient, Identified the Reponsible Provider, Reviewed the pertinent medical history, Discussed the surgical course, Reviewed Intra-OP anesthesia mangement and issues during anesthesia, Set expectations for post-procedure period and Allowed opportunity for questions and acknowledgement of understanding      Vitals: (Last set prior to Anesthesia Care Transfer)    CRNA VITALS  2019 1252 - 2019 1322      2019             Resp Rate (observed):  1  (Abnormal)                 Electronically Signed By: DESHAWN Smith CRNA  2019  1:22 PM

## 2019-04-25 NOTE — ANESTHESIA PROCEDURE NOTES
Spinal/LP Procedure Note    Spinal Block  Staff:     Anesthesiologist:  Martine Jhaveri MD  Location: OB  Procedure Start/Stop Times:      4/25/2019 11:23 AM     4/25/2019 11:29 AM    patient identified, IV checked, site marked, risks and benefits discussed, informed consent, monitors and equipment checked, pre-op evaluation, at physician/surgeon's request and post-op pain management      Correct Patient: Yes      Correct Position: Yes      Correct Site: Yes      Correct Procedure: Yes      Correct Laterality:  N/A    Site Marked:  N/A  Procedure:     Procedure:  Intrathecal    ASA:  3    Position:  Sitting    Sterile Prep: chloraprep, mask, sterile gloves and patient draped      Insertion site:  L3-4    Approach:  Midline    Needle Type:  Jalyn    Needle gauge (G):  25    Local Skin Infiltration:  1% lidocaine    amount (ml):  3    Needle Length (in):  3.5    Introducer used: Yes      Introducer gauge:  20 G    Attempts:  1    Redirects:  0    CSF:  Clear    Paresthesias:  No    Time injected:  11:29  Assessment/Narrative:      Spinous processes palpated to left of midline

## 2019-04-25 NOTE — BRIEF OP NOTE
[facility]   Brief Operative Note     Surgery Date: 2019  Surgeon:  Lyndsey Shipley MD  Assistants:  Viktoria Dominique MD PGY-    Adriana Bethea, MS4  Pre-op Diagnosis:    1. Intrauterine pregnancy at 36w0d  2. Placenta previa   Post-op Diagnosis:    1. Same   2. Liveborn male infant  3. Placenta accreta    Procedure:  Primary low-transverse  section with double layer uterine closure via Pfannensteil incision; Bakri balloon placement    Anesthesia: Spinal  EBL:  1100 mL  IVF:  1100 mL crystalloid  UOP:  60 mL clear urine at the end of the case  Drains: Restrepo Catheter   Specimens:  Placenta, cord blood, cord segment  Complications: None   Intraoperative medications: TXA, IV pitocin, IL miso 800mcg, methergine 0.2mg IM    Findings:   1. Focal accreta of the right posterior uterus - uterine wall oversewn  2. Clear amniotic fluid  3. Liveborn male infant in transverse back up presentation. Apgars 8 at 1 minute & 9 at 5 minutes. Weight 2770g.  4. Normal uterus, fallopian tubes, and ovaries.     Disposition:  Stable to PACU    Viktoria Dominique MD  OB GYN PGY-3  2019, 12:58 PM

## 2019-04-25 NOTE — PLAN OF CARE
No bleeding. Pt scheduled for C/S tomorrow at 1030 am. Reviewed pre-op, intra-op, pacu and Nicu routines.  Pt abdomen clipped at 1825, and then she completed pre-op shower. Answered all questions. She states understanding of the process.  staying over night. Continue plan of care.

## 2019-04-25 NOTE — PLAN OF CARE
Assumed patient care at 1720. Patient's vss, fundus firm at U2. No active bleeding noted. Emptied Bakri at 1842- 10 ml of blood. Patient's lungs clear after Neb treatment at 1640. Patient's pain is slightly manageable. Oxycodone and dilaudid offered at different times but she declined. Patient taking Toradol and tylenol for pain management. Respiratory toileting every 1 hr with aid of incentive spirometer and Aerobika. Patient tolerating both very well. Patient continues on  oxygen therapy via Nasal cannula at 2L, O2 ranging between 89-95%. Patient denies any respiratory distress. Discussed with patient new orders for Vest therapy twice a day per pulmonologist recommendation.Patient would like to wait to start Vest therapy. Dr. Painting notified. Patient will communicate when ready. Patient is tolerating fluids well. Urine out put adequate- see flow sheet. Plan of care reviewed with patient. Will continue to monitor and with plan of care.

## 2019-04-25 NOTE — PROGRESS NOTES
Antepartum Progress Note    Patient name: Armando Dc  MRN: [id number]  : 1975    S: Armando Dc is doing well this morning, feeling ready but appropriately anxious for surgery. Her nose bleeds have been intermittent but responsive to pressure. She is otherwise feeling well. Stable and manageable contractions, no leaking of blood or fluid from her vagina, no abdominal pain. She notes fetal movement.    Bedside ultrasound confirmed placenta over the cervical os. Presentation was also noted to be cephalic (was breech yesterday). She has been NPO since 0200.    O: /66   Pulse 77   Temp 98.2  F (36.8  C) (Oral)   Resp 18   Wt 67.9 kg (149 lb 11.2 oz)   SpO2 98%   BMI 25.70 kg/m     Gen: comfortable appearing, in no acute distress  Resp: nonlabored breathing, no increased work of breathing, no respiratory distress  Abd: soft, nontender, gravid    FHT: Baseline 130-130 bpm, moderate variability, no accels, no decels  [place]: stable, irregular contractions    US 4/15: EFW 2759g (66%tile), posterior previa, cephalic. L renal pyelectasis, mild. NILDA 28. BPP 8/8  US : BPP 8/8 - breech, posterior previa  BSUS: presentation CEPHALIC    A/P: 43 year old  at 36w0d with placenta previa and third bleed on 19.  No current bleeding or leaking of fluid. Some uterine activity, but no complaints. HD#20. Plan for scheduled  section today.    Posterior placenta previa, with third bleed  - Delivery via PLTCS scheduled today () with Dr. 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 Dr. Hernández is her Pulmonologist and is aware of her admission.  - Last PFTs 2018. Inpatient consult if worsening status.  - Recent +sputum culture for pseudomonas/haemophils,  s/p 10d azithromycin. No evidence of pneumonia.  - Continue three times daily bronchial drainage therapy and nebulization treatment as recommended per pulmonology.  - Will restart vest therapy after delivery as tolerated with potential breastfeeding and breast engorgement, which may make vest difficult to use.   - Close monitoring of oxygen saturations, start O2 supplemental therapy in pregnancy if needed to maintain oxygen saturations >95%  - Anesthesiology consultation completed 3/28. Plan for spinal and TAP block.     GDMA1  - blood sugars have been well controlled this admission with diet (yesterday morning fasting glucose was 82)  - NPO since 0200 today     Constipation  - Senna. Add Miralax if needed.     PPx  - SCDs    As the medical student, I acted as a scribe for this note. All portions of the documented history and physical were personally performed by Dr. Bryon Liz.  Adriana Bethea, MS4    This note, as scribed, accurately reflects the examination, my impressions, and the plan as I discussed it with the patient.     Bryon Liz  04/25/19

## 2019-04-25 NOTE — PROGRESS NOTES
CLINICAL NUTRITION SERVICES - REASSESSMENT NOTE     Nutrition Prescription    Malnutrition Status:    Unable to determine due to unable to visit pt today d/t in OR for delivery    Future/Additional Recommendations:  If intake declines and pt to remain inpt, recommend schedule snack vs supplement to help meet nutritional needs.     EVALUATION OF THE PROGRESS TOWARD GOALS   Diet: NPO for procedures    Intake: RN flowsheets indicate pt consuming 100% of meals. Min touch indicates pt ordering on avg 3 meals per day.      NEW FINDINGS   Unable to visit pt today d/t in OR for planned . Anticipate GDM to resolve post-partum.   Weight consistent over past week.    MALNUTRITION  % Intake: No decreased intake noted  % Weight Loss: None noted  Subcutaneous Fat Loss: Unable to assess  Muscle Loss: Unable to assess  Fluid Accumulation/Edema: None noted  Malnutrition Diagnosis: Unable to determine due to unable to visit pt today d/t in OR for delivery    Previous Goals   1. Patient to consume % of nutritionally adequate meal trays TID, or the equivalent with supplements/snacks.   Evaluation: Met  2. Weight gain of at least 1 lb/week throughout remainder of pregnancy.  Evaluation: Not met    Previous Nutrition Diagnosis  Predicted inadequate nutrient intake (protein-energy) related to variable appetite as evidenced by reliance on po intakes to meet estimated needs with potential for decline  Evaluation: No change    CURRENT NUTRITION DIAGNOSIS  Predicted inadequate nutrient intake (protein-energy) related to variable appetite as evidenced by reliance on po intakes to meet estimated needs with potential for decline.    INTERVENTIONS  Implementation  None today    Goals  Patient to consume % of nutritionally adequate meal trays TID, or the equivalent with supplements/snacks.    Monitoring/Evaluation  Progress toward goals will be monitored and evaluated per protocol.    Melanie Natarajan RD, LD  Pager:  [phone]

## 2019-04-26 LAB
GLUCOSE SERPL-MCNC: 64 MG/DL (ref 70–99)
HGB BLD-MCNC: 10.3 G/DL (ref 11.7–15.7)

## 2019-04-26 PROCEDURE — 25000132 ZZH RX MED GY IP 250 OP 250 PS 637: Performed by: STUDENT IN AN ORGANIZED HEALTH CARE EDUCATION/TRAINING PROGRAM

## 2019-04-26 PROCEDURE — 94669 MECHANICAL CHEST WALL OSCILL: CPT

## 2019-04-26 PROCEDURE — 12000001 ZZH R&B MED SURG/OB UMMC

## 2019-04-26 PROCEDURE — 85018 HEMOGLOBIN: CPT | Performed by: STUDENT IN AN ORGANIZED HEALTH CARE EDUCATION/TRAINING PROGRAM

## 2019-04-26 PROCEDURE — 94640 AIRWAY INHALATION TREATMENT: CPT | Mod: 76

## 2019-04-26 PROCEDURE — 25800030 ZZH RX IP 258 OP 636: Performed by: STUDENT IN AN ORGANIZED HEALTH CARE EDUCATION/TRAINING PROGRAM

## 2019-04-26 PROCEDURE — 25000125 ZZHC RX 250: Performed by: STUDENT IN AN ORGANIZED HEALTH CARE EDUCATION/TRAINING PROGRAM

## 2019-04-26 PROCEDURE — 82947 ASSAY GLUCOSE BLOOD QUANT: CPT | Performed by: STUDENT IN AN ORGANIZED HEALTH CARE EDUCATION/TRAINING PROGRAM

## 2019-04-26 PROCEDURE — 94640 AIRWAY INHALATION TREATMENT: CPT

## 2019-04-26 PROCEDURE — 40000275 ZZH STATISTIC RCP TIME EA 10 MIN

## 2019-04-26 PROCEDURE — 25000128 H RX IP 250 OP 636: Performed by: STUDENT IN AN ORGANIZED HEALTH CARE EDUCATION/TRAINING PROGRAM

## 2019-04-26 PROCEDURE — 36415 COLL VENOUS BLD VENIPUNCTURE: CPT | Performed by: STUDENT IN AN ORGANIZED HEALTH CARE EDUCATION/TRAINING PROGRAM

## 2019-04-26 RX ORDER — ALBUTEROL SULFATE 0.83 MG/ML
2.5 SOLUTION RESPIRATORY (INHALATION) 4 TIMES DAILY
Status: DISCONTINUED | OUTPATIENT
Start: 2019-04-26 | End: 2019-04-29 | Stop reason: HOSPADM

## 2019-04-26 RX ORDER — SODIUM CHLORIDE, SODIUM LACTATE, POTASSIUM CHLORIDE, CALCIUM CHLORIDE 600; 310; 30; 20 MG/100ML; MG/100ML; MG/100ML; MG/100ML
INJECTION, SOLUTION INTRAVENOUS CONTINUOUS
Status: ACTIVE | OUTPATIENT
Start: 2019-04-26 | End: 2019-04-26

## 2019-04-26 RX ORDER — IBUPROFEN 600 MG/1
600 TABLET, FILM COATED ORAL EVERY 6 HOURS PRN
Status: DISCONTINUED | OUTPATIENT
Start: 2019-04-26 | End: 2019-04-29 | Stop reason: HOSPADM

## 2019-04-26 RX ORDER — ACETYLCYSTEINE 200 MG/ML
2 SOLUTION ORAL; RESPIRATORY (INHALATION) 4 TIMES DAILY
Status: DISCONTINUED | OUTPATIENT
Start: 2019-04-26 | End: 2019-04-29 | Stop reason: HOSPADM

## 2019-04-26 RX ADMIN — ACETAMINOPHEN 975 MG: 325 TABLET, FILM COATED ORAL at 14:43

## 2019-04-26 RX ADMIN — SENNOSIDES AND DOCUSATE SODIUM 2 TABLET: 8.6; 5 TABLET ORAL at 08:24

## 2019-04-26 RX ADMIN — PANTOPRAZOLE SODIUM 40 MG: 40 TABLET, DELAYED RELEASE ORAL at 08:24

## 2019-04-26 RX ADMIN — ALBUTEROL SULFATE 2.5 MG: 2.5 SOLUTION RESPIRATORY (INHALATION) at 07:52

## 2019-04-26 RX ADMIN — OXYCODONE HYDROCHLORIDE 5 MG: 5 TABLET ORAL at 04:31

## 2019-04-26 RX ADMIN — ACETAMINOPHEN 975 MG: 325 TABLET, FILM COATED ORAL at 22:27

## 2019-04-26 RX ADMIN — SENNOSIDES AND DOCUSATE SODIUM 2 TABLET: 8.6; 5 TABLET ORAL at 20:17

## 2019-04-26 RX ADMIN — ALBUTEROL SULFATE 2.5 MG: 2.5 SOLUTION RESPIRATORY (INHALATION) at 19:51

## 2019-04-26 RX ADMIN — OXYCODONE HYDROCHLORIDE 5 MG: 5 TABLET ORAL at 12:41

## 2019-04-26 RX ADMIN — ACETYLCYSTEINE 2 ML: 200 SOLUTION ORAL; RESPIRATORY (INHALATION) at 14:23

## 2019-04-26 RX ADMIN — ACETYLCYSTEINE 2 ML: 200 SOLUTION ORAL; RESPIRATORY (INHALATION) at 07:52

## 2019-04-26 RX ADMIN — KETOROLAC TROMETHAMINE 30 MG: 30 INJECTION, SOLUTION INTRAMUSCULAR at 11:25

## 2019-04-26 RX ADMIN — IBUPROFEN 600 MG: 600 TABLET ORAL at 18:15

## 2019-04-26 RX ADMIN — OXYCODONE HYDROCHLORIDE 5 MG: 5 TABLET ORAL at 20:10

## 2019-04-26 RX ADMIN — SODIUM CHLORIDE, POTASSIUM CHLORIDE, SODIUM LACTATE AND CALCIUM CHLORIDE: 600; 310; 30; 20 INJECTION, SOLUTION INTRAVENOUS at 00:36

## 2019-04-26 RX ADMIN — ACETAMINOPHEN 975 MG: 325 TABLET, FILM COATED ORAL at 06:06

## 2019-04-26 RX ADMIN — ALBUTEROL SULFATE 2.5 MG: 2.5 SOLUTION RESPIRATORY (INHALATION) at 14:23

## 2019-04-26 RX ADMIN — ACETYLCYSTEINE 2 ML: 200 SOLUTION ORAL; RESPIRATORY (INHALATION) at 19:51

## 2019-04-26 RX ADMIN — KETOROLAC TROMETHAMINE 30 MG: 30 INJECTION, SOLUTION INTRAMUSCULAR at 04:31

## 2019-04-26 NOTE — PLAN OF CARE
Data: Armando Dc transferred to  [facility] via wheelchair at 11.00. Stopped to see baby in NICU. Action: Receiving unit notified of transfer: Yes. Patient and family notified of room change. Report given to Chrystal DICKSON at 1035 . Belongings sent to receiving unit. Accompanied by Registered Nurse. Oriented patient to surroundings. Call light within reach. ID bands double-checked with receiving RN.  Response: Patient tolerated transfer and is stable.

## 2019-04-26 NOTE — PROGRESS NOTES
[facility] Obstetrics Postpartum Progress Note    S: Patient states she is doing well.  Pain well controlled with current pain meds. No bleeding around Bakri per RN.  Eating and drinking without nausea/vomiting.  She is not yet passing flatus.  Has not ambulated yet.  Denies fevers/chills, dizziness, CP.  Pumping for baby in NICU  O:  Patient Vitals for the past 12 hrs:   BP Temp Temp src Resp SpO2   19 0400 100/59 98.2  F (36.8  C) Oral 14 97 %   19 0010 100/55 98  F (36.7  C) -- 16 94 %   19 2200 -- -- -- -- 94 %   19 -- -- -- -- 92 %   19 -- -- -- -- 93 %   19 194 125/59 98.4  F (36.9  C) Oral 22 98 %     Gen:  NAD  CV: regular rate and rhythm  Resp: CTAB in anterior lung field, good air movement  Abd: soft, nondistended, minimally and appropriately tender, fundus firm at 1cm below umbilicus  : Bakri removed, perineum hemostatic  Incision:  clean, dry, intact  Ext: non-tender, trace LE edema, no erythema    Hemoglobin   Date Value Ref Range Status   2019 10.3 (L) 11.7 - 15.7 g/dL Final   2019 10.4 (L) 11.7 - 15.7 g/dL Final       A/P: 44 year old  POD#1 s/p PLTCS for placenta previa, intraoperative course complicated by focal accreta and postpartum hemorrhage, doing well postpartum.    1. Postpartum hemorrhage secondary to atony and focal accreta:  Focal accreta was oversewn and Bakri was placed.  Half volum of Bakri removed overnight, 30mL remaining.  The patient had a preoperative Hgb of 11.7, an EBL of 1000cc was associated with delivery.  Hgb 10.3 on night of delivery with normal coags.  POD1 Hgb is pending.  Patient is not tachycardic or hypotensive.  2. 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 Dr. Hernández is her Pulmonologist and is aware of delivery, recommends starting vest when able.  Patient declined vest due to postop pain overnight, is using IS frequently  and is using Aerobika.  Continuing albuterol and Mucomyst, increaed to QID.    Last PFTs 11/2018. Inpatient consult if worsening status.    Recent +sputum culture for pseudomonas/haemophilus, s/p 10d azithromycin. No evidence of pneumonia.    Close monitoring of oxygen saturations, start O2 supplemental therapy if needed to maintain oxygen saturations >92%  3. Pain control: pain is well-controlled with Tylenol, Toradol.  Transition to Motrin from Toradol. Oxycodone prn, continue.  4. Rh positive/Rubella immune  5. Incision: no concerns  6. Routine postpartum:    encourage breastfeeding    Encourage ambulation    Continue regular diet    Continue bowel regimen    Contraception: declines  7. Dispo: likely discharge POD#3    Adore Painting MD  PGY-3 OB/GYN  04/26/2019 6:46 AM

## 2019-04-26 NOTE — PROGRESS NOTES
Postpartum Progress Note    S: Patient feeling well this morning. Significantly better than last night when she was having some trouble with her O2 sats. She has been on room air since 0500 this morning with O2 saturation measurements in the mid- to high- 90s. She has been up ambulating independently to use the bathroom. Her pain is well controlled on her current regimen. She denies vaginal bleeding but was told to make care team aware of any bleeding given her PPH secondary to atony/accreta s/p Bakri. She plans to trial her vest today if it isn't too painful.    O:   Vitals:    19 2200 19 0010 19 0400 19 0822   BP:  100/55 100/59 103/53   Cuff Size:       Pulse:       Resp:  16 14 16   Temp:  98  F (36.7  C) 98.2  F (36.8  C) 98.2  F (36.8  C)   TempSrc:   Oral Oral   SpO2: 94% 94% 97%    Weight:         Gen: appears well, in no acute distress  CV: regular rhythm, regular rate  Abd: soft, minimally tender to palpation  : Bakri removed  Ext: warm, well perfused, trace edema bilaterally    I/O last 3 completed shifts:  In: 1920 [P.O.:820; I.V.:600]  Out: 3383 [Urine:1825; Blood:1558]    Labs:  Hemoglobin   Date Value Ref Range Status   2019 10.3 (L) 11.7 - 15.7 g/dL Final       A/P: Armando Dc is a 44 year old  POD#1 s/p PLTCS for placenta previa, intraoperative course complicated by focal accreta and postpartum hemorrhage, doing well postpartum, hoping to transfer to postpartum floor today.    Postpartum hemorrhage secondary to atony and focal accreta (stable)  Focal accreta oversewn and Bakri placed intraoperatively.  Half volume of Bakri removed overnight, 30mL remaining. Bakri removed this morning.  The patient had a preoperative Hgb of 11.7, an EBL of 1000cc was associated with delivery.  Hgb 10.3 on night of delivery with normal coags.  POD1 Hgb is stable at 10.3 at 0600.  Patient is not tachycardic or hypotensive.  - continue to monitor during independent ambulation  today  - okay to transfer to postpartum floor given hemodynamic stability    Primary ciliary dyskinesia and bronchiectasis with moderate-severe obstructive lung disease  Stable disease. Dr. Hernández is her Pulmonologist and is aware of delivery, recommends starting vest when able.  Patient declined vest due to postop pain overnight, is using IS frequently and is using Aerobika.  Continuing albuterol and Mucomyst, increased to QID. Recent +sputum culture for pseudomonas/haemophilus, s/p 10d azithromycin. No evidence of pneumonia.  - last PFTs 11/2018, inpatient consult if worsening status.  - close monitoring of oxygen saturation  - low threshold to start supplemental O2 to maintain oxygen saturations >92%  - okay to transfer to postpartum floor given respiratory stability    Pain control  Patient reports that pain is well-controlled with Tylenol, Toradol. Transition to Motrin from Toradol.  - oxycodone prn    Dispo: discharge POD#3 or 4, depending on respiratory status.     I, Bryon Liz MD saw this patient personally and agree with assessment and plan as noted.     I personally reviewed vital signs, examination and vitals.       Bryon Liz MD  Date of Service (when I saw the patient): 04/26/19

## 2019-04-26 NOTE — PLAN OF CARE
Patient transferred to floor in wheelchair  at 1100 by Amira Doyle and accompanied by family. Report received prior to transfer. Baby is in NICU and she stopped by NICU before transfer. She was oriented to room and unit set up.  Call light and room phone given. Will continue with plan of care.

## 2019-04-26 NOTE — PROGRESS NOTES
In to see patient.  Negligible bleeding from Bakri.  Discussed that given small volume in uterus, will plan to decrease in AM between 5-6am.  Pt feels that she has mucous plugs and requests increasing nebs to 4x/day which she states has previously been OKed by Dr. Hernández.

## 2019-04-26 NOTE — PLAN OF CARE
Pt tx to room 420 via bed @ 2050. Pt tolerated transfer. Report given to Kjersti, RN. Care relinquished.

## 2019-04-26 NOTE — PLAN OF CARE
Pain is comfortably manageable with current pain regime but it gets worst when coughing. She is able to ambulate to the bathroom independently and transfer herself back to bed. She is passing flatus but no bowel movement yet. She pumps independently but only getting a tiny drop.  Will continue with plan of care.

## 2019-04-26 NOTE — PROGRESS NOTES
Visit with patient and spouse in the NICU.  Offered congratulations and support regarding baby's NICU admission.  Will continue to follow along throughout baby's NICU admission for needs and for support.

## 2019-04-26 NOTE — LACTATION NOTE
D:  I met with Armando in her postpartum room today.  Chelsea is her first baby.  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  She has gestation diabetes.  Her meds include mucomyst and albuterol nebs, azithromycin, Flonase and Protonix.  She has no history of breast/chest surgery or trauma, has already started to pump.  I:  I gave her a folder of introductory materials and went over pumping guidelines.    We talked about hands on pumping techniques, hand expression and how to access the [place] websites. I advised her to call her insurance company about pump coverage.   A:  Mom has initial information she needs to work on supply.  P:  Will continue to provide lactation support.       Tressa Avilez, RNC, IBCLC

## 2019-04-26 NOTE — PLAN OF CARE
Pt transferred to Rm 420. IV patent. SCD in place and operating. Arrived with 3 liters O2 by NC with Sa02 @ 91%. Pt independently using IS. RT here to evaluate pt. 5 mg oxycodone po administered for pain. Restrepo catheter is patent. Bakri is draining minimal amount of blood. Pt is alert and oriented. Accompanied by her .

## 2019-04-26 NOTE — PLAN OF CARE
VSS, afebrile. Bakri removed with minimal bleeding noted. Fundus firm, midline, U2. Pain well-controlled with current medications. I&O adequate. SCDs in place. Restrepo removed, patient tolerated well and urinated a small amount afterward. Respiratory treatments increased in frequency per patient request. Pumping every 2-3 hours, plans to see lactation today. Out of bed for first time since surgery, tolerated well. Ambulated with standby assist to bathroom and independently back to bed. Discussed continued activity and pain management throughout recovery. Plan for lab draws this morning and possible transfer to postpartum later today.

## 2019-04-27 PROCEDURE — 94640 AIRWAY INHALATION TREATMENT: CPT | Mod: 76

## 2019-04-27 PROCEDURE — 25000132 ZZH RX MED GY IP 250 OP 250 PS 637: Performed by: STUDENT IN AN ORGANIZED HEALTH CARE EDUCATION/TRAINING PROGRAM

## 2019-04-27 PROCEDURE — 94640 AIRWAY INHALATION TREATMENT: CPT

## 2019-04-27 PROCEDURE — 27210533 ZZH VEST CHEST PERCUSSION

## 2019-04-27 PROCEDURE — 40000275 ZZH STATISTIC RCP TIME EA 10 MIN

## 2019-04-27 PROCEDURE — 25000125 ZZHC RX 250: Performed by: STUDENT IN AN ORGANIZED HEALTH CARE EDUCATION/TRAINING PROGRAM

## 2019-04-27 PROCEDURE — 94669 MECHANICAL CHEST WALL OSCILL: CPT

## 2019-04-27 PROCEDURE — 12000001 ZZH R&B MED SURG/OB UMMC

## 2019-04-27 RX ORDER — POLYETHYLENE GLYCOL 3350 17 G/17G
17 POWDER, FOR SOLUTION ORAL DAILY
Status: DISCONTINUED | OUTPATIENT
Start: 2019-04-27 | End: 2019-04-29 | Stop reason: HOSPADM

## 2019-04-27 RX ORDER — AMOXICILLIN 250 MG
1 CAPSULE ORAL 2 TIMES DAILY PRN
Qty: 60 TABLET | Refills: 0 | Status: SHIPPED | OUTPATIENT
Start: 2019-04-27 | End: 2019-09-11

## 2019-04-27 RX ORDER — CALCIUM CARBONATE 500 MG/1
500-1000 TABLET, CHEWABLE ORAL 3 TIMES DAILY PRN
Status: DISCONTINUED | OUTPATIENT
Start: 2019-04-27 | End: 2019-04-29 | Stop reason: HOSPADM

## 2019-04-27 RX ORDER — OXYCODONE HYDROCHLORIDE 5 MG/1
5-10 TABLET ORAL
Qty: 12 TABLET | Refills: 0 | Status: SHIPPED | OUTPATIENT
Start: 2019-04-27 | End: 2019-05-08

## 2019-04-27 RX ADMIN — ALBUTEROL SULFATE 2.5 MG: 2.5 SOLUTION RESPIRATORY (INHALATION) at 12:13

## 2019-04-27 RX ADMIN — OXYCODONE HYDROCHLORIDE 5 MG: 5 TABLET ORAL at 11:37

## 2019-04-27 RX ADMIN — ALBUTEROL SULFATE 2.5 MG: 2.5 SOLUTION RESPIRATORY (INHALATION) at 08:15

## 2019-04-27 RX ADMIN — PANTOPRAZOLE SODIUM 40 MG: 40 TABLET, DELAYED RELEASE ORAL at 07:37

## 2019-04-27 RX ADMIN — ALBUTEROL SULFATE 2.5 MG: 2.5 SOLUTION RESPIRATORY (INHALATION) at 16:14

## 2019-04-27 RX ADMIN — ACETYLCYSTEINE 2 ML: 200 SOLUTION ORAL; RESPIRATORY (INHALATION) at 08:15

## 2019-04-27 RX ADMIN — OXYCODONE HYDROCHLORIDE 5 MG: 5 TABLET ORAL at 19:40

## 2019-04-27 RX ADMIN — ALBUTEROL SULFATE 2.5 MG: 2.5 SOLUTION RESPIRATORY (INHALATION) at 20:00

## 2019-04-27 RX ADMIN — OXYCODONE HYDROCHLORIDE 5 MG: 5 TABLET ORAL at 15:43

## 2019-04-27 RX ADMIN — ACETYLCYSTEINE 2 ML: 200 SOLUTION ORAL; RESPIRATORY (INHALATION) at 12:13

## 2019-04-27 RX ADMIN — ACETYLCYSTEINE 2 ML: 200 SOLUTION ORAL; RESPIRATORY (INHALATION) at 20:00

## 2019-04-27 RX ADMIN — ACETAMINOPHEN 975 MG: 325 TABLET, FILM COATED ORAL at 06:14

## 2019-04-27 RX ADMIN — SENNOSIDES AND DOCUSATE SODIUM 2 TABLET: 8.6; 5 TABLET ORAL at 07:37

## 2019-04-27 RX ADMIN — ACETAMINOPHEN 975 MG: 325 TABLET, FILM COATED ORAL at 14:40

## 2019-04-27 RX ADMIN — OXYCODONE HYDROCHLORIDE 5 MG: 5 TABLET ORAL at 07:37

## 2019-04-27 RX ADMIN — CALCIUM CARBONATE (ANTACID) CHEW TAB 500 MG 500 MG: 500 CHEW TAB at 23:25

## 2019-04-27 RX ADMIN — ACETYLCYSTEINE 2 ML: 200 SOLUTION ORAL; RESPIRATORY (INHALATION) at 16:15

## 2019-04-27 RX ADMIN — HYDROCORTISONE: 25 CREAM TOPICAL at 13:03

## 2019-04-27 NOTE — PROVIDER NOTIFICATION
04/27/19 1148   Provider Notification   Provider Name/Title Dr Dominique   Method of Notification Phone   Request Evaluate-Remote   Notification Reason Other  (bumpy, itchy rashes above belly button)   Spoke with resident about the rashes and said  it's ok to apply hydrocortisone cream

## 2019-04-27 NOTE — PROGRESS NOTES
[facility] Obstetrics Postpartum Progress Note    S: Patient reports that she is doing well this morning.  She feels that she is doing better overall from a pulmonary standpoint.  She is having cough with vest treatments and this causes incisional pain.  Discussed attempting to blunt this with a pillow.  Patient requests that oxycodone be scheduled prior to vest treatment sessions.  Otherwise incisional pain is well controlled.  Patient is passing flatus.  She is tolerating a regular diet without nausea or vomiting.  She reports feeling constipated and requests another stool softener.  No lightheadedness or dizziness.  No issues with ambulation or voiding.  Baby is doing well in NICU but still on CPAP.    O:  Patient Vitals for the past 12 hrs:   BP Temp Temp src Pulse Resp SpO2   19 0743 122/68 97.9  F (36.6  C) Oral 80 -- --   19 2325 100/52 97.8  F (36.6  C) Oral 83 18 96 %     Gen:  NAD  CV: regular rate and rhythm  Resp: CTAB in anterior lung fields, good air movement  Abd: soft, nondistended, minimally and appropriately tender, fundus firm at 1cm below umbilicus  Incision:  clean, dry, intact  Ext: non-tender, trace LE edema, no erythema    Hemoglobin   Date Value Ref Range Status   2019 10.3 (L) 11.7 - 15.7 g/dL Final   2019 10.3 (L) 11.7 - 15.7 g/dL Final     A/P: 44 year old  POD#2 s/p PLTCS for placenta previa, intraoperative course complicated by focal accreta and postpartum hemorrhage, doing well postpartum.    1. Postpartum hemorrhage secondary to atony and focal accreta:  Focal accreta was oversewn and Bakri was placed, removed POD#1. The patient had a preoperative Hgb of 11.7, an EBL of 1000cc was associated with delivery.  Hgb 10.3 on night of delivery with normal coags.  POD1 Hgb is 10.3.  Patient is not tachycardic or hypotensive.  2. 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 Dr. Hernández is  her Pulmonologist and is aware of delivery, recommends starting vest when able.  Patient attempting vest but difficult to complete session due to postop pain, is using IS frequently and is using Aerobika.  Will attempt to schedule oxycodone around vest treatments once patient sets schedule with RT. Continuing albuterol and Mucomyst, increased to QID.    Last PFTs 11/2018. Inpatient consult if worsening status.    Recent +sputum culture for pseudomonas/haemophilus, s/p 10d azithromycin. No evidence of pneumonia.    Close monitoring of oxygen saturations, start O2 supplemental therapy if needed to maintain oxygen saturations >92%. Sats >95% on RA.  3. Pain control: pain is well-controlled with Tylenol, Motrin, oxycodone prn, continue.  4. Rh positive/Rubella immune  5. Incision: no concerns  6. Routine postpartum:    encourage breastfeeding    Encourage ambulation    Continue regular diet    Continue bowel regimen, exhibiting return of bowel function.  Miralax per pt request for stool softener.    Contraception: declines, primary tubal infertility  7. Dispo: likely discharge POD#3    Adore Painting MD  PGY-3 OB/GYN  OB G3 [phone]  04/27/2019 8:18 AM     Appreciate note by Dr. Painting. Patient has been seen and examined by me separate from the resident, agree with above note.     Maite Collins MD  11:56 AM

## 2019-04-27 NOTE — PLAN OF CARE
VSS. Postpartum assessments WDL. Fundus firm. Lochia scant, no clots expressed. Denies signs and symptoms of preeclampsia. Goes down to visit infant in NICU frequently. Tolerating PO intake. Mild 2+ edema in lower extremities (ankle/foot).  Incision WDL. Steri strips intact, abd binder in place.  Pain slightly intolerable between 1930 and 2200, especially with coughing. But she reported some relief with warm packs, Tylenol and Oxycodone. Requested to discontinue narcotic medications due to fear of chemical dependency and interaction with breast milk. Acknowledged concerns and discussed some options for timing her meds - she would like to take 5mg of Oxycodone about 30-45 mins before RT treatments.  Able to make needs known. Spouse in room, supportive with cares. Continue plan of care.

## 2019-04-28 PROCEDURE — 25000132 ZZH RX MED GY IP 250 OP 250 PS 637: Performed by: STUDENT IN AN ORGANIZED HEALTH CARE EDUCATION/TRAINING PROGRAM

## 2019-04-28 PROCEDURE — 25000125 ZZHC RX 250: Performed by: STUDENT IN AN ORGANIZED HEALTH CARE EDUCATION/TRAINING PROGRAM

## 2019-04-28 PROCEDURE — 94669 MECHANICAL CHEST WALL OSCILL: CPT

## 2019-04-28 PROCEDURE — 94640 AIRWAY INHALATION TREATMENT: CPT

## 2019-04-28 PROCEDURE — 40000275 ZZH STATISTIC RCP TIME EA 10 MIN

## 2019-04-28 PROCEDURE — 12000001 ZZH R&B MED SURG/OB UMMC

## 2019-04-28 PROCEDURE — 94640 AIRWAY INHALATION TREATMENT: CPT | Mod: 76

## 2019-04-28 RX ORDER — IBUPROFEN 600 MG/1
600 TABLET, FILM COATED ORAL EVERY 6 HOURS PRN
Qty: 60 TABLET | Refills: 0 | Status: SHIPPED | OUTPATIENT
Start: 2019-04-28

## 2019-04-28 RX ORDER — ACETAMINOPHEN 325 MG/1
650 TABLET ORAL EVERY 6 HOURS PRN
Qty: 60 TABLET | Refills: 0 | Status: SHIPPED | OUTPATIENT
Start: 2019-04-28

## 2019-04-28 RX ADMIN — ALBUTEROL SULFATE 2.5 MG: 2.5 SOLUTION RESPIRATORY (INHALATION) at 12:40

## 2019-04-28 RX ADMIN — ALBUTEROL SULFATE 2.5 MG: 2.5 SOLUTION RESPIRATORY (INHALATION) at 20:09

## 2019-04-28 RX ADMIN — OXYCODONE HYDROCHLORIDE 5 MG: 5 TABLET ORAL at 08:32

## 2019-04-28 RX ADMIN — PANTOPRAZOLE SODIUM 40 MG: 40 TABLET, DELAYED RELEASE ORAL at 08:32

## 2019-04-28 RX ADMIN — ALBUTEROL SULFATE 2.5 MG: 2.5 SOLUTION RESPIRATORY (INHALATION) at 16:09

## 2019-04-28 RX ADMIN — ACETYLCYSTEINE 2 ML: 200 SOLUTION ORAL; RESPIRATORY (INHALATION) at 20:09

## 2019-04-28 RX ADMIN — IBUPROFEN 600 MG: 600 TABLET ORAL at 17:02

## 2019-04-28 RX ADMIN — SENNOSIDES AND DOCUSATE SODIUM 2 TABLET: 8.6; 5 TABLET ORAL at 22:54

## 2019-04-28 RX ADMIN — ACETAMINOPHEN 975 MG: 325 TABLET, FILM COATED ORAL at 02:51

## 2019-04-28 RX ADMIN — DOCOSANOL: 100 CREAM TOPICAL at 22:55

## 2019-04-28 RX ADMIN — IBUPROFEN 600 MG: 600 TABLET ORAL at 22:54

## 2019-04-28 RX ADMIN — ACETYLCYSTEINE 2 ML: 200 SOLUTION ORAL; RESPIRATORY (INHALATION) at 12:40

## 2019-04-28 RX ADMIN — ACETYLCYSTEINE 2 ML: 200 SOLUTION ORAL; RESPIRATORY (INHALATION) at 16:09

## 2019-04-28 RX ADMIN — HYDROCORTISONE: 25 CREAM TOPICAL at 22:54

## 2019-04-28 RX ADMIN — ALBUTEROL SULFATE 2.5 MG: 2.5 SOLUTION RESPIRATORY (INHALATION) at 09:07

## 2019-04-28 RX ADMIN — OXYCODONE HYDROCHLORIDE 5 MG: 5 TABLET ORAL at 16:10

## 2019-04-28 RX ADMIN — SENNOSIDES AND DOCUSATE SODIUM 2 TABLET: 8.6; 5 TABLET ORAL at 08:32

## 2019-04-28 RX ADMIN — IBUPROFEN 600 MG: 600 TABLET ORAL at 09:52

## 2019-04-28 RX ADMIN — ACETAMINOPHEN 975 MG: 325 TABLET, FILM COATED ORAL at 14:07

## 2019-04-28 RX ADMIN — ACETYLCYSTEINE 2 ML: 200 SOLUTION ORAL; RESPIRATORY (INHALATION) at 09:06

## 2019-04-28 NOTE — PROGRESS NOTES
[facility] Obstetrics Postpartum Progress Note    S: Patient reports that she is doing well this morning. Breast pumping. She is having cough. Had two vest treatments yesterday.  Patient is passing flatus.  She is tolerating a regular diet without nausea or vomiting. No lightheadedness or dizziness.  No issues with ambulation or voiding.    O:  Patient Vitals for the past 12 hrs:   BP Temp Temp src Heart Rate Resp SpO2   19 2300 132/84 98  F (36.7  C) Oral 88 18 --   19 -- -- -- -- -- 99 %     Gen:  NAD  CV: well-perfused  Resp: CTAB in anterior lung fields, good air movement  Abd: soft, nondistended, minimally and appropriately tender, fundus firm at 1cm below umbilicus  Incision:  clean, dry, intact  Ext: non-tender, trace LE edema, no erythema    Hemoglobin   Date Value Ref Range Status   2019 10.3 (L) 11.7 - 15.7 g/dL Final   2019 10.3 (L) 11.7 - 15.7 g/dL Final     A/P: 44 year old  POD#3 s/p PLTCS for placenta previa, intraoperative course complicated by focal accreta and postpartum hemorrhage, doing well postpartum. Doing well. Would like to board vs stay another day if possible w/ infant in NICU.    1. Postpartum hemorrhage secondary to atony and focal accreta:  Focal accreta was oversewn and Bakri was placed, removed POD#1. The patient had a preoperative Hgb of 11.7, an EBL of 1000cc was associated with delivery.  Hgb 10.3 on night of delivery with normal coags.  POD1 Hgb is 10.3.  Patient is not tachycardic or hypotensive.  2. 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 Dr. Hernández is her Pulmonologist and is aware of delivery, recommends starting vest when able.  Patient attempting vest but difficult to complete session due to postop pain, is using IS frequently and is using Aerobika.  Will attempt to schedule oxycodone around vest treatments once patient sets schedule with RT. Continuing  albuterol and Mucomyst, increased to QID.    Last PFTs 11/2018. Inpatient consult if worsening status.    Recent +sputum culture for pseudomonas/haemophilus, s/p 10d azithromycin. No evidence of pneumonia.    Close monitoring of oxygen saturations, start O2 supplemental therapy if needed to maintain oxygen saturations >92%. Sats >95% on RA.  3. Pain control: pain is well-controlled with Tylenol, Motrin, oxycodone prn, continue.  4. Rh positive/Rubella immune  5. Incision: no concerns  6. Routine postpartum:    encourage breastfeeding    Encourage ambulation    Continue regular diet    Continue bowel regimen, exhibiting return of bowel function.  Miralax per pt request for stool softener.    Contraception: declines, primary tubal infertility  7. Dispo:  discharge  tomorrow     Ran MD River  OB/GYN Resident, PGY-3  4/28/2019     OB/GYN Staff -- Pt seen and examined by me. Agree with note as above.  MD Raul

## 2019-04-28 NOTE — PLAN OF CARE
Pt is stable. Pain well manageable with current pain meds. Request to have Oxycodone 5 mg 30 minutes before Respiratory Therapy. Pumping. Able to ambulate but uses wheelchair to visit with infant in NICU. No complaints. Able to make her needs known. Will continue with plan of care.

## 2019-04-28 NOTE — PLAN OF CARE
Pain is better as claimed and she walks from room to unit exit door and then takes wc down to NICU. Had bowel movement again today.Pumping independently with result. Will continue with plan of care.

## 2019-04-28 NOTE — PLAN OF CARE
Pt taking tums for heartburn before bedtime. Pt declined GI cocktail as tums provided relief.   Pt taking Tylenol for incisional pain and cramping. Also taking oxycodone prior to RT vest treatments.

## 2019-04-29 VITALS
SYSTOLIC BLOOD PRESSURE: 130 MMHG | DIASTOLIC BLOOD PRESSURE: 81 MMHG | HEART RATE: 84 BPM | BODY MASS INDEX: 25.7 KG/M2 | TEMPERATURE: 98.2 F | RESPIRATION RATE: 20 BRPM | OXYGEN SATURATION: 96 % | WEIGHT: 149.7 LBS

## 2019-04-29 PROCEDURE — 94640 AIRWAY INHALATION TREATMENT: CPT

## 2019-04-29 PROCEDURE — 94669 MECHANICAL CHEST WALL OSCILL: CPT

## 2019-04-29 PROCEDURE — 40000275 ZZH STATISTIC RCP TIME EA 10 MIN

## 2019-04-29 PROCEDURE — 25000125 ZZHC RX 250: Performed by: STUDENT IN AN ORGANIZED HEALTH CARE EDUCATION/TRAINING PROGRAM

## 2019-04-29 PROCEDURE — 25000132 ZZH RX MED GY IP 250 OP 250 PS 637: Performed by: STUDENT IN AN ORGANIZED HEALTH CARE EDUCATION/TRAINING PROGRAM

## 2019-04-29 PROCEDURE — 94640 AIRWAY INHALATION TREATMENT: CPT | Mod: 76

## 2019-04-29 RX ADMIN — SENNOSIDES AND DOCUSATE SODIUM 1 TABLET: 8.6; 5 TABLET ORAL at 08:19

## 2019-04-29 RX ADMIN — POLYETHYLENE GLYCOL 3350 17 G: 17 POWDER, FOR SOLUTION ORAL at 12:32

## 2019-04-29 RX ADMIN — ACETYLCYSTEINE 2 ML: 200 SOLUTION ORAL; RESPIRATORY (INHALATION) at 08:09

## 2019-04-29 RX ADMIN — ACETAMINOPHEN 650 MG: 325 TABLET, FILM COATED ORAL at 12:33

## 2019-04-29 RX ADMIN — IBUPROFEN 600 MG: 600 TABLET ORAL at 09:12

## 2019-04-29 RX ADMIN — ALBUTEROL SULFATE 2.5 MG: 2.5 SOLUTION RESPIRATORY (INHALATION) at 12:47

## 2019-04-29 RX ADMIN — DOCOSANOL: 100 CREAM TOPICAL at 08:00

## 2019-04-29 RX ADMIN — ACETAMINOPHEN 650 MG: 325 TABLET, FILM COATED ORAL at 04:59

## 2019-04-29 RX ADMIN — ALBUTEROL SULFATE 2.5 MG: 2.5 SOLUTION RESPIRATORY (INHALATION) at 16:12

## 2019-04-29 RX ADMIN — ACETYLCYSTEINE 2 ML: 200 SOLUTION ORAL; RESPIRATORY (INHALATION) at 16:12

## 2019-04-29 RX ADMIN — ACETYLCYSTEINE 2 ML: 200 SOLUTION ORAL; RESPIRATORY (INHALATION) at 12:47

## 2019-04-29 RX ADMIN — PANTOPRAZOLE SODIUM 40 MG: 40 TABLET, DELAYED RELEASE ORAL at 08:18

## 2019-04-29 RX ADMIN — ALBUTEROL SULFATE 2.5 MG: 2.5 SOLUTION RESPIRATORY (INHALATION) at 08:09

## 2019-04-29 NOTE — PLAN OF CARE
Postpartum discharge instructions and medications reviewed and pt verbalized understanding. Received breast pump. Independent with self cares. Reviewed concerns and questions. Pt  discharged to home, will stay in a border room per NICU.

## 2019-04-29 NOTE — PLAN OF CARE
Patients vitals have been stable. Postpartum assessment WDL. Declines headache, dizziness, and blurred vision. States that she is voiding without difficulties. Is pumping independently for baby in the NICU. Is using hydrocortisone cream for a rash on her belly. Respiratory therapy comes up to do her neb and vest treatments. Taking ibuprofen for pain overnight, has declined tylenol and oxycodone but knows that they are available if needed. Will continue to monitor for adequate pain control.

## 2019-04-29 NOTE — PLAN OF CARE
Stable postpartum. Incision pain managed with medication and abdominal binder. Independent with self cares. Utilizing electric breast pump. Visits baby in the NICU. Planning to discharge this afternoon or evening.

## 2019-04-29 NOTE — PROGRESS NOTES
[facility] Obstetrics Postpartum Progress Note    S: Feeling well. Pain well controlled. Lochia similar to light menses.  Breathing improving.  No chest pain.  Using vest.  No nausea.  +flatus, +BM.    O:  Patient Vitals for the past 12 hrs:   BP Temp Temp src Pulse Resp   19 2330 128/70 97.7  F (36.5  C) Oral 74 16     Gen:  NAD  CV: regular rate and rhythm  Resp: CTAB in anterior lung fields, good air movement  Abd: milldly distended but soft, minimally and appropriately tender, fundus firm at 2cm below umbilicus  Incision:  clean, dry, intact  Ext: non-tender, trace LE edema, no erythema    Hemoglobin   Date Value Ref Range Status   2019 10.3 (L) 11.7 - 15.7 g/dL Final   2019 10.3 (L) 11.7 - 15.7 g/dL Final     A/P: 44 year old  POD#4 s/p PLTCS for placenta previa, intraoperative course complicated by focal accreta and postpartum hemorrhage, doing well postpartum.    1. Postpartum hemorrhage secondary to atony and focal accreta:  Focal accreta was oversewn and Bakri was placed, removed POD#1. The patient had a preoperative Hgb of 11.7, an EBL of 1000cc was associated with delivery.  Hgb 10.3 on night of delivery with normal coags.  POD1 Hgb is 10.3.  Patient is not tachycardic or hypotensive.  2. 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 Dr. Hernández is her Pulmonologist and is aware of delivery, recommends starting vest when able.  Patient using vest, is using IS frequently and is using Aerobika.  Continuing albuterol and Mucomyst, increased to QID postop.    Last PFTs 2018. Inpatient consult if worsening status.    Recent +sputum culture for pseudomonas/haemophilus, s/p 10d azithromycin. No evidence of pneumonia.    Close monitoring of oxygen saturations, start O2 supplemental therapy if needed to maintain oxygen saturations >92%. Sats >95% on RA.  3. Pain control: pain is well-controlled with Tylenol,  Motrin, oxycodone prn, continue.  4. Rh positive/Rubella immune  5. Incision: no concerns  6. Routine postpartum:    encourage breastfeeding    Encourage ambulation    Continue regular diet    Continue bowel regimen, exhibiting return of bowel function.    Contraception: declines, primary tubal infertility  7. Dispo: likely discharge POD#4 (today)    Adore Painting MD  PGY-3 OB/GYN  OB G3 [phone]  04/29/2019 6:59 AM     [facility] staff:  Appreciate note by Dr. Painting.  I have seen and examined the patient without the resident. I have reviewed, edited, and agree with the note.    Stable for discharge today.  Aislinn Brower MD  4/29/2019  8:25 AM

## 2019-05-01 ENCOUNTER — HOSPITAL ENCOUNTER (EMERGENCY)
Facility: CLINIC | Age: 44
Discharge: HOME | End: 2019-05-01
Attending: FAMILY MEDICINE | Admitting: FAMILY MEDICINE
Payer: COMMERCIAL

## 2019-05-01 ENCOUNTER — APPOINTMENT (OUTPATIENT)
Dept: CT IMAGING | Facility: CLINIC | Age: 44
End: 2019-05-01
Attending: FAMILY MEDICINE
Payer: COMMERCIAL

## 2019-05-01 ENCOUNTER — APPOINTMENT (OUTPATIENT)
Dept: CARDIOLOGY | Facility: CLINIC | Age: 44
End: 2019-05-01
Attending: FAMILY MEDICINE
Payer: COMMERCIAL

## 2019-05-01 ENCOUNTER — APPOINTMENT (OUTPATIENT)
Dept: ULTRASOUND IMAGING | Facility: CLINIC | Age: 44
End: 2019-05-01
Attending: FAMILY MEDICINE
Payer: COMMERCIAL

## 2019-05-01 ENCOUNTER — APPOINTMENT (OUTPATIENT)
Dept: GENERAL RADIOLOGY | Facility: CLINIC | Age: 44
End: 2019-05-01
Attending: FAMILY MEDICINE
Payer: COMMERCIAL

## 2019-05-01 ENCOUNTER — TELEPHONE (OUTPATIENT)
Dept: OBGYN | Facility: CLINIC | Age: 44
End: 2019-05-01

## 2019-05-01 ENCOUNTER — TELEPHONE (OUTPATIENT)
Dept: PULMONOLOGY | Facility: CLINIC | Age: 44
End: 2019-05-01

## 2019-05-01 VITALS
RESPIRATION RATE: 16 BRPM | SYSTOLIC BLOOD PRESSURE: 152 MMHG | BODY MASS INDEX: 25.92 KG/M2 | DIASTOLIC BLOOD PRESSURE: 90 MMHG | OXYGEN SATURATION: 99 % | TEMPERATURE: 98.2 F | HEART RATE: 55 BPM | WEIGHT: 151 LBS

## 2019-05-01 DIAGNOSIS — R06.02 SOB (SHORTNESS OF BREATH): ICD-10-CM

## 2019-05-01 DIAGNOSIS — O43.219 PLACENTA ACCRETA WITHOUT HEMORRHAGE: Primary | ICD-10-CM

## 2019-05-01 DIAGNOSIS — D64.9 ANEMIA, UNSPECIFIED TYPE: ICD-10-CM

## 2019-05-01 DIAGNOSIS — J47.9 BRONCHIECTASIS WITHOUT COMPLICATION (H): Chronic | ICD-10-CM

## 2019-05-01 DIAGNOSIS — R60.0 LOWER EXTREMITY EDEMA: ICD-10-CM

## 2019-05-01 LAB
ALBUMIN SERPL-MCNC: 2.3 G/DL (ref 3.4–5)
ALBUMIN UR-MCNC: NEGATIVE MG/DL
ALP SERPL-CCNC: 227 U/L (ref 40–150)
ALT SERPL W P-5'-P-CCNC: 56 U/L (ref 0–50)
ANION GAP SERPL CALCULATED.3IONS-SCNC: 10 MMOL/L (ref 3–14)
APPEARANCE UR: CLEAR
APTT PPP: 22 SEC (ref 22–37)
AST SERPL W P-5'-P-CCNC: 37 U/L (ref 0–45)
BASOPHILS # BLD AUTO: 0 10E9/L (ref 0–0.2)
BASOPHILS NFR BLD AUTO: 0.1 %
BILIRUB SERPL-MCNC: 0.2 MG/DL (ref 0.2–1.3)
BILIRUB UR QL STRIP: NEGATIVE
BUN SERPL-MCNC: 12 MG/DL (ref 7–30)
CALCIUM SERPL-MCNC: 7.9 MG/DL (ref 8.5–10.1)
CHLORIDE SERPL-SCNC: 112 MMOL/L (ref 94–109)
CO2 SERPL-SCNC: 23 MMOL/L (ref 20–32)
COLOR UR AUTO: ABNORMAL
CREAT SERPL-MCNC: 0.69 MG/DL (ref 0.52–1.04)
DIFFERENTIAL METHOD BLD: ABNORMAL
EOSINOPHIL # BLD AUTO: 0.2 10E9/L (ref 0–0.7)
EOSINOPHIL NFR BLD AUTO: 2.6 %
ERYTHROCYTE [DISTWIDTH] IN BLOOD BY AUTOMATED COUNT: 14.1 % (ref 10–15)
GFR SERPL CREATININE-BSD FRML MDRD: >90 ML/MIN/{1.73_M2}
GLUCOSE SERPL-MCNC: 122 MG/DL (ref 70–99)
GLUCOSE UR STRIP-MCNC: NEGATIVE MG/DL
HCT VFR BLD AUTO: 27.1 % (ref 35–47)
HGB BLD-MCNC: 8.8 G/DL (ref 11.7–15.7)
HGB UR QL STRIP: ABNORMAL
IMM GRANULOCYTES # BLD: 0.1 10E9/L (ref 0–0.4)
IMM GRANULOCYTES NFR BLD: 1.6 %
INR PPP: 0.93 (ref 0.86–1.14)
KETONES UR STRIP-MCNC: NEGATIVE MG/DL
LEUKOCYTE ESTERASE UR QL STRIP: NEGATIVE
LYMPHOCYTES # BLD AUTO: 1.6 10E9/L (ref 0.8–5.3)
LYMPHOCYTES NFR BLD AUTO: 23.6 %
MCH RBC QN AUTO: 29.5 PG (ref 26.5–33)
MCHC RBC AUTO-ENTMCNC: 32.5 G/DL (ref 31.5–36.5)
MCV RBC AUTO: 91 FL (ref 78–100)
MONOCYTES # BLD AUTO: 0.5 10E9/L (ref 0–1.3)
MONOCYTES NFR BLD AUTO: 6.6 %
NEUTROPHILS # BLD AUTO: 4.5 10E9/L (ref 1.6–8.3)
NEUTROPHILS NFR BLD AUTO: 65.5 %
NITRATE UR QL: NEGATIVE
NRBC # BLD AUTO: 0 10*3/UL
NRBC BLD AUTO-RTO: 0 /100
NT-PROBNP SERPL-MCNC: 389 PG/ML (ref 0–450)
PH UR STRIP: 6 PH (ref 5–7)
PLATELET # BLD AUTO: 353 10E9/L (ref 150–450)
POTASSIUM SERPL-SCNC: 3.9 MMOL/L (ref 3.4–5.3)
PROT SERPL-MCNC: 6 G/DL (ref 6.8–8.8)
RBC # BLD AUTO: 2.98 10E12/L (ref 3.8–5.2)
RBC #/AREA URNS AUTO: 0 /HPF (ref 0–2)
SODIUM SERPL-SCNC: 145 MMOL/L (ref 133–144)
SOURCE: ABNORMAL
SP GR UR STRIP: 1 (ref 1–1.03)
SQUAMOUS #/AREA URNS AUTO: <1 /HPF (ref 0–1)
TROPONIN I SERPL-MCNC: <0.015 UG/L (ref 0–0.04)
TSH SERPL DL<=0.005 MIU/L-ACNC: 1.56 MU/L (ref 0.4–4)
UROBILINOGEN UR STRIP-MCNC: NORMAL MG/DL (ref 0–2)
WBC # BLD AUTO: 6.9 10E9/L (ref 4–11)
WBC #/AREA URNS AUTO: 1 /HPF (ref 0–5)

## 2019-05-01 PROCEDURE — 84443 ASSAY THYROID STIM HORMONE: CPT | Performed by: FAMILY MEDICINE

## 2019-05-01 PROCEDURE — 93306 TTE W/DOPPLER COMPLETE: CPT

## 2019-05-01 PROCEDURE — 85730 THROMBOPLASTIN TIME PARTIAL: CPT | Performed by: FAMILY MEDICINE

## 2019-05-01 PROCEDURE — 93306 TTE W/DOPPLER COMPLETE: CPT | Mod: 26 | Performed by: INTERNAL MEDICINE

## 2019-05-01 PROCEDURE — 93970 EXTREMITY STUDY: CPT

## 2019-05-01 PROCEDURE — 96374 THER/PROPH/DIAG INJ IV PUSH: CPT | Mod: 59 | Performed by: FAMILY MEDICINE

## 2019-05-01 PROCEDURE — 83880 ASSAY OF NATRIURETIC PEPTIDE: CPT | Performed by: FAMILY MEDICINE

## 2019-05-01 PROCEDURE — 93005 ELECTROCARDIOGRAM TRACING: CPT | Performed by: FAMILY MEDICINE

## 2019-05-01 PROCEDURE — 25800030 ZZH RX IP 258 OP 636: Performed by: FAMILY MEDICINE

## 2019-05-01 PROCEDURE — 85610 PROTHROMBIN TIME: CPT | Performed by: FAMILY MEDICINE

## 2019-05-01 PROCEDURE — 93010 ELECTROCARDIOGRAM REPORT: CPT | Mod: Z6 | Performed by: FAMILY MEDICINE

## 2019-05-01 PROCEDURE — 25000128 H RX IP 250 OP 636: Performed by: FAMILY MEDICINE

## 2019-05-01 PROCEDURE — 71046 X-RAY EXAM CHEST 2 VIEWS: CPT

## 2019-05-01 PROCEDURE — 84484 ASSAY OF TROPONIN QUANT: CPT | Performed by: FAMILY MEDICINE

## 2019-05-01 PROCEDURE — 99285 EMERGENCY DEPT VISIT HI MDM: CPT | Mod: 25 | Performed by: FAMILY MEDICINE

## 2019-05-01 PROCEDURE — 71260 CT THORAX DX C+: CPT

## 2019-05-01 PROCEDURE — 85025 COMPLETE CBC W/AUTO DIFF WBC: CPT | Performed by: FAMILY MEDICINE

## 2019-05-01 PROCEDURE — 80053 COMPREHEN METABOLIC PANEL: CPT | Performed by: FAMILY MEDICINE

## 2019-05-01 PROCEDURE — 81001 URINALYSIS AUTO W/SCOPE: CPT | Performed by: FAMILY MEDICINE

## 2019-05-01 RX ORDER — FUROSEMIDE 10 MG/ML
20 INJECTION INTRAMUSCULAR; INTRAVENOUS ONCE
Status: COMPLETED | OUTPATIENT
Start: 2019-05-01 | End: 2019-05-01

## 2019-05-01 RX ORDER — LIDOCAINE 40 MG/G
CREAM TOPICAL
Status: DISCONTINUED | OUTPATIENT
Start: 2019-05-01 | End: 2019-05-01 | Stop reason: HOSPADM

## 2019-05-01 RX ORDER — IOPAMIDOL 755 MG/ML
100 INJECTION, SOLUTION INTRAVASCULAR ONCE
Status: COMPLETED | OUTPATIENT
Start: 2019-05-01 | End: 2019-05-01

## 2019-05-01 RX ORDER — SODIUM CHLORIDE 9 MG/ML
100 INJECTION, SOLUTION INTRAVENOUS ONCE
Status: COMPLETED | OUTPATIENT
Start: 2019-05-01 | End: 2019-05-01

## 2019-05-01 RX ORDER — FERROUS GLUCONATE 324(38)MG
324 TABLET ORAL 2 TIMES DAILY WITH MEALS
Qty: 30 TABLET | Refills: 1 | Status: SHIPPED | OUTPATIENT
Start: 2019-05-01 | End: 2019-09-11

## 2019-05-01 RX ORDER — FUROSEMIDE 20 MG
20 TABLET ORAL DAILY
Qty: 7 TABLET | Refills: 0 | Status: SHIPPED | OUTPATIENT
Start: 2019-05-01 | End: 2019-06-05

## 2019-05-01 RX ADMIN — IOPAMIDOL 54 ML: 755 INJECTION, SOLUTION INTRAVENOUS at 18:23

## 2019-05-01 RX ADMIN — FUROSEMIDE 20 MG: 10 INJECTION, SOLUTION INTRAMUSCULAR; INTRAVENOUS at 19:14

## 2019-05-01 RX ADMIN — SODIUM CHLORIDE 75 ML: 9 INJECTION, SOLUTION INTRAVENOUS at 18:24

## 2019-05-01 ASSESSMENT — ENCOUNTER SYMPTOMS
COUGH: 1
ABDOMINAL PAIN: 1
WEAKNESS: 1
DYSPHORIC MOOD: 0
CHEST TIGHTNESS: 0
BRUISES/BLEEDS EASILY: 0
WHEEZING: 0
SHORTNESS OF BREATH: 1
NUMBNESS: 0
ACTIVITY CHANGE: 1
FEVER: 0
ARTHRALGIAS: 0
TROUBLE SWALLOWING: 0
VOMITING: 0
HEADACHES: 0
VOICE CHANGE: 0
LIGHT-HEADEDNESS: 0
COLOR CHANGE: 0
CONFUSION: 0
WOUND: 1
FATIGUE: 1
HEMATURIA: 0
DYSURIA: 0
NECK STIFFNESS: 0
DECREASED CONCENTRATION: 0
DIFFICULTY URINATING: 0
NAUSEA: 0
EYE REDNESS: 0

## 2019-05-01 NOTE — ED NOTES
Refusing frequent BPs due to discomfort of the cuff. Remains on cardiac monitor and pulse oximetry.

## 2019-05-01 NOTE — TELEPHONE ENCOUNTER
"Call from Armando:    Discharged from hospital 4/29/2019. Baby boy born 4/25. Reports that she is feeling \"really short of breath\" since being at home. Also, notes that she is experiencing significant edema of her feet and legs.    PLAN:  Discussed with Dr Hernández:  To ED for evaluation.    Armando expressed understanding and agreement to plan.  "

## 2019-05-01 NOTE — TELEPHONE ENCOUNTER
Called patient to check on her.  She is very short of breath and cannot complete a sentence.  States her edema is painful.  Has tried to reach her pulmonologist and not been able to.    Discussed going to the emergency room for evaluation.  She will try and reach pulmonary one final time otherwise proceed to ER.    Reviewed ultrasound at postpartum visit to check for retained products of conception and will have her schedule that for approximately 8 weeks.  I will see her after that.    Pain is mostly controlled with ibuprofen and Tylenol.  Having some extra pain with coughing due to her difficulty breathing.    Will have triage call patient tomorrow, May 2, to check on her and schedule a [id number] approximately 2 weeks after delivery with me.    Zayra Roberts MD

## 2019-05-01 NOTE — ED AVS SNAPSHOT
[facility], [place], Emergency Department  [address]  [facility] [place] [place] [address]  Phone:  [phone]  Fax:  [phone]                                    Armando Dc   MRN: [id number]    Department:  [facility], Emergency Department   Date of Visit:  5/1/2019           After Visit Summary Signature Page    I have received my discharge instructions, and my questions have been answered. I have discussed any challenges I see with this plan with the nurse or doctor.    ..........................................................................................................................................  Patient/Patient Representative Signature      ..........................................................................................................................................  Patient Representative Print Name and Relationship to Patient    ..................................................               ................................................  Date                                   Time    ..........................................................................................................................................  Reviewed by Signature/Title    ...................................................              ..............................................  Date                                               Time          22EPIC Rev 08/18

## 2019-05-01 NOTE — ED PROVIDER NOTES
[facility] EMERGENCY DEPARTMENT ([facility])    19       History     Chief Complaint   Patient presents with     Shortness of Breath     SOB throughout pregnancy, increased sob the last two days.  Her pulmonologist siggested she come in to ED.   last Thursday complicated and she lost a lot of blood - was not given a transfusion but recieved fluids.  Heart is on right side of chest     The history is provided by the patient and medical records.     Armando Dc is a 44 year old  female with a medical history significant for PCD and bronchiectasis with moderately severe obstruction who was recently hospitalized here from 2019 to 2019.  Patient was hospitalized for vaginal bleeding and cramping.  Patient underwent a PLTCS on 2019.  Patient's postoperative course was complicated by postpartum hemorrhage and acute on chronic respiratory distress.  The patient during her hospitalization was able to resume her vest treatments and maintain O2 saturations in the mid to high 90s on room air.    The patient presents to the Emergency Department today for evaluation of worsening shortness of breath and bilateral lower extremity swelling.  The patient reports that since being discharged from the hospital she has been feeling fatigued and short of breath.  She thought this was from undergoing a  section and she was otherwise recovering well.  The patient over the last 2 days has noticed a significant increase in her shortness of breath and this has been associated with bilateral lower extremity swelling.  She reports that her shortness of breath is exacerbated with laying flat and improved with sitting up; she notes that she had difficulty sleeping last night secondary to the shortness of breath.  She notes pain in her bilateral legs secondary to the swelling.  She also notes that she had some right-sided chest discomfort last night that lasted approximately 1 hour before resolving.   Otherwise, she has not had any associated chest pain.  The patient did her vest treatment this morning and afterwards she noted some blood-tinged sputum.  The patient otherwise denies any fevers, chest tightness or wheezing.  She notes that she has some abdominal pain, but this is secondary to recovering from the  section, she denies any changes in that today.  The patient denies any history of DVT/PE.  The patient contacted her Pulmonology clinic this morning and they advised her to come to the Emergency Department as they were concerned for cardiomyopathy.  Patient denies any hemoptysis fever otherwise with this.  There is really no pain in her legs just the swelling     I have reviewed the Medications, Allergies, Past Medical and Surgical History, and Social History in the [employer] system.    Past Medical History:   Diagnosis Date     Carpal tunnel syndrome     right     Chronic sinusitis      Diabetes (H)     gestational diabetes diet controlled     Endometriosis     left ovary     Infertility      Kartagener's syndrome     situs inversus totalis,      Pseudomonas aeruginosa colonization      Reactive airway disease      Situs inversus      Uncomplicated asthma     Reactive airway disease       Past Surgical History:   Procedure Laterality Date     ADENOIDECTOMY        SECTION N/A 2019    Procedure: Primary  Section;  Surgeon: Lyndsey Shipley MD;  Location: [facility] L+D     LAPAROSCOPIC CYSTECTOMY OVARIAN (BENIGN) Left 2017    Procedure: LAPAROSCOPIC CYSTECTOMY OVARIAN (BENIGN);  Left  Laparoscopy Ovarian Cystotomy, Hysteroscopy And Polpectomy With Myosure ;  Surgeon: Zayra Roberts MD;  Location: [facility] OR     OPERATIVE HYSTEROSCOPY WITH MORCELLATOR N/A 2017    Procedure: OPERATIVE HYSTEROSCOPY WITH MORCELLATOR;;  Surgeon: Zayra Roberts MD;  Location: [facility] OR     TONSILLECTOMY       TONSILLECTOMY & ADENOIDECTOMY      8 years old     TRANSVAGINAL  "RETRIEVAL OVUM Bilateral 3/3/2016    IVF Procedure: TRANSVAGINAL RETRIEVAL OVUM;  Surgeon: Sunshine Gilliam MD;  Location: [facility]     uterine polyp  2013       Family History   Problem Relation Age of Onset     Hyperlipidemia Mother      Diabetes Mother      Hypertension Mother      Hyperlipidemia Father      Hypertension Father      Diabetes Maternal Grandmother      Diabetes Maternal Grandfather        Social History     Tobacco Use     Smoking status: Never Smoker     Smokeless tobacco: Never Used   Substance Use Topics     Alcohol use: No     Alcohol/week: 0.0 oz       No current facility-administered medications for this encounter.      Current Outpatient Medications   Medication     acetaminophen (TYLENOL) 325 MG tablet     acetylcysteine (MUCOMYST) 20 % nebulizer solution     albuterol (PROAIR HFA/PROVENTIL HFA/VENTOLIN HFA) 108 (90 BASE) MCG/ACT Inhaler     albuterol (PROVENTIL) (2.5 MG/3ML) 0.083% neb solution     blood glucose (NO BRAND SPECIFIED) test strip     blood glucose monitoring (NO BRAND SPECIFIED) meter device kit     docusate sodium (COLACE) 100 MG capsule     ferrous gluconate (FERGON) 324 (38 Fe) MG tablet     fluticasone-salmeterol (ADVAIR) 100-50 MCG/DOSE inhaler     furosemide (LASIX) 20 MG tablet     ibuprofen (ADVIL/MOTRIN) 600 MG tablet     oxyCODONE (ROXICODONE) 5 MG tablet     pantoprazole (PROTONIX) 40 MG EC tablet     Prenatal MV-Min-Fe Fum-FA-DHA (PRENATAL 1 PO)     Respiratory Therapy Supplies (NEBULIZER) SUZANNA     senna-docusate (SENOKOT-S/PERICOLACE) 8.6-50 MG tablet     Cholecalciferol (VITAMIN D PO)     Syringe/Needle, Disp, (SYRINGE LUER LOCK) 20G X 1-1/2\" 5 ML MISC     Water For Injection Sterile SOLN      No Known Allergies      Review of Systems   Constitutional: Positive for activity change and fatigue. Negative for fever.        Notes increasing shortness of breath fatigability last few days.   HENT: Negative for congestion, trouble swallowing and voice change.  "   Eyes: Negative for redness and visual disturbance.   Respiratory: Positive for cough (productive; blood tinged sputum) and shortness of breath (worsening). Negative for chest tightness and wheezing.    Cardiovascular: Positive for chest pain (right sided; single episode, resolved without persistent pleuritic cp) and leg swelling (bilateral).   Gastrointestinal: Positive for abdominal pain (recovering from C section). Negative for nausea and vomiting.   Genitourinary: Negative for difficulty urinating, dysuria, hematuria, vaginal bleeding, vaginal discharge and vaginal pain.   Musculoskeletal: Negative for arthralgias and neck stiffness.   Skin: Positive for wound. Negative for color change.   Neurological: Positive for weakness. Negative for syncope, light-headedness, numbness and headaches.   Hematological: Does not bruise/bleed easily.   Psychiatric/Behavioral: Negative for confusion, decreased concentration and dysphoric mood.   All other systems reviewed and are negative.      Physical Exam   BP: 129/68  Pulse: 80  Resp: 20  Weight: 68.5 kg (151 lb)  SpO2: 98 %      Physical Exam   Constitutional: She is oriented to person, place, and time. She appears well-developed and well-nourished. She appears distressed.   Patient pleasant here mildly pallorous is alert and oriented not confused no marked respiratory distress her vital signs otherwise are stable she is not tachycardic she is afebrile oxygen saturation 97-99% room air.  No confusion noted   HENT:   Head: Normocephalic and atraumatic.   Eyes: Pupils are equal, round, and reactive to light. Conjunctivae and EOM are normal. No scleral icterus.   Neck: Normal range of motion. Neck supple. No JVD present. No tracheal deviation present.   Cardiovascular: Normal rate and regular rhythm. Exam reveals no friction rub.   No murmur heard.  Pulmonary/Chest: No stridor. She has no wheezes.   Abdominal: She exhibits no mass. There is tenderness. There is no guarding.    Abdomen soft with tenderness incision area healing   Musculoskeletal: She exhibits edema.   Bilateral extremity edema 3+ symmetrical nontender   Neurological: She is alert and oriented to person, place, and time.   Skin: Skin is warm and dry. Capillary refill takes less than 2 seconds. No rash noted. She is not diaphoretic. No erythema. No pallor.   Psychiatric: She has a normal mood and affect. Her behavior is normal. Judgment and thought content normal.   Nursing note and vitals reviewed.      ED Course   2:16 PM  The patient was seen and examined by Junior Topete MD in Room ED08.        Procedures         Patient value in the ER.  EKG right-sided was done as she has known Dextra cardio did not show any hyperacute changes chest x-ray shows some chronic streaking with dextrocardia and no other changes noted.  Laboratory testing done cardiac markers are stable and negative.  Patient's hemoglobin was 8.8.  Last was 10.3 in the hospital  Other labs stable there is slight elevation of alkaline phos and AST which can be seen in postpartum error.  Renal functions normal otherwise. Na 145 glucose 122  Urine neg    Patient had an echocardiogram done also.  Slight increasing pulmonary artery pressure noted at approximately 46 otherwise no other changes left ventricular ejection fraction was 60-65% no other wall motion abnormalities no pericardial effusion etc.  Discussed all the findings with the patient this point.  I also contacted pulmonary staff was on.  Reviewed the case with patient and MD.  Feel this point her anemia certainly can be causing a lot of her dyspnea.  Think at this point discussed with patient we will do a CT scan to rule out PE if this is negative one would consider then some gentle diuresis and also make sure that she is taking iron supplementation following up closely with MD patient feels comfortable this plan.    CT scan neg for PE.   Patient given lasix 20mg IV   OK home with Fe supplement and  also Lasix 20mg po daily for a week. Patient to follow up. Ok with plan.       EKG Interpretation:      Interpreted by Junior Topete  Time reviewed: 1416  Symptoms at time of EKG: sob   Rhythm: normal sinus   Rate: normal  Axis: normal  Ectopy: none  Conduction: normal  ST Segments/ T Waves: No hyperacute  ST-T wave changes  Q Waves: none  Comparison to prior: No old EKG available    Clinical Impression: nsr without acute changes          Critical Care time:  none             Labs Ordered and Resulted from Time of ED Arrival Up to the Time of Departure from the ED   CBC WITH PLATELETS DIFFERENTIAL - Abnormal; Notable for the following components:       Result Value    RBC Count 2.98 (*)     Hemoglobin 8.8 (*)     Hematocrit 27.1 (*)     All other components within normal limits   COMPREHENSIVE METABOLIC PANEL - Abnormal; Notable for the following components:    Sodium 145 (*)     Chloride 112 (*)     Glucose 122 (*)     Calcium 7.9 (*)     Albumin 2.3 (*)     Protein Total 6.0 (*)     Alkaline Phosphatase 227 (*)     ALT 56 (*)     All other components within normal limits   ROUTINE UA WITH MICROSCOPIC REFLEX TO CULTURE - Abnormal; Notable for the following components:    Blood Urine Trace (*)     All other components within normal limits   INR   PARTIAL THROMBOPLASTIN TIME   TROPONIN I   NT PROBNP INPATIENT   TSH   PULSE OXIMETRY NURSING   CARDIAC CONTINUOUS MONITORING   PERIPHERAL IV CATHETER     Results for orders placed or performed during the hospital encounter of 05/01/19   Chest XR,  PA & LAT    Narrative    XR CHEST 2 VW 5/1/2019 3:41 PM    HISTORY: Short of breath. Hemoptysis.    COMPARISON: None.    FINDINGS: Streaky bilateral basilar opacity. Upper lungs are clear. No  effusion or pneumothorax. Normal heart size. The image is either  mislabeled or the patient has dextrocardia; clinical correlation  requested.      Impression    IMPRESSION: Streaky basilar opacity may represent pneumonia.    MARIANA  MD CARLEY   US Lower Extremity Venous Duplex Bilateral    Narrative    VENOUS ULTRASOUND BOTH LEGS  5/1/2019 3:35 PM     HISTORY: lower ext edema post partum    COMPARISON: None.    FINDINGS:  Examination of the deep veins with graded compression and  color flow Doppler with spectral wave form analysis was performed.   There is no evidence for DVT in the lower extremities.      Impression    IMPRESSION: No evidence of deep venous thrombosis.    SMITH CEE MD   Chest CT, IV contrast only - PE protocol    Narrative    CT CHEST PULMONARY EMBOLISM WITH CONTRAST  5/1/2019 6:43 PM    HISTORY:  Shortness of breat post partum, evaluate for PE.    TECHNIQUE: Scans obtained from the apices through the diaphragm with  IV contrast. Isovue 370, 44 mL IV injected. Radiation dose for this  scan was reduced using automated exposure control, adjustment of the  mA and/or kV according to patient size, or iterative reconstruction  technique.    COMPARISON:  Chest x-ray 5/1/2019.    FINDINGS: There appears to be dextrocardia, also suggested by recent  chest x-ray. The abdominal contents also appear to be reversed than  typically seen. This is suggestive of situs inversus totalis.    Vascular: No convincing acute thoracic aortic abnormality. Limited  opacification of the thoracic aorta limits assessment. No evidence for  pulmonary embolism.    Lungs and pleura: Trace bibasilar pleural fluid. There are scattered  prominent areas of micronodular opacification involving both lungs,  dominantly seen at the mid to inferior lungs. There are patchy areas  of opacification at the left lung base series 12 image 95 with a  somewhat nodular morphology. This is also associated with prominent  regions of bronchiectasis left greater than right, and scarring at the  medial anterior left lung base.    Lymph nodes: Prominent mediastinal and bilateral hilar lymph nodes.  Left hilum example is 2.1 cm, series 11 image 54. There are other  examples as  well.    Additional findings: None.      Impression    IMPRESSION:   1. No evidence for pulmonary embolism or acute thoracic aortic  abnormality.  2. Prominent bibasilar regions of micronodular airspace disease  worrisome for an infectious or inflammatory cause. This is associated  with prominent bibasilar bronchiectasis left greater than right, and  scarring with bronchiectasis at the medial left lung base. Recommend  further workup.  3. Small bibasilar pleural fluid.  4. Anatomic reversal of the thoracic and upper abdominal organs. This  suggests situs inversus totalis and clinical correlation is  recommended. This is also suggested on the comparison chest x-ray.  However, please correlate with any abnormal right to left-sided  labeling of the exams.    KARI MART MD   CBC with platelets differential   Result Value Ref Range    WBC 6.9 4.0 - 11.0 10e9/L    RBC Count 2.98 (L) 3.8 - 5.2 10e12/L    Hemoglobin 8.8 (L) 11.7 - 15.7 g/dL    Hematocrit 27.1 (L) 35.0 - 47.0 %    MCV 91 78 - 100 fl    MCH 29.5 26.5 - 33.0 pg    MCHC 32.5 31.5 - 36.5 g/dL    RDW 14.1 10.0 - 15.0 %    Platelet Count 353 150 - 450 10e9/L    Diff Method Automated Method     % Neutrophils 65.5 %    % Lymphocytes 23.6 %    % Monocytes 6.6 %    % Eosinophils 2.6 %    % Basophils 0.1 %    % Immature Granulocytes 1.6 %    Nucleated RBCs 0 0 /100    Absolute Neutrophil 4.5 1.6 - 8.3 10e9/L    Absolute Lymphocytes 1.6 0.8 - 5.3 10e9/L    Absolute Monocytes 0.5 0.0 - 1.3 10e9/L    Absolute Eosinophils 0.2 0.0 - 0.7 10e9/L    Absolute Basophils 0.0 0.0 - 0.2 10e9/L    Abs Immature Granulocytes 0.1 0 - 0.4 10e9/L    Absolute Nucleated RBC 0.0    INR   Result Value Ref Range    INR 0.93 0.86 - 1.14   Partial thromboplastin time   Result Value Ref Range    PTT 22 22 - 37 sec   Comprehensive metabolic panel   Result Value Ref Range    Sodium 145 (H) 133 - 144 mmol/L    Potassium 3.9 3.4 - 5.3 mmol/L    Chloride 112 (H) 94 - 109 mmol/L    Carbon Dioxide 23  20 - 32 mmol/L    Anion Gap 10 3 - 14 mmol/L    Glucose 122 (H) 70 - 99 mg/dL    Urea Nitrogen 12 7 - 30 mg/dL    Creatinine 0.69 0.52 - 1.04 mg/dL    GFR Estimate >90 >60 mL/min/[1.73_m2]    GFR Estimate If Black >90 >60 mL/min/[1.73_m2]    Calcium 7.9 (L) 8.5 - 10.1 mg/dL    Bilirubin Total 0.2 0.2 - 1.3 mg/dL    Albumin 2.3 (L) 3.4 - 5.0 g/dL    Protein Total 6.0 (L) 6.8 - 8.8 g/dL    Alkaline Phosphatase 227 (H) 40 - 150 U/L    ALT 56 (H) 0 - 50 U/L    AST 37 0 - 45 U/L   Troponin I   Result Value Ref Range    Troponin I ES <0.015 0.000 - 0.045 ug/L   Nt probnp inpatient (BNP)   Result Value Ref Range    N-Terminal Pro BNP Inpatient 389 0 - 450 pg/mL   TSH   Result Value Ref Range    TSH 1.56 0.40 - 4.00 mU/L   UA with Microscopic reflex to Culture   Result Value Ref Range    Color Urine Straw     Appearance Urine Clear     Glucose Urine Negative NEG^Negative mg/dL    Bilirubin Urine Negative NEG^Negative    Ketones Urine Negative NEG^Negative mg/dL    Specific Gravity Urine 1.005 1.003 - 1.035    Blood Urine Trace (A) NEG^Negative    pH Urine 6.0 5.0 - 7.0 pH    Protein Albumin Urine Negative NEG^Negative mg/dL    Urobilinogen mg/dL Normal 0.0 - 2.0 mg/dL    Nitrite Urine Negative NEG^Negative    Leukocyte Esterase Urine Negative NEG^Negative    Source Midstream Urine     WBC Urine 1 0 - 5 /HPF    RBC Urine 0 0 - 2 /HPF    Squamous Epithelial /HPF Urine <1 0 - 1 /HPF   EKG 12 lead   Result Value Ref Range    Interpretation ECG Click View Image link to view waveform and result    Echocardiogram Complete    Narrative    [id number]  [id number]  [id number]  [id number]           [facility],[place]  Echocardiography Laboratory  [address] [place] [id number]     Name: JAMES SALVADOR  MRN: [id number]  : 1975  Study Date: 2019 03:52 PM  Age: 44 yrs  Gender: Female  Patient Location: [facility]  Reason For Study: Cardiomyopathy, Postpartum Cardiomyopathy  Ordering  Physician: LUZ KEATING  Performed By: [facility] DORENE Sanchezon     BSA: 1.7 m2  Height: 64 in  Weight: 151 lb  BP: 129/68 mmHg  _____________________________________________________________________________  __        Procedure  Echocardiogram with two-dimensional, color and spectral Doppler performed.  _____________________________________________________________________________  __        Interpretation Summary  Known situs inversus.  Left ventricular function, chamber size, wall motion, and wall thickness are  normal.The EF is 60-65%.  Right ventricular function, chamber size, wall motion, and thickness are  normal.  Mild pulmonary hypertension is present. Pulmonary artery systolic pressure is  46 mmHg (38 mmHg+RA pressure).  No significant valvular abnormalities were noted.  No pericardial effusion is present.     This study was compared with the study from 1/22/19: PA pressure is elevated;  PA pressure could not be measured on the prior study, limiting comparison.  There has otherwise been no significant change.  _____________________________________________________________________________  __        Left Ventricle  Left ventricular function, chamber size, wall motion, and wall thickness are  normal.The EF is 60-65%. Left ventricular diastolic function is normal.     Right Ventricle  Right ventricular function, chamber size, wall motion, and thickness are  normal.     Atria  Both atria appear normal. The atrial septum is intact as assessed by color  Doppler .        Mitral Valve  The mitral valve is normal. Mild mitral insufficiency is present.     Aortic Valve  Aortic valve is normal in structure and function.     Tricuspid Valve  The tricuspid valve is normal. Trace tricuspid insufficiency is present. Mild  pulmonary hypertension is present. The right ventricular systolic pressure is  approximated at 37.9 mmHg plus the right atrial pressure. Pulmonary artery  systolic pressure is 46 mmHg (38 mmHg+RA  pressure).     Pulmonic Valve  The pulmonic valve is normal.     Vessels  The aorta root is normal. The thoracic aorta is normal. The pulmonary artery  cannot be assessed. IVC diameter and respiratory changes fall into an  intermediate range suggesting an RA pressure of 8 mmHg.     Pericardium  No pericardial effusion is present.        Compared to Previous Study  This study was compared with the study from 19 . PA pressure is elevated;  PA pressure could not be measured on the prior study, limiting comparison.  There has otherwise been no significant change.  _____________________________________________________________________________  __     MMode/2D Measurements & Calculations  IVSd: 0.85 cm  LVIDd: 4.5 cm  LVIDs: 3.1 cm  LVPWd: 0.72 cm  FS: 30.7 %  LV mass(C)d: 109.5 grams  LV mass(C)dI: 63.1 grams/m2  asc Aorta Diam: 3.0 cm  LVOT diam: 1.8 cm  LVOT area: 2.6 cm2  RWT: 0.32        Doppler Measurements & Calculations  MV E max jorge: 122.9 cm/sec  MV A max jorge: 60.7 cm/sec  MV E/A: 2.0  MV dec time: 0.15 sec  Ao V2 max: 157.5 cm/sec  Ao max PG: 10.0 mmHg  Ao V2 mean: 101.2 cm/sec  Ao mean P.7 mmHg  Ao V2 VTI: 32.4 cm  CECILE(I,D): 1.9 cm2  CECILE(V,D): 1.8 cm2  LV V1 max P.4 mmHg  LV V1 max: 104.6 cm/sec  LV V1 VTI: 23.7 cm  SV(LVOT): 62.5 ml  SI(LVOT): 36.0 ml/m2  TR max jorge: 307.7 cm/sec  TR max P.9 mmHg     AV Jorge Ratio (DI): 0.66  CECILE Index (cm2/m2): 1.1  E/E' av.4  Lateral E/e': 8.9  Medial E/e': 14.0     _____________________________________________________________________________  __           Report approved by: Anthony Haley 2019 05:39 PM               Assessments & Plan (with Medical Decision Making)  43 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 Patient sent for eval of cardiomyopathy. eval in the ER EKG without changes. cxr chronic changes. Labs stable with trop and bnp wnl. hgb 8.8  ( last 10.3) edema with lower extremities with neg doppler. Echo showed mild increase PA pressures possibly due to fluid overload. CT chest neg for PE. Chronic pulmonary changes with chronic pulmonary disease. Patient OK home discussed with pulmonary. Lasix 20mg for next week. Ferrous glucoate also for anemia with close follow up.         I have reviewed the nursing notes.    I have reviewed the findings, diagnosis, plan and need for follow up with the patient.       Medication List      Started    ferrous gluconate 324 (38 Fe) MG tablet  Commonly known as:  FERGON  324 mg, Oral, 2 TIMES DAILY WITH MEALS     furosemide 20 MG tablet  Commonly known as:  LASIX  20 mg, Oral, DAILY        .this    Final diagnoses:   SOB (shortness of breath)   Anemia, unspecified type   Bronchiectasis without complication (H)   Lower extremity edema     I, Vic Villeda, am serving as a trained medical scribe to document services personally performed by Junior Topete MD, based on the provider's statements to me.   IJunior MD, was physically present and have reviewed and verified the accuracy of this note documented by Vic Villeda.    5/1/2019   [facility], [place], EMERGENCY DEPARTMENT     Junior Topete MD  05/01/19 [id number]

## 2019-05-02 LAB — INTERPRETATION ECG - MUSE: NORMAL

## 2019-05-02 NOTE — TELEPHONE ENCOUNTER
Pt is feeling somewhat better after going to ED yesterday, started on lasix and iron.  Coming in for f/u 5/9/19.  Manuela Cuba RN

## 2019-05-02 NOTE — DISCHARGE INSTRUCTIONS
Home.  Elevate legs.  Take Iron supplement also.  Take the lasix once daily  Make sure to eat banana or orange for potassium supplment.  Follow up with MD for recheck.  Return if any concerns.    Results for orders placed or performed during the hospital encounter of 05/01/19   Chest XR,  PA & LAT    Narrative    XR CHEST 2 VW 5/1/2019 3:41 PM    HISTORY: Short of breath. Hemoptysis.    COMPARISON: None.    FINDINGS: Streaky bilateral basilar opacity. Upper lungs are clear. No  effusion or pneumothorax. Normal heart size. The image is either  mislabeled or the patient has dextrocardia; clinical correlation  requested.      Impression    IMPRESSION: Streaky basilar opacity may represent pneumonia.    MARIANA HOWE MD   US Lower Extremity Venous Duplex Bilateral    Narrative    VENOUS ULTRASOUND BOTH LEGS  5/1/2019 3:35 PM     HISTORY: lower ext edema post partum    COMPARISON: None.    FINDINGS:  Examination of the deep veins with graded compression and  color flow Doppler with spectral wave form analysis was performed.   There is no evidence for DVT in the lower extremities.      Impression    IMPRESSION: No evidence of deep venous thrombosis.    SMITH CEE MD   Chest CT, IV contrast only - PE protocol    Narrative    CT CHEST PULMONARY EMBOLISM WITH CONTRAST  5/1/2019 6:43 PM    HISTORY:  Shortness of breat post partum, evaluate for PE.    TECHNIQUE: Scans obtained from the apices through the diaphragm with  IV contrast. Isovue 370, 44 mL IV injected. Radiation dose for this  scan was reduced using automated exposure control, adjustment of the  mA and/or kV according to patient size, or iterative reconstruction  technique.    COMPARISON:  Chest x-ray 5/1/2019.    FINDINGS: There appears to be dextrocardia, also suggested by recent  chest x-ray. The abdominal contents also appear to be reversed than  typically seen. This is suggestive of situs inversus totalis.    Vascular: No convincing acute thoracic aortic  abnormality. Limited  opacification of the thoracic aorta limits assessment. No evidence for  pulmonary embolism.    Lungs and pleura: Trace bibasilar pleural fluid. There are scattered  prominent areas of micronodular opacification involving both lungs,  dominantly seen at the mid to inferior lungs. There are patchy areas  of opacification at the left lung base series 12 image 95 with a  somewhat nodular morphology. This is also associated with prominent  regions of bronchiectasis left greater than right, and scarring at the  medial anterior left lung base.    Lymph nodes: Prominent mediastinal and bilateral hilar lymph nodes.  Left hilum example is 2.1 cm, series 11 image 54. There are other  examples as well.    Additional findings: None.      Impression    IMPRESSION:   1. No evidence for pulmonary embolism or acute thoracic aortic  abnormality.  2. Prominent bibasilar regions of micronodular airspace disease  worrisome for an infectious or inflammatory cause. This is associated  with prominent bibasilar bronchiectasis left greater than right, and  scarring with bronchiectasis at the medial left lung base. Recommend  further workup.  3. Small bibasilar pleural fluid.  4. Anatomic reversal of the thoracic and upper abdominal organs. This  suggests situs inversus totalis and clinical correlation is  recommended. This is also suggested on the comparison chest x-ray.  However, please correlate with any abnormal right to left-sided  labeling of the exams.   CBC with platelets differential   Result Value Ref Range    WBC 6.9 4.0 - 11.0 10e9/L    RBC Count 2.98 (L) 3.8 - 5.2 10e12/L    Hemoglobin 8.8 (L) 11.7 - 15.7 g/dL    Hematocrit 27.1 (L) 35.0 - 47.0 %    MCV 91 78 - 100 fl    MCH 29.5 26.5 - 33.0 pg    MCHC 32.5 31.5 - 36.5 g/dL    RDW 14.1 10.0 - 15.0 %    Platelet Count 353 150 - 450 10e9/L    Diff Method Automated Method     % Neutrophils 65.5 %    % Lymphocytes 23.6 %    % Monocytes 6.6 %    % Eosinophils 2.6  %    % Basophils 0.1 %    % Immature Granulocytes 1.6 %    Nucleated RBCs 0 0 /100    Absolute Neutrophil 4.5 1.6 - 8.3 10e9/L    Absolute Lymphocytes 1.6 0.8 - 5.3 10e9/L    Absolute Monocytes 0.5 0.0 - 1.3 10e9/L    Absolute Eosinophils 0.2 0.0 - 0.7 10e9/L    Absolute Basophils 0.0 0.0 - 0.2 10e9/L    Abs Immature Granulocytes 0.1 0 - 0.4 10e9/L    Absolute Nucleated RBC 0.0    INR   Result Value Ref Range    INR 0.93 0.86 - 1.14   Partial thromboplastin time   Result Value Ref Range    PTT 22 22 - 37 sec   Comprehensive metabolic panel   Result Value Ref Range    Sodium 145 (H) 133 - 144 mmol/L    Potassium 3.9 3.4 - 5.3 mmol/L    Chloride 112 (H) 94 - 109 mmol/L    Carbon Dioxide 23 20 - 32 mmol/L    Anion Gap 10 3 - 14 mmol/L    Glucose 122 (H) 70 - 99 mg/dL    Urea Nitrogen 12 7 - 30 mg/dL    Creatinine 0.69 0.52 - 1.04 mg/dL    GFR Estimate >90 >60 mL/min/[1.73_m2]    GFR Estimate If Black >90 >60 mL/min/[1.73_m2]    Calcium 7.9 (L) 8.5 - 10.1 mg/dL    Bilirubin Total 0.2 0.2 - 1.3 mg/dL    Albumin 2.3 (L) 3.4 - 5.0 g/dL    Protein Total 6.0 (L) 6.8 - 8.8 g/dL    Alkaline Phosphatase 227 (H) 40 - 150 U/L    ALT 56 (H) 0 - 50 U/L    AST 37 0 - 45 U/L   Troponin I   Result Value Ref Range    Troponin I ES <0.015 0.000 - 0.045 ug/L   Nt probnp inpatient (BNP)   Result Value Ref Range    N-Terminal Pro BNP Inpatient 389 0 - 450 pg/mL   TSH   Result Value Ref Range    TSH 1.56 0.40 - 4.00 mU/L   UA with Microscopic reflex to Culture   Result Value Ref Range    Color Urine Straw     Appearance Urine Clear     Glucose Urine Negative NEG^Negative mg/dL    Bilirubin Urine Negative NEG^Negative    Ketones Urine Negative NEG^Negative mg/dL    Specific Gravity Urine 1.005 1.003 - 1.035    Blood Urine Trace (A) NEG^Negative    pH Urine 6.0 5.0 - 7.0 pH    Protein Albumin Urine Negative NEG^Negative mg/dL    Urobilinogen mg/dL Normal 0.0 - 2.0 mg/dL    Nitrite Urine Negative NEG^Negative    Leukocyte Esterase Urine  Negative NEG^Negative    Source Midstream Urine     WBC Urine 1 0 - 5 /HPF    RBC Urine 0 0 - 2 /HPF    Squamous Epithelial /HPF Urine <1 0 - 1 /HPF   EKG 12 lead   Result Value Ref Range    Interpretation ECG Click View Image link to view waveform and result    Echocardiogram Complete    Narrative    [id number]  [id number]  [id number]  [id number]           [facility],[place]  Echocardiography Laboratory  [address] [place] [address]     Name: JAMES SALVADOR  MRN: [id number]  : 1975  Study Date: 2019 03:52 PM  Age: 44 yrs  Gender: Female  Patient Location: [facility]  Reason For Study: Cardiomyopathy, Postpartum Cardiomyopathy  Ordering Physician: LUZ KEATING  Performed By: DEANGELO Webb     BSA: 1.7 m2  Height: 64 in  Weight: 151 lb  BP: 129/68 mmHg  _____________________________________________________________________________  __        Procedure  Echocardiogram with two-dimensional, color and spectral Doppler performed.  _____________________________________________________________________________  __        Interpretation Summary  Known situs inversus.  Left ventricular function, chamber size, wall motion, and wall thickness are  normal.The EF is 60-65%.  Right ventricular function, chamber size, wall motion, and thickness are  normal.  Mild pulmonary hypertension is present. Pulmonary artery systolic pressure is  46 mmHg (38 mmHg+RA pressure).  No significant valvular abnormalities were noted.  No pericardial effusion is present.     This study was compared with the study from 19: PA pressure is elevated;  PA pressure could not be measured on the prior study, limiting comparison.  There has otherwise been no significant change.  _____________________________________________________________________________  __        Left Ventricle  Left ventricular function, chamber size, wall motion, and wall thickness are  normal.The EF is 60-65%. Left  ventricular diastolic function is normal.     Right Ventricle  Right ventricular function, chamber size, wall motion, and thickness are  normal.     Atria  Both atria appear normal. The atrial septum is intact as assessed by color  Doppler .        Mitral Valve  The mitral valve is normal. Mild mitral insufficiency is present.     Aortic Valve  Aortic valve is normal in structure and function.     Tricuspid Valve  The tricuspid valve is normal. Trace tricuspid insufficiency is present. Mild  pulmonary hypertension is present. The right ventricular systolic pressure is  approximated at 37.9 mmHg plus the right atrial pressure. Pulmonary artery  systolic pressure is 46 mmHg (38 mmHg+RA pressure).     Pulmonic Valve  The pulmonic valve is normal.     Vessels  The aorta root is normal. The thoracic aorta is normal. The pulmonary artery  cannot be assessed. IVC diameter and respiratory changes fall into an  intermediate range suggesting an RA pressure of 8 mmHg.     Pericardium  No pericardial effusion is present.        Compared to Previous Study  This study was compared with the study from 19 . PA pressure is elevated;  PA pressure could not be measured on the prior study, limiting comparison.  There has otherwise been no significant change.  _____________________________________________________________________________  __     MMode/2D Measurements & Calculations  IVSd: 0.85 cm  LVIDd: 4.5 cm  LVIDs: 3.1 cm  LVPWd: 0.72 cm  FS: 30.7 %  LV mass(C)d: 109.5 grams  LV mass(C)dI: 63.1 grams/m2  asc Aorta Diam: 3.0 cm  LVOT diam: 1.8 cm  LVOT area: 2.6 cm2  RWT: 0.32        Doppler Measurements & Calculations  MV E max cielo: 122.9 cm/sec  MV A max cielo: 60.7 cm/sec  MV E/A: 2.0  MV dec time: 0.15 sec  Ao V2 max: 157.5 cm/sec  Ao max PG: 10.0 mmHg  Ao V2 mean: 101.2 cm/sec  Ao mean P.7 mmHg  Ao V2 VTI: 32.4 cm  CECILE(I,D): 1.9 cm2  CECILE(V,D): 1.8 cm2  LV V1 max P.4 mmHg  LV V1 max: 104.6 cm/sec  LV V1 VTI: 23.7  cm  SV(LVOT): 62.5 ml  SI(LVOT): 36.0 ml/m2  TR max jorge: 307.7 cm/sec  TR max P.9 mmHg     AV Jorge Ratio (DI): 0.66  CECILE Index (cm2/m2): 1.1  E/E' av.4  Lateral E/e': 8.9  Medial E/e': 14.0     _____________________________________________________________________________  __           Report approved by: Anthony Haley 2019 05:39 PM

## 2019-05-07 LAB — COPATH REPORT: NORMAL

## 2019-05-08 ENCOUNTER — OFFICE VISIT (OUTPATIENT)
Dept: PULMONOLOGY | Facility: CLINIC | Age: 44
End: 2019-05-08
Attending: INTERNAL MEDICINE
Payer: COMMERCIAL

## 2019-05-08 VITALS
BODY MASS INDEX: 25.78 KG/M2 | WEIGHT: 151 LBS | HEART RATE: 83 BPM | HEIGHT: 64 IN | SYSTOLIC BLOOD PRESSURE: 146 MMHG | RESPIRATION RATE: 16 BRPM | DIASTOLIC BLOOD PRESSURE: 73 MMHG | OXYGEN SATURATION: 94 %

## 2019-05-08 DIAGNOSIS — J47.9 BRONCHIECTASIS WITHOUT COMPLICATION (H): Primary | Chronic | ICD-10-CM

## 2019-05-08 DIAGNOSIS — J47.9 BRONCHIECTASIS WITHOUT COMPLICATION (H): Primary | ICD-10-CM

## 2019-05-08 PROCEDURE — G0463 HOSPITAL OUTPT CLINIC VISIT: HCPCS

## 2019-05-08 PROCEDURE — 87070 CULTURE OTHR SPECIMN AEROBIC: CPT | Performed by: INTERNAL MEDICINE

## 2019-05-08 ASSESSMENT — PAIN SCALES - GENERAL: PAINLEVEL: NO PAIN (0)

## 2019-05-08 ASSESSMENT — MIFFLIN-ST. JEOR: SCORE: 1325.18

## 2019-05-08 NOTE — LETTER
2019       RE: Armando Dc  [address]  [place] [address]     Dear Colleague,    Thank you for referring your patient, Armando Dc, to the [facility] at [facility]. Please see a copy of my visit note below.    [facility]  May 8, 2019         Assessment and Plan:   Armando Dc is a 44 year old female with bronchiectasis and PCD.    1. Bronchiectasis and PCD lung disease with moderate obstruction:  Armando presents today after followup from her .  Her  at 36 weeks was complicated by uterine hemorrhage requiring management of this.  Additionally, she was seen in the ED last week for volume overload.  An echocardiogram at that time showed normal LV function, but evidence of pulmonary hypertension.  Pulmonary hypertension was new compared to a previous study done in January.  Armando's last sputum culture did grow out Haemophilus and Pseudomonas.  Armando does report symptomatically that she continues to have a nice improvement.  She is much less short of breath and is able to do vest therapy with some abdominal pain, but in general is able to produce sputum.  At this time, I have recommended:   -- That she continue to do her vest therapy 3 times per day.  She should continue to use the cough pillow.   -- She should continue to do her nebulized therapy.   -- If she were to continue to show evidence of Haemophilus in her sputum we did discuss trying another course of antibiotic to manage this.     2.  Pulmonary hypertension.  As above, Armando presented to the ED with volume overload and pulmonary hypertension that was new on echocardiogram.  She did not get placed on Lasix and has had a nice diuresis and feels almost near to normal.  We did discuss repeating her echo when I see her again in .  If she continues to show evidence of pulmonary hypertension she  clearly will need more of a workup.     3.  Psychosocial.  Armando is now on maternity leave.  Her son was in the NICU for 1 week, but is now at home.  Her  unfortunately has to go back to [place] because his father is sick.  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     Lyn Hernández MD MPH  Associate Professor of Medicine  Pulmonary, Allergy, Critical Care and Sleep Medicine      Interval History:     Armando reports that her cough frequency and sputum volume are now improving.  She was producing sputum that was green and thick, but this is thinning out and becoming easier to produce.  She is performing 3 vest therapies per day.  Her activity tolerance is slowly improving and she feels that she is nearing her baseline.          Review of Systems:     CONSTITUTIONAL: no fever, ++ chills, + sweats,  decrease in weight, improving energy, no change in appetite    INTEGUMENTARY/SKIN: no rash, no obvious new lesions    ENT/MOUTH: no sore throat, no new sinus pain, no new nasal drainage, no hearing loss, no ear ringing     RESPIRATORY: see interval history    CV: no chest pain, no palpitations, ++ peripheral edema, ++ orthopnea, ++ PND    GI: no nausea, no vomiting, constipation, controlled GERD, no abdominal pain    : no dysuria, no urinary frequency    MUSCULOSKELETAL: Right wrist arthralgias--going to use soft splint    ENDOCRINE: no excessive thirst    NEURO:  No headache, no numbness, no tingling    SLEEP: up every 3 hours    PSYCHIATRIC: mood stable          Past Medical and Surgical History:     Past Medical History:   Diagnosis Date     Carpal tunnel syndrome     right     Chronic sinusitis      Diabetes (H)     gestational diabetes diet controlled     Endometriosis     left ovary     Infertility      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,      Pseudomonas aeruginosa colonization 2008     Reactive airway disease      Situs inversus      Uncomplicated asthma     Reactive  airway disease     Past Surgical History:   Procedure Laterality Date     ADENOIDECTOMY        SECTION N/A 2019    Procedure: Primary  Section;  Surgeon: Lyndsey Shipley MD;  Location: [facility] L[facility]D     LAPAROSCOPIC CYSTECTOMY OVARIAN (BENIGN) Left 2017    Procedure: LAPAROSCOPIC CYSTECTOMY OVARIAN (BENIGN);  Left  Laparoscopy Ovarian Cystotomy, Hysteroscopy And Polpectomy With Myosure ;  Surgeon: Zayra Roberts MD;  Location: [facility] OR     OPERATIVE HYSTEROSCOPY WITH MORCELLATOR N/A 2017    Procedure: OPERATIVE HYSTEROSCOPY WITH MORCELLATOR;;  Surgeon: Zayra Roberts MD;  Location: [facility] OR     TONSILLECTOMY       TONSILLECTOMY & ADENOIDECTOMY      8 years old     TRANSVAGINAL RETRIEVAL OVUM Bilateral 3/3/2016    IVF Procedure: TRANSVAGINAL RETRIEVAL OVUM;  Surgeon: Sunshine Gilliam MD;  Location: [facility] SD     uterine polyp             Family History:     Family History   Problem Relation Age of Onset     Hyperlipidemia Mother      Diabetes Mother      Hypertension Mother      Hyperlipidemia Father      Hypertension Father      Diabetes Maternal Grandmother      Diabetes Maternal Grandfather             Social History:     Social History     Socioeconomic History     Marital status:      Spouse name: Not on file     Number of children: 0     Years of education: Not on file     Highest education level: Not on file   Occupational History     Occupation: physician     Employer: [employer]     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   Social Needs     Financial resource strain: Not on file     Food insecurity:     Worry: Not on file     Inability: Not on file     Transportation needs:     Medical: Not on file     Non-medical: Not on file   Tobacco Use     Smoking status: Never Smoker     Smokeless tobacco: Never Used   Substance and Sexual Activity     Alcohol use: No     Alcohol/week: 0.0 oz     Drug use: No     Sexual activity: Not  "Currently     Partners: Male     Birth control/protection: None   Lifestyle     Physical activity:     Days per week: Not on file     Minutes per session: Not on file     Stress: Not on file   Relationships     Social connections:     Talks on phone: Not on file     Gets together: Not on file     Attends Caodaism service: Not on file     Active member of club or organization: Not on file     Attends meetings of clubs or organizations: Not on file     Relationship status: Not on file     Intimate partner violence:     Fear of current or ex partner: Not on file     Emotionally abused: Not on file     Physically abused: Not on file     Forced sexual activity: Not on file   Other Topics Concern     Parent/sibling w/ CABG, MI or angioplasty before 65F 55M? Not Asked   Social History Narrative    Lives with  ().  Immigrated in 2006 from [place] ([facility]). No children.  Parents in [place], 2 younger sisters and a brother, she is eldest.             Medications:     Current Outpatient Medications   Medication     acetaminophen (TYLENOL) 325 MG tablet     acetylcysteine (MUCOMYST) 20 % nebulizer solution     albuterol (PROAIR HFA/PROVENTIL HFA/VENTOLIN HFA) 108 (90 BASE) MCG/ACT Inhaler     albuterol (PROVENTIL) (2.5 MG/3ML) 0.083% neb solution     docusate sodium (COLACE) 100 MG capsule     ferrous gluconate (FERGON) 324 (38 Fe) MG tablet     fluticasone-salmeterol (ADVAIR) 100-50 MCG/DOSE inhaler     furosemide (LASIX) 20 MG tablet     ibuprofen (ADVIL/MOTRIN) 600 MG tablet     pantoprazole (PROTONIX) 40 MG EC tablet     Prenatal MV-Min-Fe Fum-FA-DHA (PRENATAL 1 PO)     Respiratory Therapy Supplies (NEBULIZER) SUZANNA     senna-docusate (SENOKOT-S/PERICOLACE) 8.6-50 MG tablet     Syringe/Needle, Disp, (SYRINGE LUER LOCK) 20G X 1-1/2\" 5 ML MISC     Water For Injection Sterile SOLN     blood glucose (NO BRAND SPECIFIED) test strip     blood glucose monitoring (NO BRAND SPECIFIED) meter device kit     " "Cholecalciferol (VITAMIN D PO)     No current facility-administered medications for this visit.             Physical Exam:   /73 (BP Location: Right arm, Patient Position: Chair, Cuff Size: Adult Regular)   Pulse 83   Resp 16   Ht 1.626 m (5' 4.02\")   Wt 68.5 kg (151 lb)   SpO2 94%   BMI 25.91 kg/m       Constitutional:   Awake, alert and in no apparent distress     Eyes:   nonicteric     ENT:   oral mucosa moist without lesions, opaque tm bilaterally, bilateral mucosal edema      Neck:   Supple without supraclavicular or cervical lymphadenopathy     Lungs:   Good air flow.  bibasilar crackles. No rhonchi.  No wheezes.     Cardiovascular:   Normal S1 and S2.  RRR.  No murmur, gallop or rub.     Abdomen:   NABS, soft, nontender, nondistended.       Musculoskeletal:   No edema, no digital clubbing present     Neurologic:   Alert and conversant.     Skin:   Warm, dry.  No rash on limited exam.             Data:   All laboratory and imaging data reviewed.    Cystic Fibrosis Culture  Specimen Description   Date Value Ref Range Status   05/08/2019 Throat  Final   03/27/2019 Sputum  Final   03/27/2019 Sputum  Final    Culture Micro   Date Value Ref Range Status   05/08/2019 PENDING  Preliminary   03/27/2019 Heavy growth  Normal otto    Final   03/27/2019 (A)  Final    Moderate growth  Pseudomonas aeruginosa, mucoid strain     03/27/2019 (A)  Final    Heavy growth  Haemophilus influenzae  Beta lactamase negative  Beta-lactamase negative Haemophilus influenzae are usually susceptible to ampicillin,   amoxacillin/clavulanic acid, levofloxacin, and 3rd generation cephalosporins, such as   ceftriaxone.          Recent Results (from the past 168 hour(s))   Throat Culture Aerobic Bacterial    Collection Time: 05/08/19  8:33 AM   Result Value Ref Range    Specimen Description Throat     Special Requests Specimen collected in eSwab transport (white cap)     Culture Micro PENDING        PFT: none " performed.          Again, thank you for allowing me to participate in the care of your patient.      Sincerely,    Lyn Hernández MD

## 2019-05-08 NOTE — NURSING NOTE
Chief Complaint   Patient presents with     RECHECK     Bronchiectas non CF      Alisha Prado CMA  Pt was due for PHQ2. Pt completed. Updated health maintenance. See Screenings.

## 2019-05-08 NOTE — PATIENT INSTRUCTIONS
Cystic Fibrosis Self-Care Plan    RECOMMENDATIONS:   Echo at next visit.  Throat culture today to consider treatment.  Great job!!    YOUR GOAL:  Enjoy every day of motherhood.      [facility] Nurse line:  Deejay Lao    [phone]     [facility] Fax Number:      [phone]         Cystic fibrosis Respiratory Therapist:   Sybil Peng              [phone]   Cystic fibrosis Dietitians:              Delphine Angulo              [phone]                            Faye Maciel                        [phone]   Cystic fibrosis Diabetes Nurse:    Palmira Marshall   [phone]    Cystic fibrosis Social Workers:     Brittanie Buenrostro               [phone]                     Tammi Pedro               [phone]  Cystic fibrosis Pharmacist:           Maira Saunders                               [phone]         Jennifer Oliver   [phone]  Cystic Fibrosis Genetic Counselor:   Josie Wei    [phone]    [facility] [place] website:  [email or web address]         MRN: [id number]   Clinic Date: May 8, 2019   Patient: Armando Dc     Annual Studies:   No results found for: IGG  No results found for: INS  There are no preventive care reminders to display for this patient.    Pulmonary Function Tests  FEV1: amount of air you can blow out in 1 second  FVC: total amount of air you can take in and blow out    Your Goals:         PFT Latest Ref Rng & Units 11/7/2018   FVC L 2.62   FEV1 L 1.47   FVC% % 79   FEV1% % 54          Airway Clearance: The Most Important Way to Keep Your Lungs Healthy  Vest Settings:    Hill-Rom Frequencies: 8, 9, 10 Pressure 10 Then, Frequencies 18, 19, 20 Pressure 6      RespirTech: Quick Start with Pressure of     Do each frequency for 5 minutes; Deflate vest after each frequency & cough 3 times before beginning the next setting.    Vest and Neb Therapy should be done 3 times/day.    Good Nutrition Can  Improve Lung Function and Overall Health     Take ALL of your vitamins with food     Take 1/2 of your enzymes before EVERY meal/snack and the other 1/2 mid-meal/snack    Wt Readings from Last 3 Encounters:   05/08/19 68.5 kg (151 lb)   05/01/19 68.5 kg (151 lb)   04/23/19 67.9 kg (149 lb 11.2 oz)       Body mass index is 25.91 kg/m .         National CF Foundation Recommendations for BMI in CF Adults: Women: at least 22 Men: at least 23        Controlling Blood Sugars Helps Prevent Lung Infections & Improves Nutrition  Test blood sugar:     In the morning before eating (goal is )     2 hours after a meal (goal is less than 150)     When pre-meal glucose is greater than 150 add correction     At bedtime (if less than 100 eat a snack with 15 grams of carbohydrates  Last A1C Results:   Hemoglobin A1C   Date Value Ref Range Status   09/25/2018 5.8 (H) 0 - 5.6 % Final     Comment:     Normal <5.7% Prediabetes 5.7-6.4%  Diabetes 6.5% or higher - adopted from ADA   consensus guidelines.           If diabetic, measure A1C every 6 months. Goal: Under 7%    Staying Healthy    Research:  If you are interested in learning about research opportunities or have questions, please contact the CF Research Team at [phone] or [email or web address].      CF Foundation:  Compass is a personalized resource service to help you with the insurance, financial, legal and other issues you are facing.  It's free, confidential and available to anyone with CF.  Ask your  for more information or contact Compass directly at 537-FIQRARX ([phone]) or compass@cff.org, or learn more at cff.org/compass.

## 2019-05-08 NOTE — PROGRESS NOTES
[facility]  [facility]  May 8, 2019         Assessment and Plan:   Armando Dc is a 44 year old female with bronchiectasis and PCD.    1. Bronchiectasis and PCD lung disease with moderate obstruction:  Armando presents today after followup from her .  Her  at 36 weeks was complicated by uterine hemorrhage requiring management of this.  Additionally, she was seen in the ED last week for volume overload.  An echocardiogram at that time showed normal LV function, but evidence of pulmonary hypertension.  Pulmonary hypertension was new compared to a previous study done in January.  Armando's last sputum culture did grow out Haemophilus and Pseudomonas.  Armando does report symptomatically that she continues to have a nice improvement.  She is much less short of breath and is able to do vest therapy with some abdominal pain, but in general is able to produce sputum.  At this time, I have recommended:   -- That she continue to do her vest therapy 3 times per day.  She should continue to use the cough pillow.   -- She should continue to do her nebulized therapy.   -- If she were to continue to show evidence of Haemophilus in her sputum we did discuss trying another course of antibiotic to manage this.     2.  Pulmonary hypertension.  As above, Armando presented to the ED with volume overload and pulmonary hypertension that was new on echocardiogram.  She did not get placed on Lasix and has had a nice diuresis and feels almost near to normal.  We did discuss repeating her echo when I see her again in .  If she continues to show evidence of pulmonary hypertension she clearly will need more of a workup.     3.  Psychosocial.  Armando is now on maternity leave.  Her son was in the NICU for 1 week, but is now at home.  Her  unfortunately has to go back to [place] because his father is sick.  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     Lyn BOOKER  MD Edgar MPH  Associate Professor of Medicine  Pulmonary, Allergy, Critical Care and Sleep Medicine      Interval History:     Armando reports that her cough frequency and sputum volume are now improving.  She was producing sputum that was green and thick, but this is thinning out and becoming easier to produce.  She is performing 3 vest therapies per day.  Her activity tolerance is slowly improving and she feels that she is nearing her baseline.          Review of Systems:     CONSTITUTIONAL: no fever, ++ chills, + sweats,  decrease in weight, improving energy, no change in appetite    INTEGUMENTARY/SKIN: no rash, no obvious new lesions    ENT/MOUTH: no sore throat, no new sinus pain, no new nasal drainage, no hearing loss, no ear ringing     RESPIRATORY: see interval history    CV: no chest pain, no palpitations, ++ peripheral edema, ++ orthopnea, ++ PND    GI: no nausea, no vomiting, constipation, controlled GERD, no abdominal pain    : no dysuria, no urinary frequency    MUSCULOSKELETAL: Right wrist arthralgias--going to use soft splint    ENDOCRINE: no excessive thirst    NEURO:  No headache, no numbness, no tingling    SLEEP: up every 3 hours    PSYCHIATRIC: mood stable          Past Medical and Surgical History:     Past Medical History:   Diagnosis Date     Carpal tunnel syndrome     right     Chronic sinusitis      Diabetes (H)     gestational diabetes diet controlled     Endometriosis     left ovary     Infertility      Kartagener's syndrome     situs inversus totalis,      Pseudomonas aeruginosa colonization      Reactive airway disease      Situs inversus      Uncomplicated asthma     Reactive airway disease     Past Surgical History:   Procedure Laterality Date     ADENOIDECTOMY        SECTION N/A 2019    Procedure: Primary  Section;  Surgeon: Lyndsey Shipley MD;  Location: [facility] L[facility]D     LAPAROSCOPIC CYSTECTOMY OVARIAN (BENIGN) Left 2017    Procedure:  LAPAROSCOPIC CYSTECTOMY OVARIAN (BENIGN);  Left  Laparoscopy Ovarian Cystotomy, Hysteroscopy And Polpectomy With Myosure ;  Surgeon: Zayra Roberts MD;  Location: [facility] OR     OPERATIVE HYSTEROSCOPY WITH MORCELLATOR N/A 11/13/2017    Procedure: OPERATIVE HYSTEROSCOPY WITH MORCELLATOR;;  Surgeon: Zayra Roberts MD;  Location: [facility] OR     TONSILLECTOMY       TONSILLECTOMY & ADENOIDECTOMY      8 years old     TRANSVAGINAL RETRIEVAL OVUM Bilateral 3/3/2016    IVF Procedure: TRANSVAGINAL RETRIEVAL OVUM;  Surgeon: Sunshine Gilliam MD;  Location:  [facility]     uterine polyp  2013           Family History:     Family History   Problem Relation Age of Onset     Hyperlipidemia Mother      Diabetes Mother      Hypertension Mother      Hyperlipidemia Father      Hypertension Father      Diabetes Maternal Grandmother      Diabetes Maternal Grandfather             Social History:     Social History     Socioeconomic History     Marital status:      Spouse name: Not on file     Number of children: 0     Years of education: Not on file     Highest education level: Not on file   Occupational History     Occupation: physician     Employer: [employer]     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   Social Needs     Financial resource strain: Not on file     Food insecurity:     Worry: Not on file     Inability: Not on file     Transportation needs:     Medical: Not on file     Non-medical: Not on file   Tobacco Use     Smoking status: Never Smoker     Smokeless tobacco: Never Used   Substance and Sexual Activity     Alcohol use: No     Alcohol/week: 0.0 oz     Drug use: No     Sexual activity: Not Currently     Partners: Male     Birth control/protection: None   Lifestyle     Physical activity:     Days per week: Not on file     Minutes per session: Not on file     Stress: Not on file   Relationships     Social connections:     Talks on phone: Not on file     Gets together: Not on file      "Attends [employer] service: Not on file     Active member of club or organization: Not on file     Attends meetings of clubs or organizations: Not on file     Relationship status: Not on file     Intimate partner violence:     Fear of current or ex partner: Not on file     Emotionally abused: Not on file     Physically abused: Not on file     Forced sexual activity: Not on file   Other Topics Concern     Parent/sibling w/ CABG, MI or angioplasty before 65F 55M? Not Asked   Social History Narrative    Lives with  ().  Immigrated in 2006 from [place] ([facility]). No children.  Parents in [place], 2 younger sisters and a brother, she is eldest.             Medications:     Current Outpatient Medications   Medication     acetaminophen (TYLENOL) 325 MG tablet     acetylcysteine (MUCOMYST) 20 % nebulizer solution     albuterol (PROAIR HFA/PROVENTIL HFA/VENTOLIN HFA) 108 (90 BASE) MCG/ACT Inhaler     albuterol (PROVENTIL) (2.5 MG/3ML) 0.083% neb solution     docusate sodium (COLACE) 100 MG capsule     ferrous gluconate (FERGON) 324 (38 Fe) MG tablet     fluticasone-salmeterol (ADVAIR) 100-50 MCG/DOSE inhaler     furosemide (LASIX) 20 MG tablet     ibuprofen (ADVIL/MOTRIN) 600 MG tablet     pantoprazole (PROTONIX) 40 MG EC tablet     Prenatal MV-Min-Fe Fum-FA-DHA (PRENATAL 1 PO)     Respiratory Therapy Supplies (NEBULIZER) SUZANNA     senna-docusate (SENOKOT-S/PERICOLACE) 8.6-50 MG tablet     Syringe/Needle, Disp, (SYRINGE LUER LOCK) 20G X 1-1/2\" 5 ML MISC     Water For Injection Sterile SOLN     blood glucose (NO BRAND SPECIFIED) test strip     blood glucose monitoring (NO BRAND SPECIFIED) meter device kit     Cholecalciferol (VITAMIN D PO)     No current facility-administered medications for this visit.             Physical Exam:   /73 (BP Location: Right arm, Patient Position: Chair, Cuff Size: Adult Regular)   Pulse 83   Resp 16   Ht 1.626 m (5' 4.02\")   Wt 68.5 kg (151 lb)   SpO2 94%   BMI 25.91 kg/m  "     Constitutional:   Awake, alert and in no apparent distress     Eyes:   nonicteric     ENT:   oral mucosa moist without lesions, opaque tm bilaterally, bilateral mucosal edema      Neck:   Supple without supraclavicular or cervical lymphadenopathy     Lungs:   Good air flow.  bibasilar crackles. No rhonchi.  No wheezes.     Cardiovascular:   Normal S1 and S2.  RRR.  No murmur, gallop or rub.     Abdomen:   NABS, soft, nontender, nondistended.       Musculoskeletal:   No edema, no digital clubbing present     Neurologic:   Alert and conversant.     Skin:   Warm, dry.  No rash on limited exam.             Data:   All laboratory and imaging data reviewed.    Cystic Fibrosis Culture  Specimen Description   Date Value Ref Range Status   05/08/2019 Throat  Final   03/27/2019 Sputum  Final   03/27/2019 Sputum  Final    Culture Micro   Date Value Ref Range Status   05/08/2019 PENDING  Preliminary   03/27/2019 Heavy growth  Normal otto    Final   03/27/2019 (A)  Final    Moderate growth  Pseudomonas aeruginosa, mucoid strain     03/27/2019 (A)  Final    Heavy growth  Haemophilus influenzae  Beta lactamase negative  Beta-lactamase negative Haemophilus influenzae are usually susceptible to ampicillin,   amoxacillin/clavulanic acid, levofloxacin, and 3rd generation cephalosporins, such as   ceftriaxone.          Recent Results (from the past 168 hour(s))   Throat Culture Aerobic Bacterial    Collection Time: 05/08/19  8:33 AM   Result Value Ref Range    Specimen Description Throat     Special Requests Specimen collected in eSwab transport (white cap)     Culture Micro PENDING        PFT: none performed.

## 2019-05-09 ENCOUNTER — OFFICE VISIT (OUTPATIENT)
Dept: OBGYN | Facility: CLINIC | Age: 44
End: 2019-05-09
Payer: COMMERCIAL

## 2019-05-09 VITALS
BODY MASS INDEX: 22.65 KG/M2 | SYSTOLIC BLOOD PRESSURE: 120 MMHG | HEART RATE: 92 BPM | TEMPERATURE: 99 F | DIASTOLIC BLOOD PRESSURE: 76 MMHG | WEIGHT: 132 LBS

## 2019-05-09 DIAGNOSIS — D50.9 IRON DEFICIENCY ANEMIA, UNSPECIFIED IRON DEFICIENCY ANEMIA TYPE: Primary | ICD-10-CM

## 2019-05-09 DIAGNOSIS — O43.219 PLACENTA ACCRETA WITHOUT HEMORRHAGE: ICD-10-CM

## 2019-05-09 PROCEDURE — 99213 OFFICE O/P EST LOW 20 MIN: CPT | Mod: 24 | Performed by: OBSTETRICS & GYNECOLOGY

## 2019-05-09 PROCEDURE — 36415 COLL VENOUS BLD VENIPUNCTURE: CPT | Performed by: OBSTETRICS & GYNECOLOGY

## 2019-05-09 PROCEDURE — 84702 CHORIONIC GONADOTROPIN TEST: CPT | Performed by: OBSTETRICS & GYNECOLOGY

## 2019-05-09 NOTE — NURSING NOTE
"Chief Complaint   Patient presents with     Follow Up       Initial /76 (BP Location: Left arm, Patient Position: Sitting, Cuff Size: Adult Regular)   Pulse 92   Temp 99  F (37.2  C) (Oral)   Wt 59.9 kg (132 lb)   BMI 22.65 kg/m   Estimated body mass index is 22.65 kg/m  as calculated from the following:    Height as of 19: 1.626 m (5' 4.02\").    Weight as of this encounter: 59.9 kg (132 lb).  BP completed using cuff size: regular    Questioned patient about current smoking habits.  Pt. has never smoked.          The following HM Due: NONE      The following patient reported/Care Every where data was sent to:  P ABSTRACT QUALITY INITIATIVES [[id number]]  jhony Duong           "

## 2019-05-09 NOTE — PROGRESS NOTES
MEAGAN Dc is a 44 year old female presents for post operative check. She is  2  week(s) status post .  She reports doing well and denies significant pain or bleeding. Able to breathe much better after went to ER and had some lasix done along with all tests that were negative.  Trying to take oral iron but unable to tolerate, would like a different prescription.    O.  Blood pressure 120/76, pulse 92, temperature 99  F (37.2  C), temperature source Oral, weight 59.9 kg (132 lb), currently breastfeeding.      A. /P.  (D50.9) Iron deficiency anemia, unspecified iron deficiency anemia type  (primary encounter diagnosis)  Comment: during pregnancy and delivery  Plan: ferrous sulfate (SLO-FE) 142 (45 Fe) MG CR         tablet        New script for iron was done. Plan recheck hgb at pp visit.    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 Check u/s for retained POC at 6 week pp visit.         Zayra Roberts MD

## 2019-05-10 LAB
B-HCG SERPL-ACNC: 15 IU/L (ref 0–5)
BACTERIA SPEC CULT: NORMAL
Lab: NORMAL
SPECIMEN SOURCE: NORMAL

## 2019-05-24 NOTE — ADDENDUM NOTE
Addendum  created 05/23/19 1941 by Delphine Mccormack APRN CRNA    [facility] Staff edited

## 2019-06-05 ENCOUNTER — OFFICE VISIT (OUTPATIENT)
Dept: PULMONOLOGY | Facility: CLINIC | Age: 44
End: 2019-06-05
Attending: INTERNAL MEDICINE
Payer: COMMERCIAL

## 2019-06-05 VITALS
RESPIRATION RATE: 16 BRPM | SYSTOLIC BLOOD PRESSURE: 113 MMHG | HEIGHT: 64 IN | WEIGHT: 134 LBS | OXYGEN SATURATION: 95 % | DIASTOLIC BLOOD PRESSURE: 75 MMHG | BODY MASS INDEX: 22.88 KG/M2 | HEART RATE: 72 BPM

## 2019-06-05 DIAGNOSIS — J47.9 BRONCHIECTASIS WITHOUT COMPLICATION (H): ICD-10-CM

## 2019-06-05 DIAGNOSIS — J47.9 BRONCHIECTASIS WITHOUT COMPLICATION (H): Primary | Chronic | ICD-10-CM

## 2019-06-05 DIAGNOSIS — Q34.8 PCD (PRIMARY CILIARY DYSKINESIA): Primary | ICD-10-CM

## 2019-06-05 LAB
EXPTIME-PRE: 11.5 SEC
FEF2575-%PRED-PRE: 18 %
FEF2575-PRE: 0.53 L/SEC
FEF2575-PRED: 2.87 L/SEC
FEFMAX-%PRED-PRE: 63 %
FEFMAX-PRE: 4.34 L/SEC
FEFMAX-PRED: 6.86 L/SEC
FEV1-%PRED-PRE: 54 %
FEV1-PRE: 1.49 L
FEV1FEV6-PRE: 61 %
FEV1FEV6-PRED: 83 %
FEV1FVC-PRE: 59 %
FEV1FVC-PRED: 83 %
FIFMAX-PRE: 4.17 L/SEC
FVC-%PRED-PRE: 76 %
FVC-PRE: 2.52 L
FVC-PRED: 3.29 L
GRAM STN SPEC: NORMAL
Lab: NORMAL
SPECIMEN SOURCE: NORMAL

## 2019-06-05 PROCEDURE — 87077 CULTURE AEROBIC IDENTIFY: CPT | Performed by: INTERNAL MEDICINE

## 2019-06-05 PROCEDURE — 87186 SC STD MICRODIL/AGAR DIL: CPT | Mod: XU | Performed by: INTERNAL MEDICINE

## 2019-06-05 PROCEDURE — 87205 SMEAR GRAM STAIN: CPT | Performed by: INTERNAL MEDICINE

## 2019-06-05 PROCEDURE — 87070 CULTURE OTHR SPECIMN AEROBIC: CPT | Performed by: INTERNAL MEDICINE

## 2019-06-05 PROCEDURE — G0463 HOSPITAL OUTPT CLINIC VISIT: HCPCS | Mod: ZF

## 2019-06-05 PROCEDURE — 87185 SC STD ENZYME DETCJ PER NZM: CPT | Performed by: INTERNAL MEDICINE

## 2019-06-05 ASSESSMENT — PAIN SCALES - GENERAL: PAINLEVEL: NO PAIN (0)

## 2019-06-05 ASSESSMENT — MIFFLIN-ST. JEOR: SCORE: 1247.82

## 2019-06-05 NOTE — PATIENT INSTRUCTIONS
Self-Care Plan    RECOMMENDATIONS:   Keep up the good work--you look wonderful.  Try increase your activity.    YOUR GOAL:  Stay well.  Enjoy the rest of the leave.      [facility] Nurse line:  Tashia Deejay    [phone]     [facility] Fax Number:      [phone]         Cystic fibrosis Respiratory Therapist:   Sybil Peng              [phone]   Cystic fibrosis Dietitians:              Delphine Angulo              [phone]                            Faye Maciel                        [phone]   Cystic fibrosis Diabetes Nurse:    Palmira Masrhall   [phone]    Cystic fibrosis Social Workers:     Brittanie Buenrostro               [phone]                     Tammi Pedro               [phone]  Cystic fibrosis Pharmacist:           Maira Saunders                               [phone]         Jennifer Oliver   [phone]  Cystic Fibrosis Genetic Counselor:   Josie Wei    [phone]    [facility] website:  [email or web address]         MRN: [id number]   Clinic Date: June 5, 2019   Patient: Armando Dc     Annual Studies:   No results found for: IGG  No results found for: INS  There are no preventive care reminders to display for this patient.    Pulmonary Function Tests  FEV1: amount of air you can blow out in 1 second  FVC: total amount of air you can take in and blow out    Your Goals:         PFT Latest Ref Rng & Units 6/5/2019   FVC L 2.52   FEV1 L 1.49   FVC% % 76   FEV1% % 54          Airway Clearance: The Most Important Way to Keep Your Lungs Healthy  Vest Settings:    Hill-Rom Frequencies: 8, 9, 10 Pressure 10 Then, Frequencies 18, 19, 20 Pressure 6      RespirTech: Quick Start with Pressure of     Do each frequency for 5 minutes; Deflate vest after each frequency & cough 3 times before beginning the next setting.    Vest and Neb Therapy should be done 2 times/day.    Good Nutrition Can Improve Lung  Function and Overall Health     Take ALL of your vitamins with food     Take 1/2 of your enzymes before EVERY meal/snack and the other 1/2 mid-meal/snack    Wt Readings from Last 3 Encounters:   06/05/19 60.8 kg (134 lb)   05/09/19 59.9 kg (132 lb)   05/08/19 68.5 kg (151 lb)       Body mass index is 23 kg/m .         National CF Foundation Recommendations for BMI in CF Adults: Women: at least 22 Men: at least 23        Controlling Blood Sugars Helps Prevent Lung Infections & Improves Nutrition  Test blood sugar:     In the morning before eating (goal is )     2 hours after a meal (goal is less than 150)     When pre-meal glucose is greater than 150 add correction     At bedtime (if less than 100 eat a snack with 15 grams of carbohydrates  Last A1C Results:   Hemoglobin A1C   Date Value Ref Range Status   09/25/2018 5.8 (H) 0 - 5.6 % Final     Comment:     Normal <5.7% Prediabetes 5.7-6.4%  Diabetes 6.5% or higher - adopted from ADA   consensus guidelines.           If diabetic, measure A1C every 6 months. Goal: Under 7%    Staying Healthy    Research:  If you are interested in learning about research opportunities or have questions, please contact the CF Research Team at [phone] or [email or web address].      CF Foundation:  Compass is a personalized resource service to help you with the insurance, financial, legal and other issues you are facing.  It's free, confidential and available to anyone with CF.  Ask your  for more information or contact Compass directly at 464-TCDHJZO ([phone]) or compass@cff.org, or learn more at cff.org/compass.

## 2019-06-05 NOTE — PROGRESS NOTES
[facility]  [facility]  2019         Assessment and Plan:   Armando Dc is a 44 year old female with PCD and bronchiectasis.    1. PCD and bronchiectasis lung disease with moderately-severe obstruction:  Armando was able to perform PFTs for the first time in several months.  She had been on a prolonged bed rest with her pregnancy and it was difficult for her to do the maneuver.  She now is postpartum and has done pulmonary function testing.  She has shown excellent stability in her PFTs.  Symptomatically, she continues to have a very good recovery since the birth of her child.  She has gone from she is doing 3 vests a day down to 2, maintaining stable symptomatology.  Armando historically has grown out Pseudomonas and Haemophilus; however, her last culture did have normal otto.  At this time, I have recommended that:   -- She continue to do her bronchial drainage twice daily.   -- She should continue on her inhalational therapy.     2.  Sinusitis and otitis.  Armando has very little in the way of symptoms.  We will continue to follow for now.     3.  Postpartum.  Armando reports that she was having uterine bleeding up until a couple days ago.  She reports things in general are improving.     4.  Psychosocial.  Armando reports her  remains in [place] after the death of his father.  She is getting help at home from her parents.  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     Lyn Hernández MD MPH  Associate Professor of Medicine  Pulmonary, Allergy, Critical Care and Sleep Medicine      Interval History:     Armando does report that her cough frequency and sputum volume are stable.  Again, she is performing 2 vest therapies per day.  She denies any difficulty with activity tolerance.  She is starting to walk more with her .          Review of Systems:     CONSTITUTIONAL: no fever, no chills, decrease in weight,  good energy, good appetite    INTEGUMENTARY/SKIN: no rash, no obvious new lesions    ENT/MOUTH: no sore throat, no new sinus pain, no new nasal drainage, some sinus congestion,  no hearing loss, no ear ringing     RESPIRATORY: see interval history    CV: no chest pain, no palpitations, no peripheral edema, no orthopnea, no PND    GI: no nausea, no vomiting, no change in stools, no fatty stools, ++ GERD, no abdominal pain    : no dysuria, no urinary frequency, continued uterine bleeding    MUSCULOSKELETAL: no myalgias, no arthralgias    ENDOCRINE: no excessive thirst    NEURO:  No headache, no numbness, no tingling    SLEEP: no issues    PSYCHIATRIC: mood stable          Past Medical and Surgical History:     Past Medical History:   Diagnosis Date     Carpal tunnel syndrome     right     Chronic sinusitis      Diabetes (H)     gestational diabetes diet controlled     Endometriosis     left ovary     Infertility      Kartagener's syndrome     situs inversus totalis,      Pseudomonas aeruginosa colonization      Reactive airway disease      Situs inversus      Uncomplicated asthma     Reactive airway disease     Past Surgical History:   Procedure Laterality Date     ADENOIDECTOMY        SECTION N/A 2019    Procedure: Primary  Section;  Surgeon: Lyndsey Shipley MD;  [facility]     LAPAROSCOPIC CYSTECTOMY OVARIAN (BENIGN) Left 2017    Procedure: LAPAROSCOPIC CYSTECTOMY OVARIAN (BENIGN);  Left  Laparoscopy Ovarian Cystotomy, Hysteroscopy And Polpectomy With Myosure ;  Surgeon: Zayra Roberts MD;  [facility]     OPERATIVE HYSTEROSCOPY WITH MORCELLATOR N/A 2017    Procedure: OPERATIVE HYSTEROSCOPY WITH MORCELLATOR;;  Surgeon: Zayra Roberts MD;  [facility]     TONSILLECTOMY       TONSILLECTOMY & ADENOIDECTOMY      8 years old     TRANSVAGINAL RETRIEVAL OVUM Bilateral 3/3/2016    IVF Procedure: TRANSVAGINAL RETRIEVAL OVUM;  Surgeon:  Sunshine Gilliam MD;  Location: [facility]     uterine polyp  2013           Family History:     Family History   Problem Relation Age of Onset     Hyperlipidemia Mother      Diabetes Mother      Hypertension Mother      Hyperlipidemia Father      Hypertension Father      Diabetes Maternal Grandmother      Diabetes Maternal Grandfather             Social History:     Social History     Socioeconomic History     Marital status:      Spouse name: Not on file     Number of children: 0     Years of education: Not on file     Highest education level: Not on file   Occupational History     Occupation: physician     Employer: [employer]     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   Social Needs     Financial resource strain: Not on file     Food insecurity:     Worry: Not on file     Inability: Not on file     Transportation needs:     Medical: Not on file     Non-medical: Not on file   Tobacco Use     Smoking status: Never Smoker     Smokeless tobacco: Never Used   Substance and Sexual Activity     Alcohol use: No     Alcohol/week: 0.0 oz     Drug use: No     Sexual activity: Not Currently     Partners: Male     Birth control/protection: None   Lifestyle     Physical activity:     Days per week: Not on file     Minutes per session: Not on file     Stress: Not on file   Relationships     Social connections:     Talks on phone: Not on file     Gets together: Not on file     Attends Anabaptist service: Not on file     Active member of club or organization: Not on file     Attends meetings of clubs or organizations: Not on file     Relationship status: Not on file     Intimate partner violence:     Fear of current or ex partner: Not on file     Emotionally abused: Not on file     Physically abused: Not on file     Forced sexual activity: Not on file   Other Topics Concern     Parent/sibling w/ CABG, MI or angioplasty before 65F 55M? Not Asked   Social History Narrative    Lives with   "().  Immigrated in 2006 from [place] ([facility] No children.  Parents in [place], 2 younger sisters and a brother, she is eldest.             Medications:     Current Outpatient Medications   Medication     acetaminophen (TYLENOL) 325 MG tablet     acetylcysteine (MUCOMYST) 20 % nebulizer solution     albuterol (PROAIR HFA/PROVENTIL HFA/VENTOLIN HFA) 108 (90 BASE) MCG/ACT Inhaler     albuterol (PROVENTIL) (2.5 MG/3ML) 0.083% neb solution     Cholecalciferol (VITAMIN D PO)     docusate sodium (COLACE) 100 MG capsule     ferrous gluconate (FERGON) 324 (38 Fe) MG tablet     ferrous sulfate (SLO-FE) 142 (45 Fe) MG CR tablet     fluticasone-salmeterol (ADVAIR) 100-50 MCG/DOSE inhaler     ibuprofen (ADVIL/MOTRIN) 600 MG tablet     pantoprazole (PROTONIX) 40 MG EC tablet     Prenatal MV-Min-Fe Fum-FA-DHA (PRENATAL 1 PO)     Respiratory Therapy Supplies (NEBULIZER) SUZANNA     senna-docusate (SENOKOT-S/PERICOLACE) 8.6-50 MG tablet     Syringe/Needle, Disp, (SYRINGE LUER LOCK) 20G X 1-1/2\" 5 ML MISC     Water For Injection Sterile SOLN     No current facility-administered medications for this visit.             Physical Exam:   /75   Pulse 72   Resp 16   Ht 1.626 m (5' 4\")   Wt 60.8 kg (134 lb)   SpO2 95%   BMI 23.00 kg/m      Constitutional:   Awake, alert and in no apparent distress     Eyes:   nonicteric     ENT:   oral mucosa moist without lesions, opaque TM bilaterally, bilateral mucosal edema      Neck:   Supple without supraclavicular or cervical lymphadenopathy     Lungs:   fair air flow.  No crackles. scattered rhonchi.  No wheezes.     Cardiovascular:   Normal S1 and S2.  RRR.  No murmur, gallop or rub.     Abdomen:   NABS, soft, nontender, nondistended.       Musculoskeletal:   No edema, no digital clubbing present     Neurologic:   Alert and conversant.     Skin:   Warm, dry.  No rash on limited exam.             Data:   All laboratory and imaging data reviewed.    Cystic Fibrosis Culture  Specimen " Description   Date Value Ref Range Status   06/05/2019 Sputum  Final   06/05/2019 Sputum  Final   05/08/2019 Throat  Final    Culture Micro   Date Value Ref Range Status   06/05/2019 Heavy growth  Normal otto    Preliminary   06/05/2019 (A)  Preliminary    Light growth  Non lactose fermenting gram negative rods     06/05/2019 Culture in progress  Preliminary        Recent Results (from the past 168 hour(s))   General PFT Lab (Please always keep checked)    Collection Time: 06/05/19  7:41 AM   Result Value Ref Range    FVC-Pred 3.29 L    FVC-Pre 2.52 L    FVC-%Pred-Pre 76 %    FEV1-Pre 1.49 L    FEV1-%Pred-Pre 54 %    FEV1FVC-Pred 83 %    FEV1FVC-Pre 59 %    FEFMax-Pred 6.86 L/sec    FEFMax-Pre 4.34 L/sec    FEFMax-%Pred-Pre 63 %    FEF2575-Pred 2.87 L/sec    FEF2575-Pre 0.53 L/sec    BUN9027-%Pred-Pre 18 %    ExpTime-Pre 11.50 sec    FIFMax-Pre 4.17 L/sec    FEV1FEV6-Pred 83 %    FEV1FEV6-Pre 61 %   Gram stain    Collection Time: 06/05/19  8:26 AM   Result Value Ref Range    Specimen Description Sputum     Special Requests Screen     Gram Stain <10 Squamous epithelial cells/low power field     Gram Stain >25 PMNs/low power field     Gram Stain Moderate  Mixed gram negative and positive otto      Sputum Culture Aerobic Bacterial    Collection Time: 06/05/19  8:26 AM   Result Value Ref Range    Specimen Description Sputum     Culture Micro Heavy growth  Normal otto       Culture Micro (A)      Light growth  Non lactose fermenting gram negative rods      Culture Micro Culture in progress        PFT: Moderately-severe obstructive lung disease.  When compared to 11/7/2018, the FEV1 and FVC have little change.  The decrease in FVC may represent air trapping v. restrictive physiology.  Lung volumes would be necessary to determine.

## 2019-06-05 NOTE — NURSING NOTE
Chief Complaint   Patient presents with     RECHECK     Bronchiectasis    Medications reviewed and vital signs taken.   Juan Givens, CMA

## 2019-06-05 NOTE — LETTER
6/5/2019       RE: Armando Dc  [address]  [place] [address]     Dear Colleague,    Thank you for referring your patient, Armando Dc, to the [facility] at [facility]. Please see a copy of my visit note below.    [facility] acute [facility]  June 5, 2019         Assessment and Plan:   Armando Dc is a 44 year old female with PCD and bronchiectasis.    1. PCD and bronchiectasis lung disease with moderately-severe obstruction:  Armando was able to perform PFTs for the first time in several months.  She had been on a prolonged bed rest with her pregnancy and it was difficult for her to do the maneuver.  She now is postpartum and has done pulmonary function testing.  She has shown excellent stability in her PFTs.  Symptomatically, she continues to have a very good recovery since the birth of her child.  She has gone from she is doing 3 vests a day down to 2, maintaining stable symptomatology.  Armando historically has grown out Pseudomonas and Haemophilus; however, her last culture did have normal otto.  At this time, I have recommended that:   -- She continue to do her bronchial drainage twice daily.   -- She should continue on her inhalational therapy.     2.  Sinusitis and otitis.  Armando has very little in the way of symptoms.  We will continue to follow for now.     3.  Postpartum.  Armando reports that she was having uterine bleeding up until a couple days ago.  She reports things in general are improving.     4.  Psychosocial.  Armando reports her  remains in [place] after the death of his father.  She is getting help at home from her parents.  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     Lyn Hernández MD MPH  Associate Professor of Medicine  Pulmonary, Allergy, Critical Care and Sleep Medicine      Interval History:     Armando does  report that her cough frequency and sputum volume are stable.  Again, she is performing 2 vest therapies per day.  She denies any difficulty with activity tolerance.  She is starting to walk more with her .          Review of Systems:     CONSTITUTIONAL: no fever, no chills, decrease in weight, good energy, good appetite    INTEGUMENTARY/SKIN: no rash, no obvious new lesions    ENT/MOUTH: no sore throat, no new sinus pain, no new nasal drainage, some sinus congestion,  no hearing loss, no ear ringing     RESPIRATORY: see interval history    CV: no chest pain, no palpitations, no peripheral edema, no orthopnea, no PND    GI: no nausea, no vomiting, no change in stools, no fatty stools, ++ GERD, no abdominal pain    : no dysuria, no urinary frequency, continued uterine bleeding    MUSCULOSKELETAL: no myalgias, no arthralgias    ENDOCRINE: no excessive thirst    NEURO:  No headache, no numbness, no tingling    SLEEP: no issues    PSYCHIATRIC: mood stable          Past Medical and Surgical History:     Past Medical History:   Diagnosis Date     Carpal tunnel syndrome     right     Chronic sinusitis      Diabetes (H)     gestational diabetes diet controlled     Endometriosis     left ovary     Infertility      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,      Pseudomonas aeruginosa colonization 2008     Reactive airway disease      Situs inversus      Uncomplicated asthma     Reactive airway disease     Past Surgical History:   Procedure Laterality Date     ADENOIDECTOMY        SECTION N/A 2019    Procedure: Primary  Section;  Surgeon: Lyndsey Shipley MD;  [facility]     LAPAROSCOPIC CYSTECTOMY OVARIAN (BENIGN) Left 2017    Procedure: LAPAROSCOPIC CYSTECTOMY OVARIAN (BENIGN);  Left  Laparoscopy Ovarian Cystotomy, Hysteroscopy And Polpectomy With Myosure ;  Surgeon: Zayra Roberts MD;  [facility]     OPERATIVE HYSTEROSCOPY WITH MORCELLATOR N/A  11/13/2017    Procedure: OPERATIVE HYSTEROSCOPY WITH MORCELLATOR;;  Surgeon: Zayra Roberts MD;  [facility]     TONSILLECTOMY       TONSILLECTOMY & ADENOIDECTOMY      8 years old     TRANSVAGINAL RETRIEVAL OVUM Bilateral 3/3/2016    IVF Procedure: TRANSVAGINAL RETRIEVAL OVUM;  Surgeon: Sunshine Gilliam MD;  [facility]     uterine polyp  2013           Family History:     Family History   Problem Relation Age of Onset     Hyperlipidemia Mother      Diabetes Mother      Hypertension Mother      Hyperlipidemia Father      Hypertension Father      Diabetes Maternal Grandmother      Diabetes Maternal Grandfather             Social History:     Social History     Socioeconomic History     Marital status:      Spouse name: Not on file     Number of children: 0     Years of education: Not on file     Highest education level: Not on file   Occupational History     Occupation: physician     Employer: [employer]     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   Social Needs     Financial resource strain: Not on file     Food insecurity:     Worry: Not on file     Inability: Not on file     Transportation needs:     Medical: Not on file     Non-medical: Not on file   Tobacco Use     Smoking status: Never Smoker     Smokeless tobacco: Never Used   Substance and Sexual Activity     Alcohol use: No     Alcohol/week: 0.0 oz     Drug use: No     Sexual activity: Not Currently     Partners: Male     Birth control/protection: None   Lifestyle     Physical activity:     Days per week: Not on file     Minutes per session: Not on file     Stress: Not on file   Relationships     Social connections:     Talks on phone: Not on file     Gets together: Not on file     Attends Mosque service: Not on file     Active member of club or organization: Not on file     Attends meetings of clubs or organizations: Not on file     Relationship status: Not on file     Intimate partner violence:      "Fear of current or ex partner: Not on file     Emotionally abused: Not on file     Physically abused: Not on file     Forced sexual activity: Not on file   Other Topics Concern     Parent/sibling w/ CABG, MI or angioplasty before 65F 55M? Not Asked   Social History Narrative    Lives with  ().  Immigrated in 2006 from [place] ([facility]). No children.  Parents in [place], 2 younger sisters and a brother, she is eldest.             Medications:     Current Outpatient Medications   Medication     acetaminophen (TYLENOL) 325 MG tablet     acetylcysteine (MUCOMYST) 20 % nebulizer solution     albuterol (PROAIR HFA/PROVENTIL HFA/VENTOLIN HFA) 108 (90 BASE) MCG/ACT Inhaler     albuterol (PROVENTIL) (2.5 MG/3ML) 0.083% neb solution     Cholecalciferol (VITAMIN D PO)     docusate sodium (COLACE) 100 MG capsule     ferrous gluconate (FERGON) 324 (38 Fe) MG tablet     ferrous sulfate (SLO-FE) 142 (45 Fe) MG CR tablet     fluticasone-salmeterol (ADVAIR) 100-50 MCG/DOSE inhaler     ibuprofen (ADVIL/MOTRIN) 600 MG tablet     pantoprazole (PROTONIX) 40 MG EC tablet     Prenatal MV-Min-Fe Fum-FA-DHA (PRENATAL 1 PO)     Respiratory Therapy Supplies (NEBULIZER) SUZANNA     senna-docusate (SENOKOT-S/PERICOLACE) 8.6-50 MG tablet     Syringe/Needle, Disp, (SYRINGE LUER LOCK) 20G X 1-1/2\" 5 ML MISC     Water For Injection Sterile SOLN     No current facility-administered medications for this visit.             Physical Exam:   /75   Pulse 72   Resp 16   Ht 1.626 m (5' 4\")   Wt 60.8 kg (134 lb)   SpO2 95%   BMI 23.00 kg/m       Constitutional:   Awake, alert and in no apparent distress     Eyes:   nonicteric     ENT:   oral mucosa moist without lesions, opaque TM bilaterally, bilateral mucosal edema      Neck:   Supple without supraclavicular or cervical lymphadenopathy     Lungs:   fair air flow.  No crackles. scattered rhonchi.  No wheezes.     Cardiovascular:   Normal S1 and S2.  RRR.  No murmur, gallop or rub. "     Abdomen:   NABS, soft, nontender, nondistended.       Musculoskeletal:   No edema, no digital clubbing present     Neurologic:   Alert and conversant.     Skin:   Warm, dry.  No rash on limited exam.             Data:   All laboratory and imaging data reviewed.    Cystic Fibrosis Culture  Specimen Description   Date Value Ref Range Status   06/05/2019 Sputum  Final   06/05/2019 Sputum  Final   05/08/2019 Throat  Final    Culture Micro   Date Value Ref Range Status   06/05/2019 Heavy growth  Normal otto    Preliminary   06/05/2019 (A)  Preliminary    Light growth  Non lactose fermenting gram negative rods     06/05/2019 Culture in progress  Preliminary        Recent Results (from the past 168 hour(s))   General PFT Lab (Please always keep checked)    Collection Time: 06/05/19  7:41 AM   Result Value Ref Range    FVC-Pred 3.29 L    FVC-Pre 2.52 L    FVC-%Pred-Pre 76 %    FEV1-Pre 1.49 L    FEV1-%Pred-Pre 54 %    FEV1FVC-Pred 83 %    FEV1FVC-Pre 59 %    FEFMax-Pred 6.86 L/sec    FEFMax-Pre 4.34 L/sec    FEFMax-%Pred-Pre 63 %    FEF2575-Pred 2.87 L/sec    FEF2575-Pre 0.53 L/sec    XTJ3680-%Pred-Pre 18 %    ExpTime-Pre 11.50 sec    FIFMax-Pre 4.17 L/sec    FEV1FEV6-Pred 83 %    FEV1FEV6-Pre 61 %   Gram stain    Collection Time: 06/05/19  8:26 AM   Result Value Ref Range    Specimen Description Sputum     Special Requests Screen     Gram Stain <10 Squamous epithelial cells/low power field     Gram Stain >25 PMNs/low power field     Gram Stain Moderate  Mixed gram negative and positive otto      Sputum Culture Aerobic Bacterial    Collection Time: 06/05/19  8:26 AM   Result Value Ref Range    Specimen Description Sputum     Culture Micro Heavy growth  Normal otto       Culture Micro (A)      Light growth  Non lactose fermenting gram negative rods      Culture Micro Culture in progress        PFT: Moderately-severe obstructive lung disease.  When compared to 11/7/2018, the FEV1 and FVC have little change.  The  decrease in FVC may represent air trapping v. restrictive physiology.  Lung volumes would be necessary to determine.    Again, thank you for allowing me to participate in the care of your patient.      Sincerely,    Lyn Hernández MD

## 2019-06-11 ENCOUNTER — TELEPHONE (OUTPATIENT)
Dept: PULMONOLOGY | Facility: CLINIC | Age: 44
End: 2019-06-11

## 2019-06-11 DIAGNOSIS — J98.8 RESPIRATORY INFECTION: Primary | ICD-10-CM

## 2019-06-11 LAB
BACTERIA SPEC CULT: ABNORMAL
SPECIMEN SOURCE: ABNORMAL

## 2019-06-11 RX ORDER — AMOXICILLIN AND CLAVULANATE POTASSIUM 500; 125 MG/1; MG/1
1 TABLET, FILM COATED ORAL 2 TIMES DAILY
Qty: 20 TABLET | Refills: 0 | Status: SHIPPED | OUTPATIENT
Start: 2019-06-11 | End: 2019-09-11

## 2019-06-11 NOTE — TELEPHONE ENCOUNTER
Dr. Hernández would like to treat recent +ecoli from sputum sample with 10 day course of Augmentin. Dicussed with patient and she agrees to plan.

## 2019-06-20 ENCOUNTER — ANCILLARY PROCEDURE (OUTPATIENT)
Dept: ULTRASOUND IMAGING | Facility: CLINIC | Age: 44
End: 2019-06-20
Attending: OBSTETRICS & GYNECOLOGY
Payer: COMMERCIAL

## 2019-06-20 ENCOUNTER — PRENATAL OFFICE VISIT (OUTPATIENT)
Dept: OBGYN | Facility: CLINIC | Age: 44
End: 2019-06-20
Attending: OBSTETRICS & GYNECOLOGY
Payer: COMMERCIAL

## 2019-06-20 VITALS
OXYGEN SATURATION: 93 % | WEIGHT: 138 LBS | DIASTOLIC BLOOD PRESSURE: 76 MMHG | BODY MASS INDEX: 23.56 KG/M2 | SYSTOLIC BLOOD PRESSURE: 126 MMHG | HEIGHT: 64 IN | HEART RATE: 83 BPM

## 2019-06-20 DIAGNOSIS — Z12.4 PAP SMEAR FOR CERVICAL CANCER SCREENING: ICD-10-CM

## 2019-06-20 DIAGNOSIS — Z13.1 SCREENING FOR DIABETES MELLITUS: ICD-10-CM

## 2019-06-20 DIAGNOSIS — Z13.220 SCREENING FOR LIPID DISORDERS: ICD-10-CM

## 2019-06-20 DIAGNOSIS — O43.219 PLACENTA ACCRETA WITHOUT HEMORRHAGE: ICD-10-CM

## 2019-06-20 PROBLEM — N93.9 VAGINAL BLEEDING: Status: RESOLVED | Noted: 2019-01-21 | Resolved: 2019-06-20

## 2019-06-20 PROBLEM — Z36.89 ENCOUNTER FOR TRIAGE IN PREGNANT PATIENT: Status: RESOLVED | Noted: 2019-02-15 | Resolved: 2019-06-20

## 2019-06-20 PROBLEM — Z23 NEED FOR TDAP VACCINATION: Status: RESOLVED | Noted: 2018-10-17 | Resolved: 2019-06-20

## 2019-06-20 PROBLEM — O44.00 PLACENTA PREVIA ANTEPARTUM: Status: RESOLVED | Noted: 2019-04-06 | Resolved: 2019-06-20

## 2019-06-20 PROBLEM — Z98.891 S/P CESAREAN SECTION: Status: RESOLVED | Noted: 2019-04-25 | Resolved: 2019-06-20

## 2019-06-20 PROBLEM — O46.90 VAGINAL BLEEDING DURING PREGNANCY: Status: RESOLVED | Noted: 2019-02-15 | Resolved: 2019-06-20

## 2019-06-20 PROBLEM — O44.02 PLACENTA PREVIA CENTRALIS IN SECOND TRIMESTER: Status: RESOLVED | Noted: 2019-01-29 | Resolved: 2019-06-20

## 2019-06-20 PROCEDURE — 87624 HPV HI-RISK TYP POOLED RSLT: CPT | Performed by: OBSTETRICS & GYNECOLOGY

## 2019-06-20 PROCEDURE — G0145 SCR C/V CYTO,THINLAYER,RESCR: HCPCS | Performed by: OBSTETRICS & GYNECOLOGY

## 2019-06-20 PROCEDURE — 76830 TRANSVAGINAL US NON-OB: CPT | Performed by: OBSTETRICS & GYNECOLOGY

## 2019-06-20 ASSESSMENT — PATIENT HEALTH QUESTIONNAIRE - PHQ9: SUM OF ALL RESPONSES TO PHQ QUESTIONS 1-9: 1

## 2019-06-20 ASSESSMENT — MIFFLIN-ST. JEOR: SCORE: 1265.96

## 2019-06-20 NOTE — PROGRESS NOTES
"SUBJECTIVE:  Armando Dc is a 44 year old female  here for a postpartum visit.  She had a  Section  on 19 delivering a healthy baby boy weighing 6 lbs 1 oz at 36 weeks due to placenta previa.    She has been doing okay but spouse is still in [place] since his father .  Child has colic and reflux so she is very tired.    Had ultrasound today to ensure no retained products of conception seen and this appears normal.  Patient has had no bleeding and is feeling well.    Today's Depression Rating was 1    delivery complications:  Yes, previa  breast feeding:  Yes, and pumping  bladder problems:  No  bowel problems/hemorrhoids:  No  episiotomy/laceration/incision healed? Yes  vaginal flow:  None  [place]:  No  contraception:  condoms  emotional adjustment:  doing well and happy  back to work: Next month to [employer]    OBJECTIVE:  Blood pressure 126/76, pulse 83, height 1.626 m (5' 4\"), weight 62.6 kg (138 lb), SpO2 93 %, currently breastfeeding.   General - pleasant female in no acute distress.  Breast - deferred.  Abdomen - soft, nontender, nondistended, incision well healed.   Pelvic - EG: normal adult female, BUS: within normal limits, Vagina: well rugated, no discharge, Cervix: no lesions or CMT  Rectovaginal - deferred.    Normal ultrasound today with small complex cyst probable endometrioma on the left ovary.    ASSESSMENT:  normal postpartum exam    PLAN:  May resume normal activities without restrictions  Pap smear was  done today    The patient will use condoms for birth control. Full counseling was provided, and all questions answered. Compliance is strongly emphasized.  Return to clinic in one year for an annual or PRN.    Will return to clinic for fasting labs.    DALTON NEAL    "

## 2019-06-20 NOTE — PATIENT INSTRUCTIONS
Make lab only appointment fasting (nothing but water 10 hours prior to blood draw) at any [place] clinic.    Web site [email or web address] for help

## 2019-06-24 LAB
COPATH REPORT: NORMAL
PAP: NORMAL

## 2019-06-25 LAB
FINAL DIAGNOSIS: NORMAL
HPV HR 12 DNA CVX QL NAA+PROBE: NEGATIVE
HPV16 DNA SPEC QL NAA+PROBE: NEGATIVE
HPV18 DNA SPEC QL NAA+PROBE: NEGATIVE
SPECIMEN DESCRIPTION: NORMAL
SPECIMEN SOURCE CVX/VAG CYTO: NORMAL

## 2019-09-06 DIAGNOSIS — Z22.39 PSEUDOMONAS AERUGINOSA COLONIZATION: Chronic | ICD-10-CM

## 2019-09-06 DIAGNOSIS — Q89.3 KARTAGENER'S SYNDROME: Chronic | ICD-10-CM

## 2019-09-06 DIAGNOSIS — Q89.3 SITUS INVERSUS: Chronic | ICD-10-CM

## 2019-09-09 RX ORDER — ACETYLCYSTEINE 200 MG/ML
SOLUTION ORAL; RESPIRATORY (INHALATION)
Qty: 120 ML | Refills: 5 | Status: SHIPPED | OUTPATIENT
Start: 2019-09-09 | End: 2020-05-23

## 2019-09-11 ENCOUNTER — OFFICE VISIT (OUTPATIENT)
Dept: PULMONOLOGY | Facility: CLINIC | Age: 44
End: 2019-09-11
Attending: INTERNAL MEDICINE
Payer: COMMERCIAL

## 2019-09-11 VITALS
WEIGHT: 141.09 LBS | SYSTOLIC BLOOD PRESSURE: 118 MMHG | DIASTOLIC BLOOD PRESSURE: 78 MMHG | OXYGEN SATURATION: 93 % | HEART RATE: 65 BPM | RESPIRATION RATE: 18 BRPM | TEMPERATURE: 97.8 F | BODY MASS INDEX: 24.09 KG/M2 | HEIGHT: 64 IN

## 2019-09-11 DIAGNOSIS — Q89.3 KARTAGENER SYNDROME: ICD-10-CM

## 2019-09-11 DIAGNOSIS — J47.9 BRONCHIECTASIS (H): Primary | Chronic | ICD-10-CM

## 2019-09-11 DIAGNOSIS — J98.8 RESPIRATORY INFECTION: ICD-10-CM

## 2019-09-11 LAB
EXPTIME-PRE: 8.85 SEC
FEF2575-%PRED-PRE: 22 %
FEF2575-PRE: 0.65 L/SEC
FEF2575-PRED: 2.87 L/SEC
FEFMAX-%PRED-PRE: 49 %
FEFMAX-PRE: 3.42 L/SEC
FEFMAX-PRED: 6.86 L/SEC
FEV1-%PRED-PRE: 52 %
FEV1-PRE: 1.43 L
FEV1FEV6-PRE: 63 %
FEV1FEV6-PRED: 83 %
FEV1FVC-PRE: 60 %
FEV1FVC-PRED: 83 %
FIFMAX-PRE: 4.04 L/SEC
FVC-%PRED-PRE: 72 %
FVC-PRE: 2.37 L
FVC-PRED: 3.29 L
GRAM STN SPEC: ABNORMAL
Lab: ABNORMAL
SPECIMEN SOURCE: ABNORMAL

## 2019-09-11 PROCEDURE — 87070 CULTURE OTHR SPECIMN AEROBIC: CPT | Performed by: INTERNAL MEDICINE

## 2019-09-11 PROCEDURE — 87205 SMEAR GRAM STAIN: CPT | Performed by: INTERNAL MEDICINE

## 2019-09-11 PROCEDURE — 87077 CULTURE AEROBIC IDENTIFY: CPT | Performed by: INTERNAL MEDICINE

## 2019-09-11 PROCEDURE — 87185 SC STD ENZYME DETCJ PER NZM: CPT | Performed by: INTERNAL MEDICINE

## 2019-09-11 PROCEDURE — 87186 SC STD MICRODIL/AGAR DIL: CPT | Performed by: INTERNAL MEDICINE

## 2019-09-11 RX ORDER — ALBUTEROL SULFATE 90 UG/1
2 AEROSOL, METERED RESPIRATORY (INHALATION) EVERY 6 HOURS PRN
Qty: 1 INHALER | Refills: 12 | Status: SHIPPED | OUTPATIENT
Start: 2019-09-11

## 2019-09-11 RX ORDER — AMOXICILLIN AND CLAVULANATE POTASSIUM 500; 125 MG/1; MG/1
1 TABLET, FILM COATED ORAL 2 TIMES DAILY
Qty: 28 TABLET | Refills: 0 | Status: SHIPPED | OUTPATIENT
Start: 2019-09-11 | End: 2020-03-04

## 2019-09-11 ASSESSMENT — PAIN SCALES - GENERAL: PAINLEVEL: NO PAIN (0)

## 2019-09-11 ASSESSMENT — MIFFLIN-ST. JEOR: SCORE: 1280.25

## 2019-09-11 NOTE — LETTER
9/11/2019       RE: Armando Dc  [address]  [place] [address]     Dear Colleague,    Thank you for referring your patient, Armando Dc, to the [facility] at [facility]. Please see a copy of my visit note below.    [facility]  September 11, 2019         Assessment and Plan:   Armando Dc is a 44 year old female with PCD and bronchiectasis.    1. PCD and Bronchiectasis with moderate obstruction:  Armando does report that she is coughing more over the last month.  She says it started after an exposure and resultant viral illness.  She says since that time most of her symptoms have resolved, but her sputum production remains above normal as well as her cough frequency.  Armando historically has grown out Haemophilus.  At this time, I have recommended:   -- We will begin Augmentin therapy 1 p.o. b.i.d.   -- She should continue to do her vest 1 time per day and then Aerobika once per day.   -- I will have Sybil Peng discuss with her getting new neb cups.     -- She should contact me if her symptoms do not resolve.     2.  Sinusitis and otitis.  Armando does report that she has had some increased nasal drainage and congestion with this recent illness.  She anticipates that it will improve when she is treated with this course.     3.  Gastroesophageal reflux.  Armando does report intermittent symptoms.     4.  Psychosocial.  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  She reports that this is going quite well.  She will be finished in 09/2020.  She anticipates that she and her  will move with their baby to [place].  This is where her 's office is based.     Lyn Hernández MD MPH  Associate Professor of Medicine  Pulmonary, Allergy, Critical Care and Sleep Medicine      Interval History:     Again, Armando reports that her cough frequency and sputum volume are above  baseline.  She does describe some chest tightness and congestion.  She is doing 1 vest per day.  She denies any shortness of breath at rest but does notice a slight decrease in her activity tolerance.          Review of Systems:     CONSTITUTIONAL: + fever, no chills, no change in weight, no change in energy--as expected, no change in appetite    INTEGUMENTARY/SKIN: no rash, no obvious new lesions    ENT/MOUTH: no sore throat, no new sinus pain, increased nasal drainage, no hearing loss, no ear ringing     RESPIRATORY: see interval history    CV: no chest pain, no palpitations, no peripheral edema, no orthopnea, no PND    GI: no nausea, no vomiting, no change in stools, + GERD, no abdominal pain    : no dysuria, no urinary frequency    MUSCULOSKELETAL: no myalgias, no arthralgias    ENDOCRINE: no excessive thirst    NEURO:  No headache, no numbness, no tingling    SLEEP: no issues--did have difficult nights a month ago    PSYCHIATRIC: mood stessed          Past Medical and Surgical History:     Past Medical History:   Diagnosis Date     Carpal tunnel syndrome     right     Chronic sinusitis      Diabetes (H)     gestational diabetes diet controlled     Endometriosis     left ovary     Infertility      Kartagener's syndrome     situs inversus totalis,      Pseudomonas aeruginosa colonization      Reactive airway disease      Situs inversus      Uncomplicated asthma     Reactive airway disease     Past Surgical History:   Procedure Laterality Date     ADENOIDECTOMY        SECTION N/A 2019    Procedure: Primary  Section;  Surgeon: Lyndsey Shipley MD;  [facility]     LAPAROSCOPIC CYSTECTOMY OVARIAN (BENIGN) Left 2017    Procedure: LAPAROSCOPIC CYSTECTOMY OVARIAN (BENIGN);  Left  Laparoscopy Ovarian Cystotomy, Hysteroscopy And Polpectomy With Myosure ;  Surgeon: Zayra Roberts MD;  [facility]     OPERATIVE HYSTEROSCOPY WITH MORCELLATOR N/A 2017     Procedure: OPERATIVE HYSTEROSCOPY WITH MORCELLATOR;;  Surgeon: Zayra Roberts MD;  [facility]     TONSILLECTOMY       TONSILLECTOMY & ADENOIDECTOMY      8 years old     TRANSVAGINAL RETRIEVAL OVUM Bilateral 3/3/2016    IVF Procedure: TRANSVAGINAL RETRIEVAL OVUM;  Surgeon: Sunshine Gilliam MD;  Location:  [facility]     uterine polyp  2013           Family History:     Family History   Problem Relation Age of Onset     Hyperlipidemia Mother      Diabetes Mother      Hypertension Mother      Hyperlipidemia Father      Hypertension Father      Diabetes Maternal Grandmother      Diabetes Maternal Grandfather             Social History:     Social History     Socioeconomic History     Marital status:      Spouse name: Not on file     Number of children: 0     Years of education: Not on file     Highest education level: Not on file   Occupational History     Occupation: physician     Employer: [employer]     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   Social Needs     Financial resource strain: Not on file     Food insecurity:     Worry: Not on file     Inability: Not on file     Transportation needs:     Medical: Not on file     Non-medical: Not on file   Tobacco Use     Smoking status: Never Smoker     Smokeless tobacco: Never Used   Substance and Sexual Activity     Alcohol use: No     Alcohol/week: 0.0 oz     Drug use: No     Sexual activity: Not Currently     Partners: Male     Birth control/protection: None   Lifestyle     Physical activity:     Days per week: Not on file     Minutes per session: Not on file     Stress: Not on file   Relationships     Social connections:     Talks on phone: Not on file     Gets together: Not on file     Attends Faith service: Not on file     Active member of club or organization: Not on file     Attends meetings of clubs or organizations: Not on file     Relationship status: Not on file     Intimate partner violence:     Fear of current  "or ex partner: Not on file     Emotionally abused: Not on file     Physically abused: Not on file     Forced sexual activity: Not on file   Other Topics Concern     Parent/sibling w/ CABG, MI or angioplasty before 65F 55M? Not Asked   Social History Narrative    Lives with  ().  Immigrated in 2006 from [place] ([facility]). No children.  Parents in [place], 2 younger sisters and a brother, she is eldest.             Medications:     Current Outpatient Medications   Medication     albuterol (PROAIR HFA/PROVENTIL HFA/VENTOLIN HFA) 108 (90 Base) MCG/ACT inhaler     amoxicillin-clavulanate (AUGMENTIN) 500-125 MG tablet     fluticasone-vilanterol (BREO ELLIPTA) 200-25 MCG/INH inhaler     acetaminophen (TYLENOL) 325 MG tablet     acetylcysteine (MUCOMYST) 20 % neb solution     acetylcysteine (MUCOMYST) 20 % nebulizer solution     albuterol (PROVENTIL) (2.5 MG/3ML) 0.083% neb solution     Cholecalciferol (VITAMIN D PO)     ibuprofen (ADVIL/MOTRIN) 600 MG tablet     pantoprazole (PROTONIX) 40 MG EC tablet     Prenatal MV-Min-Fe Fum-FA-DHA (PRENATAL 1 PO)     Respiratory Therapy Supplies (NEBULIZER) SUZANNA     Syringe/Needle, Disp, (SYRINGE LUER LOCK) 20G X 1-1/2\" 5 ML MISC     Water For Injection Sterile SOLN     No current facility-administered medications for this visit.             Physical Exam:   /78 (BP Location: Right arm, Patient Position: Chair, Cuff Size: Adult Regular)   Pulse 65   Temp 97.8  F (36.6  C) (Oral)   Resp 18   Ht 1.626 m (5' 4.02\")   Wt 64 kg (141 lb 1.5 oz)   SpO2 93%   BMI 24.21 kg/m       Constitutional:   Awake, alert and in no apparent distress     Eyes:   nonicteric     ENT:   oral mucosa moist without lesions, normal tm opaque, bilateral mucosal edema      Neck:   Supple without supraclavicular or cervical lymphadenopathy     Lungs:   fair air flow.  No crackles. No rhonchi.  No wheezes.     Cardiovascular:   Normal S1 and S2.  RRR.  No murmur, gallop or rub.     Abdomen:   " NABS, soft, nontender, nondistended.       Musculoskeletal:   No edema, no digital clubbing present     Neurologic:   Alert and conversant.     Skin:   Warm, dry.  No rash on limited exam.             Data:   All laboratory and imaging data reviewed.    Cystic Fibrosis Culture  Specimen Description   Date Value Ref Range Status   09/11/2019 Sputum  Final   06/05/2019 Sputum  Final   06/05/2019 Sputum  Final    Culture Micro   Date Value Ref Range Status   06/05/2019 Heavy growth  Normal otto    Final   06/05/2019 Light growth  Escherichia coli   (A)  Final   06/05/2019 (A)  Final    Moderate growth  Pseudomonas aeruginosa, mucoid strain     06/05/2019 (A)  Final    Moderate growth  Haemophilus influenzae  Beta lactamase negative  Beta-lactamase negative Haemophilus influenzae are usually susceptible to ampicillin,   amoxacillin/clavulanic acid, levofloxacin, and 3rd generation cephalosporins, such as   ceftriaxone.          Recent Results (from the past 168 hour(s))   General PFT Lab (Please always keep checked)    Collection Time: 09/11/19  7:47 AM   Result Value Ref Range    FVC-Pred 3.29 L    FVC-Pre 2.37 L    FVC-%Pred-Pre 72 %    FEV1-Pre 1.43 L    FEV1-%Pred-Pre 52 %    FEV1FVC-Pred 83 %    FEV1FVC-Pre 60 %    FEFMax-Pred 6.86 L/sec    FEFMax-Pre 3.42 L/sec    FEFMax-%Pred-Pre 49 %    FEF2575-Pred 2.87 L/sec    FEF2575-Pre 0.65 L/sec    QYE9428-%Pred-Pre 22 %    ExpTime-Pre 8.85 sec    FIFMax-Pre 4.04 L/sec    FEV1FEV6-Pred 83 %    FEV1FEV6-Pre 63 %   Gram stain    Collection Time: 09/11/19  8:43 AM   Result Value Ref Range    Specimen Description Sputum     Special Requests Screen     Gram Stain <10 Squamous epithelial cells/low power field     Gram Stain >25 PMNs/low power field     Gram Stain Many  Gram negative coccobacilli   (A)     Gram Stain Few  Gram positive cocci   (A)        PFT: Moderate obstructive lung disease.  When compared to 6/5/2019, the FEV1 and FVC have decreased.  The decrease in FVC may  represent air trapping v. restrictive physiology.  Lung volumes would be necessary to determine.        Again, thank you for allowing me to participate in the care of your patient.      Sincerely,    Lyn Hernández MD

## 2019-09-11 NOTE — PROGRESS NOTES
[facility]  September 11, 2019         Assessment and Plan:   Armando Dc is a 44 year old female with PCD and bronchiectasis.    1. PCD and Bronchiectasis with moderate obstruction:  Armando does report that she is coughing more over the last month.  She says it started after an exposure and resultant viral illness.  She says since that time most of her symptoms have resolved, but her sputum production remains above normal as well as her cough frequency.  Armando historically has grown out Haemophilus.  At this time, I have recommended:   -- We will begin Augmentin therapy 1 p.o. b.i.d.   -- She should continue to do her vest 1 time per day and then Aerobika once per day.   -- I will have Sybil Peng discuss with her getting new neb cups.     -- She should contact me if her symptoms do not resolve.     2.  Sinusitis and otitis.  Armando does report that she has had some increased nasal drainage and congestion with this recent illness.  She anticipates that it will improve when she is treated with this course.     3.  Gastroesophageal reflux.  Armando does report intermittent symptoms.     4.  Psychosocial.  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  She reports that this is going quite well.  She will be finished in 09/2020.  She anticipates that she and her  will move with their baby to [place].  This is where her 's office is based.     Lyn Hernández MD MPH  Associate Professor of Medicine  Pulmonary, Allergy, Critical Care and Sleep Medicine      Interval History:     Again, Armando reports that her cough frequency and sputum volume are above baseline.  She does describe some chest tightness and congestion.  She is doing 1 vest per day.  She denies any shortness of breath at rest but does notice a slight decrease in her activity tolerance.          Review of Systems:     CONSTITUTIONAL: + fever, no chills, no change in weight, no change in  energy--as expected, no change in appetite    INTEGUMENTARY/SKIN: no rash, no obvious new lesions    ENT/MOUTH: no sore throat, no new sinus pain, increased nasal drainage, no hearing loss, no ear ringing     RESPIRATORY: see interval history    CV: no chest pain, no palpitations, no peripheral edema, no orthopnea, no PND    GI: no nausea, no vomiting, no change in stools, + GERD, no abdominal pain    : no dysuria, no urinary frequency    MUSCULOSKELETAL: no myalgias, no arthralgias    ENDOCRINE: no excessive thirst    NEURO:  No headache, no numbness, no tingling    SLEEP: no issues--did have difficult nights a month ago    PSYCHIATRIC: mood stessed          Past Medical and Surgical History:     Past Medical History:   Diagnosis Date     Carpal tunnel syndrome     right     Chronic sinusitis      Diabetes (H)     gestational diabetes diet controlled     Endometriosis     left ovary     Infertility      Kartagener's syndrome     situs inversus totalis,      Pseudomonas aeruginosa colonization      Reactive airway disease      Situs inversus      Uncomplicated asthma     Reactive airway disease     Past Surgical History:   Procedure Laterality Date     ADENOIDECTOMY        SECTION N/A 2019    Procedure: Primary  Section;  Surgeon: Lyndsey Shipley MD;  [facility]     LAPAROSCOPIC CYSTECTOMY OVARIAN (BENIGN) Left 2017    Procedure: LAPAROSCOPIC CYSTECTOMY OVARIAN (BENIGN);  Left  Laparoscopy Ovarian Cystotomy, Hysteroscopy And Polpectomy With Myosure ;  Surgeon: Zayra Roberts MD;  [facility]     OPERATIVE HYSTEROSCOPY WITH MORCELLATOR N/A 2017    Procedure: OPERATIVE HYSTEROSCOPY WITH MORCELLATOR;;  Surgeon: Zayra Roberts MD;  [facility]     TONSILLECTOMY       TONSILLECTOMY & ADENOIDECTOMY      8 years old     TRANSVAGINAL RETRIEVAL OVUM Bilateral 3/3/2016    IVF Procedure: TRANSVAGINAL RETRIEVAL OVUM;  Surgeon: Tawana, April  MD Zayra;  Location: [facility]     uterine polyp  2013           Family History:     Family History   Problem Relation Age of Onset     Hyperlipidemia Mother      Diabetes Mother      Hypertension Mother      Hyperlipidemia Father      Hypertension Father      Diabetes Maternal Grandmother      Diabetes Maternal Grandfather             Social History:     Social History     Socioeconomic History     Marital status:      Spouse name: Not on file     Number of children: 0     Years of education: Not on file     Highest education level: Not on file   Occupational History     Occupation: physician     Employer: [employer]     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   Social Needs     Financial resource strain: Not on file     Food insecurity:     Worry: Not on file     Inability: Not on file     Transportation needs:     Medical: Not on file     Non-medical: Not on file   Tobacco Use     Smoking status: Never Smoker     Smokeless tobacco: Never Used   Substance and Sexual Activity     Alcohol use: No     Alcohol/week: 0.0 oz     Drug use: No     Sexual activity: Not Currently     Partners: Male     Birth control/protection: None   Lifestyle     Physical activity:     Days per week: Not on file     Minutes per session: Not on file     Stress: Not on file   Relationships     Social connections:     Talks on phone: Not on file     Gets together: Not on file     Attends Restorationist service: Not on file     Active member of club or organization: Not on file     Attends meetings of clubs or organizations: Not on file     Relationship status: Not on file     Intimate partner violence:     Fear of current or ex partner: Not on file     Emotionally abused: Not on file     Physically abused: Not on file     Forced sexual activity: Not on file   Other Topics Concern     Parent/sibling w/ CABG, MI or angioplasty before 65F 55M? Not Asked   Social History Narrative    Lives with  ().   "Immigrated in 2006 from [place] ([facility]). No children.  Parents in [place], 2 younger sisters and a brother, she is eldest.             Medications:     Current Outpatient Medications   Medication     albuterol (PROAIR HFA/PROVENTIL HFA/VENTOLIN HFA) 108 (90 Base) MCG/ACT inhaler     amoxicillin-clavulanate (AUGMENTIN) 500-125 MG tablet     fluticasone-vilanterol (BREO ELLIPTA) 200-25 MCG/INH inhaler     acetaminophen (TYLENOL) 325 MG tablet     acetylcysteine (MUCOMYST) 20 % neb solution     acetylcysteine (MUCOMYST) 20 % nebulizer solution     albuterol (PROVENTIL) (2.5 MG/3ML) 0.083% neb solution     Cholecalciferol (VITAMIN D PO)     ibuprofen (ADVIL/MOTRIN) 600 MG tablet     pantoprazole (PROTONIX) 40 MG EC tablet     Prenatal MV-Min-Fe Fum-FA-DHA (PRENATAL 1 PO)     Respiratory Therapy Supplies (NEBULIZER) SUZANNA     Syringe/Needle, Disp, (SYRINGE LUER LOCK) 20G X 1-1/2\" 5 ML MISC     Water For Injection Sterile SOLN     No current facility-administered medications for this visit.             Physical Exam:   /78 (BP Location: Right arm, Patient Position: Chair, Cuff Size: Adult Regular)   Pulse 65   Temp 97.8  F (36.6  C) (Oral)   Resp 18   Ht 1.626 m (5' 4.02\")   Wt 64 kg (141 lb 1.5 oz)   SpO2 93%   BMI 24.21 kg/m      Constitutional:   Awake, alert and in no apparent distress     Eyes:   nonicteric     ENT:   oral mucosa moist without lesions, normal tm opaque, bilateral mucosal edema      Neck:   Supple without supraclavicular or cervical lymphadenopathy     Lungs:   fair air flow.  No crackles. No rhonchi.  No wheezes.     Cardiovascular:   Normal S1 and S2.  RRR.  No murmur, gallop or rub.     Abdomen:   NABS, soft, nontender, nondistended.       Musculoskeletal:   No edema, no digital clubbing present     Neurologic:   Alert and conversant.     Skin:   Warm, dry.  No rash on limited exam.             Data:   All laboratory and imaging data reviewed.    Cystic Fibrosis Culture  Specimen " Description   Date Value Ref Range Status   09/11/2019 Sputum  Final   06/05/2019 Sputum  Final   06/05/2019 Sputum  Final    Culture Micro   Date Value Ref Range Status   06/05/2019 Heavy growth  Normal otto    Final   06/05/2019 Light growth  Escherichia coli   (A)  Final   06/05/2019 (A)  Final    Moderate growth  Pseudomonas aeruginosa, mucoid strain     06/05/2019 (A)  Final    Moderate growth  Haemophilus influenzae  Beta lactamase negative  Beta-lactamase negative Haemophilus influenzae are usually susceptible to ampicillin,   amoxacillin/clavulanic acid, levofloxacin, and 3rd generation cephalosporins, such as   ceftriaxone.          Recent Results (from the past 168 hour(s))   General PFT Lab (Please always keep checked)    Collection Time: 09/11/19  7:47 AM   Result Value Ref Range    FVC-Pred 3.29 L    FVC-Pre 2.37 L    FVC-%Pred-Pre 72 %    FEV1-Pre 1.43 L    FEV1-%Pred-Pre 52 %    FEV1FVC-Pred 83 %    FEV1FVC-Pre 60 %    FEFMax-Pred 6.86 L/sec    FEFMax-Pre 3.42 L/sec    FEFMax-%Pred-Pre 49 %    FEF2575-Pred 2.87 L/sec    FEF2575-Pre 0.65 L/sec    CDE3828-%Pred-Pre 22 %    ExpTime-Pre 8.85 sec    FIFMax-Pre 4.04 L/sec    FEV1FEV6-Pred 83 %    FEV1FEV6-Pre 63 %   Gram stain    Collection Time: 09/11/19  8:43 AM   Result Value Ref Range    Specimen Description Sputum     Special Requests Screen     Gram Stain <10 Squamous epithelial cells/low power field     Gram Stain >25 PMNs/low power field     Gram Stain Many  Gram negative coccobacilli   (A)     Gram Stain Few  Gram positive cocci   (A)        PFT: Moderate obstructive lung disease.  When compared to 6/5/2019, the FEV1 and FVC have decreased.  The decrease in FVC may represent air trapping v. restrictive physiology.  Lung volumes would be necessary to determine.

## 2019-09-11 NOTE — PATIENT INSTRUCTIONS
Self-Care Plan    RECOMMENDATIONS:   Start Augmentin one tablet twice daily for 14 days.  Inhaler and aerobika at night.  Will have Sybil discuss Preston and get some new neb counts.    YOUR GOAL: Get better!!  Enjoy the end of summer.  Good luck!      [facility] Nurse line:  Deejay Lao    [phone]     [facility] Fax Number:      [phone]         Cystic fibrosis Respiratory Therapist:   Sybil Peng              [phone]   Cystic fibrosis Dietitians:              Delphine Angulo              [phone]                            Faye Maciel                        [phone]   Cystic fibrosis Diabetes Nurse:    Palmira Marshall   [phone]    Cystic fibrosis Social Workers:     Brittanie Buenrostro               [phone]                     Tammi Pedro               [phone]  Cystic fibrosis Pharmacist:           Maira Saunders                               [phone]         Jennifer Oliver   [phone]  Cystic Fibrosis Genetic Counselor:   Josie Wei    [phone]    [facility] website:  [email or web address]         MRN: [id number]   Clinic Date: September 11, 2019   Patient: Armando Dc     Annual Studies:   No results found for: IGG  No results found for: INS  There are no preventive care reminders to display for this patient.    Pulmonary Function Tests  FEV1: amount of air you can blow out in 1 second  FVC: total amount of air you can take in and blow out    Your Goals:         PFT Latest Ref Rng & Units 9/11/2019   FVC L 2.37   FEV1 L 1.43   FVC% % 72   FEV1% % 52          Airway Clearance: The Most Important Way to Keep Your Lungs Healthy  Vest Settings:    Hill-Rom Frequencies: 8, 9, 10 Pressure 10 Then, Frequencies 18, 19, 20 Pressure 6      RespirTech: Quick Start with Pressure of     Do each frequency for 5 minutes; Deflate vest after each frequency & cough 3 times before beginning the next  setting.    Vest and Neb Therapy should be done 1-2 times/day.    Good Nutrition Can Improve Lung Function and Overall Health     Take ALL of your vitamins with food     Take 1/2 of your enzymes before EVERY meal/snack and the other 1/2 mid-meal/snack    Wt Readings from Last 3 Encounters:   09/11/19 64 kg (141 lb 1.5 oz)   06/20/19 62.6 kg (138 lb)   06/05/19 60.8 kg (134 lb)       Body mass index is 24.21 kg/m .         National CF Foundation Recommendations for BMI in CF Adults: Women: at least 22 Men: at least 23        Controlling Blood Sugars Helps Prevent Lung Infections & Improves Nutrition  Test blood sugar:     In the morning before eating (goal is )     2 hours after a meal (goal is less than 150)     When pre-meal glucose is greater than 150 add correction     At bedtime (if less than 100 eat a snack with 15 grams of carbohydrates  Last A1C Results:   Hemoglobin A1C   Date Value Ref Range Status   09/25/2018 5.8 (H) 0 - 5.6 % Final     Comment:     Normal <5.7% Prediabetes 5.7-6.4%  Diabetes 6.5% or higher - adopted from ADA   consensus guidelines.           If diabetic, measure A1C every 6 months. Goal: Under 7%    Staying Healthy    Research:  If you are interested in learning about research opportunities or have questions, please contact the CF Research Team at [phone] or [email or web address].      CF Foundation:  Compass is a personalized resource service to help you with the insurance, financial, legal and other issues you are facing.  It's free, confidential and available to anyone with CF.  Ask your  for more information or contact Compass directly at 802-COMPASS ([phone]) or compass@cff.org, or learn more at [email or web address].[email or web address].

## 2019-09-12 ASSESSMENT — ASTHMA QUESTIONNAIRES: ACT_TOTALSCORE: 19

## 2019-09-16 LAB
BACTERIA SPEC CULT: ABNORMAL
SPECIMEN SOURCE: ABNORMAL

## 2019-09-26 ENCOUNTER — HOSPITAL ENCOUNTER (EMERGENCY)
Facility: CLINIC | Age: 44
Discharge: LEFT BEFORE DISCHARGE | End: 2019-09-26
Payer: COMMERCIAL

## 2019-11-11 ENCOUNTER — ALLIED HEALTH/NURSE VISIT (OUTPATIENT)
Dept: PULMONOLOGY | Facility: CLINIC | Age: 44
End: 2019-11-11
Attending: INTERNAL MEDICINE
Payer: COMMERCIAL

## 2019-11-11 DIAGNOSIS — J47.9 BRONCHIECTASIS WITHOUT COMPLICATION (H): Primary | ICD-10-CM

## 2019-11-11 NOTE — PROGRESS NOTES
[facility] Fitting Appointment Note    Armando Dc is well known to our Pulmonary Clinic, and is evaluated in clinic today to assess fit and tolerance of the [facility] device for additional airway clearance options.   Ms Dc is currently performing vest treatments once daily, and Oscillatory PEP  (AerobiKa) therapy once daily. Patient expresses difficulty getting these 30-45 minute sessions in due to having a young child in the home, as well as reporting an incomplete resolution of symptoms when using her Oscillatory PEP device.    The [facility] device is a reliable, powerful, and battery operated portable option for airway clearance. It is almost 30% lighter than current device and significantly quieter, making it much easier to transport and use around small children and in an office setting. The [facility] also allows for targeted therapy to specific lung regions through an easy to use control, allowing for additional benefits that Ms Dc's current device does not allow.     Additionally, Ms. Dc has confirmed bronchiectasis on Chest CT, frequent use of antibiotics, and daily productive cough, that make it necessary for multiple times daily aggressive airway clearance. Regular airway clearance in people with PCD and Bronchiectasis has been proven to increase or stabilize lung function, decrease antibiotic use, and decrease exacerbations requiring hospitalizations. The [facility] allows for greater flexibility fitting in all of these required multi-times daily treatments in.      The device was fit in clinic today, and patient wore it for >20 minutes to assess tolerance and benefits of use. Safety, basics of use, warranty, treatment specifics, and ordering process were reviewed with patient. Provider and patient would like to move forward with the ordering process at this time.         PLEASE NOTE: Patient does not have any implanted medical devices.

## 2020-02-17 ENCOUNTER — ANCILLARY PROCEDURE (OUTPATIENT)
Dept: MAMMOGRAPHY | Facility: CLINIC | Age: 45
End: 2020-02-17
Attending: PEDIATRICS
Payer: COMMERCIAL

## 2020-02-17 ENCOUNTER — OFFICE VISIT (OUTPATIENT)
Dept: INTERNAL MEDICINE | Facility: CLINIC | Age: 45
End: 2020-02-17
Payer: COMMERCIAL

## 2020-02-17 VITALS
HEART RATE: 70 BPM | OXYGEN SATURATION: 97 % | DIASTOLIC BLOOD PRESSURE: 81 MMHG | SYSTOLIC BLOOD PRESSURE: 130 MMHG | BODY MASS INDEX: 24.02 KG/M2 | WEIGHT: 140 LBS

## 2020-02-17 DIAGNOSIS — R11.0 NAUSEA: Primary | ICD-10-CM

## 2020-02-17 DIAGNOSIS — Z12.31 VISIT FOR SCREENING MAMMOGRAM: ICD-10-CM

## 2020-02-17 LAB
ALBUMIN SERPL-MCNC: 3.9 G/DL (ref 3.4–5)
ALP SERPL-CCNC: 89 U/L (ref 40–150)
ALT SERPL W P-5'-P-CCNC: 21 U/L (ref 0–50)
AMYLASE SERPL-CCNC: 89 U/L (ref 30–110)
ANION GAP SERPL CALCULATED.3IONS-SCNC: 5 MMOL/L (ref 3–14)
AST SERPL W P-5'-P-CCNC: 10 U/L (ref 0–45)
BASOPHILS # BLD AUTO: 0 10E9/L (ref 0–0.2)
BASOPHILS NFR BLD AUTO: 0.6 %
BILIRUB SERPL-MCNC: 0.4 MG/DL (ref 0.2–1.3)
BUN SERPL-MCNC: 20 MG/DL (ref 7–30)
CALCIUM SERPL-MCNC: 8.9 MG/DL (ref 8.5–10.1)
CHLORIDE SERPL-SCNC: 110 MMOL/L (ref 94–109)
CO2 SERPL-SCNC: 24 MMOL/L (ref 20–32)
CREAT SERPL-MCNC: 0.54 MG/DL (ref 0.52–1.04)
DIFFERENTIAL METHOD BLD: NORMAL
EOSINOPHIL # BLD AUTO: 0.2 10E9/L (ref 0–0.7)
EOSINOPHIL NFR BLD AUTO: 2.2 %
ERYTHROCYTE [DISTWIDTH] IN BLOOD BY AUTOMATED COUNT: 12.8 % (ref 10–15)
GFR SERPL CREATININE-BSD FRML MDRD: >90 ML/MIN/{1.73_M2}
GLUCOSE SERPL-MCNC: 96 MG/DL (ref 70–99)
HBA1C MFR BLD: 5.9 % (ref 0–5.6)
HCG SERPL QL: NEGATIVE
HCT VFR BLD AUTO: 40.7 % (ref 35–47)
HGB BLD-MCNC: 13.4 G/DL (ref 11.7–15.7)
IMM GRANULOCYTES # BLD: 0 10E9/L (ref 0–0.4)
IMM GRANULOCYTES NFR BLD: 0.1 %
LIPASE SERPL-CCNC: 130 U/L (ref 73–393)
LYMPHOCYTES # BLD AUTO: 2.2 10E9/L (ref 0.8–5.3)
LYMPHOCYTES NFR BLD AUTO: 32.4 %
MCH RBC QN AUTO: 28.8 PG (ref 26.5–33)
MCHC RBC AUTO-ENTMCNC: 32.9 G/DL (ref 31.5–36.5)
MCV RBC AUTO: 87 FL (ref 78–100)
MONOCYTES # BLD AUTO: 0.5 10E9/L (ref 0–1.3)
MONOCYTES NFR BLD AUTO: 7.1 %
NEUTROPHILS # BLD AUTO: 3.9 10E9/L (ref 1.6–8.3)
NEUTROPHILS NFR BLD AUTO: 57.6 %
NRBC # BLD AUTO: 0 10*3/UL
NRBC BLD AUTO-RTO: 0 /100
PLATELET # BLD AUTO: 311 10E9/L (ref 150–450)
POTASSIUM SERPL-SCNC: 3.7 MMOL/L (ref 3.4–5.3)
PROT SERPL-MCNC: 8 G/DL (ref 6.8–8.8)
RBC # BLD AUTO: 4.66 10E12/L (ref 3.8–5.2)
SODIUM SERPL-SCNC: 139 MMOL/L (ref 133–144)
TSH SERPL DL<=0.005 MIU/L-ACNC: 1.33 MU/L (ref 0.4–4)
WBC # BLD AUTO: 6.8 10E9/L (ref 4–11)

## 2020-02-17 ASSESSMENT — PAIN SCALES - GENERAL: PAINLEVEL: NO PAIN (0)

## 2020-02-17 NOTE — PROGRESS NOTES
HPI:    Pt. With h/o Kartagener's syndrome and bronchiectasis followed by Dr. Hernández pulmonary (last seen 2019) comes in with about one month of postprandial fullness, nausea and occ. Vomiting. No real wt. Loss and she still has an appetite. She has a 9 month old son at home. She did have gestational diabetes. She does not smoke nor abuse EtOH. She had mammogram today. 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 Her  is an . No new medications. No change in bowel habits. Her lung disease is stable. No other HEENT, cardiopulmonary, abdominal, , GYN, neurological, systemic, psychiatric, vascular, or endocrine complaints.     Past Medical History:   Diagnosis Date     Carpal tunnel syndrome     right     Chronic sinusitis      Diabetes (H)     gestational diabetes diet controlled     Endometriosis     left ovary     Infertility      Kartagener's syndrome     situs inversus totalis,      Pseudomonas aeruginosa colonization      Reactive airway disease      Situs inversus      Uncomplicated asthma     Reactive airway disease     Past Surgical History:   Procedure Laterality Date     ADENOIDECTOMY        SECTION N/A 2019    Procedure: Primary  Section;  Surgeon: Lyndsey Shipley MD;  [facility]     LAPAROSCOPIC CYSTECTOMY OVARIAN (BENIGN) Left 2017    Procedure: LAPAROSCOPIC CYSTECTOMY OVARIAN (BENIGN);  Left  Laparoscopy Ovarian Cystotomy, Hysteroscopy And Polpectomy With Myosure ;  Surgeon: Zayra Roberts MD;  [facility]     OPERATIVE HYSTEROSCOPY WITH MORCELLATOR N/A 2017    Procedure: OPERATIVE HYSTEROSCOPY WITH MORCELLATOR;;  Surgeon: Zayra Roberts MD;  [facility]     TONSILLECTOMY       TONSILLECTOMY & ADENOIDECTOMY      8 years old     TRANSVAGINAL RETRIEVAL OVUM Bilateral 3/3/2016    IVF Procedure: TRANSVAGINAL RETRIEVAL OVUM;  Surgeon: Tawana, April  MD Zayra;  Location: [facility]     uterine polyp  2013     PE:    Vitals noted, gen, nad cooperative alert, HEENT, oropharynx clear no exudate, neck supple nl rom, no adenopathy, lungs with diffuse B coarse BS's. RRR, S1, S2, no MRG, abdomen, positive BS's, no tenderness, no rebound, no B CVA tenderness to palpation, grossly normal neurological exam.    A/P:    Post prandial nausea. Will check labs including amylase/lipase and H. Pylori. Will get abdominal U/S and follow up. If EGD is needed will discuss with Dr. Hernández regarding safety of sedation. Also discussed CT imaging.    Total time spent 25 minutes.  More than 50% of the time spent with Ms. Dc on counseling / coordinating her care

## 2020-02-17 NOTE — NURSING NOTE
Chief Complaint   Patient presents with     Gastrointestinal Problem     pt has HX of GERD. Past month has had nausea most days. Feeling full all the time. Pt reports not taking Pantoprazole     Kimberly Nissen, EMT at 3:33 PM on 2/17/2020

## 2020-02-18 ENCOUNTER — MYC MEDICAL ADVICE (OUTPATIENT)
Dept: INTERNAL MEDICINE | Facility: CLINIC | Age: 45
End: 2020-02-18

## 2020-02-18 DIAGNOSIS — Z13.220 SCREENING FOR LIPID DISORDERS: ICD-10-CM

## 2020-02-19 LAB
CHOLEST SERPL-MCNC: 175 MG/DL
HDLC SERPL-MCNC: 60 MG/DL
LDLC SERPL CALC-MCNC: 98 MG/DL
NONHDLC SERPL-MCNC: 116 MG/DL
TRIGL SERPL-MCNC: 85 MG/DL

## 2020-02-20 ENCOUNTER — TELEPHONE (OUTPATIENT)
Dept: INTERNAL MEDICINE | Facility: CLINIC | Age: 45
End: 2020-02-20

## 2020-02-20 DIAGNOSIS — A04.8 H. PYLORI INFECTION: Primary | ICD-10-CM

## 2020-02-20 DIAGNOSIS — R11.0 NAUSEA: ICD-10-CM

## 2020-02-20 LAB — H PYLORI AG STL QL IA: POSITIVE

## 2020-02-20 NOTE — TELEPHONE ENCOUNTER
"Dear Daija;     Please see results note I sent to Dr. Dc today. I also called and left her a message regarding positive H. Pylori testing. I placed PrevPac Rx. Historical this encounter and please see where she wants the medication sent to.    Thanks, LESLIE Smith      Dear Dr. Dc;    Your H. Pylori testing is positive and I will have my nurse Daija give you a call for follow up. I will send in a \"Prev Pac\" for treatment to your pharmacy.    LESLIE Smith MD    Called pt and plan of care discussed. RX to pt's pharmacy. Clinic numbers given for questions or concerns.  Daija Tony RN 1:24 PM on 2/20/2020.      "

## 2020-02-24 ENCOUNTER — ANCILLARY PROCEDURE (OUTPATIENT)
Dept: ULTRASOUND IMAGING | Facility: CLINIC | Age: 45
End: 2020-02-24
Attending: INTERNAL MEDICINE
Payer: COMMERCIAL

## 2020-02-24 ENCOUNTER — ANCILLARY PROCEDURE (OUTPATIENT)
Dept: MAMMOGRAPHY | Facility: CLINIC | Age: 45
End: 2020-02-24
Attending: INTERNAL MEDICINE
Payer: COMMERCIAL

## 2020-02-24 DIAGNOSIS — R11.0 NAUSEA: ICD-10-CM

## 2020-02-24 DIAGNOSIS — R92.8 ABNORMAL MAMMOGRAM OF RIGHT BREAST: ICD-10-CM

## 2020-03-04 ENCOUNTER — OFFICE VISIT (OUTPATIENT)
Dept: PULMONOLOGY | Facility: CLINIC | Age: 45
End: 2020-03-04
Attending: INTERNAL MEDICINE
Payer: COMMERCIAL

## 2020-03-04 VITALS
HEART RATE: 74 BPM | RESPIRATION RATE: 18 BRPM | OXYGEN SATURATION: 94 % | BODY MASS INDEX: 23.99 KG/M2 | HEIGHT: 64 IN | WEIGHT: 140.5 LBS | SYSTOLIC BLOOD PRESSURE: 125 MMHG | DIASTOLIC BLOOD PRESSURE: 80 MMHG

## 2020-03-04 DIAGNOSIS — J47.9 BRONCHIECTASIS WITHOUT COMPLICATION (H): Primary | ICD-10-CM

## 2020-03-04 LAB
EXPTIME-PRE: 11.01 SEC
FEF2575-%PRED-PRE: 25 %
FEF2575-PRE: 0.74 L/SEC
FEF2575-PRED: 2.87 L/SEC
FEFMAX-%PRED-PRE: 62 %
FEFMAX-PRE: 4.3 L/SEC
FEFMAX-PRED: 6.86 L/SEC
FEV1-%PRED-PRE: 54 %
FEV1-PRE: 1.48 L
FEV1FEV6-PRE: 66 %
FEV1FEV6-PRED: 83 %
FEV1FVC-PRE: 65 %
FEV1FVC-PRED: 83 %
FIFMAX-PRE: 4.44 L/SEC
FVC-%PRED-PRE: 69 %
FVC-PRE: 2.29 L
FVC-PRED: 3.29 L
GRAM STN SPEC: ABNORMAL
Lab: ABNORMAL
SPECIMEN SOURCE: ABNORMAL

## 2020-03-04 PROCEDURE — 87186 SC STD MICRODIL/AGAR DIL: CPT | Performed by: INTERNAL MEDICINE

## 2020-03-04 PROCEDURE — 87070 CULTURE OTHR SPECIMN AEROBIC: CPT | Performed by: INTERNAL MEDICINE

## 2020-03-04 PROCEDURE — G0463 HOSPITAL OUTPT CLINIC VISIT: HCPCS | Mod: ZF

## 2020-03-04 PROCEDURE — 87205 SMEAR GRAM STAIN: CPT | Performed by: INTERNAL MEDICINE

## 2020-03-04 PROCEDURE — 87077 CULTURE AEROBIC IDENTIFY: CPT | Performed by: INTERNAL MEDICINE

## 2020-03-04 ASSESSMENT — PAIN SCALES - GENERAL: PAINLEVEL: NO PAIN (0)

## 2020-03-04 ASSESSMENT — ASTHMA QUESTIONNAIRES
QUESTION_5 LAST FOUR WEEKS HOW WOULD YOU RATE YOUR ASTHMA CONTROL: SOMEWHAT CONTROLLED
ACT_TOTALSCORE: 21
QUESTION_2 LAST FOUR WEEKS HOW OFTEN HAVE YOU HAD SHORTNESS OF BREATH: THREE TO SIX TIMES A WEEK
QUESTION_1 LAST FOUR WEEKS HOW MUCH OF THE TIME DID YOUR ASTHMA KEEP YOU FROM GETTING AS MUCH DONE AT WORK, SCHOOL OR AT HOME: NONE OF THE TIME
QUESTION_4 LAST FOUR WEEKS HOW OFTEN HAVE YOU USED YOUR RESCUE INHALER OR NEBULIZER MEDICATION (SUCH AS ALBUTEROL): NOT AT ALL
QUESTION_3 LAST FOUR WEEKS HOW OFTEN DID YOUR ASTHMA SYMPTOMS (WHEEZING, COUGHING, SHORTNESS OF BREATH, CHEST TIGHTNESS OR PAIN) WAKE YOU UP AT NIGHT OR EARLIER THAN USUAL IN THE MORNING: NOT AT ALL

## 2020-03-04 ASSESSMENT — MIFFLIN-ST. JEOR: SCORE: 1277.3

## 2020-03-04 NOTE — NURSING NOTE
Chief Complaint   Patient presents with     RECHECK     PCD      Giorgio Ramsey, CMA CMA at 8:13 AM on 3/4/2020

## 2020-03-04 NOTE — LETTER
3/4/2020       RE: Armando Dc  [address]  [place] [address]     Dear Colleague,    Thank you for referring your patient, Armando Dc, to the [facility] at [facility]. Please see a copy of my visit note below.    [facility]  March 4, 2020         Assessment and Plan:   Armando Dc is a 44 year old female with PCD and bronchiectasis.    ASSESSMENT AND PLAN:     1.  PCD with bronchiectasis.  Armando has had a decline in her pulmonary function and had a recent illness.  She does feel in general that this is improving.  Additionally, she is now being treated for H. pylori with resolution of some GI symptoms as well.  Armando and I discussed given her slow decline in pulmonary function and a CT scan done a year ago that showed rather significant mucus plugging, particularly in the bases, that it would be appropriate to consider more aggressive treatment.  At the time of the imaging she recently had her child and we did not pursue treatment.  I would recommend at this time.   -- That we obtain a repeat chest CT with noncontrast.     -- If she continues to show evidence of significant mucous plugging I have asked her to consider a course of IV antibiotics.   -- I have asked her to be more consistent trying to do her vest therapy up to twice daily and doing Aerobika midday.   -- Historically Armando has grown out various organisms and I will certainly concentrate my antibiotic efforts towards her most recent culture.     2.  H. pylori.  Armando was seen in primary care clinic and started on clarithromycin, amoxicillin, and lansoprazole.  She does report resolution of most of her symptoms.     3.  Sinusitis.  Armando has longstanding sinus disease.  She does feel they are improving on her current antibiotic course.  We will follow these symptoms for now.  I will anticipate that it will get  better with any treatment course we pursue.     4.  Psychosocial.  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  She is looking for jobs in both [place] and in [place].  She says that this is going well.  Her child who is in  has been sick on several occasions.  Her  is commuting back and forth from [place] so that does put stress on her.     Lyn Hernández MD MPH  Associate Professor of Medicine  Pulmonary, Allergy, Critical Care and Sleep Medicine      Interval History:     Armando does report that her cough has increased, but is more dry.  Her sputum production is decreased on this current antibiotic course.  She is vesting 1 time in the morning.  She is trying to increase her vest therapy.          Review of Systems:     CONSTITUTIONAL: no fever, no chills, no sweats, no change in weight, tired form a busy life, decreased appetite with recent infection    INTEGUMENTARY/SKIN: no rash, no obvious new lesions    ENT/MOUTH: recent worsening of symptoms with recent infection     RESPIRATORY: see interval history    CV: no chest pain, no palpitations, no peripheral edema, no orthopnea    GI: + nausea, + vomiting, no change in stools, improved GERD, no abdominal pain    : no dysuria, no urinary frequency    MUSCULOSKELETAL: no myalgias, no arthralgias    ENDOCRINE: no excessive thirst    NEURO:  No headache, no numbness, no tingling    SLEEP: okay    PSYCHIATRIC: mood tired          Past Medical and Surgical History:     Past Medical History:   Diagnosis Date     Carpal tunnel syndrome     right     Chronic sinusitis      Diabetes (H)     gestational diabetes diet controlled     Endometriosis     left ovary     Infertility      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,      Pseudomonas aeruginosa colonization 2008     Reactive airway disease      Situs inversus      Uncomplicated asthma     Reactive airway disease     Past Surgical History:   Procedure Laterality Date      ADENOIDECTOMY        SECTION N/A 2019    Procedure: Primary  Section;  Surgeon: Lyndsey Shipley MD;  [facility]     LAPAROSCOPIC CYSTECTOMY OVARIAN (BENIGN) Left 2017    Procedure: LAPAROSCOPIC CYSTECTOMY OVARIAN (BENIGN);  Left  Laparoscopy Ovarian Cystotomy, Hysteroscopy And Polpectomy With Myosure ;  Surgeon: Zayra Roberts MD;  [facility]     OPERATIVE HYSTEROSCOPY WITH MORCELLATOR N/A 2017    Procedure: OPERATIVE HYSTEROSCOPY WITH MORCELLATOR;;  Surgeon: Zayra Roberts MD;  [facility]     TONSILLECTOMY       TONSILLECTOMY & ADENOIDECTOMY      8 years old     TRANSVAGINAL RETRIEVAL OVUM Bilateral 3/3/2016    IVF Procedure: TRANSVAGINAL RETRIEVAL OVUM;  Surgeon: Sunshine Gilliam MD;  Location:  [facility]     uterine polyp             Family History:     Family History   Problem Relation Age of Onset     Hyperlipidemia Mother      Diabetes Mother      Hypertension Mother      Hyperlipidemia Father      Hypertension Father      Diabetes Maternal Grandmother      Diabetes Maternal Grandfather             Social History:     Social History     Socioeconomic History     Marital status:      Spouse name: Not on file     Number of children: 0     Years of education: Not on file     Highest education level: Not on file   Occupational History     Occupation: physician     Employer: [employer]     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   Social Needs     Financial resource strain: Not on file     Food insecurity:     Worry: Not on file     Inability: Not on file     Transportation needs:     Medical: Not on file     Non-medical: Not on file   Tobacco Use     Smoking status: Never Smoker     Smokeless tobacco: Never Used   Substance and Sexual Activity     Alcohol use: No     Alcohol/week: 0.0 standard drinks     Drug use: No     Sexual activity: Not Currently     Partners: Male     Birth control/protection: None  "  Lifestyle     Physical activity:     Days per week: Not on file     Minutes per session: Not on file     Stress: Not on file   Relationships     Social connections:     Talks on phone: Not on file     Gets together: Not on file     Attends [employer] service: Not on file     Active member of club or organization: Not on file     Attends meetings of clubs or organizations: Not on file     Relationship status: Not on file     Intimate partner violence:     Fear of current or ex partner: Not on file     Emotionally abused: Not on file     Physically abused: Not on file     Forced sexual activity: Not on file   Other Topics Concern     Parent/sibling w/ CABG, MI or angioplasty before 65F 55M? Not Asked   Social History Narrative    Lives with  ().  Immigrated in 2006 from [place] ([facility]). No children.  Parents in [place], 2 younger sisters and a brother, she is eldest.             Medications:     Current Outpatient Medications   Medication     acetaminophen (TYLENOL) 325 MG tablet     acetylcysteine (MUCOMYST) 20 % neb solution     acetylcysteine (MUCOMYST) 20 % nebulizer solution     albuterol (PROAIR HFA/PROVENTIL HFA/VENTOLIN HFA) 108 (90 Base) MCG/ACT inhaler     albuterol (PROVENTIL) (2.5 MG/3ML) 0.083% neb solution     pcjazdylr-xuacryjkm-shagkuzzn PO miscellaneous box     Cholecalciferol (VITAMIN D PO)     fluticasone-vilanterol (BREO ELLIPTA) 200-25 MCG/INH inhaler     ibuprofen (ADVIL/MOTRIN) 600 MG tablet     Respiratory Therapy Supplies (NEBULIZER) SUZANNA     Syringe/Needle, Disp, (SYRINGE LUER LOCK) 20G X 1-1/2\" 5 ML MISC     Water For Injection Sterile SOLN     No current facility-administered medications for this visit.             Physical Exam:   /80   Pulse 74   Resp 18   Ht 1.626 m (5' 4\")   Wt 63.7 kg (140 lb 8 oz)   SpO2 94%   BMI 24.12 kg/m       Constitutional:   Awake, alert and in no apparent distress     Eyes:   nonicteric     ENT:   oral mucosa moist without lesions, " normal tm bilaterally, bilateral mucosal edema      Neck:   Supple without supraclavicular or cervical lymphadenopathy     Lungs:   fair air flow.  No crackles. No rhonchi.  No wheezes.     Cardiovascular:   Normal S1 and S2.  RRR.  No murmur, gallop or rub.     Abdomen:   NABS, soft, nontender, nondistended.      Musculoskeletal:   No edema, no digital clubbing present     Neurologic:   Alert and conversant.     Skin:   Warm, dry.  No rash on limited exam.             Data:   All laboratory and imaging data reviewed.    Cystic Fibrosis Culture  Specimen Description   Date Value Ref Range Status   03/04/2020 Sputum  Final   03/04/2020 Sputum  Final   09/11/2019 Sputum  Final   09/11/2019 Sputum  Final    Culture Micro   Date Value Ref Range Status   03/04/2020 Moderate growth  Normal otto to date    Preliminary   03/04/2020 Culture in progress  Preliminary   09/11/2019 Moderate growth  Normal otto    Final   09/11/2019 (A)  Final    Moderate growth  Haemophilus influenzae  Beta lactamase negative  Beta-lactamase negative Haemophilus influenzae are usually susceptible to ampicillin,   amoxacillin/clavulanic acid, levofloxacin, and 3rd generation cephalosporins, such as   ceftriaxone.     09/11/2019 (A)  Final    Light growth  Pseudomonas aeruginosa, mucoid strain          Recent Results (from the past 168 hour(s))   General PFT Lab (Please always keep checked)    Collection Time: 03/04/20  7:50 AM   Result Value Ref Range    FVC-Pred 3.29 L    FVC-Pre 2.29 L    FVC-%Pred-Pre 69 %    FEV1-Pre 1.48 L    FEV1-%Pred-Pre 54 %    FEV1FVC-Pred 83 %    FEV1FVC-Pre 65 %    FEFMax-Pred 6.86 L/sec    FEFMax-Pre 4.30 L/sec    FEFMax-%Pred-Pre 62 %    FEF2575-Pred 2.87 L/sec    FEF2575-Pre 0.74 L/sec    WGF8609-%Pred-Pre 25 %    ExpTime-Pre 11.01 sec    FIFMax-Pre 4.44 L/sec    FEV1FEV6-Pred 83 %    FEV1FEV6-Pre 66 %   Sputum Culture Aerobic Bacterial    Collection Time: 03/04/20  9:17 AM   Result Value Ref Range    Specimen  Description Sputum     Culture Micro Moderate growth  Normal otto to date       Culture Micro Culture in progress    Gram stain    Collection Time: 03/04/20  9:17 AM   Result Value Ref Range    Specimen Description Sputum     Special Requests Screen     Gram Stain <10 Squamous epithelial cells/low power field     Gram Stain >25 PMNs/low power field     Gram Stain Moderate  Gram negative rods   (A)     Gram Stain Few  Mixed gram negative and positive otto            PFT: Moderately-severe obstructive lung disease.  When compared to 9/11/2019, the FEV1 and FVC have little change.  The decrease in FVC may represent air trapping v. restrictive physiology.  Lung volumes would be necessary to determine.            Again, thank you for allowing me to participate in the care of your patient.      Sincerely,    Lyn Hernández MD

## 2020-03-04 NOTE — NURSING NOTE
Chief Complaint   Patient presents with     RECHECK     PCD     Medications reviewed and vital signs taken.   Juan Givens, PRIMO

## 2020-03-04 NOTE — PATIENT INSTRUCTIONS
Self-Care Plan    RECOMMENDATIONS:   CT chest to evaluate for plugging and reason for the decline in FVC.  We may have to do IV antibiotics.  Add albuterol inhaler and aerobika midday.    YOUR GOAL:  Feel well, enjoy your baby, finish fellowship and find and awesome job.      [facility] Nurse line:  Tashia, Deejay    [phone]     [facility] Fax Number:      [phone]         Cystic fibrosis Respiratory Therapist:   Sybil Peng              [phone]   Cystic fibrosis Dietitians:              Delphine Angulo              [phone]                            Faye Maciel                        [phone]   Cystic fibrosis Diabetes Nurse:    Palmira Marshall   [phone]    Cystic fibrosis Social Workers:     Brittanie Buenrostro               [phone]                     Tammi Pedro               [phone]  Cystic fibrosis Pharmacist:           Maira Saunders                               [phone]         Jennifer Oliver   [phone]  Cystic Fibrosis Genetic Counselor:   Josie Wei    [phone]    [facility] website:  [email or web address]         MRN: [id number]   Clinic Date: March 4, 2020   Patient: Armando Dc     Annual Studies:   No results found for: IGG  No results found for: INS  There are no preventive care reminders to display for this patient.    Pulmonary Function Tests  FEV1: amount of air you can blow out in 1 second  FVC: total amount of air you can take in and blow out    Your Goals:         PFT Latest Ref Rng & Units 3/4/2020   FVC L 2.29   FEV1 L 1.48   FVC% % 69   FEV1% % 54          Airway Clearance: The Most Important Way to Keep Your Lungs Healthy  Vest Settings:    Hill-Rom Frequencies: 8, 9, 10 Pressure 10 Then, Frequencies 18, 19, 20 Pressure 6      RespirTech: Quick Start with Pressure of     Do each frequency for 5 minutes; Deflate vest after each frequency & cough 3 times before beginning  the next setting.    Vest and Neb Therapy should be done 2 times/day.    Good Nutrition Can Improve Lung Function and Overall Health     Take ALL of your vitamins with food     Take 1/2 of your enzymes before EVERY meal/snack and the other 1/2 mid-meal/snack    Wt Readings from Last 3 Encounters:   03/04/20 63.7 kg (140 lb 8 oz)   02/17/20 63.5 kg (140 lb)   09/11/19 64 kg (141 lb 1.5 oz)       Body mass index is 24.12 kg/m .         National CF Foundation Recommendations for BMI in CF Adults: Women: at least 22 Men: at least 23        Controlling Blood Sugars Helps Prevent Lung Infections & Improves Nutrition  Test blood sugar:     In the morning before eating (goal is )     2 hours after a meal (goal is less than 150)     When pre-meal glucose is greater than 150 add correction     At bedtime (if less than 100 eat a snack with 15 grams of carbohydrates  Last A1C Results:   Hemoglobin A1C   Date Value Ref Range Status   02/17/2020 5.9 (H) 0 - 5.6 % Final     Comment:     Normal <5.7% Prediabetes 5.7-6.4%  Diabetes 6.5% or higher - adopted from ADA   consensus guidelines.           If diabetic, measure A1C every 6 months. Goal: Under 7%    Staying Healthy    Research:  If you are interested in learning about research opportunities or have questions, please contact the CF Research Team at [phone] or [email or web address].      CF Foundation:  Compass is a personalized resource service to help you with the insurance, financial, legal and other issues you are facing.  It's free, confidential and available to anyone with CF.  Ask your  for more information or contact Compass directly at 129-UCMMBMV ([phone]) or compass@cff.org, or learn more at cff.[email or web address].

## 2020-03-04 NOTE — PROGRESS NOTES
[facility]  [facility]  March 4, 2020         Assessment and Plan:   Armando Dc is a 44 year old female with PCD and bronchiectasis.    ASSESSMENT AND PLAN:     1.  PCD with bronchiectasis.  Armando has had a decline in her pulmonary function and had a recent illness.  She does feel in general that this is improving.  Additionally, she is now being treated for H. pylori with resolution of some GI symptoms as well.  Armando and I discussed given her slow decline in pulmonary function and a CT scan done a year ago that showed rather significant mucus plugging, particularly in the bases, that it would be appropriate to consider more aggressive treatment.  At the time of the imaging she recently had her child and we did not pursue treatment.  I would recommend at this time.   -- That we obtain a repeat chest CT with noncontrast.     -- If she continues to show evidence of significant mucous plugging I have asked her to consider a course of IV antibiotics.   -- I have asked her to be more consistent trying to do her vest therapy up to twice daily and doing Aerobika midday.   -- Historically Armando has grown out various organisms and I will certainly concentrate my antibiotic efforts towards her most recent culture.     2.  H. pylori.  Armando was seen in primary care clinic and started on clarithromycin, amoxicillin, and lansoprazole.  She does report resolution of most of her symptoms.     3.  Sinusitis.  Armando has longstanding sinus disease.  She does feel they are improving on her current antibiotic course.  We will follow these symptoms for now.  I will anticipate that it will get better with any treatment course we pursue.     4.  Psychosocial.  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  She is looking for jobs in both [place] and in [place].  She says that this is going well.  Her child who is in  has been sick on several occasions.  Her  is  commuting back and forth from [place] so that does put stress on her.     Lyn Hernández MD MPH  Associate Professor of Medicine  Pulmonary, Allergy, Critical Care and Sleep Medicine      Interval History:     Armando does report that her cough has increased, but is more dry.  Her sputum production is decreased on this current antibiotic course.  She is vesting 1 time in the morning.  She is trying to increase her vest therapy.          Review of Systems:     CONSTITUTIONAL: no fever, no chills, no sweats, no change in weight, tired form a busy life, decreased appetite with recent infection    INTEGUMENTARY/SKIN: no rash, no obvious new lesions    ENT/MOUTH: recent worsening of symptoms with recent infection     RESPIRATORY: see interval history    CV: no chest pain, no palpitations, no peripheral edema, no orthopnea    GI: + nausea, + vomiting, no change in stools, improved GERD, no abdominal pain    : no dysuria, no urinary frequency    MUSCULOSKELETAL: no myalgias, no arthralgias    ENDOCRINE: no excessive thirst    NEURO:  No headache, no numbness, no tingling    SLEEP: okay    PSYCHIATRIC: mood tired          Past Medical and Surgical History:     Past Medical History:   Diagnosis Date     Carpal tunnel syndrome     right     Chronic sinusitis      Diabetes (H)     gestational diabetes diet controlled     Endometriosis     left ovary     Infertility      Kartagener's syndrome     situs inversus totalis,      Pseudomonas aeruginosa colonization      Reactive airway disease      Situs inversus      Uncomplicated asthma     Reactive airway disease     Past Surgical History:   Procedure Laterality Date     ADENOIDECTOMY        SECTION N/A 2019    Procedure: Primary  Section;  Surgeon: Lyndsey Shipley MD;  [facility]     LAPAROSCOPIC CYSTECTOMY OVARIAN (BENIGN) Left 2017    Procedure: LAPAROSCOPIC CYSTECTOMY OVARIAN (BENIGN);  Left  Laparoscopy Ovarian  Cystotomy, Hysteroscopy And Polpectomy With Myosure ;  Surgeon: Zayra Roberts MD;  [facility]     OPERATIVE HYSTEROSCOPY WITH MORCELLATOR N/A 11/13/2017    Procedure: OPERATIVE HYSTEROSCOPY WITH MORCELLATOR;;  Surgeon: Zayra Roberts MD;  [facility]     TONSILLECTOMY       TONSILLECTOMY & ADENOIDECTOMY      8 years old     TRANSVAGINAL RETRIEVAL OVUM Bilateral 3/3/2016    IVF Procedure: TRANSVAGINAL RETRIEVAL OVUM;  Surgeon: Sunshine Gilliam MD;  Location: [facility]     uterine polyp  2013           Family History:     Family History   Problem Relation Age of Onset     Hyperlipidemia Mother      Diabetes Mother      Hypertension Mother      Hyperlipidemia Father      Hypertension Father      Diabetes Maternal Grandmother      Diabetes Maternal Grandfather             Social History:     Social History     Socioeconomic History     Marital status:      Spouse name: Not on file     Number of children: 0     Years of education: Not on file     Highest education level: Not on file   Occupational History     Occupation: physician     Employer: [employer]     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   Social Needs     Financial resource strain: Not on file     Food insecurity:     Worry: Not on file     Inability: Not on file     Transportation needs:     Medical: Not on file     Non-medical: Not on file   Tobacco Use     Smoking status: Never Smoker     Smokeless tobacco: Never Used   Substance and Sexual Activity     Alcohol use: No     Alcohol/week: 0.0 standard drinks     Drug use: No     Sexual activity: Not Currently     Partners: Male     Birth control/protection: None   Lifestyle     Physical activity:     Days per week: Not on file     Minutes per session: Not on file     Stress: Not on file   Relationships     Social connections:     Talks on phone: Not on file     Gets together: Not on file     Attends Congregational service: Not on file     Active member of  "club or organization: Not on file     Attends meetings of clubs or organizations: Not on file     Relationship status: Not on file     Intimate partner violence:     Fear of current or ex partner: Not on file     Emotionally abused: Not on file     Physically abused: Not on file     Forced sexual activity: Not on file   Other Topics Concern     Parent/sibling w/ CABG, MI or angioplasty before 65F 55M? Not Asked   Social History Narrative    Lives with  ().  Immigrated in 2006 from [place] ([facility]). No children.  Parents in [place], 2 younger sisters and a brother, she is eldest.             Medications:     Current Outpatient Medications   Medication     acetaminophen (TYLENOL) 325 MG tablet     acetylcysteine (MUCOMYST) 20 % neb solution     acetylcysteine (MUCOMYST) 20 % nebulizer solution     albuterol (PROAIR HFA/PROVENTIL HFA/VENTOLIN HFA) 108 (90 Base) MCG/ACT inhaler     albuterol (PROVENTIL) (2.5 MG/3ML) 0.083% neb solution     qvyqfkzen-ewmoptyhv-ezxllavdu PO miscellaneous box     Cholecalciferol (VITAMIN D PO)     fluticasone-vilanterol (BREO ELLIPTA) 200-25 MCG/INH inhaler     ibuprofen (ADVIL/MOTRIN) 600 MG tablet     Respiratory Therapy Supplies (NEBULIZER) SUZANNA     Syringe/Needle, Disp, (SYRINGE LUER LOCK) 20G X 1-1/2\" 5 ML MISC     Water For Injection Sterile SOLN     No current facility-administered medications for this visit.             Physical Exam:   /80   Pulse 74   Resp 18   Ht 1.626 m (5' 4\")   Wt 63.7 kg (140 lb 8 oz)   SpO2 94%   BMI 24.12 kg/m      Constitutional:   Awake, alert and in no apparent distress     Eyes:   nonicteric     ENT:   oral mucosa moist without lesions, normal tm bilaterally, bilateral mucosal edema      Neck:   Supple without supraclavicular or cervical lymphadenopathy     Lungs:   fair air flow.  No crackles. No rhonchi.  No wheezes.     Cardiovascular:   Normal S1 and S2.  RRR.  No murmur, gallop or rub.     Abdomen:   NABS, soft, nontender, " nondistended.      Musculoskeletal:   No edema, no digital clubbing present     Neurologic:   Alert and conversant.     Skin:   Warm, dry.  No rash on limited exam.             Data:   All laboratory and imaging data reviewed.    Cystic Fibrosis Culture  Specimen Description   Date Value Ref Range Status   03/04/2020 Sputum  Final   03/04/2020 Sputum  Final   09/11/2019 Sputum  Final   09/11/2019 Sputum  Final    Culture Micro   Date Value Ref Range Status   03/04/2020 Moderate growth  Normal otto to date    Preliminary   03/04/2020 Culture in progress  Preliminary   09/11/2019 Moderate growth  Normal otto    Final   09/11/2019 (A)  Final    Moderate growth  Haemophilus influenzae  Beta lactamase negative  Beta-lactamase negative Haemophilus influenzae are usually susceptible to ampicillin,   amoxacillin/clavulanic acid, levofloxacin, and 3rd generation cephalosporins, such as   ceftriaxone.     09/11/2019 (A)  Final    Light growth  Pseudomonas aeruginosa, mucoid strain          Recent Results (from the past 168 hour(s))   General PFT Lab (Please always keep checked)    Collection Time: 03/04/20  7:50 AM   Result Value Ref Range    FVC-Pred 3.29 L    FVC-Pre 2.29 L    FVC-%Pred-Pre 69 %    FEV1-Pre 1.48 L    FEV1-%Pred-Pre 54 %    FEV1FVC-Pred 83 %    FEV1FVC-Pre 65 %    FEFMax-Pred 6.86 L/sec    FEFMax-Pre 4.30 L/sec    FEFMax-%Pred-Pre 62 %    FEF2575-Pred 2.87 L/sec    FEF2575-Pre 0.74 L/sec    [id number]-%Pred-Pre 25 %    ExpTime-Pre 11.01 sec    FIFMax-Pre 4.44 L/sec    FEV1FEV6-Pred 83 %    FEV1FEV6-Pre 66 %   Sputum Culture Aerobic Bacterial    Collection Time: 03/04/20  9:17 AM   Result Value Ref Range    Specimen Description Sputum     Culture Micro Moderate growth  Normal otto to date       Culture Micro Culture in progress    Gram stain    Collection Time: 03/04/20  9:17 AM   Result Value Ref Range    Specimen Description Sputum     Special Requests Screen     Gram Stain <10 Squamous epithelial cells/low  power field     Gram Stain >25 PMNs/low power field     Gram Stain Moderate  Gram negative rods   (A)     Gram Stain Few  Mixed gram negative and positive otto            PFT: Moderately-severe obstructive lung disease.  When compared to 9/11/2019, the FEV1 and FVC have little change.  The decrease in FVC may represent air trapping v. restrictive physiology.  Lung volumes would be necessary to determine.

## 2020-03-05 ASSESSMENT — ASTHMA QUESTIONNAIRES: ACT_TOTALSCORE: 21

## 2020-03-09 ENCOUNTER — ANCILLARY PROCEDURE (OUTPATIENT)
Dept: CT IMAGING | Facility: CLINIC | Age: 45
End: 2020-03-09
Attending: INTERNAL MEDICINE
Payer: COMMERCIAL

## 2020-03-09 DIAGNOSIS — J47.9 BRONCHIECTASIS WITHOUT COMPLICATION (H): ICD-10-CM

## 2020-03-10 LAB
BACTERIA SPEC CULT: ABNORMAL
BACTERIA SPEC CULT: ABNORMAL
SPECIMEN SOURCE: ABNORMAL

## 2020-03-12 ENCOUNTER — MYC MEDICAL ADVICE (OUTPATIENT)
Dept: PULMONOLOGY | Facility: CLINIC | Age: 45
End: 2020-03-12

## 2020-03-12 DIAGNOSIS — J47.9 BRONCHIECTASIS (H): Primary | ICD-10-CM

## 2020-03-13 ENCOUNTER — TELEPHONE (OUTPATIENT)
Dept: PULMONOLOGY | Facility: CLINIC | Age: 45
End: 2020-03-13

## 2020-03-13 RX ORDER — CIPROFLOXACIN 750 MG/1
750 TABLET, FILM COATED ORAL 2 TIMES DAILY
Qty: 42 TABLET | Refills: 0 | Status: SHIPPED | OUTPATIENT
Start: 2020-03-13 | End: 2020-04-03

## 2020-03-13 RX ORDER — TOBRAMYCIN INHALATION SOLUTION 300 MG/5ML
300 INHALANT RESPIRATORY (INHALATION)
Qty: 280 ML | Refills: 2 | Status: SHIPPED | OUTPATIENT
Start: 2020-03-13

## 2020-03-13 NOTE — TELEPHONE ENCOUNTER
Attempted to reach patient via phone call to follow up on the following results and recommendations per Dr. Middleton:     Results: She still has evidence of significant mucus plugging at her lung bases.  The radiologists did not call it improved, but I do think it is.  I am willing to hold on doing IV therapy because she has a young child, her  travels and she is almost done with her fellowship.      Recommendations:   -try to do at least 2 to 3 vest therapies per day and make sure she pulls the vest down to cover her bases   -It looks like she has done Giles. Let's see if she can try and do it on and off 28 days for 3 cycles.   -Ciprofloxacin 750 mg po BID for 3 weeks.  No babies, no breast feeding and watch her tendons.    RN unable to reach patient. Will send medication about once RN has discussed the plan with the patient. Left name and call back number.    Diana Hargrove RN

## 2020-03-30 DIAGNOSIS — J47.9 BRONCHIECTASIS WITHOUT ACUTE EXACERBATION (H): ICD-10-CM

## 2020-03-30 RX ORDER — ALBUTEROL SULFATE 0.83 MG/ML
SOLUTION RESPIRATORY (INHALATION)
Qty: 180 ML | Refills: 11 | Status: SHIPPED | OUTPATIENT
Start: 2020-03-30 | End: 2020-05-23

## 2020-07-06 ENCOUNTER — MYC REFILL (OUTPATIENT)
Dept: PULMONOLOGY | Facility: CLINIC | Age: 45
End: 2020-07-06

## 2020-07-06 DIAGNOSIS — Q89.3 KARTAGENER'S SYNDROME: Chronic | ICD-10-CM

## 2020-07-06 DIAGNOSIS — Z22.39 PSEUDOMONAS AERUGINOSA COLONIZATION: Chronic | ICD-10-CM

## 2020-07-06 DIAGNOSIS — K21.9 GASTROESOPHAGEAL REFLUX DISEASE WITHOUT ESOPHAGITIS: ICD-10-CM

## 2020-07-06 DIAGNOSIS — Q89.3 SITUS INVERSUS: Chronic | ICD-10-CM

## 2020-07-06 RX ORDER — PANTOPRAZOLE SODIUM 40 MG/1
TABLET, DELAYED RELEASE ORAL
Qty: 90 TABLET | Refills: 3 | Status: SHIPPED | OUTPATIENT
Start: 2020-07-06

## 2020-07-06 RX ORDER — ACETYLCYSTEINE 200 MG/ML
2 SOLUTION ORAL; RESPIRATORY (INHALATION) 2 TIMES DAILY
Qty: 120 ML | Refills: 11 | Status: SHIPPED | OUTPATIENT
Start: 2020-07-06

## 2020-08-31 NOTE — PROGRESS NOTES
[facility]  [facility]  September 1, 2020         Assessment and Plan:   Armando Dc is a 45 year old female with primary ciliary dyskinesia and obstructive lung disease.    1.  PCD with moderately severe obstructive lung disease: Armando initiated every other month inhalational ALEK when I last saw her.  This was in hopes of trying to control her Pseudomonas in her sputum.  She does report symptomatic improvement since doing this.  She feels that she is much less short of breath and has decreased cough and sputum production.  The one concern related to its use is the time.  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  She was able to work from home during the last few months.  This allowed her to do her vest therapy twice daily along with the inhalational medication.  I would like her to continue on inhalational ALEK has she continues to show evidence of Pseudomonas in her sputum.  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  At this time I recommended:  --Armando continue inhalational ALEK.  I will pursue ALEK pod inhaler through her insurance.  I will ask and our respiratory therapists to do a virtual visit with her to teach her how to use it if it does become available to her  --Armando should continue to do her vest therapy twice daily  --Armando will be moving to the [place] area.  She is going to transition her care to her previous pulmonologist.  I will make certain that she has medications available to her until that time.    2.  Sinusitis: Armando has longstanding sinus symptoms.  She currently feels that they are stable.  Is a little bit more difficult to breathe while wearing a mask.    3.  Psychosocial: Armando again is just completing her fellowship and adolescent psychiatry.  She is moving to [place] for a full-time position.  She  reports that her  is doing well in his job position.  Her 18-month-old baby is also doing well to.  She is excited about this opportunity.    4.  Polyuria and dysuria: Armando is describing some urinary symptomatology.  We will obtain a UA and labs today.  I have been asked her to follow-up with her primary care physician for evaluation of these labs.    Immunizations: needs pneumovax    Lyn Hernández MD MPH  Associate Professor of Medicine  Pulmonary, Allergy, Critical Care and Sleep Medicine      Interval History:     Armando reports decreased cough and sputum production.  She feels that her shortness of breath is better.  She has been doing 2 vest per day.         Review of Systems:     CONSTITUTIONAL: no fever, no chills, no sweats, no change in weight, improvement in energy, no change in appetite    INTEGUMENTARY/SKIN: no rash, no obvious new lesions    ENT/MOUTH: no sore throat, no new sinus pain, no new nasal drainage, no ear ringing     RESPIRATORY: see interval history    CV: no chest pain, no palpitations, no peripheral edema, no orthopnea, no PND    GI: no nausea, no vomiting, no change in stools, + GERD    : no dysuria, + frequency    MUSCULOSKELETAL: no myalgias, no arthralgias    ENDOCRINE: no excessive thirst    NEURO:  No headache, no numbness, no tingling    SLEEP: no issues    PSYCHIATRIC: mood  All right          Past Medical and Surgical History:     Past Medical History:   Diagnosis Date     Carpal tunnel syndrome     right     Chronic sinusitis      Diabetes (H)     gestational diabetes diet controlled     Endometriosis     left ovary     Infertility      Kartagener's syndrome     situs inversus totalis,      Pseudomonas aeruginosa colonization      Reactive airway disease      Situs inversus      Uncomplicated asthma     Reactive airway disease     Past Surgical History:   Procedure Laterality Date     ADENOIDECTOMY        SECTION N/A 2019    Procedure: Primary   Section;  Surgeon: Lyndsey Shipley MD;  [facility]     LAPAROSCOPIC CYSTECTOMY OVARIAN (BENIGN) Left 2017    Procedure: LAPAROSCOPIC CYSTECTOMY OVARIAN (BENIGN);  Left  Laparoscopy Ovarian Cystotomy, Hysteroscopy And Polpectomy With Myosure ;  Surgeon: Zayra Roberts MD;  [facility]     OPERATIVE HYSTEROSCOPY WITH MORCELLATOR N/A 2017    Procedure: OPERATIVE HYSTEROSCOPY WITH MORCELLATOR;;  Surgeon: Zayra Roberts MD;  [facility]     TONSILLECTOMY       TONSILLECTOMY & ADENOIDECTOMY      8 years old     TRANSVAGINAL RETRIEVAL OVUM Bilateral 3/3/2016    IVF Procedure: TRANSVAGINAL RETRIEVAL OVUM;  Surgeon: Sunshine Gilliam MD;  Location:  [facility]     uterine polyp             Family History:     Family History   Problem Relation Age of Onset     Hyperlipidemia Mother      Diabetes Mother      Hypertension Mother      Hyperlipidemia Father      Hypertension Father      Diabetes Maternal Grandmother      Diabetes Maternal Grandfather             Social History:     Social History     Socioeconomic History     Marital status:      Spouse name: Not on file     Number of children: 0     Years of education: Not on file     Highest education level: Not on file   Occupational History     Occupation: physician     Employer: [employer]     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   Social Needs     Financial resource strain: Not on file     Food insecurity     Worry: Not on file     Inability: Not on file     Transportation needs     Medical: Not on file     Non-medical: Not on file   Tobacco Use     Smoking status: Never Smoker     Smokeless tobacco: Never Used   Substance and Sexual Activity     Alcohol use: No     Alcohol/week: 0.0 standard drinks     Drug use: No     Sexual activity: Not Currently     Partners: Male     Birth control/protection: None   Lifestyle     Physical activity     Days per week: Not on file      "Minutes per session: Not on file     Stress: Not on file   Relationships     Social connections     Talks on phone: Not on file     Gets together: Not on file     Attends Buddhist service: Not on file     Active member of club or organization: Not on file     Attends meetings of clubs or organizations: Not on file     Relationship status: Not on file     Intimate partner violence     Fear of current or ex partner: Not on file     Emotionally abused: Not on file     Physically abused: Not on file     Forced sexual activity: Not on file   Other Topics Concern     Parent/sibling w/ CABG, MI or angioplasty before 65F 55M? Not Asked   Social History Narrative    Lives with  ().  Immigrated in 2006 from [place] ([facility] No children.  Parents in [place], 2 younger sisters and a brother, she is eldest.             Medications:     Current Outpatient Medications   Medication     acetaminophen (TYLENOL) 325 MG tablet     acetylcysteine (MUCOMYST) 20 % neb solution     acetylcysteine (MUCOMYST) 20 % nebulizer solution     albuterol (PROAIR HFA/PROVENTIL HFA/VENTOLIN HFA) 108 (90 Base) MCG/ACT inhaler     albuterol (PROVENTIL) (2.5 MG/3ML) 0.083% neb solution     fluticasone-vilanterol (BREO ELLIPTA) 200-25 MCG/INH inhaler     pantoprazole (PROTONIX) 40 MG EC tablet     Respiratory Therapy Supplies (NEBULIZER) SUZANNA     Syringe/Needle, Disp, (SYRINGE LUER LOCK) 20G X 1-1/2\" 5 ML MISC     Water For Injection Sterile SOLN     Cholecalciferol (VITAMIN D PO)     ibuprofen (ADVIL/MOTRIN) 600 MG tablet     tobramycin, PF, (ALEK) 300 MG/5ML neb solution     No current facility-administered medications for this visit.             Physical Exam:   /75   Pulse 75   Temp 97.9  F (36.6  C) (Oral)   Resp 18   Wt 61.7 kg (136 lb)   SpO2 96%   BMI 23.34 kg/m      Constitutional:   Awake, alert and in no apparent distress     Eyes:   nonicteric     ENT:   Mask in place     Neck:   Supple without supraclavicular or " cervical lymphadenopathy     Lungs:   Good air flow.  No crackles. No rhonchi.  rare wheezes.     Cardiovascular:   Normal S1 and S2.  RRR.  No murmur, gallop or rub.     Abdomen:   NABS, soft, nontender, nondistended.      Musculoskeletal:   No edema, no digital clubbing present     Neurologic:   Alert and conversant.     Skin:   Warm, dry.  No rash on limited exam.             Data:   All laboratory and imaging data reviewed.    Cystic Fibrosis Culture  Specimen Description   Date Value Ref Range Status   03/04/2020 Sputum  Final   03/04/2020 Sputum  Final   09/11/2019 Sputum  Final   09/11/2019 Sputum  Final    Culture Micro   Date Value Ref Range Status   03/04/2020 Moderate growth  Normal otto    Final   03/04/2020 (A)  Final    Moderate growth  Pseudomonas aeruginosa, mucoid strain     09/11/2019 Moderate growth  Normal otto    Final   09/11/2019 (A)  Final    Moderate growth  Haemophilus influenzae  Beta lactamase negative  Beta-lactamase negative Haemophilus influenzae are usually susceptible to ampicillin,   amoxacillin/clavulanic acid, levofloxacin, and 3rd generation cephalosporins, such as   ceftriaxone.     09/11/2019 (A)  Final    Light growth  Pseudomonas aeruginosa, mucoid strain          Recent Results (from the past 168 hour(s))   General PFT Lab (Please always keep checked)    Collection Time: 09/01/20  8:10 AM   Result Value Ref Range    FVC-Pred 3.27 L    FVC-Pre 2.44 L    FVC-%Pred-Pre 74 %    FEV1-Pre 1.45 L    FEV1-%Pred-Pre 53 %    FEV1FVC-Pred 82 %    FEV1FVC-Pre 59 %    FEFMax-Pred 6.84 L/sec    FEFMax-Pre 4.82 L/sec    FEFMax-%Pred-Pre 70 %    FEF2575-Pred 2.83 L/sec    FEF2575-Pre 0.66 L/sec    [id number]-%Pred-Pre 23 %    ExpTime-Pre 10.57 sec    FIFMax-Pre 2.64 L/sec    FEV1FEV6-Pred 83 %    FEV1FEV6-Pre 62 %   UA with Microscopic reflex to Culture (Tiffany Naidu; [facility])    Collection Time: 09/01/20  8:20 AM    Specimen: Midstream Urine   Result Value Ref Range    Color Urine  Yellow     Appearance Urine Slightly Cloudy     Glucose Urine Negative NEG^Negative mg/dL    Bilirubin Urine Negative NEG^Negative    Ketones Urine Negative NEG^Negative mg/dL    Specific Gravity Urine 1.014 1.003 - 1.035    Blood Urine Negative NEG^Negative    pH Urine 5.0 5.0 - 7.0 pH    Protein Albumin Urine Negative NEG^Negative mg/dL    Urobilinogen mg/dL 0.0 0.0 - 2.0 mg/dL    Nitrite Urine Negative NEG^Negative    Leukocyte Esterase Urine Negative NEG^Negative    Source Midstream Urine     WBC Urine 2 0 - 5 /HPF    RBC Urine 1 0 - 2 /HPF    Squamous Epithelial /HPF Urine 2 (H) 0 - 1 /HPF    Mucous Urine Present (A) NEG^Negative /LPF   Basic metabolic panel    Collection Time: 09/01/20  8:40 AM   Result Value Ref Range    Sodium 140 133 - 144 mmol/L    Potassium 4.3 3.4 - 5.3 mmol/L    Chloride 110 (H) 94 - 109 mmol/L    Carbon Dioxide 26 20 - 32 mmol/L    Anion Gap 4 3 - 14 mmol/L    Glucose 108 (H) 70 - 99 mg/dL    Urea Nitrogen 14 7 - 30 mg/dL    Creatinine 0.69 0.52 - 1.04 mg/dL    GFR Estimate >90 >60 mL/min/[1.73_m2]    GFR Estimate If Black >90 >60 mL/min/[1.73_m2]    Calcium 8.6 8.5 - 10.1 mg/dL   Hemoglobin A1c    Collection Time: 09/01/20  8:40 AM   Result Value Ref Range    Hemoglobin A1C 5.8 (H) 0 - 5.6 %       PFT: Moderately-severe obstructive lung disease.  When compared to 3/4/2020, the FVC has increased.  The decrease in FVC may represent air trapping v. restrictive physiology.  Lung volumes would be necessary to determine.

## 2020-08-31 NOTE — PATIENT INSTRUCTIONS
Self-Care Plan    RECOMMENDATIONS:   Armando,  It was great to see you today.  I am excited about your new life adventure.  Congratulations on completing your fellowship!  The plan from today:  --continue choco--will try to get the pod inhaler  --Will get labs and urine today  --pneumovax today  Great job with vest!    YOUR GOAL:  Enjoy your new job and move to [place]!      [facility] Nurse line:  Ema Lao KJ and Diana [phone]     [facility] Fax Number:      [phone]         Cystic Fibrosis Respiratory Therapists:   Sybil Peng              [phone]          Daily Newell   [phone]  Cystic Fibrosis Dietitians:              Delphine Angulo              [phone]                            Faye Maciel                        [phone]   Cystic Fibrosis Diabetes Nurse:    Palmira Marshall   [phone]    Cystic Fibrosis Social Workers:     Brittanie Buenrostro               [phone]                     Tammi Pedro               [phone]  Cystic Fibrosis Pharmacists:           Maira Saunders                               [phone]         Jennifer Oliver   [phone]  Cystic Fibrosis Genetic Counselor:   Josie Wei    [phone]    [facility] website:  [email or web address]         MRN: [id number]   Clinic Date: August 31, 2020   Patient: Armando Dc     Annual Studies:   No results found for: IGG  No results found for: INS  There are no preventive care reminders to display for this patient.    Pulmonary Function Tests  FEV1: amount of air you can blow out in 1 second  FVC: total amount of air you can take in and blow out    Your Goals:         PFT Latest Ref Rng & Units 3/4/2020   FVC L 2.29   FEV1 L 1.48   FVC% % 69   FEV1% % 54          Airway Clearance: The Most Important Way to Keep Your Lungs Healthy  Vest Settings:    Hill-Rom Frequencies: 8, 9, 10 Pressure 10 Then, Frequencies 18, 19, 20 Pressure 6      RespirTech:  Quick Start with Pressure of     Do each frequency for 5 minutes; Deflate vest after each frequency & cough 3 times before beginning the next setting.    Vest and Neb Therapy should be done 2 times/day.    Good Nutrition Can Improve Lung Function and Overall Health     Take ALL of your vitamins with food     Take 1/2 of your enzymes before EVERY meal/snack and the other 1/2 mid-meal/snack    Wt Readings from Last 3 Encounters:   03/04/20 63.7 kg (140 lb 8 oz)   02/17/20 63.5 kg (140 lb)   09/11/19 64 kg (141 lb 1.5 oz)       There is no height or weight on file to calculate BMI.         National CF Foundation Recommendations for BMI in CF Adults: Women: at least 22 Men: at least 23        Controlling Blood Sugars Helps Prevent Lung Infections & Improves Nutrition  Test blood sugar:     In the morning before eating (goal is )     2 hours after a meal (goal is less than 150)     When pre-meal glucose is greater than 150 add correction     At bedtime (if less than 100 eat a snack with 15 grams of carbohydrates  Last A1C Results:   Hemoglobin A1C   Date Value Ref Range Status   02/17/2020 5.9 (H) 0 - 5.6 % Final     Comment:     Normal <5.7% Prediabetes 5.7-6.4%  Diabetes 6.5% or higher - adopted from ADA   consensus guidelines.           If diabetic, measure A1C every 6 months. Goal: Under 7%    Staying Healthy    Research:  If you are interested in learning about research opportunities or have questions, please contact the CF Research Team at [phone] or [email or web address].      CF Foundation:  Compass is a personalized resource service to help you with the insurance, financial, legal and other issues you are facing.  It's free, confidential and available to anyone with CF.  Ask your  for more information or contact Compass directly at 536-COMPASS ([phone]) or compass@cff.org, or learn more at cff.org/compass.

## 2020-09-01 ENCOUNTER — OFFICE VISIT (OUTPATIENT)
Dept: PULMONOLOGY | Facility: CLINIC | Age: 45
End: 2020-09-01
Attending: INTERNAL MEDICINE
Payer: COMMERCIAL

## 2020-09-01 VITALS
RESPIRATION RATE: 18 BRPM | HEART RATE: 75 BPM | DIASTOLIC BLOOD PRESSURE: 75 MMHG | SYSTOLIC BLOOD PRESSURE: 122 MMHG | WEIGHT: 136 LBS | BODY MASS INDEX: 23.34 KG/M2 | OXYGEN SATURATION: 96 % | TEMPERATURE: 97.9 F

## 2020-09-01 DIAGNOSIS — J47.9 BRONCHIECTASIS WITHOUT COMPLICATION (H): Primary | ICD-10-CM

## 2020-09-01 DIAGNOSIS — J47.9 BRONCHIECTASIS WITHOUT ACUTE EXACERBATION (H): Primary | ICD-10-CM

## 2020-09-01 DIAGNOSIS — J47.9 BRONCHIECTASIS WITHOUT ACUTE EXACERBATION (H): ICD-10-CM

## 2020-09-01 DIAGNOSIS — R11.0 NAUSEA: ICD-10-CM

## 2020-09-01 LAB
ALBUMIN UR-MCNC: NEGATIVE MG/DL
ANION GAP SERPL CALCULATED.3IONS-SCNC: 4 MMOL/L (ref 3–14)
APPEARANCE UR: ABNORMAL
BILIRUB UR QL STRIP: NEGATIVE
BUN SERPL-MCNC: 14 MG/DL (ref 7–30)
CALCIUM SERPL-MCNC: 8.6 MG/DL (ref 8.5–10.1)
CHLORIDE SERPL-SCNC: 110 MMOL/L (ref 94–109)
CO2 SERPL-SCNC: 26 MMOL/L (ref 20–32)
COLOR UR AUTO: YELLOW
CREAT SERPL-MCNC: 0.69 MG/DL (ref 0.52–1.04)
EXPTIME-PRE: 10.57 SEC
FEF2575-%PRED-PRE: 23 %
FEF2575-PRE: 0.66 L/SEC
FEF2575-PRED: 2.83 L/SEC
FEFMAX-%PRED-PRE: 70 %
FEFMAX-PRE: 4.82 L/SEC
FEFMAX-PRED: 6.84 L/SEC
FEV1-%PRED-PRE: 53 %
FEV1-PRE: 1.45 L
FEV1FEV6-PRE: 62 %
FEV1FEV6-PRED: 83 %
FEV1FVC-PRE: 59 %
FEV1FVC-PRED: 82 %
FIFMAX-PRE: 2.64 L/SEC
FVC-%PRED-PRE: 74 %
FVC-PRE: 2.44 L
FVC-PRED: 3.27 L
GFR SERPL CREATININE-BSD FRML MDRD: >90 ML/MIN/{1.73_M2}
GLUCOSE SERPL-MCNC: 108 MG/DL (ref 70–99)
GLUCOSE UR STRIP-MCNC: NEGATIVE MG/DL
HBA1C MFR BLD: 5.8 % (ref 0–5.6)
HGB UR QL STRIP: NEGATIVE
KETONES UR STRIP-MCNC: NEGATIVE MG/DL
LEUKOCYTE ESTERASE UR QL STRIP: NEGATIVE
MUCOUS THREADS #/AREA URNS LPF: PRESENT /LPF
NITRATE UR QL: NEGATIVE
PH UR STRIP: 5 PH (ref 5–7)
POTASSIUM SERPL-SCNC: 4.3 MMOL/L (ref 3.4–5.3)
RBC #/AREA URNS AUTO: 1 /HPF (ref 0–2)
SODIUM SERPL-SCNC: 140 MMOL/L (ref 133–144)
SOURCE: ABNORMAL
SP GR UR STRIP: 1.01 (ref 1–1.03)
SQUAMOUS #/AREA URNS AUTO: 2 /HPF (ref 0–1)
UROBILINOGEN UR STRIP-MCNC: 0 MG/DL (ref 0–2)
WBC #/AREA URNS AUTO: 2 /HPF (ref 0–5)

## 2020-09-01 PROCEDURE — 83036 HEMOGLOBIN GLYCOSYLATED A1C: CPT | Performed by: INTERNAL MEDICINE

## 2020-09-01 PROCEDURE — 80048 BASIC METABOLIC PNL TOTAL CA: CPT | Performed by: INTERNAL MEDICINE

## 2020-09-01 PROCEDURE — 81001 URINALYSIS AUTO W/SCOPE: CPT | Performed by: INTERNAL MEDICINE

## 2020-09-01 PROCEDURE — G0009 ADMIN PNEUMOCOCCAL VACCINE: HCPCS | Mod: ZF

## 2020-09-01 PROCEDURE — G0463 HOSPITAL OUTPT CLINIC VISIT: HCPCS | Mod: 25,ZF

## 2020-09-01 PROCEDURE — 25000128 H RX IP 250 OP 636: Mod: ZF | Performed by: INTERNAL MEDICINE

## 2020-09-01 PROCEDURE — 90732 PPSV23 VACC 2 YRS+ SUBQ/IM: CPT | Mod: ZF | Performed by: INTERNAL MEDICINE

## 2020-09-01 PROCEDURE — 36415 COLL VENOUS BLD VENIPUNCTURE: CPT | Performed by: INTERNAL MEDICINE

## 2020-09-01 RX ADMIN — PNEUMOCOCCAL VACCINE POLYVALENT 0.5 ML
25; 25; 25; 25; 25; 25; 25; 25; 25; 25; 25; 25; 25; 25; 25; 25; 25; 25; 25; 25; 25; 25; 25 INJECTION, SOLUTION INTRAMUSCULAR; SUBCUTANEOUS at 08:08

## 2020-09-01 ASSESSMENT — PAIN SCALES - GENERAL: PAINLEVEL: NO PAIN (0)

## 2020-09-01 NOTE — LETTER
9/1/2020   RE: Armando Dc  [address]  [place] [address]    Dear Colleague,    Thank you for referring your patient, Armando Dc, to the [facility]. Please see a copy of my visit note below.    [facility]  September 1, 2020         Assessment and Plan:   Armando Dc is a 45 year old female with primary ciliary dyskinesia and obstructive lung disease.    1.  PCD with moderately severe obstructive lung disease: Armando initiated every other month inhalational ALEK when I last saw her.  This was in hopes of trying to control her Pseudomonas in her sputum.  She does report symptomatic improvement since doing this.  She feels that she is much less short of breath and has decreased cough and sputum production.  The one concern related to its use is the time.  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  She was able to work from home during the last few months.  This allowed her to do her vest therapy twice daily along with the inhalational medication.  I would like her to continue on inhalational ALEK has she continues to show evidence of Pseudomonas in her sputum.  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  At this time I recommended:  --Armando continue inhalational ALEK.  I will pursue ALEK pod inhaler through her insurance.  I will ask and our respiratory therapists to do a virtual visit with her to teach her how to use it if it does become available to her  --Armando should continue to do her vest therapy twice daily  --Armando will be moving to the [facility].  She is going to transition her care to her previous pulmonologist.  I will make certain that she has medications available to her until that time.    2.  Sinusitis: Armando has longstanding sinus symptoms.  She  currently feels that they are stable.  Is a little bit more difficult to breathe while wearing a mask.    3.  Psychosocial: Armando again is just completing her fellowship and adolescent psychiatry.  She is moving to [place] for a full-time position.  She reports that her  is doing well in his job position.  Her 18-month-old baby is also doing well to.  She is excited about this opportunity.    4.  Polyuria and dysuria: Armando is describing some urinary symptomatology.  We will obtain a UA and labs today.  I have been asked her to follow-up with her primary care physician for evaluation of these labs.    Immunizations: needs pneumovax    Lny Hernández MD MPH  Associate Professor of Medicine  Pulmonary, Allergy, Critical Care and Sleep Medicine      Interval History:     Armando reports decreased cough and sputum production.  She feels that her shortness of breath is better.  She has been doing 2 vest per day.         Review of Systems:     CONSTITUTIONAL: no fever, no chills, no sweats, no change in weight, improvement in energy, no change in appetite    INTEGUMENTARY/SKIN: no rash, no obvious new lesions    ENT/MOUTH: no sore throat, no new sinus pain, no new nasal drainage, no ear ringing     RESPIRATORY: see interval history    CV: no chest pain, no palpitations, no peripheral edema, no orthopnea, no PND    GI: no nausea, no vomiting, no change in stools, + GERD    : no dysuria, + frequency    MUSCULOSKELETAL: no myalgias, no arthralgias    ENDOCRINE: no excessive thirst    NEURO:  No headache, no numbness, no tingling    SLEEP: no issues    PSYCHIATRIC: mood  All right          Past Medical and Surgical History:     Past Medical History:   Diagnosis Date     Carpal tunnel syndrome     right     Chronic sinusitis      Diabetes (H)     gestational diabetes diet controlled     Endometriosis     left ovary     Infertility      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,      Pseudomonas aeruginosa  colonization      Reactive airway disease      Situs inversus      Uncomplicated asthma     Reactive airway disease     Past Surgical History:   Procedure Laterality Date     ADENOIDECTOMY        SECTION N/A 2019    Procedure: Primary  Section;  Surgeon: Lyndsey Shipley MD;  Location: [facility] L[facility]D     LAPAROSCOPIC CYSTECTOMY OVARIAN (BENIGN) Left 2017    Procedure: LAPAROSCOPIC CYSTECTOMY OVARIAN (BENIGN);  Left  Laparoscopy Ovarian Cystotomy, Hysteroscopy And Polpectomy With Myosure ;  Surgeon: Zayra Roberts MD;  Location: [facility] OR     OPERATIVE HYSTEROSCOPY WITH MORCELLATOR N/A 2017    Procedure: OPERATIVE HYSTEROSCOPY WITH MORCELLATOR;;  Surgeon: Zayra Roberts MD;  Location: [facility] OR     TONSILLECTOMY       TONSILLECTOMY & ADENOIDECTOMY      8 years old     TRANSVAGINAL RETRIEVAL OVUM Bilateral 3/3/2016    IVF Procedure: TRANSVAGINAL RETRIEVAL OVUM;  Surgeon: Sunshine Gilliam MD;  Location: [facility] SD     uterine polyp             Family History:     Family History   Problem Relation Age of Onset     Hyperlipidemia Mother      Diabetes Mother      Hypertension Mother      Hyperlipidemia Father      Hypertension Father      Diabetes Maternal Grandmother      Diabetes Maternal Grandfather             Social History:     Social History     Socioeconomic History     Marital status:      Spouse name: Not on file     Number of children: 0     Years of education: Not on file     Highest education level: Not on file   Occupational History     Occupation: physician     Employer: [employer]     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   Social Needs     Financial resource strain: Not on file     Food insecurity     Worry: Not on file     Inability: Not on file     Transportation needs     Medical: Not on file     Non-medical: Not on file   Tobacco Use     Smoking status: Never Smoker     Smokeless tobacco: Never Used   Substance and  "Sexual Activity     Alcohol use: No     Alcohol/week: 0.0 standard drinks     Drug use: No     Sexual activity: Not Currently     Partners: Male     Birth control/protection: None   Lifestyle     Physical activity     Days per week: Not on file     Minutes per session: Not on file     Stress: Not on file   Relationships     Social connections     Talks on phone: Not on file     Gets together: Not on file     Attends Tenriism service: Not on file     Active member of club or organization: Not on file     Attends meetings of clubs or organizations: Not on file     Relationship status: Not on file     Intimate partner violence     Fear of current or ex partner: Not on file     Emotionally abused: Not on file     Physically abused: Not on file     Forced sexual activity: Not on file   Other Topics Concern     Parent/sibling w/ CABG, MI or angioplasty before 65F 55M? Not Asked   Social History Narrative    Lives with  ().  Immigrated in 2006 from [place] ([facility]). No children.  Parents in [place], 2 younger sisters and a brother, she is eldest.             Medications:     Current Outpatient Medications   Medication     acetaminophen (TYLENOL) 325 MG tablet     acetylcysteine (MUCOMYST) 20 % neb solution     acetylcysteine (MUCOMYST) 20 % nebulizer solution     albuterol (PROAIR HFA/PROVENTIL HFA/VENTOLIN HFA) 108 (90 Base) MCG/ACT inhaler     albuterol (PROVENTIL) (2.5 MG/3ML) 0.083% neb solution     fluticasone-vilanterol (BREO ELLIPTA) 200-25 MCG/INH inhaler     pantoprazole (PROTONIX) 40 MG EC tablet     Respiratory Therapy Supplies (NEBULIZER) SUZANNA     Syringe/Needle, Disp, (SYRINGE LUER LOCK) 20G X 1-1/2\" 5 ML MISC     Water For Injection Sterile SOLN     Cholecalciferol (VITAMIN D PO)     ibuprofen (ADVIL/MOTRIN) 600 MG tablet     tobramycin, PF, (ALEK) 300 MG/5ML neb solution     No current facility-administered medications for this visit.             Physical Exam:   /75   Pulse 75   Temp " 97.9  F (36.6  C) (Oral)   Resp 18   Wt 61.7 kg (136 lb)   SpO2 96%   BMI 23.34 kg/m      Constitutional:   Awake, alert and in no apparent distress     Eyes:   nonicteric     ENT:   Mask in place     Neck:   Supple without supraclavicular or cervical lymphadenopathy     Lungs:   Good air flow.  No crackles. No rhonchi.  rare wheezes.     Cardiovascular:   Normal S1 and S2.  RRR.  No murmur, gallop or rub.     Abdomen:   NABS, soft, nontender, nondistended.      Musculoskeletal:   No edema, no digital clubbing present     Neurologic:   Alert and conversant.     Skin:   Warm, dry.  No rash on limited exam.             Data:   All laboratory and imaging data reviewed.    Cystic Fibrosis Culture  Specimen Description   Date Value Ref Range Status   03/04/2020 Sputum  Final   03/04/2020 Sputum  Final   09/11/2019 Sputum  Final   09/11/2019 Sputum  Final    Culture Micro   Date Value Ref Range Status   03/04/2020 Moderate growth  Normal otto    Final   03/04/2020 (A)  Final    Moderate growth  Pseudomonas aeruginosa, mucoid strain     09/11/2019 Moderate growth  Normal otto    Final   09/11/2019 (A)  Final    Moderate growth  Haemophilus influenzae  Beta lactamase negative  Beta-lactamase negative Haemophilus influenzae are usually susceptible to ampicillin,   amoxacillin/clavulanic acid, levofloxacin, and 3rd generation cephalosporins, such as   ceftriaxone.     09/11/2019 (A)  Final    Light growth  Pseudomonas aeruginosa, mucoid strain          Recent Results (from the past 168 hour(s))   General PFT Lab (Please always keep checked)    Collection Time: 09/01/20  8:10 AM   Result Value Ref Range    FVC-Pred 3.27 L    FVC-Pre 2.44 L    FVC-%Pred-Pre 74 %    FEV1-Pre 1.45 L    FEV1-%Pred-Pre 53 %    FEV1FVC-Pred 82 %    FEV1FVC-Pre 59 %    FEFMax-Pred 6.84 L/sec    FEFMax-Pre 4.82 L/sec    FEFMax-%Pred-Pre 70 %    FEF2575-Pred 2.83 L/sec    FEF2575-Pre 0.66 L/sec    IFM6744-%Pred-Pre 23 %    ExpTime-Pre 10.57 sec     FIFMax-Pre 2.64 L/sec    FEV1FEV6-Pred 83 %    FEV1FEV6-Pre 62 %   UA with Microscopic reflex to Culture (Tiffany Naiud; [facility])    Collection Time: 09/01/20  8:20 AM    Specimen: Midstream Urine   Result Value Ref Range    Color Urine Yellow     Appearance Urine Slightly Cloudy     Glucose Urine Negative NEG^Negative mg/dL    Bilirubin Urine Negative NEG^Negative    Ketones Urine Negative NEG^Negative mg/dL    Specific Gravity Urine 1.014 1.003 - 1.035    Blood Urine Negative NEG^Negative    pH Urine 5.0 5.0 - 7.0 pH    Protein Albumin Urine Negative NEG^Negative mg/dL    Urobilinogen mg/dL 0.0 0.0 - 2.0 mg/dL    Nitrite Urine Negative NEG^Negative    Leukocyte Esterase Urine Negative NEG^Negative    Source Midstream Urine     WBC Urine 2 0 - 5 /HPF    RBC Urine 1 0 - 2 /HPF    Squamous Epithelial /HPF Urine 2 (H) 0 - 1 /HPF    Mucous Urine Present (A) NEG^Negative /LPF   Basic metabolic panel    Collection Time: 09/01/20  8:40 AM   Result Value Ref Range    Sodium 140 133 - 144 mmol/L    Potassium 4.3 3.4 - 5.3 mmol/L    Chloride 110 (H) 94 - 109 mmol/L    Carbon Dioxide 26 20 - 32 mmol/L    Anion Gap 4 3 - 14 mmol/L    Glucose 108 (H) 70 - 99 mg/dL    Urea Nitrogen 14 7 - 30 mg/dL    Creatinine 0.69 0.52 - 1.04 mg/dL    GFR Estimate >90 >60 mL/min/[1.73_m2]    GFR Estimate If Black >90 >60 mL/min/[1.73_m2]    Calcium 8.6 8.5 - 10.1 mg/dL   Hemoglobin A1c    Collection Time: 09/01/20  8:40 AM   Result Value Ref Range    Hemoglobin A1C 5.8 (H) 0 - 5.6 %     PFT: Moderately-severe obstructive lung disease.  When compared to 3/4/2020, the FVC has increased.  The decrease in FVC may represent air trapping v. restrictive physiology.  Lung volumes would be necessary to determine.    Again, thank you for allowing me to participate in the care of your patient.      Sincerely,      Lyn Hernández MD

## 2020-09-01 NOTE — NURSING NOTE
Chief Complaint   Patient presents with     RECHECK     PCD     Medications reviewed and updated.  Vitals taken  Alisha Prado CMA  Given patient [id number] in left deltoid IM. Cleaned site with alcohol, and applied bandage. Pt tolerated injection well  Alisha Prado CMA

## 2020-09-03 ENCOUNTER — TELEPHONE (OUTPATIENT)
Dept: PULMONOLOGY | Facility: CLINIC | Age: 45
End: 2020-09-03

## 2020-09-03 NOTE — TELEPHONE ENCOUNTER
Called patient on behalf of Dr Hernández to inform patient that recent labs were reviewed and looked good. A1c is stable, urine was clean. Also informed patient that our pharmacist liaison is currently working on getting approval for ALEK podhaler. Patient verbalized understanding. No questions at this time.

## 2020-09-09 ENCOUNTER — MYC MEDICAL ADVICE (OUTPATIENT)
Dept: OBGYN | Facility: CLINIC | Age: 45
End: 2020-09-09

## 2020-09-22 ENCOUNTER — MYC MEDICAL ADVICE (OUTPATIENT)
Dept: PULMONOLOGY | Facility: CLINIC | Age: 45
End: 2020-09-22

## 2020-09-23 DIAGNOSIS — J47.9 BRONCHIECTASIS WITHOUT COMPLICATION (H): Primary | ICD-10-CM

## 2020-09-23 DIAGNOSIS — Q34.8 PCD (PRIMARY CILIARY DYSKINESIA): ICD-10-CM

## 2020-09-23 DIAGNOSIS — Z22.39 PSEUDOMONAS AERUGINOSA COLONIZATION: ICD-10-CM

## 2020-09-23 RX ORDER — TOBRAMYCIN 28 MG/1
4 CAPSULE ORAL; RESPIRATORY (INHALATION) EVERY 12 HOURS
Qty: 224 CAPSULE | Refills: 3 | Status: SHIPPED | OUTPATIENT
Start: 2020-09-23

## 2020-12-27 ENCOUNTER — HEALTH MAINTENANCE LETTER (OUTPATIENT)
Age: 45
End: 2020-12-27

## 2021-03-16 NOTE — PLAN OF CARE
Data: Vital signs within normal limits. Postpartum checks within normal limits - see flow record. Patient eating and drinking normally. Patient able to empty bladder independently and is up ambulating. No apparent signs of infection. Incision healing well. Patient performing self cares and is able to care for infant.  Action: Patient medicated during the shift for pain and cramping. See MAR. Patient reassessed within 1 hour after each medication and pain was improved - patient stated she was comfortable. Patient education done about pumping and pain control. See flow record.  Response: Positive attachment behaviors observed toward  infant in NICU. Support persons is present.   Plan: Anticipate discharge transfer to [facility] this morning. Report to Heidi DICKSON at 1038   yes

## 2021-04-24 ENCOUNTER — HEALTH MAINTENANCE LETTER (OUTPATIENT)
Age: 46
End: 2021-04-24

## 2021-05-27 ENCOUNTER — ORDER TRANSCRIPTION (OUTPATIENT)
Dept: ADMINISTRATIVE | Facility: HOSPITAL | Age: 46
End: 2021-05-27

## 2021-05-27 DIAGNOSIS — J47.9 BRONCHIECTASIS WITHOUT ACUTE EXACERBATION (HCC): Primary | ICD-10-CM

## 2021-05-27 DIAGNOSIS — Q34.8 PCD (PRIMARY CILIARY DYSKINESIA): ICD-10-CM

## 2021-05-27 DIAGNOSIS — Z11.59 ENCOUNTER FOR SCREENING FOR OTHER VIRAL DISEASES: ICD-10-CM

## 2021-05-27 DIAGNOSIS — Z01.818 PREPROCEDURAL EXAMINATION: ICD-10-CM

## 2021-06-21 NOTE — PROGRESS NOTES
[facility]  Genetic Counseling Consult    Patient: Armando Dc YOB: 1975   Date of Service: 2018      Armando Dc was seen at [facility] [place] for genetic consultation to discuss the options for screening and testing for fetal chromosome abnormalities.  The indication for genetic counseling is advanced maternal age.        Impression/Plan:   1.  Armando had an ultrasound and blood draw for first trimester screening.  Results are expected within 3-5 days, and will be available in EPIC.  We will contact her to discuss the results, and a copy will be forwarded to the office of the referring OB provider.Of note, this pregnancy was conceived by IVF and embryos did have preimplantation genetic screening prior to transfer. I discussed with the patient that PGS is a screening test and that a normal PGS result significantly reduces but does not eliminate the possibility of aneuploidy. Invasive testing (such as amniocentesis) is recommended if the patient desires definitive information regarding aneuploidy in pregnancy. We discussed the limitation of NIPT following PGS and that pursuing multiple screening tests may result in conflicting information in which case the option of invasive testing would be reviewed again.     2.  Maternal serum AFP (single marker screen) is recommended after 15 weeks to screen for open neural tube defects. A quad screen should not be performed.    3.  An 18-20 week comprehensive ultrasound is standard of care for all women 35 or older at delivery.    Pregnancy History:   /Parity:    Age at Delivery: 44 y.o.  LO: 2019, by Patient Reported  Gestational Age: 12w1d    No significant complications or exposures were reported in the current pregnancy.    Medical History:   Armando s reported medical history is not expected to impact pregnancy management or risks to fetal development.       Family  History:   A three-generation pedigree was obtained, and is scanned under the  Media  tab.   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 Maxim has not had genetic testing for Kartagener syndrome. Kartagener syndrome is also known as primary ciliary dyskinesia and is caused by mutations in several genes. Approximately 30% of individuals with Kartagener syndrome have mutations in either the DNAI1 or DNAH5 genes. In many individuals with Kartagener syndrome an identifiable gene mutation is not known. Kartagener syndrome is associated with autosomal recessive inheritance. For Armando's current pregnancy to be at risk her partner would also have to be a carrier for Kartagener syndrome. We discussed the potential for genetic testing for Armando if she would pursue prenatal diagnostic testing. She declined genetic testing for Kartagener syndrome. A comprehensive level II ultrasound is able to help screen for situs inversus which could be associated with this condition.     Armando's  reported that his sister had a history of multiple pregnancy losses and a son that  in the  period is multiple congenital anomalies. This was a consanguineous marriage.  If these losses were related to the consanguineous relationship, it is unlikely there would be risks for other family members. More information regarding the diagnosis for their nephew and his sister's losses would be helpful to determine if there is a risk for other family members.    Otherwise, the reported family history is negative for multiple miscarriages, stillbirths, birth defects, mental retardation, known genetic conditions, and consanguinity.       Carrier Screening:   The patient reports that she and the father of the pregnancy have  ancestry:      The hemoglobinopathies are a group of genetic  blood diseases that occur with increased frequency in individuals of  ancestry and carrier screening for these conditions is available.  Carrier screening for the hemoglobinopathies includes a CBC with red blood cell indices, a ferritin level, and a quantitative hemoglobin electrophoresis or HPLC.  In addition,  screening in the [facility] includes many of the hemoglobinopathies.      Expanded carrier screening for mutations in a large panel of genes associated with autosomal recessive conditions including cystic fibrosis, spinal muscular atrophy, and others, is now available.      The patient has declined the carrier screening options reviewed today.       Risk Assessment for Chromosome Conditions:   We explained that the risk for fetal chromosome abnormalities increases with maternal age. We discussed specific features of common chromosome abnormalities, including Down syndrome, trisomy 13, trisomy 18, and sex chromosome trisomies.      - At age 44 at midtrimester, the risk to have a baby with Down syndrome is 1 in 31.     - At age 44 at midtrimester, the risk to have a baby with any chromosome abnormality is 1 in 19.      As you know this pregnancy was conceived by IVF with preimplantation genetic screening. Normal PGS results reduce the risk for aneuploidy in the pregnancy.          Testing Options:   We discussed the following options:  First trimester screening    First trimester ultrasound with nuchal translucency and nasal bone assessments, maternal plasma hCG, HOLGER-A, and AFP measurement    Screens for fetal trisomy 21, trisomy 13, and trisomy 18    Cannot screen for open neural tube defects; maternal serum AFP after 15 weeks is recommended    Non-invasive Prenatal Testing (NIPT)    Maternal plasma cell-free DNA testing; first trimester ultrasound with nuchal translucency and nasal bone assessment is recommended, when appropriate    Screens for fetal trisomy 21, trisomy 13, trisomy 18,  and sex chromosome aneuploidy    Cannot screen for open neural tube defects; maternal serum AFP after 15 weeks is recommended      Genetic Amniocentesis    Invasive procedure typically performed in the second trimester by which amniotic fluid is obtained for the purpose of chromosome analysis and/or other prenatal genetic analysis    Diagnostic results; >99% sensitivity for fetal chromosome abnormalities    AFAFP measurement tests for open neural tube defects    Comprehensive (Level II) ultrasound: Detailed ultrasound performed between 18-22 weeks gestation to screen for major birth defects and markers for aneuploidy.    We reviewed the benefits and limitations of this testing.  Screening tests provide a risk assessment specific to the pregnancy for certain fetal chromosome abnormalities, but cannot definitively diagnose or exclude a fetal chromosome abnormality.  Follow-up genetic counseling and consideration of diagnostic testing is recommended with any abnormal screening result. Diagnostic tests carry inherent risks- including risk of miscarriage- that require careful consideration.  These tests can detect fetal chromosome abnormalities with greater than 99% certainty.  Results can be compromised by maternal cell contamination or mosaicism, and are limited by the resolution of cytogenetic G-banding technology.  There is no screening nor diagnostic test that can detect all forms of birth defects or mental disability.     It was a pleasure to be involved with [facility] s care. Face-to-face time of the meeting was 35 minutes.    Zayra Mancera MS, [facility]  Maternal Fetal Medicine  [facility]  Phone:[phone]  Phone: [phone]  Email: [email or web address]

## 2021-06-21 NOTE — PROGRESS NOTES
"Please see \"Imaging\" tab under Chart Review for full report.    Geeta Felton MD  Maternal Fetal Medicine    "

## 2021-09-12 ENCOUNTER — LAB ENCOUNTER (OUTPATIENT)
Dept: LAB | Facility: HOSPITAL | Age: 46
End: 2021-09-12
Attending: PEDIATRICS
Payer: COMMERCIAL

## 2021-09-12 DIAGNOSIS — Z11.59 ENCOUNTER FOR SCREENING FOR OTHER VIRAL DISEASES: ICD-10-CM

## 2021-09-12 DIAGNOSIS — Z01.818 PREPROCEDURAL EXAMINATION: ICD-10-CM

## 2021-09-13 LAB — SARS-COV-2 RNA RESP QL NAA+PROBE: NOT DETECTED

## 2021-09-15 ENCOUNTER — RT VISIT (OUTPATIENT)
Dept: RESPIRATORY THERAPY | Facility: HOSPITAL | Age: 46
End: 2021-09-15
Attending: PEDIATRICS
Payer: COMMERCIAL

## 2021-09-15 DIAGNOSIS — J47.9 BRONCHIECTASIS WITHOUT ACUTE EXACERBATION (HCC): ICD-10-CM

## 2021-09-15 DIAGNOSIS — Q34.8 PCD (PRIMARY CILIARY DYSKINESIA): ICD-10-CM

## 2021-09-15 PROCEDURE — 94729 DIFFUSING CAPACITY: CPT

## 2021-09-15 PROCEDURE — 94060 EVALUATION OF WHEEZING: CPT

## 2021-09-15 PROCEDURE — 94726 PLETHYSMOGRAPHY LUNG VOLUMES: CPT

## 2021-09-17 NOTE — PROCEDURES
Findings:  Postbronchodilator FEV1 is 1.32L, 53% predicted. Postbronchodilator FVC is 2.28L, 77% predicted. FEV1/ FVC ratio is 0.58. There is no significant bronchodilator response after   administration of albuterol.    The flow-volume loop demonstrates

## 2021-09-22 ENCOUNTER — HOSPITAL ENCOUNTER (OUTPATIENT)
Dept: MAMMOGRAPHY | Age: 46
Discharge: HOME | End: 2021-09-22
Attending: INTERNAL MEDICINE
Payer: COMMERCIAL

## 2021-09-22 DIAGNOSIS — Z12.31 VISIT FOR SCREENING MAMMOGRAM: ICD-10-CM

## 2021-09-22 DIAGNOSIS — Z12.31 ENCOUNTER FOR SCREENING MAMMOGRAM FOR MALIGNANT NEOPLASM OF BREAST: ICD-10-CM

## 2021-09-22 PROCEDURE — 77063 BREAST TOMOSYNTHESIS BI: CPT | Performed by: INTERNAL MEDICINE

## 2021-09-22 PROCEDURE — 77067 SCR MAMMO BI INCL CAD: CPT | Performed by: INTERNAL MEDICINE

## 2021-10-03 ENCOUNTER — HEALTH MAINTENANCE LETTER (OUTPATIENT)
Age: 46
End: 2021-10-03

## 2021-10-20 ENCOUNTER — HOSPITAL ENCOUNTER (OUTPATIENT)
Dept: MAMMOGRAPHY | Facility: HOSPITAL | Age: 46
Discharge: HOME | End: 2021-10-20
Attending: INTERNAL MEDICINE
Payer: COMMERCIAL

## 2021-10-20 DIAGNOSIS — R92.2 INCONCLUSIVE MAMMOGRAM: ICD-10-CM

## 2021-10-20 PROCEDURE — 77061 BREAST TOMOSYNTHESIS UNI: CPT | Performed by: INTERNAL MEDICINE

## 2021-10-20 PROCEDURE — 77065 DX MAMMO INCL CAD UNI: CPT | Performed by: INTERNAL MEDICINE

## 2022-01-23 ENCOUNTER — HEALTH MAINTENANCE LETTER (OUTPATIENT)
Age: 47
End: 2022-01-23

## 2022-02-17 PROBLEM — O09.519 AMA (ADVANCED MATERNAL AGE) PRIMIGRAVIDA 35+: Status: RESOLVED | Noted: 2018-10-17 | Resolved: 2019-06-20

## 2022-05-15 ENCOUNTER — HEALTH MAINTENANCE LETTER (OUTPATIENT)
Age: 47
End: 2022-05-15

## 2022-07-06 ENCOUNTER — ORDER TRANSCRIPTION (OUTPATIENT)
Dept: ADMINISTRATIVE | Facility: HOSPITAL | Age: 47
End: 2022-07-06

## 2022-07-06 DIAGNOSIS — J45.909 ASTHMA: Primary | ICD-10-CM

## 2022-07-06 DIAGNOSIS — Z11.59 ENCOUNTER FOR SCREENING FOR OTHER VIRAL DISEASES: ICD-10-CM

## 2022-07-06 DIAGNOSIS — Z01.818 PREOP EXAMINATION: Primary | ICD-10-CM

## 2022-07-10 ENCOUNTER — LAB ENCOUNTER (OUTPATIENT)
Dept: LAB | Facility: HOSPITAL | Age: 47
End: 2022-07-10
Attending: INTERNAL MEDICINE
Payer: COMMERCIAL

## 2022-07-10 DIAGNOSIS — Z01.818 PREOP EXAMINATION: ICD-10-CM

## 2022-07-10 DIAGNOSIS — Z11.59 ENCOUNTER FOR SCREENING FOR OTHER VIRAL DISEASES: ICD-10-CM

## 2022-07-11 LAB — SARS-COV-2 RNA RESP QL NAA+PROBE: NOT DETECTED

## 2022-09-11 ENCOUNTER — HEALTH MAINTENANCE LETTER (OUTPATIENT)
Age: 47
End: 2022-09-11

## 2023-04-30 ENCOUNTER — HEALTH MAINTENANCE LETTER (OUTPATIENT)
Age: 48
End: 2023-04-30

## 2023-06-03 ENCOUNTER — HEALTH MAINTENANCE LETTER (OUTPATIENT)
Age: 48
End: 2023-06-03

## 2024-11-18 NOTE — MR AVS SNAPSHOT
After Visit Summary   8/29/2017    Armando Dc    MRN: [id number]           Patient Information     Date Of Birth          1975        Visit Information        Provider Department      8/29/2017 10:40 AM Lyn Hernández MD [facility]        Today's Diagnoses     PCD (primary ciliary dyskinesia)    -  1    Kartagener's syndrome          Care Instructions    Self-Care Plan       MRN: [id number]   Clinic Date: August 29, 2017   Patient: Armando Dc     Annual Studies:   No results found for: IGG  No results found for: INS  There are no preventive care reminders to display for this patient.      Pulmonary Function Tests  FEV1: amount of air you can blow out in 1 second  FVC: total amount of air you can take in and blow out    Your Goals:         PFT Latest Ref Rng & Units 8/29/2017   FVC L 2.79   FEV1 L 1.53   FVC% % 84   FEV1% % 55          Airway Clearance: The Most Important Way to Keep Your Lungs Healthy  Vest Settings:    Hill-Rom Frequencies: 8, 9, 10 Pressure 10 Then, Frequencies 18, 19, 20 Pressure 6      RespirTech: Quick Start with Pressure of     Do each frequency for 5 minutes; Deflate vest after each frequency & cough 3 times before beginning the next setting.    Vest and Neb Therapy should be done 1-2 times/day.    Good Nutrition Can Improve Lung Function and Overall Health     Take ALL of your vitamins with food     Take 1/2 of your enzymes before EVERY meal/snack and the other 1/2 mid-meal/snack    Wt Readings from Last 3 Encounters:   08/29/17 62.9 kg (138 lb 10.7 oz)   05/16/17 63.5 kg (140 lb)   02/16/17 63.5 kg (140 lb)       Body mass index is 24.18 kg/(m^2).         National CF Foundation Recommendations for BMI in CF Adults: Women: at least 22 Men: at least 23        Controlling Blood Sugars Helps Prevent Lung Infections & Improves Nutrition  Test blood sugar:     In the morning before eating (goal is )     2 hours after a meal  (goal is less than 150)     When pre-meal glucose is greater than 150 add correction     At bedtime (if less than 100 eat a snack with 15 grams of carbohydrates  Last A1C Results:   Hemoglobin A1C   Date Value Ref Range Status   09/20/2016 5.8 4.3 - 6.0 % Final         If diabetic, measure A1C every 6 months. Goal: Under 7%    Staying Healthy    Research:  If you are interested in learning about research opportunities or have questions, please contact Adriana Vásquez at [phone] or [email or web address].      CF Foundation:  Compass is a personalized resource service to help you with the insurance, financial, legal and other issues you are facing.  It's free, confidential and available to anyone with CF.  Ask your  for more information or contact Compass directly at [address]-[facility] ([phone]) or compass@cff.org, or learn more at cff.org/compass.          RECOMMENDATIONS: Will provide letter.  Change to pantoprazole from prilosec.  It is a category B.  Great job!  Good luck with the interview.    YOUR GOAL:  To get a fellowship and enjoy [facility].      CF Nurse Line:  Tami Lao: [phone]   Sybil Peng, RT: [phone]     Delphine Angulo: [phone] or Milena Ferreiraicians: [phone]   Sharon Stacy, Diabetes Nurse: [phone]      Akilah Guzman: [phone] or Olga Greer [phone], Social Workers   [email or web address]                   Follow-ups after your visit        Follow-up notes from your care team     Return in about 3 months (around 11/29/2017).      Your next 10 appointments already scheduled     Sep 01, 2017  7:00 AM CDT   (Arrive by 6:45 AM)   New Patient Visit with Blake Sifuentes MD   [facility] ([facility])    [address] [place] [id number] [phone]            Sep 07, 2017 11:45 AM CDT   Office Visit with Zayra Roberts MD   [facility]  [facility])    [address] [place] [address] [address]  [facility] [id number] [phone]           Bring a current list of meds and any records pertaining to this visit. For Physicals, please bring immunization records and any forms needing to be filled out. Please arrive 10 minutes early to complete paperwork.            Dec 05, 2017 10:00 AM CST   CF LOOP with [facility] PFL CF   [facility] ([facility])    [address] [address] [phone]            Dec 05, 2017 10:40 AM CST   (Arrive by 10:25 AM)   RETURN CYSTIC FIBROSIS VISIT with Lyn Hernández MD   [facility] ([facility])    [address] [place] [id number] [phone]              Future tests that were ordered for you today     Open Future Orders        Priority Expected Expires Ordered    Sputum Culture Aerobic Bacterial Routine  9/28/2017 8/29/2017    Gram stain Routine  10/28/2017 8/29/2017            Who to contact     If you have questions or need follow up information about today's clinic visit or your schedule please contact [facility] directly at [phone].  Normal or non-critical lab and imaging results will be communicated to you by JumpChathart, letter or phone within 4 business days after the clinic has received the results. If you do not hear from us within 7 days, please contact the clinic through JumpChathart or phone. If you have a critical or abnormal lab result, we will notify you by phone as soon as possible.  Submit refill requests through SmartCloud or call your pharmacy and they will forward the refill request to us. Please allow 3 business days for your refill to be completed.          Additional Information About Your Visit        SmartCloud Information     SmartCloud gives you secure access to your electronic health record. If you  "see a primary care provider, you can also send messages to your care team and make appointments. If you have questions, please call your primary care clinic.  If you do not have a primary care provider, please call [phone] and they will assist you.        Care EveryWhere ID     This is your Care EveryWhere ID. This could be used by other organizations to access your [place] medical records  [id number]        Your Vitals Were     Pulse Temperature Respirations Height Pulse Oximetry BMI (Body Mass Index)    79 98.1  F (36.7  C) (Oral) 16 1.613 m (5' 3.5\") 96% 24.18 kg/m2       Blood Pressure from Last 3 Encounters:   08/29/17 131/81   05/16/17 118/87   02/16/17 117/79    Weight from Last 3 Encounters:   08/29/17 62.9 kg (138 lb 10.7 oz)   05/16/17 63.5 kg (140 lb)   02/16/17 63.5 kg (140 lb)              We Performed the Following     Throat Culture Aerobic Bacterial        Primary Care Provider    None Specified       No primary provider on file.        Equal Access to Services     [facility]: Hadii bernardo Godinez, sujatha angel, evan weiss, tee mead . So [facility] [phone].    ATENCIÓN: Si habla español, tiene a guevara disposición servicios gratuitos de asistencia lingüística. Llame al [phone].    We comply with applicable federal civil rights laws and [place] laws. We do not discriminate on the basis of race, color, national origin, age, disability sex, sexual orientation or gender identity.            Thank you!     Thank you for choosing [facility]  for your care. Our goal is always to provide you with excellent care. Hearing back from our patients is one way we can continue to improve our services. Please take a few minutes to complete the written survey that you may receive in the mail after your visit with us. Thank you!             Your Updated Medication List - Protect others around you: Learn how to " "safely use, store and throw away your medicines at www.disposemymeds.org.          This list is accurate as of: 8/29/17 11:35 AM.  Always use your most recent med list.                   Brand Name Dispense Instructions for use Diagnosis    ACAPELLA Misc      use twice daily        acetylcysteine 20 % nebulizer solution    MUCOMYST    120 mL    Inhale 2 mLs into the lungs 2 times daily    Kartagener's syndrome, Situs inversus, Pseudomonas aeruginosa colonization       * albuterol (2.5 MG/3ML) 0.083% neb solution     180 mL    USE ONE VIAL IN NEBULIZER TWICE DAILY    Bronchiectasis without acute exacerbation (H)       * albuterol 108 (90 BASE) MCG/ACT Inhaler    PROAIR HFA/PROVENTIL HFA/VENTOLIN HFA    1 Inhaler    Inhale 2 puffs into the lungs every 6 hours as needed for shortness of breath / dyspnea or wheezing    Kartagener syndrome       budesonide-formoterol 160-4.5 MCG/ACT Inhaler    SYMBICORT    1 Inhaler    Inhale 2 puffs into the lungs 2 times daily    Kartagener syndrome, Kartagener's syndrome       nebulizer Sindy      Nebulizer compressor - use with ALEK as directed        Syringe Luer Lock 20G X 1-1/2\" 5 ML Misc     60 each    1 each 2 times daily    Kartagener syndrome, Reactive airway disease       Water For Injection Sterile Soln     250 mL    Inject 4 mLs into the vein 2 times daily    Kartagener syndrome, Reactive airway disease       * Notice:  This list has 2 medication(s) that are the same as other medications prescribed for you. Read the directions carefully, and ask your doctor or other care provider to review them with you.      " Statement Selected

## (undated) DEVICE — TUBING SYS AQUILEX BLUE INFLOW AQL-110 YLW OUTFLOW AQL-111

## (undated) DEVICE — STOCKING SLEEVE COMPRESSION CALF LG

## (undated) DEVICE — SOL WATER IRRIG 1000ML BOTTLE 07139-09

## (undated) DEVICE — CATH TRAY FOLEY SURESTEP 16FR WDRAIN BAG STLK LATEX A300316A

## (undated) DEVICE — ESU GROUND PAD UNIVERSAL W/O CORD

## (undated) DEVICE — PAD CHUX UNDERPAD 30X30"

## (undated) DEVICE — SUCTION CANISTER MEDIVAC LINER 1500ML W/LID 65651-515

## (undated) DEVICE — BARRIER SEPRAFILM 5X6" SINGLE SHEET 4301-02

## (undated) DEVICE — SOL NACL 0.9% IRRIG 1000ML BOTTLE 2F7124

## (undated) DEVICE — TUBING C02 INSUFFLATION LAP FILTER HEATER 6198

## (undated) DEVICE — JELLY LUBRICATING SURGILUBE 2OZ TUBE

## (undated) DEVICE — LIGHT HANDLE X2

## (undated) DEVICE — STRAP KNEE/BODY 31143004

## (undated) DEVICE — SPECIMEN BAG BEMIS HI FLOW SUCTION WHITE SOCK 533810

## (undated) DEVICE — DRAPE SETUP C-SECTION INVISISHIELD 54X90" DYNJE5600

## (undated) DEVICE — SOL NACL 0.9% IRRIG 3000ML BAG 2B7477

## (undated) DEVICE — SU VICRYL 0 CT-1 36" J346H

## (undated) DEVICE — PREP CHLORAPREP 26ML TINTED ORANGE  260815

## (undated) DEVICE — DEVICE TISSUE REMOVAL HYSTEROSCOPIC MYOSURE LITE 30-401LITE

## (undated) DEVICE — SUCTION MANIFOLD DORNOCH ULTRA CART UL-CL500

## (undated) DEVICE — ENDO TROCAR FIRST ENTRY KII FIOS ADV FIX 05X100MM CFF03

## (undated) DEVICE — GLOVE PROTEXIS BLUE W/NEU-THERA 6.5  2D73EB65

## (undated) DEVICE — ENDO POUCH GOLD 10MM ECATCH 173050G

## (undated) DEVICE — GLOVE PROTEXIS W/NEU-THERA 6.0  2D73TE60

## (undated) DEVICE — SU VICRYL 3-0 CTX 36" UND J980H

## (undated) DEVICE — ESU PENCIL W/HOLSTER E2350H

## (undated) DEVICE — PAD PERI INDIV WRAP 11" 2022A

## (undated) DEVICE — SOL NACL 0.9% IRRIG 1000ML BOTTLE 07138-09

## (undated) DEVICE — SU DERMABOND ADVANCED .7ML DNX12

## (undated) DEVICE — LINEN TOWEL PACK X5 5464

## (undated) DEVICE — LINEN GOWN X4 5410

## (undated) DEVICE — SEAL SET MYOSURE ROD LENS SCOPE SINGLE USE 40-902

## (undated) DEVICE — COVER CAMERA IN-LIGHT DISP LT-C02

## (undated) DEVICE — GLOVE ESTEEM POWDER FREE SMT 6.5  2D72PT65

## (undated) DEVICE — PANTIES MESH LG/XLG 2PK 706M2

## (undated) DEVICE — SOL ADH LIQUID BENZOIN SWAB 0.6ML C1544

## (undated) DEVICE — BASIN SET MAJOR

## (undated) DEVICE — SU MONOCRYL 4-0 PS-2 18" UND Y496G

## (undated) DEVICE — Device

## (undated) DEVICE — DRSG STERI STRIP 1/4X3" R1541

## (undated) DEVICE — SUCTION IRR STRYKERFLOW II W/TIP 250-070-520

## (undated) DEVICE — SUCTION CANISTER BEMIS HI FLOW 006772-901

## (undated) DEVICE — GLOVE PROTEXIS BLUE W/NEU-THERA 7.0  2D73EB70

## (undated) DEVICE — BNDG ABDOMINAL BINDER 10X26-50" 08140145

## (undated) DEVICE — CATH TRAY FOLEY 16FR SILICONE 907416

## (undated) DEVICE — PACK C-SECTION LF PL15OTA83B

## (undated) DEVICE — LINEN POUCH DBL 5427

## (undated) DEVICE — SOL NACL 0.9% INJ 1000ML BAG 2B1324X

## (undated) RX ORDER — ONDANSETRON 2 MG/ML
INJECTION INTRAMUSCULAR; INTRAVENOUS
Status: DISPENSED
Start: 2019-04-25

## (undated) RX ORDER — SODIUM CHLORIDE, SODIUM LACTATE, POTASSIUM CHLORIDE, CALCIUM CHLORIDE 600; 310; 30; 20 MG/100ML; MG/100ML; MG/100ML; MG/100ML
INJECTION, SOLUTION INTRAVENOUS
Status: DISPENSED
Start: 2017-11-13

## (undated) RX ORDER — ACETAMINOPHEN 325 MG/1
TABLET ORAL
Status: DISPENSED
Start: 2017-11-13

## (undated) RX ORDER — MORPHINE SULFATE 1 MG/ML
INJECTION, SOLUTION EPIDURAL; INTRATHECAL; INTRAVENOUS
Status: DISPENSED
Start: 2019-04-25

## (undated) RX ORDER — PROPOFOL 10 MG/ML
INJECTION, EMULSION INTRAVENOUS
Status: DISPENSED
Start: 2017-11-13

## (undated) RX ORDER — LIDOCAINE HYDROCHLORIDE 20 MG/ML
INJECTION, SOLUTION EPIDURAL; INFILTRATION; INTRACAUDAL; PERINEURAL
Status: DISPENSED
Start: 2017-11-13

## (undated) RX ORDER — OXYTOCIN/0.9 % SODIUM CHLORIDE 30/500 ML
PLASTIC BAG, INJECTION (ML) INTRAVENOUS
Status: DISPENSED
Start: 2019-04-25

## (undated) RX ORDER — ALBUTEROL SULFATE 90 UG/1
AEROSOL, METERED RESPIRATORY (INHALATION)
Status: DISPENSED
Start: 2017-11-13

## (undated) RX ORDER — PHENAZOPYRIDINE HYDROCHLORIDE 200 MG/1
TABLET, FILM COATED ORAL
Status: DISPENSED
Start: 2017-11-13

## (undated) RX ORDER — BUPIVACAINE HYDROCHLORIDE 2.5 MG/ML
INJECTION, SOLUTION EPIDURAL; INFILTRATION; INTRACAUDAL
Status: DISPENSED
Start: 2017-11-13

## (undated) RX ORDER — ROCURONIUM BROMIDE 50 MG/5 ML
SYRINGE (ML) INTRAVENOUS
Status: DISPENSED
Start: 2017-11-13

## (undated) RX ORDER — ETOMIDATE 2 MG/ML
INJECTION INTRAVENOUS
Status: DISPENSED
Start: 2017-11-13

## (undated) RX ORDER — GLYCOPYRROLATE 0.2 MG/ML
INJECTION, SOLUTION INTRAMUSCULAR; INTRAVENOUS
Status: DISPENSED
Start: 2017-11-13

## (undated) RX ORDER — HYDROMORPHONE HYDROCHLORIDE 1 MG/ML
INJECTION, SOLUTION INTRAMUSCULAR; INTRAVENOUS; SUBCUTANEOUS
Status: DISPENSED
Start: 2017-11-13

## (undated) RX ORDER — FENTANYL CITRATE 50 UG/ML
INJECTION, SOLUTION INTRAMUSCULAR; INTRAVENOUS
Status: DISPENSED
Start: 2019-04-25

## (undated) RX ORDER — EPHEDRINE SULFATE 50 MG/ML
INJECTION, SOLUTION INTRAMUSCULAR; INTRAVENOUS; SUBCUTANEOUS
Status: DISPENSED
Start: 2017-11-13

## (undated) RX ORDER — LABETALOL HYDROCHLORIDE 5 MG/ML
INJECTION, SOLUTION INTRAVENOUS
Status: DISPENSED
Start: 2017-11-13

## (undated) RX ORDER — GABAPENTIN 300 MG/1
CAPSULE ORAL
Status: DISPENSED
Start: 2017-11-13

## (undated) RX ORDER — FENTANYL CITRATE 50 UG/ML
INJECTION, SOLUTION INTRAMUSCULAR; INTRAVENOUS
Status: DISPENSED
Start: 2017-11-13

## (undated) RX ORDER — SODIUM BICARBONATE 42 MG/ML
INJECTION, SOLUTION INTRAVENOUS
Status: DISPENSED
Start: 2017-11-13

## (undated) RX ORDER — ALBUTEROL SULFATE 0.83 MG/ML
SOLUTION RESPIRATORY (INHALATION)
Status: DISPENSED
Start: 2017-11-13